# Patient Record
Sex: MALE | Race: WHITE | Employment: OTHER | URBAN - METROPOLITAN AREA
[De-identification: names, ages, dates, MRNs, and addresses within clinical notes are randomized per-mention and may not be internally consistent; named-entity substitution may affect disease eponyms.]

---

## 2017-02-08 RX ORDER — AMLODIPINE BESYLATE 2.5 MG/1
TABLET ORAL
Qty: 90 TAB | Refills: 1 | Status: SHIPPED | OUTPATIENT
Start: 2017-02-08 | End: 2017-08-07 | Stop reason: SDUPTHER

## 2017-02-15 ENCOUNTER — APPOINTMENT (OUTPATIENT)
Dept: INTERNAL MEDICINE CLINIC | Age: 62
End: 2017-02-15

## 2017-02-15 DIAGNOSIS — I12.9 BENIGN HYPERTENSION WITH CHRONIC KIDNEY DISEASE, STAGE II: ICD-10-CM

## 2017-02-15 DIAGNOSIS — N18.2 BENIGN HYPERTENSION WITH CHRONIC KIDNEY DISEASE, STAGE II: ICD-10-CM

## 2017-02-16 LAB
BUN SERPL-MCNC: 17 MG/DL (ref 8–27)
BUN/CREAT SERPL: 15 (ref 10–22)
CALCIUM SERPL-MCNC: 8.9 MG/DL (ref 8.6–10.2)
CHLORIDE SERPL-SCNC: 101 MMOL/L (ref 96–106)
CO2 SERPL-SCNC: 25 MMOL/L (ref 18–29)
CREAT SERPL-MCNC: 1.14 MG/DL (ref 0.76–1.27)
GLUCOSE SERPL-MCNC: 123 MG/DL (ref 65–99)
INTERPRETATION: NORMAL
POTASSIUM SERPL-SCNC: 4.4 MMOL/L (ref 3.5–5.2)
SODIUM SERPL-SCNC: 140 MMOL/L (ref 134–144)

## 2017-02-21 ENCOUNTER — OFFICE VISIT (OUTPATIENT)
Dept: INTERNAL MEDICINE CLINIC | Age: 62
End: 2017-02-21

## 2017-02-21 VITALS
HEART RATE: 61 BPM | HEIGHT: 73 IN | RESPIRATION RATE: 14 BRPM | TEMPERATURE: 96.9 F | OXYGEN SATURATION: 95 % | SYSTOLIC BLOOD PRESSURE: 129 MMHG | DIASTOLIC BLOOD PRESSURE: 78 MMHG | BODY MASS INDEX: 30.75 KG/M2 | WEIGHT: 232 LBS

## 2017-02-21 DIAGNOSIS — I25.10 CORONARY ARTERY DISEASE INVOLVING NATIVE CORONARY ARTERY OF NATIVE HEART WITHOUT ANGINA PECTORIS: ICD-10-CM

## 2017-02-21 DIAGNOSIS — G89.29 CHRONIC LEFT SHOULDER PAIN: ICD-10-CM

## 2017-02-21 DIAGNOSIS — M25.512 CHRONIC LEFT SHOULDER PAIN: ICD-10-CM

## 2017-02-21 DIAGNOSIS — I12.9 BENIGN HYPERTENSION WITH CHRONIC KIDNEY DISEASE, STAGE II: Primary | ICD-10-CM

## 2017-02-21 DIAGNOSIS — E78.00 HYPERCHOLESTEROLEMIA: ICD-10-CM

## 2017-02-21 DIAGNOSIS — Z23 ENCOUNTER FOR IMMUNIZATION: ICD-10-CM

## 2017-02-21 DIAGNOSIS — N18.2 BENIGN HYPERTENSION WITH CHRONIC KIDNEY DISEASE, STAGE II: Primary | ICD-10-CM

## 2017-02-21 NOTE — MR AVS SNAPSHOT
Visit Information Date & Time Provider Department Dept. Phone Encounter #  
 2/21/2017  1:30 PM Shaun Hendricks MD [facility] Martel [phone] [id number] Follow-up Instructions Return in about 6 months (around 8/21/2017) for HTN, ckd, CAD  Fasting lab one week prior . Upcoming Health Maintenance Date Due ZOSTER VACCINE AGE 60> 11/10/2015 INFLUENZA AGE 9 TO ADULT 8/1/2016 Pneumococcal 19-64 Highest Risk (1 of 3 - PCV13) 11/10/2020* COLONOSCOPY 12/19/2017 DTaP/Tdap/Td series (2 - Td) 5/7/2022 *Topic was postponed. The date shown is not the original due date. Allergies as of 2/21/2017  Review Complete On: 2/21/2017 By: Shaun Hendricks MD  
 No Known Allergies Current Immunizations  Reviewed on 2/21/2017 Name Date Influenza Vaccine (Quad) PF  Incomplete Influenza Vaccine Split 10/1/2011 Influenza Vaccine Whole 9/14/2010 TD Vaccine 3/31/2004 TDAP Vaccine 5/7/2012 Reviewed by Matthew James LPN on 2/60/9623 at  1:32 PM  
You Were Diagnosed With   
  
 Codes Comments Benign hypertension with chronic kidney disease, stage II    -  Primary ICD-10-CM: I12.9, N18.2 ICD-9-CM: 403.10, 585.2 Hypercholesterolemia     ICD-10-CM: E78.00 ICD-9-CM: 272.0 Coronary artery disease involving native coronary artery of native heart without angina pectoris     ICD-10-CM: I25.10 ICD-9-CM: 414.01 Chronic left shoulder pain     ICD-10-CM: M25.512, G89.29 ICD-9-CM: 719.41, 338.29 Encounter for immunization     ICD-10-CM: W21 ICD-9-CM: V03.89 Vitals BP Pulse Temp Resp Height(growth percentile) Weight(growth percentile) 129/78 (BP 1 Location: Left arm, BP Patient Position: Sitting) 61 96.9 °F (36.1 °C) (Oral) 14 6' 1\" (1.854 m) 232 lb (105.2 kg) SpO2 BMI Smoking Status 95% 30.61 kg/m2 Former Smoker BMI and BSA Data  Body Mass Index Body Surface Area  
 30.61 kg/m 2 2.33 m 2  
  
 Preferred Pharmacy Pharmacy Name [address], [facility] [phone] Your Updated Medication List  
  
   
This list is accurate as of: 2/21/17  2:06 PM.  Always use your most recent med list. amLODIPine 2.5 mg tablet Commonly known as:  Arlyss Badger TAKE 1 TABLET DAILY  
  
 aspirin delayed-release 81 mg tablet Take 1 Tab by mouth daily. atorvastatin 40 mg tablet Commonly known as:  LIPITOR  
TAKE 1 TABLET EVERY EVENING  
  
 metoprolol succinate 25 mg XL tablet Commonly known as:  TOPROL-XL  
TAKE 1 TABLET DAILY (NEED APPOINTMENT FOR REFILLS)  
  
 nitroglycerin 0.4 mg SL tablet Commonly known as:  NITROSTAT  
1 Tab by SubLINGual route every five (5) minutes as needed for Chest Pain (Max 3 doses daily). We Performed the Following INFLUENZA VIRUS VAC QUAD,SPLIT,PRESV FREE SYRINGE 3/> YRS IM [id number] CPT(R)] [place] IMMUNIZ ADMIN,1 SINGLE/COMB VAC/TOXOID [id number] CPT(R)] Follow-up Instructions Return in about 6 months (around 8/21/2017) for HTN, ckd, CAD  Fasting lab one week prior . To-Do List   
 02/21/2017 Imaging:  XR SHOULDER LT AP/LAT MIN 2 V   
  
 08/21/2017 Lab:  LIPID PANEL   
  
 08/21/2017 Lab:  METABOLIC PANEL, COMPREHENSIVE Patient Instructions Office visit in 6 months with fasting lab work a week before visit. See Dr Yomi Nguyen in June. Vaccine Information Statement Influenza (Flu) Vaccine (Inactivated or Recombinant): What you need to know Many Vaccine Information Statements are available in [place]ian and other languages. See www.immunize.org/vis Hojas de Información Sobre Vacunas están disponibles en Español y en muchos otros idiomas. Visite www.immunize.org/vis 1. Why get vaccinated? Influenza (flu) is a contagious disease that spreads around the [place] every year, usually between October and May. Flu is caused by influenza viruses, and is spread mainly by coughing, sneezing, and close contact. Anyone can get flu. Flu strikes suddenly and can last several days. Symptoms vary by age, but can include: 
 fever/chills  sore throat  muscle aches  fatigue  cough  headache  runny or stuffy nose Flu can also lead to pneumonia and blood infections, and cause diarrhea and seizures in children. If you have a medical condition, such as heart or lung disease, flu can make it worse. Flu is more dangerous for some people. Infants and young children, people 72years of age and older, pregnant women, and people with certain health conditions or a weakened immune system are at greatest risk. Each year thousands of people in the [facility] die from flu, and many more are hospitalized. Flu vaccine can: 
 keep you from getting flu, 
 make flu less severe if you do get it, and 
 keep you from spreading flu to your family and other people. 2. Inactivated and recombinant flu vaccines A dose of flu vaccine is recommended every flu season. Children 6 months through 6years of age may need two doses during the same flu season. Everyone else needs only one dose each flu season. Some inactivated flu vaccines contain a very small amount of a mercury-based preservative called thimerosal. Studies have not shown thimerosal in vaccines to be harmful, but flu vaccines that do not contain thimerosal are available. There is no live flu virus in flu shots. They cannot cause the flu. There are many flu viruses, and they are always changing. Each year a new flu vaccine is made to protect against three or four viruses that are likely to cause disease in the upcoming flu season. But even when the vaccine doesnt exactly match these viruses, it may still provide some protection Flu vaccine cannot prevent: 
 flu that is caused by a virus not covered by the vaccine, or 
  illnesses that look like flu but are not. It takes about 2 weeks for protection to develop after vaccination, and protection lasts through the flu season. 3. Some people should not get this vaccine Tell the person who is giving you the vaccine:  If you have any severe, life-threatening allergies. If you ever had a life-threatening allergic reaction after a dose of flu vaccine, or have a severe allergy to any part of this vaccine, you may be advised not to get vaccinated. Most, but not all, types of flu vaccine contain a small amount of egg protein.  If you ever had Guillain-Barré Syndrome (also called GBS). Some people with a history of GBS should not get this vaccine. This should be discussed with your doctor.  If you are not feeling well. It is usually okay to get flu vaccine when you have a mild illness, but you might be asked to come back when you feel better. 4. Risks of a vaccine reaction With any medicine, including vaccines, there is a chance of reactions. These are usually mild and go away on their own, but serious reactions are also possible. Most people who get a flu shot do not have any problems with it. Minor problems following a flu shot include:  
 soreness, redness, or swelling where the shot was given  hoarseness  sore, red or itchy eyes  cough  fever  aches  headache  itching  fatigue If these problems occur, they usually begin soon after the shot and last 1 or 2 days. More serious problems following a flu shot can include the following:  There may be a small increased risk of Guillain-Barré Syndrome (GBS) after inactivated flu vaccine. This risk has been estimated at 1 or 2 additional cases per million people vaccinated. This is much lower than the risk of severe complications from flu, which can be prevented by flu vaccine.    
 
 Young children who get the flu shot along with pneumococcal vaccine (PCV13) and/or DTaP vaccine at the same time might be slightly more likely to have a seizure caused by fever. Ask your doctor for more information. Tell your doctor if a child who is getting flu vaccine has ever had a seizure. Problems that could happen after any injected vaccine:  People sometimes faint after a medical procedure, including vaccination. Sitting or lying down for about 15 minutes can help prevent fainting, and injuries caused by a fall. Tell your doctor if you feel dizzy, or have vision changes or ringing in the ears.  Some people get severe pain in the shoulder and have difficulty moving the arm where a shot was given. This happens very rarely.  Any medication can cause a severe allergic reaction. Such reactions from a vaccine are very rare, estimated at about 1 in a million doses, and would happen within a few minutes to a few hours after the vaccination. As with any medicine, there is a very remote chance of a vaccine causing a serious injury or death. The safety of vaccines is always being monitored. For more information, visit: www.cdc.gov/vaccinesafety/ 
 
 
The [facility] Vaccine Injury Compensation Program (VICP) is a federal program that was created to compensate people who may have been injured by certain vaccines. Persons who believe they may have been injured by a vaccine can learn about the program and about filing a claim by calling 2-281.600.4629 or visiting the [employer] website at [email or web address]. There is a time limit to file a claim for compensation. 7. How can I learn more?  Ask your healthcare provider. He or she can give you the vaccine package insert or suggest other sources of information.  Call your local or state health department.  Contact the Centers for Disease Control and Prevention (CDC): 
- Call 4-381.869.9470 (2-800-FFX-INFO) or 
- Visit CDCs website at www.cdc.gov/flu Vaccine Information Statement Inactivated Influenza Vaccine 8/7/2015 
[id number] [facility] and [employer] Centers for Disease Control and Prevention Office Use Only Introducing [facility]! Maddie Florian introduces [employer] patient portal. Now you can access parts of your medical record, email your doctor's office, and request medication refills online. 1. In your internet browser, go to [email or web address] 2. Click on the First Time User? Click Here link in the Sign In box. You will see the New Member Sign Up page. 3. Enter your [employer] Access Code exactly as it appears below. You will not need to use this code after youve completed the sign-up process. If you do not sign up before the expiration date, you must request a new code. · [employer] Access Code: [email or web address] Expires: 5/22/2017  2:06 PM 
 
4. Enter the last four digits of your Social Security Number (xxxx) and Date of Birth (mm/dd/yyyy) as indicated and click Submit.  You will be taken to the next sign-up page. 5. Create a [employer] ID. This will be your [employer] login ID and cannot be changed, so think of one that is secure and easy to remember. 6. Create a [employer] password. You can change your password at any time. 7. Enter your Password Reset Question and Answer. This can be used at a later time if you forget your password. 8. Enter your e-mail address. You will receive e-mail notification when new information is available in [address]. 9. Click Sign Up. You can now view and download portions of your medical record. 10. Click the Download Summary menu link to download a portable copy of your medical information. If you have questions, please visit the Frequently Asked Questions section of the [employer] website. Remember, [employer] is NOT to be used for urgent needs. For medical emergencies, dial 911. Now available from your iPhone and Android! Please provide this summary of care documentation to your next provider. Your primary care clinician is listed as Samuel Zee. If you have any questions after today's visit, please call [phone].

## 2017-02-21 NOTE — PROGRESS NOTES
Reviewed record In preparation for visit and have obtained necessary documentation. Has info on advanced directive but has not filled them out. 1. Have you been to the ER, urgent care clinic or hospitalized since your last visit? UC 9/12/16    2. Have you seen or consulted any other health care providers outside of the [address] [place] [address] since your last visit? Include any pap smears or colon screening. MRI, DR Rosaline Montez [facility] reviewed: After verbal order read back of , patient received Flu Shot in left arm,  NDC [id number] Lot TR9AL Exp 6/30/17. Patient tolerated procedure without complaints and received VIS.

## 2017-02-21 NOTE — PROGRESS NOTES
HISTORY OF PRESENT ILLNESS  Portillo Conway is a 64 y.o. male. HPI  He presents for follow up of hypertension, hyperlipidemia, coronary artery disease, history of NSTEMI, and drug-eluting stent in right coronary in 2014, and chronic kidney disease. Diet and Lifestyle: generally follows a low fat low cholesterol diet, generally follows a low sodium diet, exercises regularly, nonsmoker   Medication compliance: compliant all of the time  Medication side effects: none  Home BP Monitoring: is well controlled at home, ranging 120's/70's. Cardiovascular ROS:  He complains of lower extremity edema. He denies palpitations, orthopnea, exertional chest pressure/discomfort, claudication, dyspnea on exertion, dizziness    He complains of left shoulder pain for months. He has some difficulty moving it particularly reaching behind to scratch his back. He denies any injury. He has no neck pain, numbness, tingling, or weakness in the left arm.       Patient Active Problem List   Diagnosis Code    History of colonoscopy Z98.890    Low back pain M54.5    Hypercholesterolemia E78.00    CAD (coronary artery disease), native coronary artery I25.10    Osteoarthritis of both knees M17.0    CKD (chronic kidney disease) stage 2, GFR 60-89 ml/min N18.2    Benign hypertension with chronic kidney disease, stage II I12.9, N18.2     Past Medical History   Diagnosis Date    CAD (coronary artery disease) April 2014     NSTEMI, PCI/NATE RCA    Hypercholesterolemia     Hypertension      Past Surgical History   Procedure Laterality Date    Hx orthopaedic       left shoulder, torn tendon    Hx orthopaedic       right knee X 4    Pr colonoscopy flx dx w/collj spec when pfrmd  12/19/2007     Dr Caballero Day 1 polyp repeat 12/2012    Pr cardiac surg procedure unlist       Stent RCA 4/2014     Social History     Social History    Marital status:      Spouse name: N/A    Number of children: N/A    Years of education: N/A     Social History Main Topics    Smoking status: Former Smoker     Packs/day: 0.25     Years: 35.00     Types: Cigarettes     Quit date: 11/12/2014    Smokeless tobacco: Never Used      Comment: Never smoked over a pack per day    Alcohol use No    Drug use: No    Sexual activity: Not Asked     Other Topics Concern    None     Social History Narrative     Family History   Problem Relation Age of Onset    Heart Disease Mother      CAD    Hypertension Mother     Elevated Lipids Mother     Cancer Father      lung    Mental Retardation Brother     Seizures Brother     Diabetes Brother     Hypertension Brother     Elevated Lipids Brother      No Known Allergies  Current Outpatient Prescriptions   Medication Sig Dispense Refill    amLODIPine (NORVASC) 2.5 mg tablet TAKE 1 TABLET DAILY 90 Tab 1    metoprolol succinate (TOPROL-XL) 25 mg XL tablet TAKE 1 TABLET DAILY (NEED APPOINTMENT FOR REFILLS) 90 Tab 1    atorvastatin (LIPITOR) 40 mg tablet TAKE 1 TABLET EVERY EVENING 90 Tab 2    nitroglycerin (NITROSTAT) 0.4 mg SL tablet 1 Tab by SubLINGual route every five (5) minutes as needed for Chest Pain (Max 3 doses daily). 1 Bottle 3    aspirin delayed-release 81 mg tablet Take 1 Tab by mouth daily. Review of Systems   Constitutional: Negative for malaise/fatigue and weight loss. Gastrointestinal: Negative for constipation, diarrhea and heartburn. Musculoskeletal: Positive for back pain (sometimes) and joint pain (knees, left shoulder (cannot reach behind to scratch back)). Neurological: Negative for dizziness, tingling and focal weakness. Visit Vitals    /78 (BP 1 Location: Left arm, BP Patient Position: Sitting)    Pulse 61    Temp 96.9 °F (36.1 °C) (Oral)    Resp 14    Ht 6' 1\" (1.854 m)    Wt 232 lb (105.2 kg)    SpO2 95%    BMI 30.61 kg/m2     Physical Exam   Constitutional: He is oriented to person, place, and time. He appears well-developed and well-nourished.    HENT: Head: Normocephalic and atraumatic. Eyes: Conjunctivae are normal. Pupils are equal, round, and reactive to light. Neck: Neck supple. Carotid bruit is not present. No thyromegaly present. Cardiovascular: Normal rate, regular rhythm and normal heart sounds. PMI is not displaced. Exam reveals no gallop. No murmur heard. Pulses:       Dorsalis pedis pulses are 2+ on the right side, and 2+ on the left side. Posterior tibial pulses are 2+ on the right side, and 2+ on the left side. Pulmonary/Chest: Effort normal. He has no wheezes. He has no rhonchi. He has no rales. Abdominal: Soft. Normal appearance. He exhibits no abdominal bruit and no mass. There is no hepatosplenomegaly. There is no tenderness. Musculoskeletal: He exhibits no edema. Right shoulder: He exhibits decreased range of motion (internal rotation). Lymphadenopathy:     He has no cervical adenopathy. Right: No supraclavicular adenopathy present. Left: No inguinal and no supraclavicular adenopathy present. Neurological: He is alert and oriented to person, place, and time. No sensory deficit. Skin: Skin is warm, dry and intact. No rash noted. Psychiatric: He has a normal mood and affect. His behavior is normal.   Nursing note and vitals reviewed.       Results for orders placed or performed in visit on 45/29/91   METABOLIC PANEL, BASIC   Result Value Ref Range    Glucose 123 (H) 65 - 99 mg/dL    BUN 17 8 - 27 mg/dL    Creatinine 1.14 0.76 - 1.27 mg/dL    GFR est non-AA 69 >59 mL/min/1.73    GFR est AA 80 >59 mL/min/1.73    BUN/Creatinine ratio 15 10 - 22    Sodium 140 134 - 144 mmol/L    Potassium 4.4 3.5 - 5.2 mmol/L    Chloride 101 96 - 106 mmol/L    CO2 25 18 - 29 mmol/L    Calcium 8.9 8.6 - 10.2 mg/dL   CKD REPORT   Result Value Ref Range    Interpretation Note      Lab Results   Component Value Date/Time    Cholesterol, total 90 08/08/2016 11:18 AM    HDL Cholesterol 34 08/08/2016 11:18 AM    LDL, calculated 44 08/08/2016 11:18 AM    VLDL, calculated 12 08/08/2016 11:18 AM    Triglyceride 59 08/08/2016 11:18 AM    CHOL/HDL Ratio 4.5 10/29/2010 08:57 AM     Results for orders placed or performed in visit on 02/21/17   XR SHOULDER LT AP/LAT MIN 2 V    Narrative    EXAM:  XR SHOULDER LT AP/LAT MIN 2 V    INDICATION:   pain, no injury. Chronic left shoulder pain, with no known  injury. COMPARISON: None. FINDINGS: 2 views of the left shoulder demonstrate no fracture, dislocation or  other acute abnormality. No significant degenerative changes are evident. Impression    IMPRESSION:  No acute abnormality. ASSESSMENT and PLAN    ICD-10-CM ICD-9-CM    1. Benign hypertension with chronic kidney disease, stage II I12.9 403.10     N18.2 585.2    2. Hypercholesterolemia M34.17 763.1 METABOLIC PANEL, COMPREHENSIVE      LIPID PANEL   3. Coronary artery disease involving native coronary artery of native heart without angina pectoris I25.10 414.01    4. Chronic left shoulder pain M25.512 719.41 XR SHOULDER LT AP/LAT MIN 2 V    G89.29 338.29    5. Encounter for immunization Z23 V03.89 INFLUENZA VIRUS VAC QUAD,SPLIT,PRESV FREE SYRINGE 3/> YRS IM      IN IMMUNIZ ADMIN,1 SINGLE/COMB VAC/TOXOID         Benign hypertension with chronic kidney disease, stage II  Hypertension is controlled    Hypercholesterolemia  Hyperlipidemia is controlled  -     METABOLIC PANEL, COMPREHENSIVE; Future  -     LIPID PANEL; Future    Coronary artery disease involving native coronary artery of native heart without angina pectoris  Stable. Chronic left shoulder pain  Can offer cortisone injection or referral to orthopedics. He will take under advisement.   -     XR SHOULDER LT AP/LAT MIN 2 V; Future    Encounter for immunization  -     Influenza virus vaccine (QUADRIVALENT PRES FREE SYRINGE) IM 3 years and older  -     IN IMMUNIZ ADMIN,1 SINGLE/COMB VAC/TOXOID      Follow-up Disposition:  Return in about 6 months (around 8/21/2017) for HTN, ckd, CAD  Fasting lab one week prior . lab results and schedule of future lab studies reviewed with patient  reviewed diet, exercise and weight control  cardiovascular risk and specific lipid/LDL goals reviewed  Discussed the patient's BMI with him. The BMI follow up plan is as follows: I have counseled this patient on diet and exercise regimens  I have discussed the diagnosis with the patient and the intended plan as seen in the above orders. Patient is in agreement. The patient has received an after-visit summary and questions were answered concerning future plans. I have discussed medication side effects and warnings with the patient as well.

## 2017-02-21 NOTE — PATIENT INSTRUCTIONS
Office visit in 6 months with fasting lab work a week before visit. See Dr Yomi Nguyen in June. Vaccine Information Statement    Influenza (Flu) Vaccine (Inactivated or Recombinant): What you need to know    Many Vaccine Information Statements are available in Setswana and other languages. See www.immunize.org/vis  Hojas de Información Sobre Vacunas están disponibles en Español y en muchos otros idiomas. Visite www.immunize.org/vis    1. Why get vaccinated? Influenza (flu) is a contagious disease that spreads around the [place] every year, usually between October and May. Flu is caused by influenza viruses, and is spread mainly by coughing, sneezing, and close contact. Anyone can get flu. Flu strikes suddenly and can last several days. Symptoms vary by age, but can include:   fever/chills   sore throat   muscle aches   fatigue   cough   headache    runny or stuffy nose    Flu can also lead to pneumonia and blood infections, and cause diarrhea and seizures in children. If you have a medical condition, such as heart or lung disease, flu can make it worse. Flu is more dangerous for some people. Infants and young children, people 72years of age and older, pregnant women, and people with certain health conditions or a weakened immune system are at greatest risk. Each year thousands of people in the [facility] die from flu, and many more are hospitalized. Flu vaccine can:   keep you from getting flu,   make flu less severe if you do get it, and   keep you from spreading flu to your family and other people. 2. Inactivated and recombinant flu vaccines    A dose of flu vaccine is recommended every flu season. Children 6 months through 6years of age may need two doses during the same flu season. Everyone else needs only one dose each flu season.        Some inactivated flu vaccines contain a very small amount of a mercury-based preservative called thimerosal. Studies have not shown thimerosal in vaccines to be harmful, but flu vaccines that do not contain thimerosal are available. There is no live flu virus in flu shots. They cannot cause the flu. There are many flu viruses, and they are always changing. Each year a new flu vaccine is made to protect against three or four viruses that are likely to cause disease in the upcoming flu season. But even when the vaccine doesnt exactly match these viruses, it may still provide some protection    Flu vaccine cannot prevent:   flu that is caused by a virus not covered by the vaccine, or   illnesses that look like flu but are not. It takes about 2 weeks for protection to develop after vaccination, and protection lasts through the flu season. 3. Some people should not get this vaccine    Tell the person who is giving you the vaccine:     If you have any severe, life-threatening allergies. If you ever had a life-threatening allergic reaction after a dose of flu vaccine, or have a severe allergy to any part of this vaccine, you may be advised not to get vaccinated. Most, but not all, types of flu vaccine contain a small amount of egg protein.  If you ever had Guillain-Barré Syndrome (also called GBS). Some people with a history of GBS should not get this vaccine. This should be discussed with your doctor.  If you are not feeling well. It is usually okay to get flu vaccine when you have a mild illness, but you might be asked to come back when you feel better. 4. Risks of a vaccine reaction    With any medicine, including vaccines, there is a chance of reactions. These are usually mild and go away on their own, but serious reactions are also possible. Most people who get a flu shot do not have any problems with it.      Minor problems following a flu shot include:    soreness, redness, or swelling where the shot was given     hoarseness   sore, red or itchy eyes   cough   fever   aches   headache   itching   fatigue  If these problems occur, they usually begin soon after the shot and last 1 or 2 days. More serious problems following a flu shot can include the following:     There may be a small increased risk of Guillain-Barré Syndrome (GBS) after inactivated flu vaccine. This risk has been estimated at 1 or 2 additional cases per million people vaccinated. This is much lower than the risk of severe complications from flu, which can be prevented by flu vaccine.  Young children who get the flu shot along with pneumococcal vaccine (PCV13) and/or DTaP vaccine at the same time might be slightly more likely to have a seizure caused by fever. Ask your doctor for more information. Tell your doctor if a child who is getting flu vaccine has ever had a seizure. Problems that could happen after any injected vaccine:      People sometimes faint after a medical procedure, including vaccination. Sitting or lying down for about 15 minutes can help prevent fainting, and injuries caused by a fall. Tell your doctor if you feel dizzy, or have vision changes or ringing in the ears.  Some people get severe pain in the shoulder and have difficulty moving the arm where a shot was given. This happens very rarely.  Any medication can cause a severe allergic reaction. Such reactions from a vaccine are very rare, estimated at about 1 in a million doses, and would happen within a few minutes to a few hours after the vaccination. As with any medicine, there is a very remote chance of a vaccine causing a serious injury or death. The safety of vaccines is always being monitored. For more information, visit: www.cdc.gov/vaccinesafety/    5. What if there is a serious reaction? What should I look for?  Look for anything that concerns you, such as signs of a severe allergic reaction, very high fever, or unusual behavior.     Signs of a severe allergic reaction can include hives, swelling of the face and throat, difficulty breathing, a fast heartbeat, dizziness, and weakness - usually within a few minutes to a few hours after the vaccination. What should I do?  If you think it is a severe allergic reaction or other emergency that cant wait, call 9-1-1 and get the person to the nearest hospital. Otherwise, call your doctor.  Reactions should be reported to the Vaccine Adverse Event Reporting System (VAERS). Your doctor should file this report, or you can do it yourself through  the VAERS web site at www.vaers. [facility].gov, or by calling 0-129.831.8754. VAERS does not give medical advice. 6. The National Vaccine Injury Compensation Program    The [facility] Vaccine Injury Compensation Program (VICP) is a federal program that was created to compensate people who may have been injured by certain vaccines. Persons who believe they may have been injured by a vaccine can learn about the program and about filing a claim by calling 7-734.239.6325 or visiting the [facility] website at www.[facility].gov/vaccinecompensation. There is a time limit to file a claim for compensation. 7. How can I learn more?  Ask your healthcare provider. He or she can give you the vaccine package insert or suggest other sources of information.  Call your local or state health department.  Contact the Centers for Disease Control and Prevention (CDC):  - Call 2-697.694.4321 (1-800-CDC-INFO) or  - Visit CDCs website at www.cdc.gov/flu    Vaccine Information Statement   Inactivated Influenza Vaccine   8/7/2015  42 HÉCTOR Christy [id number]    Department of Health and Human Services  Centers for Disease Control and Prevention    Office Use Only

## 2017-05-25 ENCOUNTER — OFFICE VISIT (OUTPATIENT)
Dept: CARDIOLOGY CLINIC | Age: 62
End: 2017-05-25

## 2017-05-25 VITALS
RESPIRATION RATE: 16 BRPM | OXYGEN SATURATION: 97 % | DIASTOLIC BLOOD PRESSURE: 80 MMHG | SYSTOLIC BLOOD PRESSURE: 120 MMHG | HEIGHT: 73 IN | BODY MASS INDEX: 30.88 KG/M2 | WEIGHT: 233 LBS | HEART RATE: 68 BPM

## 2017-05-25 DIAGNOSIS — I25.10 CORONARY ARTERY DISEASE INVOLVING NATIVE CORONARY ARTERY OF NATIVE HEART WITHOUT ANGINA PECTORIS: Primary | ICD-10-CM

## 2017-05-25 DIAGNOSIS — I12.9 BENIGN HYPERTENSION WITH CHRONIC KIDNEY DISEASE, STAGE II: ICD-10-CM

## 2017-05-25 DIAGNOSIS — E78.00 HYPERCHOLESTEROLEMIA: ICD-10-CM

## 2017-05-25 DIAGNOSIS — N18.2 BENIGN HYPERTENSION WITH CHRONIC KIDNEY DISEASE, STAGE II: ICD-10-CM

## 2017-05-25 NOTE — PROGRESS NOTES
5/25/2017 10:46 AM      Subjective:     Haroon Howell   denies chest pain, chest pressure/discomfort, dyspnea, palpitations, irregular heart beats, near-syncope, syncope. Visit Vitals    /80 (BP 1 Location: Right arm, BP Patient Position: Sitting)    Pulse 68    Resp 16    Ht 6' 1\" (1.854 m)    Wt 233 lb (105.7 kg)    SpO2 97%    BMI 30.74 kg/m2     Current Outpatient Prescriptions   Medication Sig    amLODIPine (NORVASC) 2.5 mg tablet TAKE 1 TABLET DAILY    metoprolol succinate (TOPROL-XL) 25 mg XL tablet TAKE 1 TABLET DAILY (NEED APPOINTMENT FOR REFILLS)    atorvastatin (LIPITOR) 40 mg tablet TAKE 1 TABLET EVERY EVENING    nitroglycerin (NITROSTAT) 0.4 mg SL tablet 1 Tab by SubLINGual route every five (5) minutes as needed for Chest Pain (Max 3 doses daily).  aspirin delayed-release 81 mg tablet Take 1 Tab by mouth daily. No current facility-administered medications for this visit. Objective:      Visit Vitals    /80 (BP 1 Location: Right arm, BP Patient Position: Sitting)    Pulse 68    Resp 16    Ht 6' 1\" (1.854 m)    Wt 233 lb (105.7 kg)    SpO2 97%    BMI 30.74 kg/m2       Data Review:     EKG: Normal sinus rhythm, inferior T wave inversion.      Reviewed and/or ordered active problem list, medication list tests    Past Medical History:   Diagnosis Date    CAD (coronary artery disease) April 2014    NSTEMI, PCI/NATE RCA    Hypercholesterolemia     Hypertension       Past Surgical History:   Procedure Laterality Date    CARDIAC SURG PROCEDURE UNLIST      Stent RCA 4/2014    HX ORTHOPAEDIC      left shoulder, torn tendon    HX ORTHOPAEDIC      right knee X 4    IN COLONOSCOPY FLX DX W/COLLJ SPEC WHEN PFRMD  12/19/2007    Dr Meliza Patel 1 polyp repeat 12/2012     No Known Allergies   Family History   Problem Relation Age of Onset    Heart Disease Mother      CAD    Hypertension Mother     Elevated Lipids Mother     Cancer Father lung    Mental Retardation Brother     Seizures Brother     Diabetes Brother     Hypertension Brother     Elevated Lipids Brother       Social History     Social History    Marital status:      Spouse name: N/A    Number of children: N/A    Years of education: N/A     Occupational History    Not on file. Social History Main Topics    Smoking status: Former Smoker     Packs/day: 0.25     Years: 35.00     Types: Cigarettes     Quit date: 11/12/2014    Smokeless tobacco: Never Used      Comment: Never smoked over a pack per day    Alcohol use No    Drug use: No    Sexual activity: Not on file     Other Topics Concern    Not on file     Social History Narrative         Review of Systems     General: Not Present- Anorexia, Chills, Dietary Changes, Fatigue, Fever, Medication Changes, Night Sweats, Weight Gain > 10lbs. and Weight Loss > 10lbs. .  Skin: Not Present- Bruising and Excessive Sweating. HEENT: Not Present- Headache, Visual Loss and Vertigo. Respiratory: Not Present- Cough, Decreased Exercise Tolerance, Difficulty Breathing, Snoring and Wheezing. Cardiovascular: Not Present- Abnormal Blood Pressure, Chest Pain, Claudications, Difficulty Breathing On Exertion, Edema, Fainting / Blacking Out, Irregular Heart Beat, Night Cramps, Orthopnea, Palpitations, Paroxysmal Nocturnal Dyspnea, Rapid Heart Rate, Shortness of Breath and Swelling of Extremities. Gastrointestinal: Not Present- Black, Tarry Stool, Bloody Stool, Diarrhea, Hematemesis, Rectal Bleeding and Vomiting. Musculoskeletal: Not Present- Muscle Pain and Muscle Weakness. Neurological: Not Present- Dizziness. Psychiatric: Not Present- Depression. Endocrine: Not Present- Cold Intolerance, Heat Intolerance and Thyroid Problems. Hematology: Not Present- Abnormal Bleeding, Anemia, Blood Clots and Easy Bruising.       Physical Exam   The physical exam findings are as follows:       General   Mental Status - Alert.  General Appearance - Not in acute distress. Chest and Lung Exam   Inspection: Accessory muscles - No use of accessory muscles in breathing. Auscultation:   Breath sounds: - Normal.      Cardiovascular   Inspection: Jugular vein - Bilateral - Inspection Normal.  Palpation/Percussion:   Apical Impulse: - Normal.  Auscultation: Rhythm - Regular. Heart Sounds - S1 WNL and S2 WNL. No S3 or S4. Murmurs & Other Heart Sounds: Auscultation of the heart reveals - No Murmurs. Carotid arteries - No Carotid bruit. Peripheral Vascular   Upper Extremity: Inspection - Bilateral - No Cyanotic nailbeds or Digital clubbing. Lower Extremity:   Palpation: Dorsalis pedis pulse - Bilateral - Normal. Posterior tibia pulse - Bilateral - Normal. Edema - Bilateral - No edema. Assessment:       ICD-10-CM ICD-9-CM    1. Coronary artery disease involving native coronary artery of native heart without angina pectoris I25.10 414.01 AMB POC EKG ROUTINE W/ 12 LEADS, INTER & REP   2. Hypercholesterolemia E78.00 272.0    3. Benign hypertension with chronic kidney disease, stage II I12.9 403.10     N18.2 585. 2        Plan:     1. CAD: stable. Continue current meds. 2. HTN: Controlled  3. High Cholesterol: Continue current statin, Last FLP noted. At target.

## 2017-05-25 NOTE — MR AVS SNAPSHOT
Visit Information Date & Time Provider Department Dept. Phone Encounter #  
 5/25/2017 10:30 AM Yolande Lange, [address] [facility] [phone] [id number] Follow-up Instructions Return in about 1 year (around 5/25/2018). Routing History Follow-up and Disposition History Your Appointments 8/22/2017  8:15 AM  
LAB with LAB [facility]) Appt Note: FASTING labs [address] [facility] [address] [phone]  
  
   
 [facility]  
  
    
 8/29/2017  8:45 AM  
ROUTINE CARE with Corbin Goldberg, MD  
[facility]) Appt Note: 6mth f/u; HTN, ckd, CAD Â Fasting lab one week prior [address] [facility] [address] [phone]  
  
   
 [address] Upcoming Health Maintenance Date Due ZOSTER VACCINE AGE 60> 11/10/2015 INFLUENZA AGE 9 TO ADULT 8/1/2017 COLONOSCOPY 12/19/2017 DTaP/Tdap/Td series (2 - Td) 5/7/2022 Allergies as of 5/25/2017  Review Complete On: 5/25/2017 By: Yolande Lange MD  
 No Known Allergies Current Immunizations  Reviewed on 2/21/2017 Name Date Influenza Vaccine (Quad) PF 2/21/2017 Influenza Vaccine Split 10/1/2011 Influenza Vaccine Whole 9/14/2010 TD Vaccine 3/31/2004 TDAP Vaccine 5/7/2012 Not reviewed this visit You Were Diagnosed With   
  
 Codes Comments Coronary artery disease involving native coronary artery of native heart without angina pectoris    -  Primary ICD-10-CM: I25.10 ICD-9-CM: 414.01 Hypercholesterolemia     ICD-10-CM: E78.00 ICD-9-CM: 272.0 Benign hypertension with chronic kidney disease, stage II     ICD-10-CM: I12.9, N18.2 ICD-9-CM: 403.10, 585.2 Vitals BP Pulse Resp Height(growth percentile) Weight(growth percentile) SpO2 120/80 (BP 1 Location: Right arm, BP Patient Position: Sitting) 68 16 6' 1\" (1.854 m) 233 lb (105.7 kg) 97% BMI Smoking Status 30.74 kg/m2 Former Smoker Vitals History BMI and BSA Data Body Mass Index Body Surface Area 30.74 kg/m 2 2.33 m 2 Preferred Pharmacy Pharmacy Name Phone [address] [phone] Your Updated Medication List  
  
   
This list is accurate as of: 5/25/17 10:48 AM.  Always use your most recent med list. amLODIPine 2.5 mg tablet Commonly known as:  [place] TAKE 1 TABLET DAILY  
  
 aspirin delayed-release 81 mg tablet Take 1 Tab by mouth daily. atorvastatin 40 mg tablet Commonly known as:  LIPITOR  
TAKE 1 TABLET EVERY EVENING  
  
 metoprolol succinate 25 mg XL tablet Commonly known as:  TOPROL-XL  
TAKE 1 TABLET DAILY (NEED APPOINTMENT FOR REFILLS)  
  
 nitroglycerin 0.4 mg SL tablet Commonly known as:  NITROSTAT  
1 Tab by SubLINGual route every five (5) minutes as needed for Chest Pain (Max 3 doses daily). We Performed the Following AMB POC EKG ROUTINE W/ 12 LEADS, INTER & REP [12113 CPT(R)] Follow-up Instructions Return in about 1 year (around 5/25/2018). Introducing [facility]! [employer] introduces [employer] patient portal. Now you can access parts of your medical record, email your doctor's office, and request medication refills online. 1. In your internet browser, go to [email or web address] [employer] 2. Click on the First Time User? Click Here link in the Sign In box. You will see the New Member Sign Up page. 3. Enter your [employer] Access Code exactly as it appears below. You will not need to use this code after youve completed the sign-up process. If you do not sign up before the expiration date, you must request a new code.  
 
· [employer] Access Code: [facility] 
 Expires: 8/23/2017 10:48 AM 
 
4. Enter the last four digits of your Social Security Number (xxxx) and Date of Birth (mm/dd/yyyy) as indicated and click Submit. You will be taken to the next sign-up page. 5. Create a OwnLocal ID. This will be your [employer] login ID and cannot be changed, so think of one that is secure and easy to remember. 6. Create a Own[employer] password. You can change your password at any time. 7. Enter your Password Reset Question and Answer. This can be used at a later time if you forget your password. 8. Enter your e-mail address. You will receive e-mail notification when new information is available in [address] 9. Click Sign Up. You can now view and download portions of your medical record. 10. Click the Download Summary menu link to download a portable copy of your medical information. If you have questions, please visit the Frequently Asked Questions section of the [employer] website. Remember, [employer] is NOT to be used for urgent needs. For medical emergencies, dial 911. Now available from your iPhone and Android! Please provide this summary of care documentation to your next provider. Your primary care clinician is listed as Jazmin Stover. If you have any questions after today's visit, please call [phone].

## 2017-06-09 RX ORDER — ATORVASTATIN CALCIUM 40 MG/1
TABLET, FILM COATED ORAL
Qty: 90 TAB | Refills: 1 | Status: SHIPPED | OUTPATIENT
Start: 2017-06-09 | End: 2017-11-30 | Stop reason: SDUPTHER

## 2017-06-28 RX ORDER — METOPROLOL SUCCINATE 25 MG/1
TABLET, EXTENDED RELEASE ORAL
Qty: 90 TAB | Refills: 0 | Status: SHIPPED | OUTPATIENT
Start: 2017-06-28 | End: 2017-09-26 | Stop reason: SDUPTHER

## 2017-08-07 RX ORDER — AMLODIPINE BESYLATE 2.5 MG/1
TABLET ORAL
Qty: 90 TAB | Refills: 0 | Status: SHIPPED | OUTPATIENT
Start: 2017-08-07 | End: 2017-11-05 | Stop reason: SDUPTHER

## 2017-08-22 ENCOUNTER — APPOINTMENT (OUTPATIENT)
Dept: INTERNAL MEDICINE CLINIC | Age: 62
End: 2017-08-22

## 2017-08-22 DIAGNOSIS — E78.00 HYPERCHOLESTEROLEMIA: ICD-10-CM

## 2017-08-23 ENCOUNTER — TELEPHONE (OUTPATIENT)
Dept: CARDIOLOGY CLINIC | Age: 62
End: 2017-08-23

## 2017-08-23 LAB
ALBUMIN SERPL-MCNC: 4.1 G/DL (ref 3.6–4.8)
ALBUMIN/GLOB SERPL: 1.6 {RATIO} (ref 1.2–2.2)
ALP SERPL-CCNC: 90 IU/L (ref 39–117)
ALT SERPL-CCNC: 22 IU/L (ref 0–44)
AST SERPL-CCNC: 21 IU/L (ref 0–40)
BILIRUB SERPL-MCNC: 0.4 MG/DL (ref 0–1.2)
BUN SERPL-MCNC: 18 MG/DL (ref 8–27)
BUN/CREAT SERPL: 13 (ref 10–24)
CALCIUM SERPL-MCNC: 9.2 MG/DL (ref 8.6–10.2)
CHLORIDE SERPL-SCNC: 101 MMOL/L (ref 96–106)
CHOLEST SERPL-MCNC: 103 MG/DL (ref 100–199)
CO2 SERPL-SCNC: 25 MMOL/L (ref 18–29)
CREAT SERPL-MCNC: 1.44 MG/DL (ref 0.76–1.27)
GLOBULIN SER CALC-MCNC: 2.5 G/DL (ref 1.5–4.5)
GLUCOSE SERPL-MCNC: 112 MG/DL (ref 65–99)
HDLC SERPL-MCNC: 32 MG/DL
INTERPRETATION, 910389: NORMAL
INTERPRETATION: NORMAL
LDLC SERPL CALC-MCNC: 50 MG/DL (ref 0–99)
PDF IMAGE, 910387: NORMAL
POTASSIUM SERPL-SCNC: 4.9 MMOL/L (ref 3.5–5.2)
PROT SERPL-MCNC: 6.6 G/DL (ref 6–8.5)
SODIUM SERPL-SCNC: 140 MMOL/L (ref 134–144)
TRIGL SERPL-MCNC: 104 MG/DL (ref 0–149)
VLDLC SERPL CALC-MCNC: 21 MG/DL (ref 5–40)

## 2017-08-23 NOTE — TELEPHONE ENCOUNTER
----- Message from Preet Babcock MD sent at 8/23/2017  8:08 AM EDT -----  Can you check with him who ordered labs for him       I looked in cc and it looks like his PCP-Lorin ordered for him. The results are in cc. Is that what you are looking for? Message  Received: Today       MD Sona Reed LPN       Caller: Unspecified (Today, 12:10 PM)                     He needs to f/u with them to discuss test results. Called pt,spoke to wife Contreras on hippa form, told her that pt needs to speak to his PCP for his lab results. She verified that pt has appt with his PCP next week and will get his results then. She verbalized that she understood everything.

## 2017-08-29 ENCOUNTER — OFFICE VISIT (OUTPATIENT)
Dept: INTERNAL MEDICINE CLINIC | Age: 62
End: 2017-08-29

## 2017-08-29 VITALS
DIASTOLIC BLOOD PRESSURE: 78 MMHG | HEART RATE: 64 BPM | BODY MASS INDEX: 30.42 KG/M2 | SYSTOLIC BLOOD PRESSURE: 128 MMHG | RESPIRATION RATE: 16 BRPM | WEIGHT: 229.5 LBS | OXYGEN SATURATION: 95 % | HEIGHT: 73 IN | TEMPERATURE: 97.3 F

## 2017-08-29 DIAGNOSIS — Z23 ENCOUNTER FOR IMMUNIZATION: ICD-10-CM

## 2017-08-29 DIAGNOSIS — I12.9 BENIGN HYPERTENSION WITH CHRONIC KIDNEY DISEASE, STAGE II: ICD-10-CM

## 2017-08-29 DIAGNOSIS — E11.9 CONTROLLED TYPE 2 DIABETES MELLITUS WITHOUT COMPLICATION, WITHOUT LONG-TERM CURRENT USE OF INSULIN (HCC): Primary | ICD-10-CM

## 2017-08-29 DIAGNOSIS — Z13.31 SCREENING FOR DEPRESSION: ICD-10-CM

## 2017-08-29 DIAGNOSIS — M72.2 PLANTAR FASCIITIS OF RIGHT FOOT: ICD-10-CM

## 2017-08-29 DIAGNOSIS — N18.2 BENIGN HYPERTENSION WITH CHRONIC KIDNEY DISEASE, STAGE II: ICD-10-CM

## 2017-08-29 DIAGNOSIS — E78.00 HYPERCHOLESTEROLEMIA: ICD-10-CM

## 2017-08-29 DIAGNOSIS — R73.9 HYPERGLYCEMIA: ICD-10-CM

## 2017-08-29 DIAGNOSIS — I25.10 CORONARY ARTERY DISEASE INVOLVING NATIVE CORONARY ARTERY OF NATIVE HEART WITHOUT ANGINA PECTORIS: ICD-10-CM

## 2017-08-29 LAB — HBA1C MFR BLD HPLC: 6.5 % (ref 4.8–5.6)

## 2017-08-29 RX ORDER — METFORMIN HYDROCHLORIDE 500 MG/1
500 TABLET, EXTENDED RELEASE ORAL
Qty: 90 TAB | Refills: 3 | Status: SHIPPED | OUTPATIENT
Start: 2017-08-29 | End: 2017-11-30 | Stop reason: SDUPTHER

## 2017-08-29 RX ORDER — ASPIRIN 81 MG/1
81 TABLET ORAL DAILY
Qty: 90 TAB | Refills: 3
Start: 2017-08-29

## 2017-08-29 NOTE — PROGRESS NOTES
Reviewed record In preparation for visit and have obtained necessary documentation. Has info on advanced directive but has not filled them out. 1. Have you been to the ER, urgent care clinic or hospitalized since your last visit? No     2. Have you seen or consulted any other health care providers outside of the [address] [place] [address] since your last visit? Include any pap smears or colon screening. Dr Alie Frey reviewed with provider. Health Maintenance reviewed: After verbal order read back of , patient received Flu Shot in left arm,  [employer] [address] 47 [id number] Lot [id number] Exp 4/30/18. Patient tolerated procedure without complaints and received VIS.

## 2017-08-29 NOTE — MR AVS SNAPSHOT
Visit Information Date & Time Provider Department Dept. Phone Encounter #  
 8/29/2017  8:45 AM Carlos Valderrama MD [facility] [phone] [id number] Follow-up Instructions Return in about 3 months (around 11/29/2017) for HTN, DM, ckd  PPSV 23  NON-fasting lab one week prior . Upcoming Health Maintenance Date Due ZOSTER VACCINE AGE 60> 9/10/2015 COLONOSCOPY 12/19/2017 DTaP/Tdap/Td series (2 - Td) 5/7/2022 Allergies as of 8/29/2017  Review Complete On: 8/29/2017 By: Carlos Valderrama MD  
 No Known Allergies Current Immunizations  Reviewed on 8/29/2017 Name Date Influenza Vaccine (Quad) PF 8/29/2017, 2/21/2017 Influenza Vaccine Split 10/1/2011 Influenza Vaccine Whole 9/14/2010 TD Vaccine 3/31/2004 TDAP Vaccine 5/7/2012 Reviewed by Jerome Rose LPN on 9/77/5189 at  8:47 AM  
You Were Diagnosed With   
  
 Codes Comments Controlled type 2 diabetes mellitus without complication, without long-term current use of insulin ([facility]    -  Primary ICD-10-CM: E11.9 ICD-9-CM: 250.00 Benign hypertension with chronic kidney disease, stage II     ICD-10-CM: I12.9, N18.2 ICD-9-CM: 403.10, 585.2 Hypercholesterolemia     ICD-10-CM: E78.00 ICD-9-CM: 272.0 Coronary artery disease involving native coronary artery of native heart without angina pectoris     ICD-10-CM: I25.10 ICD-9-CM: 414.01 Hyperglycemia     ICD-10-CM: R73.9 ICD-9-CM: 790.29 Plantar fasciitis of right foot     ICD-10-CM: M72.2 ICD-9-CM: 728.71 Encounter for immunization     ICD-10-CM: Z65 ICD-9-CM: V03.89 Vitals BP Pulse Temp Resp Height(growth percentile) Weight(growth percentile) 128/78 (BP 1 Location: Left arm, BP Patient Position: Sitting) 64 97.3 °F (36.3 °C) (Oral) 16 6' 1\" (1.854 m) 229 lb 8 oz (104.1 kg) SpO2 BMI Smoking Status 95% 30.28 kg/m2 Former Smoker BMI and BSA Data Body Mass Index Body Surface Area  
 30.28 kg/m 2 2.32 m 2 Preferred Pharmacy Pharmacy Name Phone [address] [facility] [phone] Your Updated Medication List  
  
   
This list is accurate as of: 8/29/17  9:59 AM.  Always use your most recent med list. amLODIPine 2.5 mg tablet Commonly known as:  [place] TAKE 1 TABLET DAILY  
  
 aspirin delayed-release 81 mg tablet Take 1 Tab by mouth daily. atorvastatin 40 mg tablet Commonly known as:  LIPITOR  
TAKE 1 TABLET EVERY EVENING  
  
 metFORMIN  mg tablet Commonly known as:  GLUCOPHAGE XR Take 1 Tab by mouth daily (with dinner). metoprolol succinate 25 mg XL tablet Commonly known as:  TOPROL-XL  
TAKE 1 TABLET DAILY (NEED APPOINTMENT FOR REFILLS)  
  
 nitroglycerin 0.4 mg SL tablet Commonly known as:  NITROSTAT  
1 Tab by SubLINGual route every five (5) minutes as needed for Chest Pain (Max 3 doses daily). Prescriptions Sent to Pharmacy Refills  
 metFORMIN ER (GLUCOPHAGE XR) 500 mg tablet 3 Sig: Take 1 Tab by mouth daily (with dinner). Class: Normal  
 Pharmacy: [address], [address] [place] [address] #: [phone] Route: Oral  
  
We Performed the Following AMB POC HEMOGLOBIN A1C [07009 CPT(R)] INFLUENZA VIRUS VAC QUAD,SPLIT,PRESV FREE SYRINGE 3/> YRS IM [id number] CPT(R)] IN IMMUNIZ ADMIN,1 SINGLE/COMB VAC/TOXOID [id number] CPT(R)] Follow-up Instructions Return in about 3 months (around 11/29/2017) for HTN, DM, ckd  PPSV 23  NON-fasting lab one week prior . To-Do List   
 11/29/2017 Lab:  HEMOGLOBIN A1C WITH EAG   
  
 11/29/2017 Lab:  METABOLIC PANEL, BASIC   
  
 11/29/2017 Lab:  MICROALBUMIN, UR, RAND W/ MICROALBUMIN/CREA RATIO Patient Instructions Advise you purchase Tuli's heel cups and use them in both shoes. Let me know if this, plus stretching and ice packs outlined below do not help after a month. Physical therapy, night splinting, referral to foot and ankle specialist would be the options at that point Start metformin  mg once daily after a meal.  
Make appointment with eye doctor for dilated retinal exam and have a copy of report sent to us Office visit in 3 months, non-fasting lab one week prior. Need urine sample Vaccine Information Statement Influenza (Flu) Vaccine (Inactivated or Recombinant): What you need to know Many Vaccine Information Statements are available in [place]ese and other languages. See www.immunize.org/vis Hojas de Información Sobre Vacunas están disponibles en Español y en muchos otros idiomas. Visite www.immunize.org/vis 1. Why get vaccinated? Influenza (flu) is a contagious disease that spreads around the [place] every year, usually between October and May. Flu is caused by influenza viruses, and is spread mainly by coughing, sneezing, and close contact. Anyone can get flu. Flu strikes suddenly and can last several days. Symptoms vary by age, but can include: 
 fever/chills  sore throat  muscle aches  fatigue  cough  headache  runny or stuffy nose Flu can also lead to pneumonia and blood infections, and cause diarrhea and seizures in children. If you have a medical condition, such as heart or lung disease, flu can make it worse. Flu is more dangerous for some people. Infants and young children, people 72years of age and older, pregnant women, and people with certain health conditions or a weakened immune system are at greatest risk. Each year thousands of people in the [facility] die from flu, and many more are hospitalized. Flu vaccine can: 
 keep you from getting flu, 
 make flu less severe if you do get it, and 
 keep you from spreading flu to your family and other people. 2. Inactivated and recombinant flu vaccines A dose of flu vaccine is recommended every flu season. Children 6 months through 6years of age may need two doses during the same flu season. Everyone else needs only one dose each flu season. Some inactivated flu vaccines contain a very small amount of a mercury-based preservative called thimerosal. Studies have not shown thimerosal in vaccines to be harmful, but flu vaccines that do not contain thimerosal are available. There is no live flu virus in flu shots. They cannot cause the flu. There are many flu viruses, and they are always changing. Each year a new flu vaccine is made to protect against three or four viruses that are likely to cause disease in the upcoming flu season. But even when the vaccine doesnt exactly match these viruses, it may still provide some protection Flu vaccine cannot prevent: 
 flu that is caused by a virus not covered by the vaccine, or 
 illnesses that look like flu but are not. It takes about 2 weeks for protection to develop after vaccination, and protection lasts through the flu season. 3. Some people should not get this vaccine Tell the person who is giving you the vaccine:  If you have any severe, life-threatening allergies. If you ever had a life-threatening allergic reaction after a dose of flu vaccine, or have a severe allergy to any part of this vaccine, you may be advised not to get vaccinated. Most, but not all, types of flu vaccine contain a small amount of egg protein.  If you ever had Guillain-Barré Syndrome (also called GBS). Some people with a history of GBS should not get this vaccine. This should be discussed with your doctor.  If you are not feeling well. It is usually okay to get flu vaccine when you have a mild illness, but you might be asked to come back when you feel better. 4. Risks of a vaccine reaction With any medicine, including vaccines, there is a chance of reactions. These are usually mild and go away on their own, but serious reactions are also possible. Most people who get a flu shot do not have any problems with it. Minor problems following a flu shot include:  
 soreness, redness, or swelling where the shot was given  hoarseness  sore, red or itchy eyes  cough  fever  aches  headache  itching  fatigue If these problems occur, they usually begin soon after the shot and last 1 or 2 days. More serious problems following a flu shot can include the following:  There may be a small increased risk of Guillain-Barré Syndrome (GBS) after inactivated flu vaccine. This risk has been estimated at 1 or 2 additional cases per million people vaccinated. This is much lower than the risk of severe complications from flu, which can be prevented by flu vaccine.  Young children who get the flu shot along with pneumococcal vaccine (PCV13) and/or DTaP vaccine at the same time might be slightly more likely to have a seizure caused by fever. Ask your doctor for more information. Tell your doctor if a child who is getting flu vaccine has ever had a seizure. Problems that could happen after any injected vaccine:  People sometimes faint after a medical procedure, including vaccination. Sitting or lying down for about 15 minutes can help prevent fainting, and injuries caused by a fall. Tell your doctor if you feel dizzy, or have vision changes or ringing in the ears.  Some people get severe pain in the shoulder and have difficulty moving the arm where a shot was given. This happens very rarely.  Any medication can cause a severe allergic reaction. Such reactions from a vaccine are very rare, estimated at about 1 in a million doses, and would happen within a few minutes to a few hours after the vaccination. As with any medicine, there is a very remote chance of a vaccine causing a serious injury or death. The safety of vaccines is always being monitored. For more information, visit: www.cdc.gov/vaccinesafety/ 
 
5. What if there is a serious reaction? What should I look for?  Look for anything that concerns you, such as signs of a severe allergic reaction, very high fever, or unusual behavior. Signs of a severe allergic reaction can include hives, swelling of the face and throat, difficulty breathing, a fast heartbeat, dizziness, and weakness  usually within a few minutes to a few hours after the vaccination. What should I do?  If you think it is a severe allergic reaction or other emergency that cant wait, call 9-1-1 and get the person to the nearest hospital. Otherwise, call your doctor.  Reactions should be reported to the Vaccine Adverse Event Reporting System (VAERS). Your doctor should file this report, or you can do it yourself through  the VAERS web site at www.vaers. [facility].gov, or by calling 5-431.913.6120. VAERS does not give medical advice. 6. The National Vaccine Injury Compensation Program 
 
The [facility] Vaccine Injury Compensation Program (VICP) is a federal program that was created to compensate people who may have been injured by certain vaccines. Persons who believe they may have been injured by a vaccine can learn about the program and about filing a claim by calling 7-941.398.5695 or visiting the 1900 "[employer] website at www.[facility].gov/vaccinecompensation. There is a time limit to file a claim for compensation. 7. How can I learn more?  Ask your healthcare provider. He or she can give you the vaccine package insert or suggest other sources of information.  Call your local or state health department.  Contact the Centers for Disease Control and Prevention (CDC): 
- Call 0-188.850.4361 (1-800-CDC-INFO) or 
- Visit CDCs website at www.cdc.gov/flu Vaccine Information Statement Inactivated Influenza Vaccine 8/7/2015 
42 HÉCTOR Fernandez 574ON-78 Department of Health and [facility] [employer] Centers for Disease Control and Prevention Office Use Only Plantar Fasciitis: Care Instructions Your Care Instructions Plantar fasciitis is pain and inflammation of the plantar fascia, the tissue at the bottom of your foot that connects the heel bone to the toes. The plantar fascia also supports the arch. If you strain the plantar fascia, it can develop small tears and cause heel pain when you stand or walk. Plantar fasciitis can be caused by running or other sports. It also may occur in people who are overweight or who have high arches or flat feet. You may get plantar fasciitis if you walk or stand for long periods, or have a tight Achilles tendon or calf muscles. You can improve your foot pain with rest and other care at home. It might take a few weeks to a few months for your foot to heal completely. Follow-up care is a key part of your treatment and safety. Be sure to make and go to all appointments, and call your doctor if you are having problems. It's also a good idea to know your test results and keep a list of the medicines you take. How can you care for yourself at home? · Rest your feet often. Reduce your activity to a level that lets you avoid pain. If possible, do not run or walk on hard surfaces. · Take pain medicines exactly as directed. ¨ If the doctor gave you a prescription medicine for pain, take it as prescribed. ¨ If you are not taking a prescription pain medicine, take an over-the-counter anti-inflammatory medicine for pain and swelling, such as ibuprofen (Advil, Motrin) or naproxen (Aleve). Read and follow all instructions on the label. · Use ice massage to help with pain and swelling. You can use an ice cube or an ice cup several times a day.  To make an ice cup, fill a paper cup with water and freeze it. Cut off the top of the cup until a half-inch of ice shows. Hold onto the remaining paper to use the cup. Rub the ice in small circles over the area for 5 to 7 minutes. · Contrast baths, which alternate hot and cold water, can also help reduce swelling. But because heat alone may make pain and swelling worse, end a contrast bath with a soak in cold water. · Wear a night splint if your doctor suggests it. A night splint holds your foot with the toes pointed up and the foot and ankle at a 90-degree angle. This position gives the bottom of your foot a constant, gentle stretch. · Do simple exercises such as calf stretches and towel stretches 2 to 3 times each day, especially when you first get up in the morning. These can help the plantar fascia become more flexible. They also make the muscles that support your arch stronger. Hold these stretches for 15 to 30 seconds per stretch. Repeat 2 to 4 times. ¨ Stand about 1 foot from a wall. Place the palms of both hands against the wall at chest level. Lean forward against the wall, keeping one leg with the knee straight and heel on the ground while bending the knee of the other leg. ¨ Sit down on the floor or a mat with your feet stretched in front of you. Roll up a towel lengthwise, and loop it over the ball of your foot. Holding the towel at both ends, gently pull the towel toward you to stretch your foot. · Wear shoes with good arch support. Athletic shoes or shoes with a well-cushioned sole are good choices. · Try heel cups or shoe inserts (orthotics) to help cushion your heel. You can buy these at many shoe stores. · Put on your shoes as soon as you get out of bed. Going barefoot or wearing slippers may make your pain worse. · Reach and stay at a good weight for your height. This puts less strain on your feet. When should you call for help? Call your doctor now or seek immediate medical care if: · You have heel pain with fever, redness, or warmth in your heel. · You cannot put weight on the sore foot. Watch closely for changes in your health, and be sure to contact your doctor if: 
· You have numbness or tingling in your heel. · Your heel pain lasts more than 2 weeks. Where can you learn more? Go to http://fela-franklin.info/. Rogena Olp in the search box to learn more about \"Plantar Fasciitis: Care Instructions. \" Current as of: March 21, 2017 Content Version: 11.3 © 3927-4410 [employer]. Care instructions adapted under license by [employer] (which disclaims liability or warranty for this information). If you have questions about a medical condition or this instruction, always ask your healthcare professional. [address] any warranty or liability for your use of this information. Plantar Fasciitis: Exercises Your Care Instructions Here are some examples of typical rehabilitation exercises for your condition. Start each exercise slowly. Ease off the exercise if you start to have pain. Your doctor or physical therapist will tell you when you can start these exercises and which ones will work best for you. How to do the exercises Note: Each exercise should create a pulling feeling but should not cause pain. Towel stretch 1. Sit with your legs extended and knees straight. 2. Place a towel around your foot just under the toes. 3. Hold each end of the towel in each hand, with your hands above your knees. 4. Pull back with the towel so that your foot stretches toward you. 5. Hold the position for at least 15 to 30 seconds. 6. Repeat 2 to 4 times a session, up to 5 sessions a day. Calf stretch Note: This exercise stretches the muscles at the back of the lower leg (the calf) and the Achilles tendon. Do this exercise 3 or 4 times a day, 5 days a week. 1. Stand facing a wall with your hands on the wall at about eye level. Put the leg you want to stretch about a step behind your other leg. 2. Keeping your back heel on the floor, bend your front knee until you feel a stretch in the back leg. 3. Hold the stretch for 15 to 30 seconds. Repeat 2 to 4 times. Plantar fascia and calf stretch Note: Stretching the plantar fascia and calf muscles can increase flexibility and decrease heel pain. You can do this exercise several times each day and before and after activity. 1. Stand on a step as shown above. Be sure to hold on to the banister. 2. Slowly let your heels down over the edge of the step as you relax your calf muscles. You should feel a gentle stretch across the bottom of your foot and up the back of your leg to your knee. 3. Hold the stretch about 15 to 30 seconds, and then tighten your calf muscle a little to bring your heel back up to the level of the step. Repeat 2 to 4 times. Towel curls 1. While sitting, place your foot on a towel on the floor and scrunch the towel toward you with your toes. 2. Then, also using your toes, push the towel away from you. Note: Make this exercise more challenging by placing a weighted object, such as a soup can, on the other end of the towel. [employer] pickups 1. Put marbles on the floor next to a cup. 
2. Using your toes, try to lift the marbles up from the floor and put them in the cup. Follow-up care is a key part of your treatment and safety. Be sure to make and go to all appointments, and call your doctor if you are having problems. It's also a good idea to know your test results and keep a list of the medicines you take. Where can you learn more? Go to http://fela-franklin.info/. Camilla Gordon in the search box to learn more about \"Plantar Fasciitis: Exercises. \" Current as of: March 21, 2017 Content Version: 11.3 © 0124-0569 [employer].  Care instructions adapted under license by [facility] [address] (which disclaims liability or warranty for this information). If you have questions about a medical condition or this instruction, always ask your healthcare professional. [address] any warranty or liability for your use of this information. Learning About Diabetes Food Guidelines Your Care Instructions Meal planning is important to manage diabetes. It helps keep your blood sugar at a target level (which you set with your doctor). You don't have to eat special foods. You can eat what your family eats, including sweets once in a while. But you do have to pay attention to how often you eat and how much you eat of certain foods. You may want to work with a dietitian or a certified diabetes educator (CDE) to help you plan meals and snacks. A dietitian or CDE can also help you lose weight if that is one of your goals. What should you know about eating carbs? Managing the amount of carbohydrate (carbs) you eat is an important part of healthy meals when you have diabetes. Carbohydrate is found in many foods. · Learn which foods have carbs. And learn the amounts of carbs in different foods. ¨ Bread, cereal, pasta, and rice have about 15 grams of carbs in a serving. A serving is 1 slice of bread (1 ounce), ½ cup of cooked cereal, or 1/3 cup of cooked pasta or rice. ¨ Fruits have 15 grams of carbs in a serving. A serving is 1 small fresh fruit, such as an apple or orange; ½ of a banana; ½ cup of cooked or canned fruit; ½ cup of fruit juice; 1 cup of melon or raspberries; or 2 tablespoons of dried fruit. ¨ Milk and no-sugar-added yogurt have 15 grams of carbs in a serving. A serving is 1 cup of milk or 2/3 cup of no-sugar-added yogurt. ¨ Starchy vegetables have 15 grams of carbs in a serving. A serving is ½ cup of mashed potatoes or sweet potato; 1 cup winter squash; ½ of a small baked potato; ½ cup of cooked beans; or ½ cup cooked corn or green peas. · Learn how much carbs to eat each day and at each meal. A dietitian or CDE can teach you how to keep track of the amount of carbs you eat. This is called carbohydrate counting. · If you are not sure how to count carbohydrate grams, use the Plate Method to plan meals. It is a good, quick way to make sure that you have a balanced meal. It also helps you spread carbs throughout the day. ¨ Divide your plate by types of foods. Put non-starchy vegetables on half the plate, meat or other protein food on one-quarter of the plate, and a grain or starchy vegetable in the final quarter of the plate. To this you can add a small piece of fruit and 1 cup of milk or yogurt, depending on how many carbs you are supposed to eat at a meal. 
· Try to eat about the same amount of carbs at each meal. Do not \"save up\" your daily allowance of carbs to eat at one meal. 
· Proteins have very little or no carbs per serving. Examples of proteins are beef, chicken, turkey, fish, eggs, tofu, cheese, cottage cheese, and peanut butter. A serving size of meat is 3 ounces, which is about the size of a deck of cards. Examples of meat substitute serving sizes (equal to 1 ounce of meat) are 1/4 cup of cottage cheese, 1 egg, 1 tablespoon of peanut butter, and ½ cup of tofu. How can you eat out and still eat healthy? · Learn to estimate the serving sizes of foods that have carbohydrate. If you measure food at home, it will be easier to estimate the amount in a serving of restaurant food. · If the meal you order has too much carbohydrate (such as potatoes, corn, or baked beans), ask to have a low-carbohydrate food instead. Ask for a salad or green vegetables. · If you use insulin, check your blood sugar before and after eating out to help you plan how much to eat in the future. · If you eat more carbohydrate at a meal than you had planned, take a walk or do other exercise. This will help lower your blood sugar. What else should you know? · Limit saturated fat, such as the fat from meat and dairy products. This is a healthy choice because people who have diabetes are at higher risk of heart disease. So choose lean cuts of meat and nonfat or low-fat dairy products. Use olive or canola oil instead of butter or shortening when cooking. · Don't skip meals. Your blood sugar may drop too low if you skip meals and take insulin or certain medicines for diabetes. · Check with your doctor before you drink alcohol. Alcohol can cause your blood sugar to drop too low. Alcohol can also cause a bad reaction if you take certain diabetes medicines. Follow-up care is a key part of your treatment and safety. Be sure to make and go to all appointments, and call your doctor if you are having problems. It's also a good idea to know your test results and keep a list of the medicines you take. Where can you learn more? Go to http://fela-franklin.info/. Enter D282 in the search box to learn more about \"Learning About Diabetes Food Guidelines. \" Current as of: March 13, 2017 Content Version: 11.3 © 1241-9712 [employer]. Care instructions adapted under license by [employer] (which disclaims liability or warranty for this information). If you have questions about a medical condition or this instruction, always ask your healthcare professional. [address] any warranty or liability for your use of this information. Learning About Tests When You Have Diabetes Why do you need regular diabetes tests? Diabetes can be hard on your body if it's not well controlled. But having tests on a regular schedule can help you and your doctor find problems early, when it's easier to start managing them. What tests do you need?  
The tests you may have, how often you should have them, and the goals of the tests are: 
A1c blood test. This test shows the average level of blood sugar over the past 2 to 3 months. It helps your doctor see whether blood sugar levels have been staying within your target range. · How often: Every 3 to 6 months · Goal: A blood sugar level in your target range Blood pressure test: This test measures the pressure of blood flow in the arteries. Controlling blood pressure can help prevent damage to nerves and blood vessels. · How often: Every 3 to 6 months · Goal: A blood pressure level in your target range Cholesterol test: This test measures the amount of a type of fat in the blood. It is common for people with diabetes to also have high cholesterol. Too much cholesterol in the blood can build up inside the blood vessels and raise the risk for heart attack and stroke. · How often: At the time of your diabetes diagnosis, and as often as your doctor recommends after that · Goal: A cholesterol level in your target range Albumin-creatinine ratio test: This test checks for kidney damage by looking for the protein albumin (say \"al-BYOO-deniz\") in the urine. Albumin is normally found in the blood. Kidney damage can let small amounts of it (microalbumin) leak into the urine. · How often: Once a year · Goal: No protein in the urine Blood creatinine test/estimated glomerular filtration (eGFR): The blood creatinine (say \"ohec-SI-kg-neen\") level shows how well your kidneys are working. Creatinine is a waste product that muscles release into the blood. Blood creatinine is used to estimate the glomerular filtration rate. A high level of creatinine and/or a low eGFR may mean your kidneys are not working as well as they should. · How often: Once a year · Goal: Normal level of creatinine in the blood. The eGFR goal is greater than 60 mL/min/1.73 m². Complete foot exam: The doctor checks for foot sores and whether any sensation has been lost. 
· How often: Once a year · Goal: Healthy feet with no foot ulcers or loss of feeling Dental exam and cleaning: The dentist checks for gum disease and tooth decay. People with high blood sugar are more likely to have these problems. · How often: Every 6 months · Goal: Healthy teeth and gums Complete eye exam: High blood sugar levels can damage the eyes. This exam is done by an ophthalmologist or optometrist. It includes a dilated eye exam. The exam shows whether there's damage to the back of the eye (diabetic retinopathy). · How often: Once a year. If you don't have any signs of diabetic retinopathy, your doctor may recommend an exam every 2 years. · Goal: No damage to the back of the eye Thyroid-stimulating hormone (TSH) blood test: This test checks for thyroid disease. Too little thyroid hormone can cause some medicines (like insulin) to stay in the body longer. This can cause low blood sugar. You may be tested if you have high cholesterol or are a woman over 48years old. · How often: As part of your diabetes diagnosis, and as often as your doctor recommends after that · Goal: Normal level of TSH in the blood Follow-up care is a key part of your treatment and safety. Be sure to make and go to all appointments, and call your doctor if you are having problems. It's also a good idea to know your test results and keep a list of the medicines you take. Where can you learn more? Go to http://fela-franklin.info/. Enter [id number] in the search box to learn more about \"Learning About Tests When You Have Diabetes. \" Current as of: March 13, 2017 Content Version: 11.3 © 2792-3494 [employer]. Care instructions adapted under license by [employer] (which disclaims liability or warranty for this information). If you have questions about a medical condition or this instruction, always ask your healthcare professional. [place] [address] any warranty or liability for your use of this information. Introducing [facility] SERVICES! [facility] introduces [employer] patient portal. Now you can access parts of your medical record, email your doctor's office, and request medication refills online. 1. In your internet browser, go to [email or web address] 2. Click on the First Time User? Click Here link in the Sign In box. You will see the New Member Sign Up page. 3. Enter your [employer] Access Code exactly as it appears below. You will not need to use this code after youve completed the sign-up process. If you do not sign up before the expiration date, you must request a new code. · [employer] Access Code: [email or web address] Expires: 11/27/2017  8:52 AM 
 
4. Enter the last four digits of your Social Security Number (xxxx) and Date of Birth (mm/dd/yyyy) as indicated and click Submit. You will be taken to the next sign-up page. 5. Create a [employer] ID. This will be your [employer] login ID and cannot be changed, so think of one that is secure and easy to remember. 6. Create a [employer] password. You can change your password at any time. 7. Enter your Password Reset Question and Answer. This can be used at a later time if you forget your password. 8. Enter your e-mail address. You will receive e-mail notification when new information is available in [address] 9. Click Sign Up. You can now view and download portions of your medical record. 10. Click the Download Summary menu link to download a portable copy of your medical information. If you have questions, please visit the Frequently Asked Questions section of the [employer] website. Remember, [employer] is NOT to be used for urgent needs. For medical emergencies, dial 911. Now available from your iPhone and Android! Please provide this summary of care documentation to your next provider. Your primary care clinician is listed as Carlos Valderrama. If you have any questions after today's visit, please call [phone].

## 2017-08-29 NOTE — PATIENT INSTRUCTIONS
Advise you purchase Tuli's heel cups and use them in both shoes. Let me know if this, plus stretching and ice packs outlined below do not help after a month. Physical therapy, night splinting, referral to foot and ankle specialist would be the options at that point  Start metformin  mg once daily after a meal.   Make appointment with eye doctor for dilated retinal exam and have a copy of report sent to us  Office visit in 3 months, non-fasting lab one week prior. Need urine sample          Vaccine Information Statement    Influenza (Flu) Vaccine (Inactivated or Recombinant): What you need to know    Many Vaccine Information Statements are available in Faroese and other languages. See www.immunize.org/vis  Hojas de Información Sobre Vacunas están disponibles en Español y en muchos otros idiomas. Visite www.immunize.org/vis    1. Why get vaccinated? Influenza (flu) is a contagious disease that spreads around the [place] every year, usually between October and May. Flu is caused by influenza viruses, and is spread mainly by coughing, sneezing, and close contact. Anyone can get flu. Flu strikes suddenly and can last several days. Symptoms vary by age, but can include:   fever/chills   sore throat   muscle aches   fatigue   cough   headache    runny or stuffy nose    Flu can also lead to pneumonia and blood infections, and cause diarrhea and seizures in children. If you have a medical condition, such as heart or lung disease, flu can make it worse. Flu is more dangerous for some people. Infants and young children, people 72years of age and older, pregnant women, and people with certain health conditions or a weakened immune system are at greatest risk. Each year thousands of people in the [facility] die from flu, and many more are hospitalized.      Flu vaccine can:   keep you from getting flu,   make flu less severe if you do get it, and   keep you from spreading flu to your family and other people. 2. Inactivated and recombinant flu vaccines    A dose of flu vaccine is recommended every flu season. Children 6 months through 6years of age may need two doses during the same flu season. Everyone else needs only one dose each flu season. Some inactivated flu vaccines contain a very small amount of a mercury-based preservative called thimerosal. Studies have not shown thimerosal in vaccines to be harmful, but flu vaccines that do not contain thimerosal are available. There is no live flu virus in flu shots. They cannot cause the flu. There are many flu viruses, and they are always changing. Each year a new flu vaccine is made to protect against three or four viruses that are likely to cause disease in the upcoming flu season. But even when the vaccine doesnt exactly match these viruses, it may still provide some protection    Flu vaccine cannot prevent:   flu that is caused by a virus not covered by the vaccine, or   illnesses that look like flu but are not. It takes about 2 weeks for protection to develop after vaccination, and protection lasts through the flu season. 3. Some people should not get this vaccine    Tell the person who is giving you the vaccine:     If you have any severe, life-threatening allergies. If you ever had a life-threatening allergic reaction after a dose of flu vaccine, or have a severe allergy to any part of this vaccine, you may be advised not to get vaccinated. Most, but not all, types of flu vaccine contain a small amount of egg protein.  If you ever had Guillain-Barré Syndrome (also called GBS). Some people with a history of GBS should not get this vaccine. This should be discussed with your doctor.  If you are not feeling well. It is usually okay to get flu vaccine when you have a mild illness, but you might be asked to come back when you feel better.       4. Risks of a vaccine reaction    With any medicine, including vaccines, there is a chance of reactions. These are usually mild and go away on their own, but serious reactions are also possible. Most people who get a flu shot do not have any problems with it. Minor problems following a flu shot include:    soreness, redness, or swelling where the shot was given     hoarseness   sore, red or itchy eyes   cough   fever   aches   headache   itching   fatigue  If these problems occur, they usually begin soon after the shot and last 1 or 2 days. More serious problems following a flu shot can include the following:     There may be a small increased risk of Guillain-Barré Syndrome (GBS) after inactivated flu vaccine. This risk has been estimated at 1 or 2 additional cases per million people vaccinated. This is much lower than the risk of severe complications from flu, which can be prevented by flu vaccine.  Young children who get the flu shot along with pneumococcal vaccine (PCV13) and/or DTaP vaccine at the same time might be slightly more likely to have a seizure caused by fever. Ask your doctor for more information. Tell your doctor if a child who is getting flu vaccine has ever had a seizure. Problems that could happen after any injected vaccine:      People sometimes faint after a medical procedure, including vaccination. Sitting or lying down for about 15 minutes can help prevent fainting, and injuries caused by a fall. Tell your doctor if you feel dizzy, or have vision changes or ringing in the ears.  Some people get severe pain in the shoulder and have difficulty moving the arm where a shot was given. This happens very rarely.  Any medication can cause a severe allergic reaction. Such reactions from a vaccine are very rare, estimated at about 1 in a million doses, and would happen within a few minutes to a few hours after the vaccination.     As with any medicine, there is a very remote chance of a vaccine causing a serious injury or death. The safety of vaccines is always being monitored. For more information, visit: www.cdc.gov/vaccinesafety/    5. What if there is a serious reaction? What should I look for?  Look for anything that concerns you, such as signs of a severe allergic reaction, very high fever, or unusual behavior. Signs of a severe allergic reaction can include hives, swelling of the face and throat, difficulty breathing, a fast heartbeat, dizziness, and weakness  usually within a few minutes to a few hours after the vaccination. What should I do?  If you think it is a severe allergic reaction or other emergency that cant wait, call 9-1-1 and get the person to the nearest hospital. Otherwise, call your doctor.  Reactions should be reported to the Vaccine Adverse Event Reporting System (VAERS). Your doctor should file this report, or you can do it yourself through  the VAERS web site at www.vaers. [facility].gov, or by calling 6-194.808.6798. VAERS does not give medical advice. 6. The National Vaccine Injury Compensation Program    The [facility] Vaccine Injury Compensation Program (VICP) is a federal program that was created to compensate people who may have been injured by certain vaccines. Persons who believe they may have been injured by a vaccine can learn about the program and about filing a claim by calling 1-836.751.7211 or visiting the [facility] website at www.[facility].gov/vaccinecompensation. There is a time limit to file a claim for compensation. 7. How can I learn more?  Ask your healthcare provider. He or she can give you the vaccine package insert or suggest other sources of information.  Call your local or state health department.  Contact the Centers for Disease Control and Prevention (CDC):  - Call 9-398.655.7883 (1-800-CDC-INFO) or  - Visit CDCs website at www.cdc.gov/flu    Vaccine Information Statement   Inactivated Influenza Vaccine   8/7/2015  42 HÉCTOR Osman [id number]    Department of Health and Human Services  Centers for Disease Control and Prevention    Office Use Only       Plantar Fasciitis: Care Instructions  Your Care Instructions    Plantar fasciitis is pain and inflammation of the plantar fascia, the tissue at the bottom of your foot that connects the heel bone to the toes. The plantar fascia also supports the arch. If you strain the plantar fascia, it can develop small tears and cause heel pain when you stand or walk. Plantar fasciitis can be caused by running or other sports. It also may occur in people who are overweight or who have high arches or flat feet. You may get plantar fasciitis if you walk or stand for long periods, or have a tight Achilles tendon or calf muscles. You can improve your foot pain with rest and other care at home. It might take a few weeks to a few months for your foot to heal completely. Follow-up care is a key part of your treatment and safety. Be sure to make and go to all appointments, and call your doctor if you are having problems. It's also a good idea to know your test results and keep a list of the medicines you take. How can you care for yourself at home? · Rest your feet often. Reduce your activity to a level that lets you avoid pain. If possible, do not run or walk on hard surfaces. · Take pain medicines exactly as directed. ¨ If the doctor gave you a prescription medicine for pain, take it as prescribed. ¨ If you are not taking a prescription pain medicine, take an over-the-counter anti-inflammatory medicine for pain and swelling, such as ibuprofen (Advil, Motrin) or naproxen (Aleve). Read and follow all instructions on the label. · Use ice massage to help with pain and swelling. You can use an ice cube or an ice cup several times a day. To make an ice cup, fill a paper cup with water and freeze it. Cut off the top of the cup until a half-inch of ice shows. Hold onto the remaining paper to use the cup.  Rub the ice in small circles over the area for 5 to 7 minutes. · Contrast baths, which alternate hot and cold water, can also help reduce swelling. But because heat alone may make pain and swelling worse, end a contrast bath with a soak in cold water. · Wear a night splint if your doctor suggests it. A night splint holds your foot with the toes pointed up and the foot and ankle at a 90-degree angle. This position gives the bottom of your foot a constant, gentle stretch. · Do simple exercises such as calf stretches and towel stretches 2 to 3 times each day, especially when you first get up in the morning. These can help the plantar fascia become more flexible. They also make the muscles that support your arch stronger. Hold these stretches for 15 to 30 seconds per stretch. Repeat 2 to 4 times. ¨ Stand about 1 foot from a wall. Place the palms of both hands against the wall at chest level. Lean forward against the wall, keeping one leg with the knee straight and heel on the ground while bending the knee of the other leg. ¨ Sit down on the floor or a mat with your feet stretched in front of you. Roll up a towel lengthwise, and loop it over the ball of your foot. Holding the towel at both ends, gently pull the towel toward you to stretch your foot. · Wear shoes with good arch support. Athletic shoes or shoes with a well-cushioned sole are good choices. · Try heel cups or shoe inserts (orthotics) to help cushion your heel. You can buy these at many shoe stores. · Put on your shoes as soon as you get out of bed. Going barefoot or wearing slippers may make your pain worse. · Reach and stay at a good weight for your height. This puts less strain on your feet. When should you call for help? Call your doctor now or seek immediate medical care if:  · You have heel pain with fever, redness, or warmth in your heel. · You cannot put weight on the sore foot.   Watch closely for changes in your health, and be sure to contact your doctor if:  · You have numbness or tingling in your heel. · Your heel pain lasts more than 2 weeks. Where can you learn more? Go to http://fela-franklin.info/. iLma Waterman in the search box to learn more about \"Plantar Fasciitis: Care Instructions. \"  Current as of: March 21, 2017  Content Version: 11.3  © 2430-3893 [employer]. Care instructions adapted under license by [employer] (which disclaims liability or warranty for this information). If you have questions about a medical condition or this instruction, always ask your healthcare professional. [place] [address] any warranty or liability for your use of this information. Plantar Fasciitis: Exercises  Your Care Instructions  Here are some examples of typical rehabilitation exercises for your condition. Start each exercise slowly. Ease off the exercise if you start to have pain. Your doctor or physical therapist will tell you when you can start these exercises and which ones will work best for you. How to do the exercises  Note: Each exercise should create a pulling feeling but should not cause pain. Towel stretch    1. Sit with your legs extended and knees straight. 2. Place a towel around your foot just under the toes. 3. Hold each end of the towel in each hand, with your hands above your knees. 4. Pull back with the towel so that your foot stretches toward you. 5. Hold the position for at least 15 to 30 seconds. 6. Repeat 2 to 4 times a session, up to 5 sessions a day. Calf stretch    Note: This exercise stretches the muscles at the back of the lower leg (the calf) and the Achilles tendon. Do this exercise 3 or 4 times a day, 5 days a week. 1. Stand facing a wall with your hands on the wall at about eye level. Put the leg you want to stretch about a step behind your other leg.   2. Keeping your back heel on the floor, bend your front knee until you feel a stretch in the back leg.  3. Hold the stretch for 15 to 30 seconds. Repeat 2 to 4 times. Plantar fascia and calf stretch    Note: Stretching the plantar fascia and calf muscles can increase flexibility and decrease heel pain. You can do this exercise several times each day and before and after activity. 1. Stand on a step as shown above. Be sure to hold on to the banister. 2. Slowly let your heels down over the edge of the step as you relax your calf muscles. You should feel a gentle stretch across the bottom of your foot and up the back of your leg to your knee. 3. Hold the stretch about 15 to 30 seconds, and then tighten your calf muscle a little to bring your heel back up to the level of the step. Repeat 2 to 4 times. Towel curls    1. While sitting, place your foot on a towel on the floor and scrunch the towel toward you with your toes. 2. Then, also using your toes, push the towel away from you. Note: Make this exercise more challenging by placing a weighted object, such as a soup can, on the other end of the towel. [place] pickups    1. Put marbles on the floor next to a cup.  2. Using your toes, try to lift the marbles up from the floor and put them in the cup. Follow-up care is a key part of your treatment and safety. Be sure to make and go to all appointments, and call your doctor if you are having problems. It's also a good idea to know your test results and keep a list of the medicines you take. Where can you learn more? Go to http://fela-franklin.info/. Telma Valadez in the search box to learn more about \"Plantar Fasciitis: Exercises. \"  Current as of: March 21, 2017  Content Version: 11.3  © 8752-0602 [employer]. Care instructions adapted under license by [employer] (which disclaims liability or warranty for this information).  If you have questions about a medical condition or this instruction, always ask your healthcare professional. Lilli Ahumada any warranty or liability for your use of this information. Learning About Diabetes Food Guidelines  Your Care Instructions  Meal planning is important to manage diabetes. It helps keep your blood sugar at a target level (which you set with your doctor). You don't have to eat special foods. You can eat what your family eats, including sweets once in a while. But you do have to pay attention to how often you eat and how much you eat of certain foods. You may want to work with a dietitian or a certified diabetes educator (CDE) to help you plan meals and snacks. A dietitian or CDE can also help you lose weight if that is one of your goals. What should you know about eating carbs? Managing the amount of carbohydrate (carbs) you eat is an important part of healthy meals when you have diabetes. Carbohydrate is found in many foods. · Learn which foods have carbs. And learn the amounts of carbs in different foods. ¨ Bread, cereal, pasta, and rice have about 15 grams of carbs in a serving. A serving is 1 slice of bread (1 ounce), ½ cup of cooked cereal, or 1/3 cup of cooked pasta or rice. ¨ Fruits have 15 grams of carbs in a serving. A serving is 1 small fresh fruit, such as an apple or orange; ½ of a banana; ½ cup of cooked or canned fruit; ½ cup of fruit juice; 1 cup of melon or raspberries; or 2 tablespoons of dried fruit. ¨ Milk and no-sugar-added yogurt have 15 grams of carbs in a serving. A serving is 1 cup of milk or 2/3 cup of no-sugar-added yogurt. ¨ Starchy vegetables have 15 grams of carbs in a serving. A serving is ½ cup of mashed potatoes or sweet potato; 1 cup winter squash; ½ of a small baked potato; ½ cup of cooked beans; or ½ cup cooked corn or green peas. · Learn how much carbs to eat each day and at each meal. A dietitian or CDE can teach you how to keep track of the amount of carbs you eat. This is called carbohydrate counting.   · If you are not sure how to count carbohydrate grams, use the Plate Method to plan meals. It is a good, quick way to make sure that you have a balanced meal. It also helps you spread carbs throughout the day. ¨ Divide your plate by types of foods. Put non-starchy vegetables on half the plate, meat or other protein food on one-quarter of the plate, and a grain or starchy vegetable in the final quarter of the plate. To this you can add a small piece of fruit and 1 cup of milk or yogurt, depending on how many carbs you are supposed to eat at a meal.  · Try to eat about the same amount of carbs at each meal. Do not \"save up\" your daily allowance of carbs to eat at one meal.  · Proteins have very little or no carbs per serving. Examples of proteins are beef, chicken, turkey, fish, eggs, tofu, cheese, cottage cheese, and peanut butter. A serving size of meat is 3 ounces, which is about the size of a deck of cards. Examples of meat substitute serving sizes (equal to 1 ounce of meat) are 1/4 cup of cottage cheese, 1 egg, 1 tablespoon of peanut butter, and ½ cup of tofu. How can you eat out and still eat healthy? · Learn to estimate the serving sizes of foods that have carbohydrate. If you measure food at home, it will be easier to estimate the amount in a serving of restaurant food. · If the meal you order has too much carbohydrate (such as potatoes, corn, or baked beans), ask to have a low-carbohydrate food instead. Ask for a salad or green vegetables. · If you use insulin, check your blood sugar before and after eating out to help you plan how much to eat in the future. · If you eat more carbohydrate at a meal than you had planned, take a walk or do other exercise. This will help lower your blood sugar. What else should you know? · Limit saturated fat, such as the fat from meat and dairy products. This is a healthy choice because people who have diabetes are at higher risk of heart disease. So choose lean cuts of meat and nonfat or low-fat dairy products.  Use olive or canola oil instead of butter or shortening when cooking. · Don't skip meals. Your blood sugar may drop too low if you skip meals and take insulin or certain medicines for diabetes. · Check with your doctor before you drink alcohol. Alcohol can cause your blood sugar to drop too low. Alcohol can also cause a bad reaction if you take certain diabetes medicines. Follow-up care is a key part of your treatment and safety. Be sure to make and go to all appointments, and call your doctor if you are having problems. It's also a good idea to know your test results and keep a list of the medicines you take. Where can you learn more? Go to http://fela-franklin.info/. Enter L581 in the search box to learn more about \"Learning About Diabetes Food Guidelines. \"  Current as of: March 13, 2017  Content Version: 11.3  © 5877-7944 [employer]. Care instructions adapted under license by [employer] (which disclaims liability or warranty for this information). If you have questions about a medical condition or this instruction, always ask your healthcare professional. [address] any warranty or liability for your use of this information. Learning About Tests When You Have Diabetes  Why do you need regular diabetes tests? Diabetes can be hard on your body if it's not well controlled. But having tests on a regular schedule can help you and your doctor find problems early, when it's easier to start managing them. What tests do you need? The tests you may have, how often you should have them, and the goals of the tests are:  A1c blood test. This test shows the average level of blood sugar over the past 2 to 3 months. It helps your doctor see whether blood sugar levels have been staying within your target range.   · How often: Every 3 to 6 months  · Goal: A blood sugar level in your target range  Blood pressure test: This test measures the pressure of blood flow in the arteries. Controlling blood pressure can help prevent damage to nerves and blood vessels. · How often: Every 3 to 6 months  · Goal: A blood pressure level in your target range  Cholesterol test: This test measures the amount of a type of fat in the blood. It is common for people with diabetes to also have high cholesterol. Too much cholesterol in the blood can build up inside the blood vessels and raise the risk for heart attack and stroke. · How often: At the time of your diabetes diagnosis, and as often as your doctor recommends after that  · Goal: A cholesterol level in your target range  Albumin-creatinine ratio test: This test checks for kidney damage by looking for the protein albumin (say \"al-BYOO-deniz\") in the urine. Albumin is normally found in the blood. Kidney damage can let small amounts of it (microalbumin) leak into the urine. · How often: Once a year  · Goal: No protein in the urine  Blood creatinine test/estimated glomerular filtration (eGFR): The blood creatinine (say \"ekdu-TG-ns-neen\") level shows how well your kidneys are working. Creatinine is a waste product that muscles release into the blood. Blood creatinine is used to estimate the glomerular filtration rate. A high level of creatinine and/or a low eGFR may mean your kidneys are not working as well as they should. · How often: Once a year  · Goal: Normal level of creatinine in the blood. The eGFR goal is greater than 60 mL/min/1.73 m². Complete foot exam: The doctor checks for foot sores and whether any sensation has been lost.  · How often: Once a year  · Goal: Healthy feet with no foot ulcers or loss of feeling  Dental exam and cleaning: The dentist checks for gum disease and tooth decay. People with high blood sugar are more likely to have these problems. · How often: Every 6 months  · Goal: Healthy teeth and gums  Complete eye exam: High blood sugar levels can damage the eyes.  This exam is done by an ophthalmologist or optometrist. It includes a dilated eye exam. The exam shows whether there's damage to the back of the eye (diabetic retinopathy). · How often: Once a year. If you don't have any signs of diabetic retinopathy, your doctor may recommend an exam every 2 years. · Goal: No damage to the back of the eye  Thyroid-stimulating hormone (TSH) blood test: This test checks for thyroid disease. Too little thyroid hormone can cause some medicines (like insulin) to stay in the body longer. This can cause low blood sugar. You may be tested if you have high cholesterol or are a woman over 48years old. · How often: As part of your diabetes diagnosis, and as often as your doctor recommends after that  · Goal: Normal level of TSH in the blood  Follow-up care is a key part of your treatment and safety. Be sure to make and go to all appointments, and call your doctor if you are having problems. It's also a good idea to know your test results and keep a list of the medicines you take. Where can you learn more? Go to http://fela-franklin.info/. Enter [id number] in the search box to learn more about \"Learning About Tests When You Have Diabetes. \"  Current as of: March 13, 2017  Content Version: 11.3  © 9852-4778 [employer]. Care instructions adapted under license by [employer] (which disclaims liability or warranty for this information). If you have questions about a medical condition or this instruction, always ask your healthcare professional. [place] [address] any warranty or liability for your use of this information.

## 2017-08-29 NOTE — PROGRESS NOTES
HISTORY OF PRESENT ILLNESS  Jazz López is a 64 y.o. male. HPI  He presents for follow up of hypertension with chronic kidney disease, hyperlipidemia and coronary artery disease with history of myocardial infarction and coronary stenting. Diet and Lifestyle: generally follows a low fat low cholesterol diet, generally follows a low sodium diet, exercises regularly, nonsmoker  Medication compliance: compliant all of the time  Medication side effects: none  Home BP Monitoring:  is well controlled at home, ranging 120's/70's  Cardiovascular ROS:  He complains of lower extremity edema, dizziness. (when rises quickly)    He denies palpitations, orthopnea, exertional chest pressure/discomfort, claudication, dyspnea on exertion     Right heel pain for weeks. Has a lot of sharp and aching pain when first stands on it in the morning and then gets worse again late in the day. It is worse when barefoot. Problem is getting worse since onset. He has not tried any specific treatment.      PHQ over the last two weeks 8/29/2017   Little interest or pleasure in doing things Not at all   Feeling down, depressed or hopeless Not at all   Total Score PHQ 2 0       Patient Active Problem List   Diagnosis Code    History of colonoscopy Z98.890    Low back pain M54.5    Hypercholesterolemia E78.00    CAD (coronary artery disease), native coronary artery I25.10    Osteoarthritis of both knees M17.0    CKD (chronic kidney disease) stage 2, GFR 60-89 ml/min N18.2    Benign hypertension with chronic kidney disease, stage II I12.9, N18.2     Past Medical History:   Diagnosis Date    CAD (coronary artery disease) April 2014    NSTEMI, PCI/NATE RCA    Hypercholesterolemia     Hypertension      Past Surgical History:   Procedure Laterality Date    CARDIAC SURG PROCEDURE UNLIST      Stent RCA 4/2014    HX ORTHOPAEDIC      left shoulder, torn tendon    HX ORTHOPAEDIC      right knee X 4    [place] COLONOSCOPY FLX DX W/COLLJ SPEC WHEN PFRMD  12/19/2007    Dr Eloina Mulligan 1 polyp repeat 12/2012     Social History     Social History    Marital status:      Spouse name: N/A    Number of children: N/A    Years of education: N/A     Social History Main Topics    Smoking status: Former Smoker     Packs/day: 0.25     Years: 35.00     Types: Cigarettes     Quit date: 11/12/2014    Smokeless tobacco: Never Used      Comment: Never smoked over a pack per day    Alcohol use No    Drug use: No    Sexual activity: Not Asked     Other Topics Concern    None     Social History Narrative     Family History   Problem Relation Age of Onset    Heart Disease Mother      CAD    Hypertension Mother     Elevated Lipids Mother     Cancer Father      lung    Mental Retardation Brother     Seizures Brother     Diabetes Brother     Hypertension Brother     Elevated Lipids Brother      No Known Allergies  Current Outpatient Prescriptions   Medication Sig Dispense Refill    amLODIPine (NORVASC) 2.5 mg tablet TAKE 1 TABLET DAILY 90 Tab 0    metoprolol succinate (TOPROL-XL) 25 mg XL tablet TAKE 1 TABLET DAILY (NEED APPOINTMENT FOR REFILLS) 90 Tab 0    atorvastatin (LIPITOR) 40 mg tablet TAKE 1 TABLET EVERY EVENING 90 Tab 1    nitroglycerin (NITROSTAT) 0.4 mg SL tablet 1 Tab by SubLINGual route every five (5) minutes as needed for Chest Pain (Max 3 doses daily). 1 Bottle 3    aspirin delayed-release 81 mg tablet Take 1 Tab by mouth daily. Review of Systems   Constitutional: Negative for malaise/fatigue and weight loss. Eyes: Negative for blurred vision and double vision. Gastrointestinal: Negative for constipation, diarrhea and heartburn. Musculoskeletal: Negative for back pain and joint pain. Neurological: Negative for dizziness, tingling and focal weakness.      Visit Vitals    /78 (BP 1 Location: Left arm, BP Patient Position: Sitting)    Pulse 64    Temp 97.3 °F (36.3 °C) (Oral)    Resp 16    Ht 6' 1\" (1.854 m)    Wt 229 lb 8 oz (104.1 kg)    SpO2 95%    BMI 30.28 kg/m2     Physical Exam   Constitutional: He is oriented to person, place, and time. He appears well-developed and well-nourished. HENT:   Head: Normocephalic and atraumatic. Eyes: Conjunctivae are normal. Pupils are equal, round, and reactive to light. Neck: Neck supple. Carotid bruit is not present. No thyromegaly present. Cardiovascular: Normal rate, regular rhythm and normal heart sounds. PMI is not displaced. Exam reveals no gallop. No murmur heard. Pulses:       Dorsalis pedis pulses are 2+ on the right side, and 2+ on the left side. Posterior tibial pulses are 2+ on the right side, and 2+ on the left side. Pulmonary/Chest: Effort normal. He has no wheezes. He has no rhonchi. He has no rales. Abdominal: Soft. Normal appearance. He exhibits no abdominal bruit and no mass. There is no hepatosplenomegaly. There is no tenderness. Musculoskeletal: He exhibits no edema. Right heel is tender. Lymphadenopathy:     He has no cervical adenopathy. Right: No supraclavicular adenopathy present. Left: No supraclavicular adenopathy present. Neurological: He is alert and oriented to person, place, and time. No sensory deficit. Skin: Skin is warm, dry and intact. No rash noted. Psychiatric: He has a normal mood and affect. His behavior is normal.   Nursing note and vitals reviewed.       Results for orders placed or performed in visit on 95/95/06   METABOLIC PANEL, COMPREHENSIVE   Result Value Ref Range    Glucose 112 (H) 65 - 99 mg/dL    BUN 18 8 - 27 mg/dL    Creatinine 1.44 (H) 0.76 - 1.27 mg/dL    GFR est non-AA 52 (L) >59 mL/min/1.73    GFR est AA 60 >59 mL/min/1.73    BUN/Creatinine ratio 13 10 - 24    Sodium 140 134 - 144 mmol/L    Potassium 4.9 3.5 - 5.2 mmol/L    Chloride 101 96 - 106 mmol/L    CO2 25 18 - 29 mmol/L    Calcium 9.2 8.6 - 10.2 mg/dL    Protein, total 6.6 6.0 - 8.5 g/dL    Albumin 4.1 3.6 - 4.8 g/dL    GLOBULIN, TOTAL 2.5 1.5 - 4.5 g/dL    A-G Ratio 1.6 1.2 - 2.2    Bilirubin, total 0.4 0.0 - 1.2 mg/dL    Alk. phosphatase 90 39 - 117 IU/L    AST (SGOT) 21 0 - 40 IU/L    ALT (SGPT) 22 0 - 44 IU/L   LIPID PANEL   Result Value Ref Range    Cholesterol, total 103 100 - 199 mg/dL    Triglyceride 104 0 - 149 mg/dL    HDL Cholesterol 32 (L) >39 mg/dL    VLDL, calculated 21 5 - 40 mg/dL    LDL, calculated 50 0 - 99 mg/dL   CVD REPORT   Result Value Ref Range    INTERPRETATION NTAP     PDF IMAGE Not applicable    CKD REPORT   Result Value Ref Range    Interpretation Note      Results for orders placed or performed in visit on 08/29/17   AMB POC HEMOGLOBIN A1C   Result Value Ref Range    Hemoglobin A1c (POC) 6.5 (A) 4.8 - 5.6 %       ASSESSMENT and PLAN    ICD-10-CM ICD-9-CM    1. Controlled type 2 diabetes mellitus without complication, without long-term current use of insulin ([facility]) E11.9 250.00 HEMOGLOBIN A1C WITH EAG      MICROALBUMIN, UR, RAND W/ MICROALBUMIN/CREA RATIO   2. Benign hypertension with chronic kidney disease, stage II H99.6 889.28 METABOLIC PANEL, BASIC    C40.7 585.2    3. Hypercholesterolemia E78.00 272.0    4. Coronary artery disease involving native coronary artery of native heart without angina pectoris I25.10 414.01    5. Hyperglycemia R73.9 790.29 AMB POC HEMOGLOBIN A1C   6. Plantar fasciitis of right foot M72.2 728.71    7. Encounter for immunization Z23 V03.89 INFLUENZA VIRUS VAC QUAD,SPLIT,PRESV FREE SYRINGE 3/> YRS IM      ME IMMUNIZ ADMIN,1 SINGLE/COMB VAC/TOXOID     Diagnoses and all orders for this visit:    1. Controlled type 2 diabetes mellitus without complication, without long-term current use of insulin ([facility] [address]  New diagnosis. -     HEMOGLOBIN A1C WITH EAG; Future  -     MICROALBUMIN, UR, RAND W/ MICROALBUMIN/CREA RATIO; Future        -     metFORMIN ER (GLUCOPHAGE XR) 500 mg tablet; Take 1 Tab by mouth daily (with dinner).     2. Benign hypertension with chronic kidney disease, stage II  Blood pressure is at goal and creatinine is stable. -     METABOLIC PANEL, BASIC; Future    3. Hypercholesterolemia  Hyperlipidemia is controlled    4. Coronary artery disease involving native coronary artery of native heart without angina pectoris      -     aspirin delayed-release 81 mg tablet; Take 1 Tab by mouth daily. Stable    5. Hyperglycemia  -     AMB POC HEMOGLOBIN A1C    6. Plantar fasciitis of right foot  Conservative treatment discussed. Information sheet given. 7. Encounter for immunization  -     Influenza virus vaccine (QUADRIVALENT PRES FREE SYRINGE) IM ([id number])  -     [place] IMMUNIZ ADMIN,1 SINGLE/COMB VAC/TOXOID        Follow-up Disposition:  Return in about 3 months (around 11/29/2017) for HTN, DM, ckd  PPSV 23  NON-fasting lab one week prior . lab results and schedule of future lab studies reviewed with patient  reviewed diet, exercise and weight control  cardiovascular risk and specific lipid/LDL goals reviewed  Discussed the patient's BMI with him. The BMI follow up plan is as follows: I have counseled this patient on diet and exercise regimens  I have discussed the diagnosis with the patient and the intended plan as seen in the above orders. Patient is in agreement. The patient has received an after-visit summary and questions were answered concerning future plans. I have discussed medication side effects and warnings with the patient as well.

## 2017-09-26 RX ORDER — METOPROLOL SUCCINATE 25 MG/1
TABLET, EXTENDED RELEASE ORAL
Qty: 90 TAB | Refills: 0 | Status: SHIPPED | OUTPATIENT
Start: 2017-09-26 | End: 2017-11-30 | Stop reason: SDUPTHER

## 2017-11-06 RX ORDER — AMLODIPINE BESYLATE 2.5 MG/1
TABLET ORAL
Qty: 90 TAB | Refills: 0 | Status: SHIPPED | OUTPATIENT
Start: 2017-11-06 | End: 2017-11-30 | Stop reason: SDUPTHER

## 2017-11-21 ENCOUNTER — APPOINTMENT (OUTPATIENT)
Dept: INTERNAL MEDICINE CLINIC | Age: 62
End: 2017-11-21

## 2017-11-21 DIAGNOSIS — I12.9 BENIGN HYPERTENSION WITH CHRONIC KIDNEY DISEASE, STAGE II: ICD-10-CM

## 2017-11-21 DIAGNOSIS — N18.2 BENIGN HYPERTENSION WITH CHRONIC KIDNEY DISEASE, STAGE II: ICD-10-CM

## 2017-11-21 DIAGNOSIS — E11.9 CONTROLLED TYPE 2 DIABETES MELLITUS WITHOUT COMPLICATION, WITHOUT LONG-TERM CURRENT USE OF INSULIN (HCC): ICD-10-CM

## 2017-11-22 LAB
ALBUMIN/CREAT UR: 7 MG/G CREAT (ref 0–30)
BUN SERPL-MCNC: 19 MG/DL (ref 8–27)
BUN/CREAT SERPL: 16 (ref 10–24)
CALCIUM SERPL-MCNC: 8.7 MG/DL (ref 8.6–10.2)
CHLORIDE SERPL-SCNC: 102 MMOL/L (ref 96–106)
CO2 SERPL-SCNC: 25 MMOL/L (ref 18–29)
CREAT SERPL-MCNC: 1.2 MG/DL (ref 0.76–1.27)
CREAT UR-MCNC: 235.6 MG/DL
EST. AVERAGE GLUCOSE BLD GHB EST-MCNC: 128 MG/DL
GFR SERPLBLD CREATININE-BSD FMLA CKD-EPI: 64 ML/MIN/1.73
GFR SERPLBLD CREATININE-BSD FMLA CKD-EPI: 74 ML/MIN/1.73
GLUCOSE SERPL-MCNC: 98 MG/DL (ref 65–99)
HBA1C MFR BLD: 6.1 % (ref 4.8–5.6)
INTERPRETATION: NORMAL
Lab: NORMAL
MICROALBUMIN UR-MCNC: 16.5 UG/ML
POTASSIUM SERPL-SCNC: 4.7 MMOL/L (ref 3.5–5.2)
SODIUM SERPL-SCNC: 141 MMOL/L (ref 134–144)

## 2017-11-30 ENCOUNTER — OFFICE VISIT (OUTPATIENT)
Dept: INTERNAL MEDICINE CLINIC | Age: 62
End: 2017-11-30

## 2017-11-30 VITALS
RESPIRATION RATE: 18 BRPM | DIASTOLIC BLOOD PRESSURE: 75 MMHG | WEIGHT: 222.2 LBS | OXYGEN SATURATION: 96 % | SYSTOLIC BLOOD PRESSURE: 126 MMHG | HEART RATE: 60 BPM | TEMPERATURE: 97.2 F | HEIGHT: 73 IN | BODY MASS INDEX: 29.45 KG/M2

## 2017-11-30 DIAGNOSIS — I25.10 CORONARY ARTERY DISEASE INVOLVING NATIVE CORONARY ARTERY OF NATIVE HEART WITHOUT ANGINA PECTORIS: ICD-10-CM

## 2017-11-30 DIAGNOSIS — E78.00 HYPERCHOLESTEROLEMIA: ICD-10-CM

## 2017-11-30 DIAGNOSIS — Z12.11 SCREEN FOR COLON CANCER: ICD-10-CM

## 2017-11-30 DIAGNOSIS — M72.2 PLANTAR FASCIITIS OF RIGHT FOOT: ICD-10-CM

## 2017-11-30 DIAGNOSIS — N18.2 BENIGN HYPERTENSION WITH CHRONIC KIDNEY DISEASE, STAGE II: ICD-10-CM

## 2017-11-30 DIAGNOSIS — I12.9 BENIGN HYPERTENSION WITH CHRONIC KIDNEY DISEASE, STAGE II: ICD-10-CM

## 2017-11-30 DIAGNOSIS — Z23 ENCOUNTER FOR IMMUNIZATION: ICD-10-CM

## 2017-11-30 DIAGNOSIS — E11.9 CONTROLLED TYPE 2 DIABETES MELLITUS WITHOUT COMPLICATION, WITHOUT LONG-TERM CURRENT USE OF INSULIN (HCC): Primary | ICD-10-CM

## 2017-11-30 RX ORDER — AMLODIPINE BESYLATE 2.5 MG/1
TABLET ORAL
Qty: 90 TAB | Refills: 3 | Status: SHIPPED | OUTPATIENT
Start: 2017-11-30 | End: 2018-02-03 | Stop reason: SDUPTHER

## 2017-11-30 RX ORDER — ATORVASTATIN CALCIUM 40 MG/1
TABLET, FILM COATED ORAL
Qty: 90 TAB | Refills: 3 | Status: SHIPPED | OUTPATIENT
Start: 2017-11-30 | End: 2018-09-21 | Stop reason: SDUPTHER

## 2017-11-30 RX ORDER — METFORMIN HYDROCHLORIDE 500 MG/1
500 TABLET, EXTENDED RELEASE ORAL
Qty: 90 TAB | Refills: 3 | Status: SHIPPED | OUTPATIENT
Start: 2017-11-30 | End: 2018-09-24 | Stop reason: SDUPTHER

## 2017-11-30 RX ORDER — METOPROLOL SUCCINATE 25 MG/1
TABLET, EXTENDED RELEASE ORAL
Qty: 90 TAB | Refills: 3 | Status: SHIPPED | OUTPATIENT
Start: 2017-11-30 | End: 2018-09-28 | Stop reason: SDUPTHER

## 2017-11-30 NOTE — PATIENT INSTRUCTIONS
Try Tuli's heel cups in both shoes  Do stretches as illustrated below  Office visit in 6 months with fasting lab work a week before visit. Vaccine Information Statement    Pneumococcal Polysaccharide Vaccine: What You Need to Know    Many Vaccine Information Statements are available in Serbian and other languages. See www.immunize.org/vis. Hojas de información Sobre Vacunas están disponibles en español y en muchos otros idiomas. Visite WorthScale.si. 1. Why get vaccinated? Vaccination can protect older adults (and some children and younger adults) from pneumococcal disease. Pneumococcal disease is caused by bacteria that can spread from person to person through close contact. It can cause ear infections, and it can also lead to more serious infections of the:   Lungs (pneumonia),   Blood (bacteremia), and   Covering of the brain and spinal cord (meningitis). Meningitis can cause deafness and brain damage, and it can be fatal.      Anyone can get pneumococcal disease, but children under 3years of age, people with certain medical conditions, adults over 72years of age, and cigarette smokers are at the highest risk. About 18,000 older adults die each year from pneumococcal disease in the [place]. Treatment of pneumococcal infections with penicillin and other drugs used to be more effective. But some strains of the disease have become resistant to these drugs. This makes prevention of the disease, through vaccination, even more important. 2. Pneumococcal polysaccharide vaccine (PPSV23)    Pneumococcal polysaccharide vaccine (PPSV23) protects against 23 types of pneumococcal bacteria. It will not prevent all pneumococcal disease.     PPSV23 is recommended for:   All adults 72years of age and older,   Anyone 2 through 59years of age with certain long-term health problems,   Anyone 2 through 59years of age with a weakened immune system,   Adults 23 through 59 years of age who smoke cigarettes or have asthma. Most people need only one dose of PPSV. A second dose is recommended for certain high-risk groups. People 72 and older should get a dose even if they have gotten one or more doses of the vaccine before they turned 65. Your healthcare provider can give you more information about these recommendations. Most healthy adults develop protection within 2 to 3 weeks of getting the shot. 3. Some people should not get this vaccine     Anyone who has had a life-threatening allergic reaction to PPSV should not get another dose.  Anyone who has a severe allergy to any component of PPSV should not receive it. Tell your provider if you have any severe allergies.  Anyone who is moderately or severely ill when the shot is scheduled may be asked to wait until they recover before getting the vaccine. Someone with a mild illness can usually be vaccinated.  Children less than 3years of age should not receive this vaccine.  There is no evidence that PPSV is harmful to either a pregnant woman or to her fetus. However, as a precaution, women who need the vaccine should be vaccinated before becoming pregnant, if possible. 4. Risks of a vaccine reaction    With any medicine, including vaccines, there is a chance of side effects. These are usually mild and go away on their own, but serious reactions are also possible. About half of people who get PPSV have mild side effects, such as redness or pain where the shot is given, which go away within about two days. Less than 1 out of 100 people develop a fever, muscle aches, or more severe local reactions. Problems that could happen after any vaccine:     People sometimes faint after a medical procedure, including vaccination. Sitting or lying down for about 15 minutes can help prevent fainting, and injuries caused by a fall.  Tell your doctor if you feel dizzy, or have vision changes or ringing in the ears.     Some people get severe pain in the shoulder and have difficulty moving the arm where a shot was given. This happens very rarely.  Any medication can cause a severe allergic reaction. Such reactions from a vaccine are very rare, estimated at about 1 in a million doses, and would happen within a few minutes to a few hours after the vaccination. As with any medicine, there is a very remote chance of a vaccine causing a serious injury or death. The safety of vaccines is always being monitored. For more information, visit: www.cdc.gov/vaccinesafety/     5. What if there is a serious reaction? What should I look for? Look for anything that concerns you, such as signs of a severe allergic reaction, very high fever, or unusual behavior. Signs of a severe allergic reaction can include hives, swelling of the face and throat, difficulty breathing, a fast heartbeat, dizziness, and weakness. These would usually start a few minutes to a few hours after the vaccination. What should I do? If you think it is a severe allergic reaction or other emergency that cant wait, call 9-1-1 or get to the nearest hospital. Otherwise, call your doctor. Afterward, the reaction should be reported to the Vaccine Adverse Event Reporting System (VAERS). Your doctor might file this report, or you can do it yourself through the VAERS web site at www.vaers. hhs.gov, or by calling 7-540.597.6192. VAERS does not give medical advice. 6. How can I learn more?  Ask your doctor. He or she can give you the vaccine package insert or suggest other sources of information.  Call your local or state health department.    Contact the Centers for Disease Control and Prevention (CDC):  - Call 9-307.479.9239 (1-800-CDC-INFO) or  - Visit CDCs website at [employer] 18 04/24/2015    [facility] Disease Control and Prevention    Office Use Only       Plantar Fasciitis: Care Instructions  Your Care Instructions    Plantar fasciitis is pain and inflammation of the plantar fascia, the tissue at the bottom of your foot that connects the heel bone to the toes. The plantar fascia also supports the arch. If you strain the plantar fascia, it can develop small tears and cause heel pain when you stand or walk. Plantar fasciitis can be caused by running or other sports. It also may occur in people who are overweight or who have high arches or flat feet. You may get plantar fasciitis if you walk or stand for long periods, or have a tight Achilles tendon or calf muscles. You can improve your foot pain with rest and other care at home. It might take a few weeks to a few months for your foot to heal completely. Follow-up care is a key part of your treatment and safety. Be sure to make and go to all appointments, and call your doctor if you are having problems. It's also a good idea to know your test results and keep a list of the medicines you take. How can you care for yourself at home? · Rest your feet often. Reduce your activity to a level that lets you avoid pain. If possible, do not run or walk on hard surfaces. · Take pain medicines exactly as directed. ¨ If the doctor gave you a prescription medicine for pain, take it as prescribed. ¨ If you are not taking a prescription pain medicine, take an over-the-counter anti-inflammatory medicine for pain and swelling, such as ibuprofen (Advil, Motrin) or naproxen (Aleve). Read and follow all instructions on the label. · Use ice massage to help with pain and swelling. You can use an ice cube or an ice cup several times a day. To make an ice cup, fill a paper cup with water and freeze it. Cut off the top of the cup until a half-inch of ice shows. Hold onto the remaining paper to use the cup. Rub the ice in small circles over the area for 5 to 7 minutes.   · Contrast baths, which alternate hot and cold water, can also help reduce swelling. But because heat alone may make pain and swelling worse, end a contrast bath with a soak in cold water. · Wear a night splint if your doctor suggests it. A night splint holds your foot with the toes pointed up and the foot and ankle at a 90-degree angle. This position gives the bottom of your foot a constant, gentle stretch. · Do simple exercises such as calf stretches and towel stretches 2 to 3 times each day, especially when you first get up in the morning. These can help the plantar fascia become more flexible. They also make the muscles that support your arch stronger. Hold these stretches for 15 to 30 seconds per stretch. Repeat 2 to 4 times. ¨ Stand about 1 foot from a wall. Place the palms of both hands against the wall at chest level. Lean forward against the wall, keeping one leg with the knee straight and heel on the ground while bending the knee of the other leg. ¨ Sit down on the floor or a mat with your feet stretched in front of you. Roll up a towel lengthwise, and loop it over the ball of your foot. Holding the towel at both ends, gently pull the towel toward you to stretch your foot. · Wear shoes with good arch support. Athletic shoes or shoes with a well-cushioned sole are good choices. · Try heel cups or shoe inserts (orthotics) to help cushion your heel. You can buy these at many shoe stores. · Put on your shoes as soon as you get out of bed. Going barefoot or wearing slippers may make your pain worse. · Reach and stay at a good weight for your height. This puts less strain on your feet. When should you call for help? Call your doctor now or seek immediate medical care if:  · You have heel pain with fever, redness, or warmth in your heel. · You cannot put weight on the sore foot. Watch closely for changes in your health, and be sure to contact your doctor if:  · You have numbness or tingling in your heel.   · Your heel pain lasts more than 2 weeks. Where can you learn more? Go to http://fela-franklin.info/. Anjali Numbers in the search box to learn more about \"Plantar Fasciitis: Care Instructions. \"  Current as of: March 21, 2017  Content Version: 11.4  © 1412-6295 [employer]. Care instructions adapted under license by [employer] (which disclaims liability or warranty for this information). If you have questions about a medical condition or this instruction, always ask your healthcare professional. [place] [address] any warranty or liability for your use of this information. Plantar Fasciitis: Exercises  Your Care Instructions  Here are some examples of typical rehabilitation exercises for your condition. Start each exercise slowly. Ease off the exercise if you start to have pain. Your doctor or physical therapist will tell you when you can start these exercises and which ones will work best for you. How to do the exercises  Towel stretch    1. Sit with your legs extended and knees straight. 2. Place a towel around your foot just under the toes. 3. Hold each end of the towel in each hand, with your hands above your knees. 4. Pull back with the towel so that your foot stretches toward you. 5. Hold the position for at least 15 to 30 seconds. 6. Repeat 2 to 4 times a session, up to 5 sessions a day. Calf stretch    This exercise stretches the muscles at the back of the lower leg (the calf) and the Achilles tendon. Do this exercise 3 or 4 times a day, 5 days a week. 1. Stand facing a wall with your hands on the wall at about eye level. Put the leg you want to stretch about a step behind your other leg. 2. Keeping your back heel on the floor, bend your front knee until you feel a stretch in the back leg. 3. Hold the stretch for 15 to 30 seconds. Repeat 2 to 4 times.   Plantar fascia and calf stretch    Stretching the plantar fascia and calf muscles can increase flexibility and decrease heel pain. You can do this exercise several times each day and before and after activity. 1. Stand on a step as shown above. Be sure to hold on to the banister. 2. Slowly let your heels down over the edge of the step as you relax your calf muscles. You should feel a gentle stretch across the bottom of your foot and up the back of your leg to your knee. 3. Hold the stretch about 15 to 30 seconds, and then tighten your calf muscle a little to bring your heel back up to the level of the step. Repeat 2 to 4 times. Towel curls    Make this exercise more challenging by placing a weighted object, such as a soup can, on the other end of the towel. 1. While sitting, place your foot on a towel on the floor and scrunch the towel toward you with your toes. 2. Then, also using your toes, push the towel away from you. Molt pickups    1. Put marbles on the floor next to a cup.  2. Using your toes, try to lift the marbles up from the floor and put them in the cup. Follow-up care is a key part of your treatment and safety. Be sure to make and go to all appointments, and call your doctor if you are having problems. It's also a good idea to know your test results and keep a list of the medicines you take. Where can you learn more? Go to http://fela-franklin.info/. Shakila Rodriguez in the search box to learn more about \"Plantar Fasciitis: Exercises. \"  Current as of: March 21, 2017  Content Version: 11.4  © 6469-0083 [employer]. Care instructions adapted under license by [email or web address] (which disclaims liability or warranty for this information). If you have questions about a medical condition or this instruction, always ask your healthcare professional. [address] any warranty or liability for your use of this information.            Body Mass Index: Care Instructions  Your Care Instructions    Body mass index (BMI) can help you see if your weight is raising your risk for health problems. It uses a formula to compare how much you weigh with how tall you are. · A BMI lower than 18.5 is considered underweight. · A BMI between 18.5 and 24.9 is considered healthy. · A BMI between 25 and 29.9 is considered overweight. A BMI of 30 or higher is considered obese. If your BMI is in the normal range, it means that you have a lower risk for weight-related health problems. If your BMI is in the overweight or obese range, you may be at increased risk for weight-related health problems, such as high blood pressure, heart disease, stroke, arthritis or joint pain, and diabetes. If your BMI is in the underweight range, you may be at increased risk for health problems such as fatigue, lower protection (immunity) against illness, muscle loss, bone loss, hair loss, and hormone problems. BMI is just one measure of your risk for weight-related health problems. You may be at higher risk for health problems if you are not active, you eat an unhealthy diet, or you drink too much alcohol or use tobacco products. Follow-up care is a key part of your treatment and safety. Be sure to make and go to all appointments, and call your doctor if you are having problems. It's also a good idea to know your test results and keep a list of the medicines you take. How can you care for yourself at home? · Practice healthy eating habits. This includes eating plenty of fruits, vegetables, whole grains, lean protein, and low-fat dairy. · If your doctor recommends it, get more exercise. Walking is a good choice. Bit by bit, increase the amount you walk every day. Try for at least 30 minutes on most days of the week. · Do not smoke. Smoking can increase your risk for health problems. If you need help quitting, talk to your doctor about stop-smoking programs and medicines. These can increase your chances of quitting for good.   · Limit alcohol to 2 drinks a day for men and 1 drink a day for women. Too much alcohol can cause health problems. If you have a BMI higher than 25  · Your doctor may do other tests to check your risk for weight-related health problems. This may include measuring the distance around your waist. A waist measurement of more than 40 inches in men or 35 inches in women can increase the risk of weight-related health problems. · Talk with your doctor about steps you can take to stay healthy or improve your health. You may need to make lifestyle changes to lose weight and stay healthy, such as changing your diet and getting regular exercise. If you have a BMI lower than 18.5  · Your doctor may do other tests to check your risk for health problems. · Talk with your doctor about steps you can take to stay healthy or improve your health. You may need to make lifestyle changes to gain or maintain weight and stay healthy, such as getting more healthy foods in your diet and doing exercises to build muscle. Where can you learn more? Go to http://fela-franklin.info/. Enter S176 in the search box to learn more about \"Body Mass Index: Care Instructions. \"  Current as of: October 13, 2016  Content Version: 11.4  © 6343-5101 [employer]. Care instructions adapted under license by [employer] (which disclaims liability or warranty for this information). If you have questions about a medical condition or this instruction, always ask your healthcare professional. [address] any warranty or liability for your use of this information.

## 2017-11-30 NOTE — PROGRESS NOTES
Chief Complaint   Patient presents with    Hypertension     3m f/u    Diabetes     3m f/u    Chronic Kidney Disease     3m f/u     1. Have you been to the ER, urgent care clinic since your last visit? Hospitalized since your last visit? No    2. Have you seen or consulted any other health care providers outside of the [facility] since your last visit? Include any pap smears or colon screening. No    Eye exam scheduled for 12/2017. Kasandra Villalta is a 58 y.o. male who presents for routine immunizations. He denies any symptoms , reactions or allergies that would exclude them from being immunized today. Risks and adverse reactions were discussed and the VIS was given to them. All questions were addressed. He was observed for 15 min post injection. There were no reactions observed.     Iglesia Leach LPN

## 2017-11-30 NOTE — MR AVS SNAPSHOT
Visit Information Date & Time Provider Department Dept. Phone Encounter #  
 11/30/2017  9:15 AM Rocio Walker, [address] [facility] [phone] [id number] Follow-up Instructions Return in about 6 months (around 5/30/2018) for DM, HTN, chol  Fasting lab one week prior  . Upcoming Health Maintenance Date Due  
 FOOT EXAM Q1 11/10/1965 EYE EXAM RETINAL OR DILATED Q1 11/10/1965 Pneumococcal 19-64 Medium Risk (1 of 1 - PPSV23) 11/10/1974 ZOSTER VACCINE AGE 60> 9/10/2015 COLONOSCOPY 12/19/2017 HEMOGLOBIN A1C Q6M 5/21/2018 LIPID PANEL Q1 8/22/2018 MICROALBUMIN Q1 11/21/2018 DTaP/Tdap/Td series (2 - Td) 5/7/2022 Allergies as of 11/30/2017  Review Complete On: 11/30/2017 By: Rocio Walker MD  
 No Known Allergies Current Immunizations  Reviewed on 8/29/2017 Name Date Influenza Vaccine (Quad) PF 8/29/2017, 2/21/2017 Influenza Vaccine Split 10/1/2011 Influenza Vaccine Whole 9/14/2010 Pneumococcal Polysaccharide (PPSV-23)  Incomplete TD Vaccine 3/31/2004 TDAP Vaccine 5/7/2012 Not reviewed this visit You Were Diagnosed With   
  
 Codes Comments Controlled type 2 diabetes mellitus without complication, without long-term current use of insulin ([facility] [address]    -  Primary ICD-10-CM: E11.9 ICD-9-CM: 250.00 Hypercholesterolemia     ICD-10-CM: E78.00 ICD-9-CM: 272.0 Benign hypertension with chronic kidney disease, stage II     ICD-10-CM: I12.9, N18.2 ICD-9-CM: 403.10, 585.2 Coronary artery disease involving native coronary artery of native heart without angina pectoris     ICD-10-CM: I25.10 ICD-9-CM: 414.01 Plantar fasciitis of right foot     ICD-10-CM: M72.2 ICD-9-CM: 728.71 Encounter for immunization     ICD-10-CM: T03 ICD-9-CM: V03.89 Screen for colon cancer     ICD-10-CM: Z12.11 ICD-9-CM: V76.51 Vitals BP Pulse Temp Resp Height(growth percentile) Weight(growth percentile) 126/75 (BP 1 Location: Left arm, BP Patient Position: Sitting) 60 97.2 °F (36.2 °C) (Oral) 18 6' 1\" (1.854 m) 222 lb 3.2 oz (100.8 kg) SpO2 BMI Smoking Status 96% 29.32 kg/m2 Former Smoker Vitals History BMI and BSA Data Body Mass Index Body Surface Area  
 29.32 kg/m 2 2.28 m 2 Preferred Pharmacy Pharmacy Name Phone [address] [phone] Your Updated Medication List  
  
   
This list is accurate as of: 11/30/17  9:49 AM.  Always use your most recent med list. amLODIPine 2.5 mg tablet Commonly known as:  Morrison Del TAKE 1 TABLET DAILY  
  
 aspirin delayed-release 81 mg tablet Take 1 Tab by mouth daily. atorvastatin 40 mg tablet Commonly known as:  LIPITOR  
TAKE 1 TABLET EVERY EVENING  
  
 metFORMIN  mg tablet Commonly known as:  GLUCOPHAGE XR Take 1 Tab by mouth daily (with dinner). metoprolol succinate 25 mg XL tablet Commonly known as:  TOPROL-XL  
TAKE 1 TABLET DAILY  
  
 nitroglycerin 0.4 mg SL tablet Commonly known as:  NITROSTAT  
1 Tab by SubLINGual route every five (5) minutes as needed for Chest Pain (Max 3 doses daily). Prescriptions Sent to Pharmacy Refills  
 metFORMIN ER (GLUCOPHAGE XR) 500 mg tablet 3 Sig: Take 1 Tab by mouth daily (with dinner). Class: Normal  
 Pharmacy: [address], [address] Ph #: [phone] Route: Oral  
 metoprolol succinate (TOPROL-XL) 25 mg XL tablet 3 Sig: TAKE 1 TABLET DAILY Class: Normal  
 Pharmacy: [address], [address] Ph #: [phone]  
 atorvastatin (LIPITOR) 40 mg tablet 3 Sig: TAKE 1 TABLET EVERY EVENING  Class: Normal  
 Pharmacy: [address], [address] Ph #: [phone]  
 amLODIPine (NORVASC) 2.5 mg tablet 3  
 Sig: TAKE 1 TABLET DAILY Class: Normal  
 Pharmacy: [address], [address] [facility] #: [phone] We Performed the Following  DIABETES FOOT EXAM [HM7 Custom] PNEUMOCOCCAL POLYSACCHARIDE VACCINE, 23-VALENT, ADULT OR IMMUNOSUPPRESSED PT DOSE, [59296 CPT(R)] SD IMMUNIZ,ADMIN,EACH ADDL [id number] CPT(R)] REFERRAL TO GASTROENTEROLOGY [FKI50 Custom] Follow-up Instructions Return in about 6 months (around 5/30/2018) for DM, HTN, chol  Fasting lab one week prior  . To-Do List   
 05/30/2018 Lab:  HEMOGLOBIN A1C WITH EAG   
  
 05/30/2018 Lab:  LIPID PANEL   
  
 05/30/2018 Lab:  METABOLIC PANEL, COMPREHENSIVE Referral Information Referral ID Referred By Referred To  
  
 [id number] BRITTANIE, [address], [place]   
   [address] [place] [address] Phone: [phone] Fax: [phone] Visits Status Start Date End Date 1 New Request 11/30/17 11/30/18 If your referral has a status of pending review or denied, additional information will be sent to support the outcome of this decision. Patient Instructions Try Tuli's heel cups in both shoes Do stretches as illustrated below Office visit in 6 months with fasting lab work a week before visit. Vaccine Information Statement Pneumococcal Polysaccharide Vaccine: What You Need to Know Many Vaccine Information Statements are available in Sudanese and other languages. See www.immunize.org/vis. Hojas de información Sobre Vacunas están disponibles en español y en muchos otros idiomas. Visite ScottScale.si. 1. Why get vaccinated? Vaccination can protect older adults (and some children and younger adults) from pneumococcal disease. Pneumococcal disease is caused by bacteria that can spread from person to person through close contact.   It can cause ear infections, and it can also lead to more serious infections of the: 
 Lungs (pneumonia),  Blood (bacteremia), and 
 Covering of the brain and spinal cord (meningitis). Meningitis can cause deafness and brain damage, and it can be fatal.   
 
Anyone can get pneumococcal disease, but children under 3years of age, people with certain medical conditions, adults over 72years of age, and cigarette smokers are at the highest risk. About 18,000 older adults die each year from pneumococcal disease in the [place]. Treatment of pneumococcal infections with penicillin and other drugs used to be more effective. But some strains of the disease have become resistant to these drugs. This makes prevention of the disease, through vaccination, even more important. 2. Pneumococcal polysaccharide vaccine (PPSV23) Pneumococcal polysaccharide vaccine (PPSV23) protects against 23 types of pneumococcal bacteria. It will not prevent all pneumococcal disease. PPSV23 is recommended for:  All adults 72years of age and older,  Anyone 2 through 59years of age with certain long-term health problems, 
 Anyone 2 through 59years of age with a weakened immune system, 
 Adults 23 through 59years of age who smoke cigarettes or have asthma. Most people need only one dose of PPSV. A second dose is recommended for certain high-risk groups. People 72 and older should get a dose even if they have gotten one or more doses of the vaccine before they turned 65. Your healthcare provider can give you more information about these recommendations. Most healthy adults develop protection within 2 to 3 weeks of getting the shot. 3. Some people should not get this vaccine  Anyone who has had a life-threatening allergic reaction to PPSV should not get another dose.  Anyone who has a severe allergy to any component of PPSV should not receive it. Tell your provider if you have any severe allergies.  Anyone who is moderately or severely ill when the shot is scheduled may be asked to wait until they recover before getting the vaccine. Someone with a mild illness can usually be vaccinated.  Children less than 3years of age should not receive this vaccine.  There is no evidence that PPSV is harmful to either a pregnant woman or to her fetus. However, as a precaution, women who need the vaccine should be vaccinated before becoming pregnant, if possible. 4. Risks of a vaccine reaction With any medicine, including vaccines, there is a chance of side effects. These are usually mild and go away on their own, but serious reactions are also possible. About half of people who get PPSV have mild side effects, such as redness or pain where the shot is given, which go away within about two days. Less than 1 out of 100 people develop a fever, muscle aches, or more severe local reactions. Problems that could happen after any vaccine:  People sometimes faint after a medical procedure, including vaccination. Sitting or lying down for about 15 minutes can help prevent fainting, and injuries caused by a fall. Tell your doctor if you feel dizzy, or have vision changes or ringing in the ears.  Some people get severe pain in the shoulder and have difficulty moving the arm where a shot was given. This happens very rarely.  Any medication can cause a severe allergic reaction. Such reactions from a vaccine are very rare, estimated at about 1 in a million doses, and would happen within a few minutes to a few hours after the vaccination. As with any medicine, there is a very remote chance of a vaccine causing a serious injury or death. The safety of vaccines is always being monitored. For more information, visit: www.cdc.gov/vaccinesafety/  
 
5. What if there is a serious reaction? What should I look for?  
 
Look for anything that concerns you, such as signs of a severe allergic reaction, very high fever, or unusual behavior. Signs of a severe allergic reaction can include hives, swelling of the face and throat, difficulty breathing, a fast heartbeat, dizziness, and weakness. These would usually start a few minutes to a few hours after the vaccination. What should I do? If you think it is a severe allergic reaction or other emergency that cant wait, call 9-1-1 or get to the nearest hospital. Otherwise, call your doctor. Afterward, the reaction should be reported to the Vaccine Adverse Event Reporting System (VAERS). Your doctor might file this report, or you can do it yourself through the VAERS web site at www.vaers. [facility].gov, or by calling 5-335.696.6566. [employer] does not give medical advice. 6. How can I learn more?  Ask your doctor. He or she can give you the vaccine package insert or suggest other sources of information.  Call your local or state health department.  Contact the Centers for Disease Control and Prevention (CDC): 
- Call 4-312.313.4737 (1-800-CDC-INFO) or 
- Visit CDCs website at www.cdc.gov/vaccines Vaccine Information Statement PPSV  
04/24/2015 Department of [facility] and [employer] Centers for Disease Control and Prevention Office Use Only Plantar Fasciitis: Care Instructions Your Care Instructions Plantar fasciitis is pain and inflammation of the plantar fascia, the tissue at the bottom of your foot that connects the heel bone to the toes. The plantar fascia also supports the arch. If you strain the plantar fascia, it can develop small tears and cause heel pain when you stand or walk. Plantar fasciitis can be caused by running or other sports. It also may occur in people who are overweight or who have high arches or flat feet. You may get plantar fasciitis if you walk or stand for long periods, or have a tight Achilles tendon or calf muscles. You can improve your foot pain with rest and other care at home.  It might take a few weeks to a few months for your foot to heal completely. Follow-up care is a key part of your treatment and safety. Be sure to make and go to all appointments, and call your doctor if you are having problems. It's also a good idea to know your test results and keep a list of the medicines you take. How can you care for yourself at home? · Rest your feet often. Reduce your activity to a level that lets you avoid pain. If possible, do not run or walk on hard surfaces. · Take pain medicines exactly as directed. ¨ If the doctor gave you a prescription medicine for pain, take it as prescribed. ¨ If you are not taking a prescription pain medicine, take an over-the-counter anti-inflammatory medicine for pain and swelling, such as ibuprofen (Advil, Motrin) or naproxen (Aleve). Read and follow all instructions on the label. · Use ice massage to help with pain and swelling. You can use an ice cube or an ice cup several times a day. To make an ice cup, fill a paper cup with water and freeze it. Cut off the top of the cup until a half-inch of ice shows. Hold onto the remaining paper to use the cup. Rub the ice in small circles over the area for 5 to 7 minutes. · Contrast baths, which alternate hot and cold water, can also help reduce swelling. But because heat alone may make pain and swelling worse, end a contrast bath with a soak in cold water. · Wear a night splint if your doctor suggests it. A night splint holds your foot with the toes pointed up and the foot and ankle at a 90-degree angle. This position gives the bottom of your foot a constant, gentle stretch. · Do simple exercises such as calf stretches and towel stretches 2 to 3 times each day, especially when you first get up in the morning. These can help the plantar fascia become more flexible. They also make the muscles that support your arch stronger. Hold these stretches for 15 to 30 seconds per stretch. Repeat 2 to 4 times. ¨ Stand about 1 foot from a wall. Place the palms of both hands against the wall at chest level. Lean forward against the wall, keeping one leg with the knee straight and heel on the ground while bending the knee of the other leg. ¨ Sit down on the floor or a mat with your feet stretched in front of you. Roll up a towel lengthwise, and loop it over the ball of your foot. Holding the towel at both ends, gently pull the towel toward you to stretch your foot. · Wear shoes with good arch support. Athletic shoes or shoes with a well-cushioned sole are good choices. · Try heel cups or shoe inserts (orthotics) to help cushion your heel. You can buy these at many shoe stores. · Put on your shoes as soon as you get out of bed. Going barefoot or wearing slippers may make your pain worse. · Reach and stay at a good weight for your height. This puts less strain on your feet. When should you call for help? Call your doctor now or seek immediate medical care if: 
· You have heel pain with fever, redness, or warmth in your heel. · You cannot put weight on the sore foot. Watch closely for changes in your health, and be sure to contact your doctor if: 
· You have numbness or tingling in your heel. · Your heel pain lasts more than 2 weeks. Where can you learn more? Go to http://fela-franklin.info/. Mera Pineda in the search box to learn more about \"Plantar Fasciitis: Care Instructions. \" Current as of: March 21, 2017 Content Version: 11.4 © 7961-1637 [employer]. Care instructions adapted under license by [employer] (which disclaims liability or warranty for this information). If you have questions about a medical condition or this instruction, always ask your healthcare professional. [address] any warranty or liability for your use of this information. Plantar Fasciitis: Exercises Your Care Instructions Here are some examples of typical rehabilitation exercises for your condition. Start each exercise slowly. Ease off the exercise if you start to have pain. Your doctor or physical therapist will tell you when you can start these exercises and which ones will work best for you. How to do the exercises Towel stretch 1. Sit with your legs extended and knees straight. 2. Place a towel around your foot just under the toes. 3. Hold each end of the towel in each hand, with your hands above your knees. 4. Pull back with the towel so that your foot stretches toward you. 5. Hold the position for at least 15 to 30 seconds. 6. Repeat 2 to 4 times a session, up to 5 sessions a day. Calf stretch This exercise stretches the muscles at the back of the lower leg (the calf) and the Achilles tendon. Do this exercise 3 or 4 times a day, 5 days a week. 1. Stand facing a wall with your hands on the wall at about eye level. Put the leg you want to stretch about a step behind your other leg. 2. Keeping your back heel on the floor, bend your front knee until you feel a stretch in the back leg. 3. Hold the stretch for 15 to 30 seconds. Repeat 2 to 4 times. Plantar fascia and calf stretch Stretching the plantar fascia and calf muscles can increase flexibility and decrease heel pain. You can do this exercise several times each day and before and after activity. 1. Stand on a step as shown above. Be sure to hold on to the banister. 2. Slowly let your heels down over the edge of the step as you relax your calf muscles. You should feel a gentle stretch across the bottom of your foot and up the back of your leg to your knee. 3. Hold the stretch about 15 to 30 seconds, and then tighten your calf muscle a little to bring your heel back up to the level of the step. Repeat 2 to 4 times. Towel curls Make this exercise more challenging by placing a weighted object, such as a soup can, on the other end of the towel. 1. While sitting, place your foot on a towel on the floor and scrunch the towel toward you with your toes. 2. Then, also using your toes, push the towel away from you. [place] pickups 1. Put marbles on the floor next to a cup. 
2. Using your toes, try to lift the marbles up from the floor and put them in the cup. Follow-up care is a key part of your treatment and safety. Be sure to make and go to all appointments, and call your doctor if you are having problems. It's also a good idea to know your test results and keep a list of the medicines you take. Where can you learn more? Go to http://fela-franklin.info/. Cresencio Mcknight in the search box to learn more about \"Plantar Fasciitis: Exercises. \" Current as of: March 21, 2017 Content Version: 11.4 © 1791-6859 Yg[employer]. Care instructions adapted under license by [employer] (which disclaims liability or warranty for this information). If you have questions about a medical condition or this instruction, always ask your healthcare professional. [place] [address] any warranty or liability for your use of this information. Body Mass Index: Care Instructions Your Care Instructions Body mass index (BMI) can help you see if your weight is raising your risk for health problems. It uses a formula to compare how much you weigh with how tall you are. · A BMI lower than 18.5 is considered underweight. · A BMI between 18.5 and 24.9 is considered healthy. · A BMI between 25 and 29.9 is considered overweight. A BMI of 30 or higher is considered obese. If your BMI is in the normal range, it means that you have a lower risk for weight-related health problems.  If your BMI is in the overweight or obese range, you may be at increased risk for weight-related health problems, such as high blood pressure, heart disease, stroke, arthritis or joint pain, and diabetes. If your BMI is in the underweight range, you may be at increased risk for health problems such as fatigue, lower protection (immunity) against illness, muscle loss, bone loss, hair loss, and hormone problems. BMI is just one measure of your risk for weight-related health problems. You may be at higher risk for health problems if you are not active, you eat an unhealthy diet, or you drink too much alcohol or use tobacco products. Follow-up care is a key part of your treatment and safety. Be sure to make and go to all appointments, and call your doctor if you are having problems. It's also a good idea to know your test results and keep a list of the medicines you take. How can you care for yourself at home? · Practice healthy eating habits. This includes eating plenty of fruits, vegetables, whole grains, lean protein, and low-fat dairy. · If your doctor recommends it, get more exercise. Walking is a good choice. Bit by bit, increase the amount you walk every day. Try for at least 30 minutes on most days of the week. · Do not smoke. Smoking can increase your risk for health problems. If you need help quitting, talk to your doctor about stop-smoking programs and medicines. These can increase your chances of quitting for good. · Limit alcohol to 2 drinks a day for men and 1 drink a day for women. Too much alcohol can cause health problems. If you have a BMI higher than 25 · Your doctor may do other tests to check your risk for weight-related health problems. This may include measuring the distance around your waist. A waist measurement of more than 40 inches in men or 35 inches in women can increase the risk of weight-related health problems. · Talk with your doctor about steps you can take to stay healthy or improve your health. You may need to make lifestyle changes to lose weight and stay healthy, such as changing your diet and getting regular exercise. If you have a BMI lower than 18.5 · Your doctor may do other tests to check your risk for health problems. · Talk with your doctor about steps you can take to stay healthy or improve your health. You may need to make lifestyle changes to gain or maintain weight and stay healthy, such as getting more healthy foods in your diet and doing exercises to build muscle. Where can you learn more? Go to http://fela-franklin.info/. Enter S176 in the search box to learn more about \"Body Mass Index: Care Instructions. \" Current as of: October 13, 2016 Content Version: 11.4 © 3397-1355 [employer]. Care instructions adapted under license by [employer] (which disclaims liability or warranty for this information). If you have questions about a medical condition or this instruction, always ask your healthcare professional. [address] any warranty or liability for your use of this information. Introducing [facility]! [employer] introduces [employer] patient portal. Now you can access parts of your medical record, email your doctor's office, and request medication refills online. 1. In your internet browser, go to [email or web address] 2. Click on the First Time User? Click Here link in the Sign In box. You will see the New Member Sign Up page. 3. Enter your [employer] Access Code exactly as it appears below. You will not need to use this code after youve completed the sign-up process. If you do not sign up before the expiration date, you must request a new code. · [employer] Access Code: [email or web address] Expires: 2/28/2018  9:49 AM 
 
4. Enter the last four digits of your Social Security Number (xxxx) and Date of Birth (mm/dd/yyyy) as indicated and click Submit. You will be taken to the next sign-up page. 5. Create a [employer] ID.  This will be your [employer] login ID and cannot be changed, so think of one that is secure and easy to remember. 6. Create a Sakti3 password. You can change your password at any time. 7. Enter your Password Reset Question and Answer. This can be used at a later time if you forget your password. 8. Enter your e-mail address. You will receive e-mail notification when new information is available in [address] 9. Click Sign Up. You can now view and download portions of your medical record. 10. Click the Download Summary menu link to download a portable copy of your medical information. If you have questions, please visit the Frequently Asked Questions section of the Sakti[employer] website. Remember, Sakti3 is NOT to be used for urgent needs. For medical emergencies, dial 911. Now available from your iPhone and Android! Please provide this summary of care documentation to your next provider. Your primary care clinician is listed as Airam Clemente. If you have any questions after today's visit, please call [phone].

## 2017-11-30 NOTE — PROGRESS NOTES
HISTORY OF PRESENT ILLNESS  Bindu Gambino is a 58 y.o. male. HPI  He presents for follow up of diabetes mellitus, hypertension with chronic kidney disease, hyperlipidemia, coronary artery disease, history of prior MI and status post coronary artery stenting. Diabetic ROS - medication compliance: compliant all of the time,      home glucose monitoring: is not performed,      home BP Monitoring: is well controlled at home, ranging 120's/70's.      further diabetic ROS: no polyuria or polydipsia, no numbness, tingling or pain in extremities, no unusual visual symptoms, no hypoglycemia, no medication side effects noted. Diet and Lifestyle: generally follows a low fat low cholesterol diet, generally follows a low sodium diet, follows a diabetic diet regularly, exercises regularly, nonsmoker  Cardiovascular ROS:  He denies palpitations, orthopnea, exertional chest pressure/discomfort, claudication, lower extremity edema, dyspnea on exertion, dizziness      Pain in right heel. Interferes with walking somewhat. Worst when first puts heel on the floor.        Patient Active Problem List   Diagnosis Code    History of colonoscopy Z98.890    Low back pain M54.5    Hypercholesterolemia E78.00    Osteoarthritis of both knees M17.0    CKD (chronic kidney disease) stage 2, GFR 60-89 ml/min N18.2    Benign hypertension with chronic kidney disease, stage II I12.9, N18.2    Coronary artery disease involving native coronary artery of native heart without angina pectoris I25.10    Controlled type 2 diabetes mellitus without complication, without long-term current use of insulin ([facility]) E11.9     Past Medical History:   Diagnosis Date    CAD (coronary artery disease) April 2014    NSTEMI, PCI/NATE RCA    Hypercholesterolemia     Hypertension      Past Surgical History:   Procedure Laterality Date    CARDIAC SURG PROCEDURE UNLIST      Stent RCA 4/2014    HX ORTHOPAEDIC      left shoulder, torn tendon    HX ORTHOPAEDIC right knee X 4    [place] COLONOSCOPY FLX DX W/COLLJ SPEC WHEN PFRMD  12/19/2007    Dr Velia Arzate 1 polyp repeat 12/2012     Social History     Social History    Marital status:      Spouse name: N/A    Number of children: N/A    Years of education: N/A     Social History Main Topics    Smoking status: Former Smoker     Packs/day: 0.25     Years: 35.00     Types: Cigarettes     Quit date: 11/12/2014    Smokeless tobacco: Never Used      Comment: Never smoked over a pack per day    Alcohol use No    Drug use: No    Sexual activity: Not Asked     Other Topics Concern    None     Social History Narrative     Family History   Problem Relation Age of Onset    Heart Disease Mother      CAD    Hypertension Mother     Elevated Lipids Mother     Cancer Father      lung    Mental Retardation Brother     Seizures Brother     Diabetes Brother     Hypertension Brother     Elevated Lipids Brother      No Known Allergies  Current Outpatient Prescriptions   Medication Sig Dispense Refill    amLODIPine (NORVASC) 2.5 mg tablet TAKE 1 TABLET DAILY 90 Tab 0    metoprolol succinate (TOPROL-XL) 25 mg XL tablet TAKE 1 TABLET DAILY (NEED APPOINTMENT FOR REFILLS) 90 Tab 0    aspirin delayed-release 81 mg tablet Take 1 Tab by mouth daily. 90 Tab 3    metFORMIN ER (GLUCOPHAGE XR) 500 mg tablet Take 1 Tab by mouth daily (with dinner). 90 Tab 3    atorvastatin (LIPITOR) 40 mg tablet TAKE 1 TABLET EVERY EVENING 90 Tab 1    nitroglycerin (NITROSTAT) 0.4 mg SL tablet 1 Tab by SubLINGual route every five (5) minutes as needed for Chest Pain (Max 3 doses daily). 1 Bottle 3       Review of Systems   Constitutional: Negative for malaise/fatigue and weight loss. Gastrointestinal: Negative for constipation, diarrhea and heartburn. Musculoskeletal: Negative for back pain and joint pain. Neurological: Negative for dizziness, tingling and focal weakness.      Visit Vitals    /75 (BP 1 Location: Left arm, BP Patient Position: Sitting)    Pulse 60    Temp 97.2 °F (36.2 °C) (Oral)    Resp 18    Ht 6' 1\" (1.854 m)    Wt 222 lb 3.2 oz (100.8 kg)    SpO2 96%    BMI 29.32 kg/m2     Physical Exam   Constitutional: He is oriented to person, place, and time. He appears well-developed and well-nourished. HENT:   Head: Normocephalic and atraumatic. Eyes: Conjunctivae are normal. Pupils are equal, round, and reactive to light. Neck: Neck supple. Carotid bruit is not present. No thyromegaly present. Cardiovascular: Normal rate, regular rhythm and normal heart sounds. PMI is not displaced. Exam reveals no gallop. No murmur heard. Pulses:       Dorsalis pedis pulses are 2+ on the right side, and 2+ on the left side. Posterior tibial pulses are 2+ on the right side, and 2+ on the left side. Pulmonary/Chest: Effort normal. He has no wheezes. He has no rhonchi. He has no rales. Abdominal: Soft. Normal appearance. He exhibits no abdominal bruit and no mass. There is no hepatosplenomegaly. There is no tenderness. Musculoskeletal: He exhibits no edema. Right foot: There is no tenderness and no swelling. Lymphadenopathy:     He has no cervical adenopathy. Right: No supraclavicular adenopathy present. Left: No supraclavicular adenopathy present. Neurological: He is alert and oriented to person, place, and time. No sensory deficit. Skin: Skin is warm, dry and intact. No rash noted. Psychiatric: He has a normal mood and affect. His behavior is normal.   Nursing note and vitals reviewed.   Diabetic foot exam:     Left: Reflexes 1+     Vibratory sensation diminished    Proprioception normal   Sharp/dull discrimination normal    Filament test 6/6   Pulse DP: 2+ (normal)   Pulse PT: 2+ (normal)   Deformities: None  Right: Reflexes 1+   Vibratory sensation diminished   Proprioception normal   Sharp/dull discrimination normal   Filament test 6/6   Pulse DP: 2+ (normal)   Pulse PT: 2+ (normal)   Deformities: None    Results for orders placed or performed in visit on 13/78/23   METABOLIC PANEL, BASIC   Result Value Ref Range    Glucose 98 65 - 99 mg/dL    BUN 19 8 - 27 mg/dL    Creatinine 1.20 0.76 - 1.27 mg/dL    GFR est non-AA 64 >59 mL/min/1.73    GFR est AA 74 >59 mL/min/1.73    BUN/Creatinine ratio 16 10 - 24    Sodium 141 134 - 144 mmol/L    Potassium 4.7 3.5 - 5.2 mmol/L    Chloride 102 96 - 106 mmol/L    CO2 25 18 - 29 mmol/L    Calcium 8.7 8.6 - 10.2 mg/dL   HEMOGLOBIN A1C WITH EAG   Result Value Ref Range    Hemoglobin A1c 6.1 (H) 4.8 - 5.6 %    Estimated average glucose 128 mg/dL   MICROALBUMIN, UR, RAND W/ MICROALBUMIN/CREA RATIO   Result Value Ref Range    Creatinine, urine 235.6 Not Estab. mg/dL    Microalbumin, urine 16.5 Not Estab. ug/mL    Microalb/Creat ratio (ug/mg creat.) 7.0 0.0 - 30.0 mg/g creat   CKD REPORT   Result Value Ref Range    Interpretation Note    DIABETES PATIENT EDUCATION   Result Value Ref Range    PDF Image Not applicable      Lab Results   Component Value Date/Time    Cholesterol, total 103 08/22/2017 08:18 AM    HDL Cholesterol 32 08/22/2017 08:18 AM    LDL, calculated 50 08/22/2017 08:18 AM    VLDL, calculated 21 08/22/2017 08:18 AM    Triglyceride 104 08/22/2017 08:18 AM    CHOL/HDL Ratio 4.5 10/29/2010 08:57 AM       ASSESSMENT and PLAN    ICD-10-CM ICD-9-CM    1. Controlled type 2 diabetes mellitus without complication, without long-term current use of insulin (HCC) E11.9 250.00 HM DIABETES FOOT EXAM      metFORMIN ER (GLUCOPHAGE XR) 500 mg tablet      HEMOGLOBIN A1C WITH EAG   2. Hypercholesterolemia E78.00 272.0 atorvastatin (LIPITOR) 40 mg tablet      LIPID PANEL      METABOLIC PANEL, COMPREHENSIVE   3. Benign hypertension with chronic kidney disease, stage II I12.9 403.10 metoprolol succinate (TOPROL-XL) 25 mg XL tablet    N18.2 585.2 amLODIPine (NORVASC) 2.5 mg tablet   4.  Coronary artery disease involving native coronary artery of native heart without angina pectoris I25.10 414.01    5. Plantar fasciitis of right foot M72.2 728.71    6. Encounter for immunization Z23 V03.89 PNEUMOCOCCAL POLYSACCHARIDE VACCINE, 23-VALENT, ADULT OR IMMUNOSUPPRESSED PT DOSE,      [place] IMMUNIZ,ADMIN,EACH ADDL   7. Screen for colon cancer Z12.11 V76.51 REFERRAL TO GASTROENTEROLOGY     Diagnoses and all orders for this visit:    1. Controlled type 2 diabetes mellitus without complication, without long-term current use of insulin ([facility])  -      DIABETES FOOT EXAM  -     metFORMIN ER (GLUCOPHAGE XR) 500 mg tablet; Take 1 Tab by mouth daily (with dinner). -     HEMOGLOBIN A1C WITH EAG; Future    2. Hypercholesterolemia  Hyperlipidemia is controlled  -     atorvastatin (LIPITOR) 40 mg tablet; TAKE 1 TABLET EVERY EVENING  -     LIPID PANEL; Future  -     METABOLIC PANEL, COMPREHENSIVE; Future    3. Benign hypertension with chronic kidney disease, stage II  Blood pressure is at goal and creatinine is stable. -     metoprolol succinate (TOPROL-XL) 25 mg XL tablet; TAKE 1 TABLET DAILY  -     amLODIPine (NORVASC) 2.5 mg tablet; TAKE 1 TABLET DAILY    4. Coronary artery disease involving native coronary artery of native heart without angina pectoris    5. Plantar fasciitis of right foot  Discussed home treatment and referral to PT or orthopedist if not improving. 6. Encounter for immunization  -     PNEUMOCOCCAL POLYSACCHARIDE VACCINE, 23-VALENT, ADULT OR IMMUNOSUPPRESSED PT DOSE,  -     [place] IMMUNIZ,ADMIN,EACH ADDL    7. Screen for colon cancer  -     REFERRAL TO GASTROENTEROLOGY      Follow-up Disposition:  Return in about 6 months (around 5/30/2018) for DM, HTN, chol  Fasting lab one week prior  . lab results and schedule of future lab studies reviewed with patient  reviewed diet, exercise and weight control  cardiovascular risk and specific lipid/LDL goals reviewed  Discussed the patient's BMI with him.   The BMI follow up plan is as follows:   dietary management education, guidance, and counseling  encourage exercise  monitor weight  I have discussed the diagnosis with the patient and the intended plan as seen in the above orders. Patient is in agreement. The patient has received an after-visit summary and questions were answered concerning future plans. I have discussed medication side effects and warnings with the patient as well.

## 2018-03-19 DIAGNOSIS — I12.9 BENIGN HYPERTENSION WITH CHRONIC KIDNEY DISEASE, STAGE II: ICD-10-CM

## 2018-03-19 DIAGNOSIS — N18.2 BENIGN HYPERTENSION WITH CHRONIC KIDNEY DISEASE, STAGE II: ICD-10-CM

## 2018-03-19 RX ORDER — AMLODIPINE BESYLATE 2.5 MG/1
TABLET ORAL
Qty: 14 TAB | Refills: 0 | Status: SHIPPED | OUTPATIENT
Start: 2018-03-19 | End: 2018-12-27 | Stop reason: SDUPTHER

## 2018-03-19 RX ORDER — AMLODIPINE BESYLATE 2.5 MG/1
TABLET ORAL
Qty: 90 TAB | Refills: 3 | Status: SHIPPED | OUTPATIENT
Start: 2018-03-19 | End: 2018-03-19 | Stop reason: SDUPTHER

## 2018-03-19 NOTE — TELEPHONE ENCOUNTER
PCP: Asha Alvarez MD     Last appt: 11/30/2017   Future Appointments  Date Time Provider Gosia Wagoneristi   5/29/2018 8:15 AM LAB [facility] SCHED   6/4/2018 8:45 AM Asha Alvarez MD [facility] [place] SCHED        Requested Prescriptions     Pending Prescriptions Disp Refills    amLODIPine (NORVASC) 2.5 mg tablet 90 Tab 3     Sig: TAKE 1 TABLET DAILY

## 2018-05-29 ENCOUNTER — APPOINTMENT (OUTPATIENT)
Dept: INTERNAL MEDICINE CLINIC | Facility: CLINIC | Age: 63
End: 2018-05-29

## 2018-05-29 DIAGNOSIS — E78.00 HYPERCHOLESTEROLEMIA: ICD-10-CM

## 2018-05-29 DIAGNOSIS — E11.9 CONTROLLED TYPE 2 DIABETES MELLITUS WITHOUT COMPLICATION, WITHOUT LONG-TERM CURRENT USE OF INSULIN (HCC): ICD-10-CM

## 2018-05-30 LAB
ALBUMIN SERPL-MCNC: 3.9 G/DL (ref 3.6–4.8)
ALBUMIN/GLOB SERPL: 1.6 {RATIO} (ref 1.2–2.2)
ALP SERPL-CCNC: 85 IU/L (ref 39–117)
ALT SERPL-CCNC: 24 IU/L (ref 0–44)
AST SERPL-CCNC: 23 IU/L (ref 0–40)
BILIRUB SERPL-MCNC: 0.3 MG/DL (ref 0–1.2)
BUN SERPL-MCNC: 11 MG/DL (ref 8–27)
BUN/CREAT SERPL: 9 (ref 10–24)
CALCIUM SERPL-MCNC: 8.7 MG/DL (ref 8.6–10.2)
CHLORIDE SERPL-SCNC: 102 MMOL/L (ref 96–106)
CHOLEST SERPL-MCNC: 96 MG/DL (ref 100–199)
CO2 SERPL-SCNC: 25 MMOL/L (ref 18–29)
CREAT SERPL-MCNC: 1.17 MG/DL (ref 0.76–1.27)
EST. AVERAGE GLUCOSE BLD GHB EST-MCNC: 123 MG/DL
GFR SERPLBLD CREATININE-BSD FMLA CKD-EPI: 66 ML/MIN/1.73
GFR SERPLBLD CREATININE-BSD FMLA CKD-EPI: 77 ML/MIN/1.73
GLOBULIN SER CALC-MCNC: 2.5 G/DL (ref 1.5–4.5)
GLUCOSE SERPL-MCNC: 120 MG/DL (ref 65–99)
HBA1C MFR BLD: 5.9 % (ref 4.8–5.6)
HDLC SERPL-MCNC: 30 MG/DL
LDLC SERPL CALC-MCNC: 29 MG/DL (ref 0–99)
POTASSIUM SERPL-SCNC: 4.3 MMOL/L (ref 3.5–5.2)
PROT SERPL-MCNC: 6.4 G/DL (ref 6–8.5)
SODIUM SERPL-SCNC: 141 MMOL/L (ref 134–144)
TRIGL SERPL-MCNC: 187 MG/DL (ref 0–149)
VLDLC SERPL CALC-MCNC: 37 MG/DL (ref 5–40)

## 2018-06-04 ENCOUNTER — OFFICE VISIT (OUTPATIENT)
Dept: INTERNAL MEDICINE CLINIC | Facility: CLINIC | Age: 63
End: 2018-06-04

## 2018-06-04 VITALS
OXYGEN SATURATION: 98 % | DIASTOLIC BLOOD PRESSURE: 78 MMHG | RESPIRATION RATE: 20 BRPM | TEMPERATURE: 98.3 F | BODY MASS INDEX: 28.83 KG/M2 | HEIGHT: 73 IN | SYSTOLIC BLOOD PRESSURE: 128 MMHG | WEIGHT: 217.5 LBS | HEART RATE: 67 BPM

## 2018-06-04 DIAGNOSIS — I12.9 BENIGN HYPERTENSION WITH CHRONIC KIDNEY DISEASE, STAGE II: ICD-10-CM

## 2018-06-04 DIAGNOSIS — E11.9 CONTROLLED TYPE 2 DIABETES MELLITUS WITHOUT COMPLICATION, WITHOUT LONG-TERM CURRENT USE OF INSULIN (HCC): Primary | ICD-10-CM

## 2018-06-04 DIAGNOSIS — E66.3 OVERWEIGHT (BMI 25.0-29.9): ICD-10-CM

## 2018-06-04 DIAGNOSIS — I25.10 CORONARY ARTERY DISEASE INVOLVING NATIVE CORONARY ARTERY OF NATIVE HEART WITHOUT ANGINA PECTORIS: ICD-10-CM

## 2018-06-04 DIAGNOSIS — N18.2 BENIGN HYPERTENSION WITH CHRONIC KIDNEY DISEASE, STAGE II: ICD-10-CM

## 2018-06-04 DIAGNOSIS — E78.00 HYPERCHOLESTEROLEMIA: ICD-10-CM

## 2018-06-04 NOTE — PROGRESS NOTES
HISTORY OF PRESENT ILLNESS  Haley Burt is a 58 y.o. male. HPI  He presents for follow up of diabetes mellitus, hypertension, chronic kidney disease, hyperlipidemia, coronary artery disease and status post coronary artery stenting. Diet and Lifestyle: generally follows a low fat low cholesterol diet, generally follows a low sodium diet, does not rigorously follow a diabetic diet, exercises sporadically, nonsmoker  Diabetic ROS - medication compliance: compliant all of the time,      home glucose monitoring: not done     home BP Monitorin'/80's.      further diabetic ROS: no polyuria or polydipsia, no numbness, tingling or pain in extremities, no unusual visual symptoms, no hypoglycemia, no medication side effects noted.    Cardiovascular ROS:  He complains of dizziness if stands up quickly  He denies palpitations, orthopnea, exertional chest pressure/discomfort, claudication, lower extremity edema, dyspnea on exertion      Patient Active Problem List   Diagnosis Code    History of colonoscopy Z98.890    Low back pain M54.5    Hypercholesterolemia E78.00    Osteoarthritis of both knees M17.0    CKD (chronic kidney disease) stage 2, GFR 60-89 ml/min N18.2    Benign hypertension with chronic kidney disease, stage II I12.9, N18.2    Coronary artery disease involving native coronary artery of native heart without angina pectoris I25.10    Controlled type 2 diabetes mellitus without complication, without long-term current use of insulin ([facility]) E11.9     Past Medical History:   Diagnosis Date    CAD (coronary artery disease) 2014    NSTEMI, PCI/NATE RCA    Hypercholesterolemia     Hypertension      Past Surgical History:   Procedure Laterality Date    CARDIAC SURG PROCEDURE UNLIST      Stent RCA 2014    HX ORTHOPAEDIC      left shoulder, torn tendon    HX ORTHOPAEDIC      right knee X 4    [place] COLONOSCOPY FLX DX W/COLLJ SPEC WHEN PFRMD  2007    Dr Rasheed Cline 1 polyp repeat 2012     Social History     Social History    Marital status:      Spouse name: N/A    Number of children: N/A    Years of education: N/A     Social History Main Topics    Smoking status: Former Smoker     Packs/day: 0.25     Years: 35.00     Types: Cigarettes     Quit date: 11/12/2014    Smokeless tobacco: Never Used      Comment: Never smoked over a pack per day    Alcohol use No    Drug use: No    Sexual activity: Not Asked     Other Topics Concern    None     Social History Narrative     Family History   Problem Relation Age of Onset    Heart Disease Mother      CAD    Hypertension Mother     Elevated Lipids Mother     Cancer Father      lung    Mental Retardation Brother     Seizures Brother     Diabetes Brother     Hypertension Brother     Elevated Lipids Brother      No Known Allergies  Current Outpatient Prescriptions   Medication Sig Dispense Refill    amLODIPine (NORVASC) 2.5 mg tablet TAKE 1 TABLET DAILY 14 Tab 0    metFORMIN ER (GLUCOPHAGE XR) 500 mg tablet Take 1 Tab by mouth daily (with dinner). 90 Tab 3    metoprolol succinate (TOPROL-XL) 25 mg XL tablet TAKE 1 TABLET DAILY 90 Tab 3    atorvastatin (LIPITOR) 40 mg tablet TAKE 1 TABLET EVERY EVENING 90 Tab 3    aspirin delayed-release 81 mg tablet Take 1 Tab by mouth daily. 90 Tab 3    nitroglycerin (NITROSTAT) 0.4 mg SL tablet 1 Tab by SubLINGual route every five (5) minutes as needed for Chest Pain (Max 3 doses daily). 1 Bottle 3       Review of Systems   Constitutional: Negative for malaise/fatigue and weight loss. Gastrointestinal: Negative for constipation, diarrhea and heartburn. Musculoskeletal: Positive for back pain (lower back). Negative for joint pain. Neurological: Negative for dizziness, tingling and focal weakness.      Visit Vitals    /78 (BP 1 Location: Left arm, BP Patient Position: Sitting)    Pulse 67    Temp 98.3 °F (36.8 °C) (Oral)    Resp 20    Ht 6' 1\" (1.854 m)    Wt 217 lb 8 oz (98.7 kg)    SpO2 98%    BMI 28.7 kg/m2     Physical Exam   Constitutional: He is oriented to person, place, and time. He appears well-developed and well-nourished. HENT:   Head: Normocephalic and atraumatic. Eyes: Conjunctivae are normal. Pupils are equal, round, and reactive to light. Neck: Neck supple. Carotid bruit is not present. No thyromegaly present. Cardiovascular: Normal rate, regular rhythm and normal heart sounds. PMI is not displaced. Exam reveals no gallop. No murmur heard. Pulses:       Dorsalis pedis pulses are 2+ on the right side, and 2+ on the left side. Posterior tibial pulses are 2+ on the right side, and 2+ on the left side. Pulmonary/Chest: Effort normal. He has no wheezes. He has no rhonchi. He has no rales. Abdominal: Soft. Normal appearance. He exhibits no abdominal bruit and no mass. There is no hepatosplenomegaly. There is no tenderness. Musculoskeletal: He exhibits no edema. Lymphadenopathy:     He has no cervical adenopathy. Right: No supraclavicular adenopathy present. Left: No supraclavicular adenopathy present. Neurological: He is alert and oriented to person, place, and time. No sensory deficit. Skin: Skin is warm, dry and intact. No rash noted. Psychiatric: He has a normal mood and affect. His behavior is normal.   Nursing note and vitals reviewed.     Results for orders placed or performed in visit on 05/29/18   LIPID PANEL   Result Value Ref Range    Cholesterol, total 96 (L) 100 - 199 mg/dL    Triglyceride 187 (H) 0 - 149 mg/dL    HDL Cholesterol 30 (L) >39 mg/dL    VLDL, calculated 37 5 - 40 mg/dL    LDL, calculated 29 0 - 99 mg/dL   METABOLIC PANEL, COMPREHENSIVE   Result Value Ref Range    Glucose 120 (H) 65 - 99 mg/dL    BUN 11 8 - 27 mg/dL    Creatinine 1.17 0.76 - 1.27 mg/dL    GFR est non-AA 66 >59 mL/min/1.73    GFR est AA 77 >59 mL/min/1.73    BUN/Creatinine ratio 9 (L) 10 - 24    Sodium 141 134 - 144 mmol/L    Potassium 4.3 3.5 - 5.2 mmol/L    Chloride 102 96 - 106 mmol/L    CO2 25 18 - 29 mmol/L    Calcium 8.7 8.6 - 10.2 mg/dL    Protein, total 6.4 6.0 - 8.5 g/dL    Albumin 3.9 3.6 - 4.8 g/dL    GLOBULIN, TOTAL 2.5 1.5 - 4.5 g/dL    A-G Ratio 1.6 1.2 - 2.2    Bilirubin, total 0.3 0.0 - 1.2 mg/dL    Alk. phosphatase 85 39 - 117 IU/L    AST (SGOT) 23 0 - 40 IU/L    ALT (SGPT) 24 0 - 44 IU/L   HEMOGLOBIN A1C WITH EAG   Result Value Ref Range    Hemoglobin A1c 5.9 (H) 4.8 - 5.6 %    Estimated average glucose 123 mg/dL       ASSESSMENT and PLAN    ICD-10-CM ICD-9-CM    1. Controlled type 2 diabetes mellitus without complication, without long-term current use of insulin (HCC) E11.9 250.00    2. Benign hypertension with chronic kidney disease, stage II I12.9 403.10     N18.2 585.2    3. Hypercholesterolemia E78.00 272.0    4. Coronary artery disease involving native coronary artery of native heart without angina pectoris I25.10 414.01    5. Overweight (BMI 25.0-29. 9) E66.3 278.02      Diagnoses and all orders for this visit:    1. Controlled type 2 diabetes mellitus without complication, without long-term current use of insulin ([facility] [address]  Diabetes Mellitus: well controlled. Is taking statin. Issues reviewed with him: low cholesterol diet, weight control and daily exercise discussed and glycohemoglobin and other lab monitoring discussed. Is not taking ACE/ARB; blood pressure controlled and microalbumin/creat is normal. .    2. Benign hypertension with chronic kidney disease, stage II  Blood pressure is at goal and creatinine is stable. 3. Hypercholesterolemia  Hyperlipidemia is controlled     4. Coronary artery disease involving native coronary artery of native heart without angina pectoris  Stable taking aspirin and statin. 5. Overweight (BMI 25.0-29. 9)  Discussed using smaller plate for portion control, keeping a food diary for awareness of food consumed, checking weight often, and increasing physical activity. Will re-evaluate next visit. Follow-up Disposition:  Return in about 6 months (around 12/4/2018) for DM, HTN, chol, POC A1c .  lab results and schedule of future lab studies reviewed with patient  reviewed diet, exercise and weight control  cardiovascular risk and specific lipid/LDL goals reviewed  I have discussed the diagnosis, evaluation and treatment options and the intended plan with the patient. Patient is in agreement. The patient has received an after-visit summary and questions were answered concerning future plans. I have discussed side effects and warnings of any new medications with the patient as well.

## 2018-06-04 NOTE — PROGRESS NOTES
Suha Morejon  Identified pt with two pt identifiers(name and ). Chief Complaint   Patient presents with    Diabetes    Blood Pressure Check    Cholesterol Problem       Reviewed record In preparation for visit and have obtained necessary documentation. Given info on advanced directives. 1. Have you been to the ER, urgent care clinic or hospitalized since your last visit? No     2. Have you seen or consulted any other health care providers outside of the [address] [place] [address] since your last visit? Include any pap smears or colon screening. Colonoscopy, Dr Merna Chu reviewed with provider.     Health Maintenance reviewed:     Health Maintenance Due   Topic    ZOSTER VACCINE AGE 60>           Wt Readings from Last 3 Encounters:   18 217 lb 8 oz (98.7 kg)   17 222 lb 3.2 oz (100.8 kg)   17 229 lb 8 oz (104.1 kg)        Temp Readings from Last 3 Encounters:   18 98.3 °F (36.8 °C) (Oral)   17 97.2 °F (36.2 °C) (Oral)   17 97.3 °F (36.3 °C) (Oral)        BP Readings from Last 3 Encounters:   18 128/78   17 126/75   17 128/78        Pulse Readings from Last 3 Encounters:   18 67   17 60   17 64        Vitals:    18 0854   BP: 128/78   Pulse: 67   Resp: 20   Temp: 98.3 °F (36.8 °C)   TempSrc: Oral   SpO2: 98%   Weight: 217 lb 8 oz (98.7 kg)   Height: 6' 1\" (1.854 m)   PainSc:   0 - No pain          Learning Assessment:   :       Learning Assessment 4/10/2014   PRIMARY LEARNER Patient   HIGHEST LEVEL OF EDUCATION - PRIMARY LEARNER  SOME COLLEGE   BARRIERS PRIMARY LEARNER NONE   CO-LEARNER CAREGIVER No   PRIMARY LANGUAGE ENGLISH    NEED No   LEARNER PREFERENCE PRIMARY VIDEOS   ANSWERED BY Patient   RELATIONSHIP SELF        Depression Screening:   :       PHQ over the last two weeks 2017   Little interest or pleasure in doing things Not at all   Feeling down, depressed or hopeless Not at all   Total Score PHQ 2 0        Fall Risk Assessment:   :     No flowsheet data found. Abuse Screening:   :       Abuse Screening Questionnaire 8/29/2017 8/15/2016   Do you ever feel afraid of your partner? N N   Are you in a relationship with someone who physically or mentally threatens you? N N   Is it safe for you to go home?  Y Y        ADL Screening:   :       ADL Assessment 11/3/2014   Feeding yourself No Help Needed   Getting from bed to chair No Help Needed   Getting dressed No Help Needed   Bathing or showering No Help Needed   Walk across the room (includes cane/walker) No Help Needed   Using the telphone No Help Needed   Taking your medications No Help Needed   Preparing meals No Help Needed   Managing money (expenses/bills) No Help Needed   Moderately strenuous housework (laundry) No Help Needed   Shopping for personal items (toiletries/medicines) No Help Needed   Shopping for groceries No Help Needed   Driving No Help Needed   Climbing a flight of stairs No Help Needed   Getting to places beyond walking distances No Help Needed

## 2018-06-04 NOTE — MR AVS SNAPSHOT
[address] [address] [phone] Patient: Charisse Lou MRN: [id number] OIT:[id number] Visit Information Date & Time Provider Department Dept. Phone Encounter #  
 6/4/2018  8:45 AM Dixon Chau MD [facility] Internal Medicine [place] [id number] Follow-up Instructions Return in about 6 months (around 12/4/2018) for DM, HTN, chol, POC A1c . Upcoming Health Maintenance Date Due ZOSTER VACCINE AGE 60> 9/10/2015 Influenza Age 5 to Adult 8/1/2018 MICROALBUMIN Q1 11/21/2018 HEMOGLOBIN A1C Q6M 11/29/2018 FOOT EXAM Q1 11/30/2018 EYE EXAM RETINAL OR DILATED Q1 12/4/2018 LIPID PANEL Q1 5/29/2019 DTaP/Tdap/Td series (2 - Td) 5/7/2022 COLONOSCOPY 1/9/2028 Allergies as of 6/4/2018  Review Complete On: 6/4/2018 By: Dixon Chau MD  
 No Known Allergies Current Immunizations  Reviewed on 6/4/2018 Name Date Influenza Vaccine (Quad) PF 8/29/2017, 2/21/2017 Influenza Vaccine Split 10/1/2011 Influenza Vaccine Whole 9/14/2010 Pneumococcal Polysaccharide (PPSV-23) 11/30/2017 TD Vaccine 3/31/2004 TDAP Vaccine 5/7/2012 Reviewed by Eriberto Martin LPN on 0/6/4916 at  2:25 AM  
You Were Diagnosed With   
  
 Codes Comments Controlled type 2 diabetes mellitus without complication, without long-term current use of insulin ([facility]    -  Primary ICD-10-CM: E11.9 ICD-9-CM: 250.00 Benign hypertension with chronic kidney disease, stage II     ICD-10-CM: I12.9, N18.2 ICD-9-CM: 403.10, 585.2 Hypercholesterolemia     ICD-10-CM: E78.00 ICD-9-CM: 272.0 Coronary artery disease involving native coronary artery of native heart without angina pectoris     ICD-10-CM: I25.10 ICD-9-CM: 414.01 Overweight (BMI 25.0-29. 9)     ICD-10-CM: C31.2 ICD-9-CM: 278.02 Vitals BP Pulse Temp Resp Height(growth percentile) Weight(growth percentile) 128/78 (BP 1 Location: Left arm, BP Patient Position: Sitting) 67 98.3 °F (36.8 °C) (Oral) 20 6' 1\" (1.854 m) 217 lb 8 oz (98.7 kg) SpO2 BMI Smoking Status 98% 28.7 kg/m2 Former Smoker BMI and BSA Data Body Mass Index Body Surface Area 28.7 kg/m 2 2.25 m 2 Preferred Pharmacy Pharmacy Name Phone [facility] [address] [facility], [place] [phone] Your Updated Medication List  
  
   
This list is accurate as of 6/4/18  9:35 AM.  Always use your most recent med list. amLODIPine 2.5 mg tablet Commonly known as:  Gabrielle Jose TAKE 1 TABLET DAILY  
  
 aspirin delayed-release 81 mg tablet Take 1 Tab by mouth daily. atorvastatin 40 mg tablet Commonly known as:  LIPITOR  
TAKE 1 TABLET EVERY EVENING  
  
 metFORMIN  mg tablet Commonly known as:  GLUCOPHAGE XR Take 1 Tab by mouth daily (with dinner). metoprolol succinate 25 mg XL tablet Commonly known as:  TOPROL-XL  
TAKE 1 TABLET DAILY  
  
 nitroglycerin 0.4 mg SL tablet Commonly known as:  NITROSTAT  
1 Tab by SubLINGual route every five (5) minutes as needed for Chest Pain (Max 3 doses daily). Follow-up Instructions Return in about 6 months (around 12/4/2018) for DM, HTN, chol, POC A1c . Patient Instructions Office visit in 6 months with hemoglobin A1c at that visit. We will also need a urine sample. Learning About Meal Planning for Diabetes Why plan your meals? Meal planning can be a key part of managing diabetes. Planning meals and snacks with the right balance of carbohydrate, protein, and fat can help you keep your blood sugar at the target level you set with your doctor. You don't have to eat special foods. You can eat what your family eats, including sweets once in a while.  But you do have to pay attention to how often you eat and how much you eat of certain foods. You may want to work with a dietitian or a certified diabetes educator. He or she can give you tips and meal ideas and can answer your questions about meal planning. This health professional can also help you reach a healthy weight if that is one of your goals. What plan is right for you? Your dietitian or diabetes educator may suggest that you start with the plate format or carbohydrate counting. The plate format The plate format is a simple way to help you manage how you eat. You plan meals by learning how much space each food should take on a plate. Using the plate format helps you spread carbohydrate throughout the day. It can make it easier to keep your blood sugar level within your target range. It also helps you see if you're eating healthy portion sizes. To use the plate format, you put non-starchy vegetables on half your plate. Add meat or meat substitutes on one-quarter of the plate. Put a grain or starchy vegetable (such as brown rice or a potato) on the final quarter of the plate. You can add a small piece of fruit and some low-fat or fat-free milk or yogurt, depending on your carbohydrate goal for each meal. 
Here are some tips for using the plate format: · Make sure that you are not using an oversized plate. A 9-inch plate is best. Many restaurants use larger plates. · Get used to using the plate format at home. Then you can use it when you eat out. · Write down your questions about using the plate format. Talk to your doctor, a dietitian, or a diabetes educator about your concerns. Carbohydrate counting With carbohydrate counting, you plan meals based on the amount of carbohydrate in each food. Carbohydrate raises blood sugar higher and more quickly than any other nutrient. It is found in desserts, breads and cereals, and fruit.  It's also found in starchy vegetables such as potatoes and corn, grains such as rice and pasta, and milk and yogurt. Spreading carbohydrate throughout the day helps keep your blood sugar levels within your target range. Your daily amount depends on several things, including your weight, how active you are, which diabetes medicines you take, and what your goals are for your blood sugar levels. A registered dietitian or diabetes educator can help you plan how much carbohydrate to include in each meal and snack. A guideline for your daily amount of carbohydrate is: · 45 to 60 grams at each meal. That's about the same as 3 to 4 carbohydrate servings. · 15 to 20 grams at each snack. That's about the same as 1 carbohydrate serving. The Nutrition Facts label on packaged foods tells you how much carbohydrate is in a serving of the food. First, look at the serving size on the food label. Is that the amount you eat in a serving? All of the nutrition information on a food label is based on that serving size. So if you eat more or less than that, you'll need to adjust the other numbers. Total carbohydrate is the next thing you need to look for on the label. If you count carbohydrate servings, one serving of carbohydrate is 15 grams. For foods that don't come with labels, such as fresh fruits and vegetables, you'll need a guide that lists carbohydrate in these foods. Ask your doctor, dietitian, or diabetes educator about books or other nutrition guides you can use. If you take insulin, you need to know how many grams of carbohydrate are in a meal. This lets you know how much rapid-acting insulin to take before you eat. If you use an insulin pump, you get a constant rate of insulin during the day. So the pump must be programmed at meals to give you extra insulin to cover the rise in blood sugar after meals. When you know how much carbohydrate you will eat, you can take the right amount of insulin.  Or, if you always use the same amount of insulin, you need to make sure that you eat the same amount of carbohydrate at meals. If you need more help to understand carbohydrate counting and food labels, ask your doctor, dietitian, or diabetes educator. How do you get started with meal planning? Here are some tips to get started: 
· Plan your meals a week at a time. Don't forget to include snacks too. · Use cookbooks or online recipes to plan several main meals. Plan some quick meals for busy nights. You also can double some recipes that freeze well. Then you can save half for other busy nights when you don't have time to cook. · Make sure you have the ingredients you need for your recipes. If you're running low on basic items, put these items on your shopping list too. · List foods that you use to make breakfasts, lunches, and snacks. List plenty of fruits and vegetables. · Post this list on the refrigerator. Add to it as you think of more things you need. · Take the list to the store to do your weekly shopping. Follow-up care is a key part of your treatment and safety. Be sure to make and go to all appointments, and call your doctor if you are having problems. It's also a good idea to know your test results and keep a list of the medicines you take. Where can you learn more? Go to http://fela-franklin.info/. Jordan Andre in the search box to learn more about \"Learning About Meal Planning for Diabetes. \" Current as of: March 13, 2017 Content Version: 11.4 © 1386-1236 Healthwise, Incorporated. Care instructions adapted under license by [employer] (which disclaims liability or warranty for this information). If you have questions about a medical condition or this instruction, always ask your healthcare professional. [place] [address] any warranty or liability for your use of this information. Introducing [facility]!    
 [employer] introduces Opeepl patient portal. Now you can access parts of your medical record, email your doctor's office, and request medication refills online. 1. In your internet browser, go to [email or web address] 2. Click on the First Time User? Click Here link in the Sign In box. You will see the New Member Sign Up page. 3. Enter your Mirror42 Access Code exactly as it appears below. You will not need to use this code after youve completed the sign-up process. If you do not sign up before the expiration date, you must request a new code. · Mirror42 Access Code: [email or web address] Expires: 9/2/2018  9:35 AM 
 
4. Enter the last four digits of your Social Security Number (xxxx) and Date of Birth (mm/dd/yyyy) as indicated and click Submit. You will be taken to the next sign-up page. 5. Create a Mirror42 ID. This will be your Mirror42 login ID and cannot be changed, so think of one that is secure and easy to remember. 6. Create a Mirror42 password. You can change your password at any time. 7. Enter your Password Reset Question and Answer. This can be used at a later time if you forget your password. 8. Enter your e-mail address. You will receive e-mail notification when new information is available in [address] 9. Click Sign Up. You can now view and download portions of your medical record. 10. Click the Download Summary menu link to download a portable copy of your medical information. If you have questions, please visit the Frequently Asked Questions section of the Mirror42 website. Remember, Mirror42 is NOT to be used for urgent needs. For medical emergencies, dial 911. Now available from your iPhone and Android! Please provide this summary of care documentation to your next provider. Your primary care clinician is listed as Joselito Saucedo. If you have any questions after today's visit, please call [phone].

## 2018-06-04 NOTE — PATIENT INSTRUCTIONS
Office visit in 6 months with hemoglobin A1c at that visit. We will also need a urine sample. Learning About Meal Planning for Diabetes  Why plan your meals? Meal planning can be a key part of managing diabetes. Planning meals and snacks with the right balance of carbohydrate, protein, and fat can help you keep your blood sugar at the target level you set with your doctor. You don't have to eat special foods. You can eat what your family eats, including sweets once in a while. But you do have to pay attention to how often you eat and how much you eat of certain foods. You may want to work with a dietitian or a certified diabetes educator. He or she can give you tips and meal ideas and can answer your questions about meal planning. This health professional can also help you reach a healthy weight if that is one of your goals. What plan is right for you? Your dietitian or diabetes educator may suggest that you start with the plate format or carbohydrate counting. The plate format  The plate format is a simple way to help you manage how you eat. You plan meals by learning how much space each food should take on a plate. Using the plate format helps you spread carbohydrate throughout the day. It can make it easier to keep your blood sugar level within your target range. It also helps you see if you're eating healthy portion sizes. To use the plate format, you put non-starchy vegetables on half your plate. Add meat or meat substitutes on one-quarter of the plate. Put a grain or starchy vegetable (such as brown rice or a potato) on the final quarter of the plate. You can add a small piece of fruit and some low-fat or fat-free milk or yogurt, depending on your carbohydrate goal for each meal.  Here are some tips for using the plate format:  · Make sure that you are not using an oversized plate. A 9-inch plate is best. Many restaurants use larger plates. · Get used to using the plate format at home.  Then you can use it when you eat out. · Write down your questions about using the plate format. Talk to your doctor, a dietitian, or a diabetes educator about your concerns. Carbohydrate counting  With carbohydrate counting, you plan meals based on the amount of carbohydrate in each food. Carbohydrate raises blood sugar higher and more quickly than any other nutrient. It is found in desserts, breads and cereals, and fruit. It's also found in starchy vegetables such as potatoes and corn, grains such as rice and pasta, and milk and yogurt. Spreading carbohydrate throughout the day helps keep your blood sugar levels within your target range. Your daily amount depends on several things, including your weight, how active you are, which diabetes medicines you take, and what your goals are for your blood sugar levels. A registered dietitian or diabetes educator can help you plan how much carbohydrate to include in each meal and snack. A guideline for your daily amount of carbohydrate is:  · 45 to 60 grams at each meal. That's about the same as 3 to 4 carbohydrate servings. · 15 to 20 grams at each snack. That's about the same as 1 carbohydrate serving. The Nutrition Facts label on packaged foods tells you how much carbohydrate is in a serving of the food. First, look at the serving size on the food label. Is that the amount you eat in a serving? All of the nutrition information on a food label is based on that serving size. So if you eat more or less than that, you'll need to adjust the other numbers. Total carbohydrate is the next thing you need to look for on the label. If you count carbohydrate servings, one serving of carbohydrate is 15 grams. For foods that don't come with labels, such as fresh fruits and vegetables, you'll need a guide that lists carbohydrate in these foods. Ask your doctor, dietitian, or diabetes educator about books or other nutrition guides you can use.   If you take insulin, you need to know how many grams of carbohydrate are in a meal. This lets you know how much rapid-acting insulin to take before you eat. If you use an insulin pump, you get a constant rate of insulin during the day. So the pump must be programmed at meals to give you extra insulin to cover the rise in blood sugar after meals. When you know how much carbohydrate you will eat, you can take the right amount of insulin. Or, if you always use the same amount of insulin, you need to make sure that you eat the same amount of carbohydrate at meals. If you need more help to understand carbohydrate counting and food labels, ask your doctor, dietitian, or diabetes educator. How do you get started with meal planning? Here are some tips to get started:  · Plan your meals a week at a time. Don't forget to include snacks too. · Use cookbooks or online recipes to plan several main meals. Plan some quick meals for busy nights. You also can double some recipes that freeze well. Then you can save half for other busy nights when you don't have time to cook. · Make sure you have the ingredients you need for your recipes. If you're running low on basic items, put these items on your shopping list too. · List foods that you use to make breakfasts, lunches, and snacks. List plenty of fruits and vegetables. · Post this list on the refrigerator. Add to it as you think of more things you need. · Take the list to the store to do your weekly shopping. Follow-up care is a key part of your treatment and safety. Be sure to make and go to all appointments, and call your doctor if you are having problems. It's also a good idea to know your test results and keep a list of the medicines you take. Where can you learn more? Go to http://fela-franklin.info/. Sindhu Beckham in the search box to learn more about \"Learning About Meal Planning for Diabetes. \"  Current as of: March 13, 2017  Content Version: 11.4  © 2825-4169 Healthwise, [employer]. Care instructions adapted under license by [employer] (which disclaims liability or warranty for this information). If you have questions about a medical condition or this instruction, always ask your healthcare professional. [address] any warranty or liability for your use of this information.

## 2018-06-19 ENCOUNTER — HOSPITAL ENCOUNTER (EMERGENCY)
Age: 63
Discharge: HOME | End: 2018-06-19
Attending: EMERGENCY MEDICINE | Admitting: EMERGENCY MEDICINE
Payer: COMMERCIAL

## 2018-06-19 ENCOUNTER — APPOINTMENT (OUTPATIENT)
Dept: CT IMAGING | Age: 63
End: 2018-06-19
Attending: EMERGENCY MEDICINE
Payer: COMMERCIAL

## 2018-06-19 ENCOUNTER — APPOINTMENT (OUTPATIENT)
Dept: GENERAL RADIOLOGY | Age: 63
End: 2018-06-19
Attending: EMERGENCY MEDICINE
Payer: COMMERCIAL

## 2018-06-19 VITALS
SYSTOLIC BLOOD PRESSURE: 131 MMHG | TEMPERATURE: 97.7 F | OXYGEN SATURATION: 94 % | WEIGHT: 218.7 LBS | DIASTOLIC BLOOD PRESSURE: 70 MMHG | RESPIRATION RATE: 18 BRPM | HEART RATE: 67 BPM | HEIGHT: 73 IN | BODY MASS INDEX: 28.98 KG/M2

## 2018-06-19 DIAGNOSIS — R10.13 ABDOMINAL PAIN, EPIGASTRIC: Primary | ICD-10-CM

## 2018-06-19 LAB
ALBUMIN SERPL-MCNC: 3.4 G/DL (ref 3.5–5)
ALBUMIN/GLOB SERPL: 0.9 {RATIO} (ref 1.1–2.2)
ALP SERPL-CCNC: 80 U/L (ref 45–117)
ALT SERPL-CCNC: 25 U/L (ref 12–78)
ANION GAP SERPL CALC-SCNC: 6 MMOL/L (ref 5–15)
APPEARANCE UR: CLEAR
AST SERPL-CCNC: 21 U/L (ref 15–37)
ATRIAL RATE: 67 BPM
BACTERIA URNS QL MICRO: NEGATIVE /HPF
BASOPHILS # BLD: 0 K/UL (ref 0–0.1)
BASOPHILS NFR BLD: 0 % (ref 0–1)
BILIRUB SERPL-MCNC: 0.4 MG/DL (ref 0.2–1)
BILIRUB UR QL: NEGATIVE
BUN SERPL-MCNC: 15 MG/DL (ref 6–20)
BUN/CREAT SERPL: 12 (ref 12–20)
CALCIUM SERPL-MCNC: 8.4 MG/DL (ref 8.5–10.1)
CALCULATED P AXIS, ECG09: 69 DEGREES
CALCULATED R AXIS, ECG10: -3 DEGREES
CALCULATED T AXIS, ECG11: 35 DEGREES
CHLORIDE SERPL-SCNC: 106 MMOL/L (ref 97–108)
CK MB CFR SERPL CALC: 1 % (ref 0–2.5)
CK MB SERPL-MCNC: 2.7 NG/ML (ref 5–25)
CK SERPL-CCNC: 263 U/L (ref 39–308)
CO2 SERPL-SCNC: 26 MMOL/L (ref 21–32)
COLOR UR: NORMAL
CREAT SERPL-MCNC: 1.27 MG/DL (ref 0.7–1.3)
DIAGNOSIS, 93000: NORMAL
DIFFERENTIAL METHOD BLD: ABNORMAL
EOSINOPHIL # BLD: 0.1 K/UL (ref 0–0.4)
EOSINOPHIL NFR BLD: 1 % (ref 0–7)
EPITH CASTS URNS QL MICRO: NORMAL /LPF
ERYTHROCYTE [DISTWIDTH] IN BLOOD BY AUTOMATED COUNT: 12.9 % (ref 11.5–14.5)
GLOBULIN SER CALC-MCNC: 3.6 G/DL (ref 2–4)
GLUCOSE SERPL-MCNC: 148 MG/DL (ref 65–100)
GLUCOSE UR STRIP.AUTO-MCNC: NEGATIVE MG/DL
HCT VFR BLD AUTO: 38.2 % (ref 36.6–50.3)
HGB BLD-MCNC: 13.3 G/DL (ref 12.1–17)
HGB UR QL STRIP: NEGATIVE
HYALINE CASTS URNS QL MICRO: NORMAL /LPF (ref 0–5)
IMM GRANULOCYTES # BLD: 0 K/UL (ref 0–0.04)
IMM GRANULOCYTES NFR BLD AUTO: 0 % (ref 0–0.5)
KETONES UR QL STRIP.AUTO: NEGATIVE MG/DL
LACTATE SERPL-SCNC: 1.2 MMOL/L (ref 0.4–2)
LEUKOCYTE ESTERASE UR QL STRIP.AUTO: NEGATIVE
LIPASE SERPL-CCNC: 147 U/L (ref 73–393)
LYMPHOCYTES # BLD: 1.2 K/UL (ref 0.8–3.5)
LYMPHOCYTES NFR BLD: 17 % (ref 12–49)
MCH RBC QN AUTO: 32.6 PG (ref 26–34)
MCHC RBC AUTO-ENTMCNC: 34.8 G/DL (ref 30–36.5)
MCV RBC AUTO: 93.6 FL (ref 80–99)
MONOCYTES # BLD: 0.3 K/UL (ref 0–1)
MONOCYTES NFR BLD: 4 % (ref 5–13)
NEUTS SEG # BLD: 5.5 K/UL (ref 1.8–8)
NEUTS SEG NFR BLD: 77 % (ref 32–75)
NITRITE UR QL STRIP.AUTO: NEGATIVE
NRBC # BLD: 0 K/UL (ref 0–0.01)
NRBC BLD-RTO: 0 PER 100 WBC
P-R INTERVAL, ECG05: 186 MS
PH UR STRIP: 7 [PH] (ref 5–8)
PLATELET # BLD AUTO: 159 K/UL (ref 150–400)
PMV BLD AUTO: 9.2 FL (ref 8.9–12.9)
POTASSIUM SERPL-SCNC: 3.9 MMOL/L (ref 3.5–5.1)
PROT SERPL-MCNC: 7 G/DL (ref 6.4–8.2)
PROT UR STRIP-MCNC: NEGATIVE MG/DL
Q-T INTERVAL, ECG07: 408 MS
QRS DURATION, ECG06: 102 MS
QTC CALCULATION (BEZET), ECG08: 431 MS
RBC # BLD AUTO: 4.08 M/UL (ref 4.1–5.7)
RBC #/AREA URNS HPF: NORMAL /HPF (ref 0–5)
SODIUM SERPL-SCNC: 138 MMOL/L (ref 136–145)
SP GR UR REFRACTOMETRY: 1.02 (ref 1–1.03)
TROPONIN I SERPL-MCNC: <0.05 NG/ML
UA: UC IF INDICATED,UAUC: NORMAL
UROBILINOGEN UR QL STRIP.AUTO: 0.2 EU/DL (ref 0.2–1)
VENTRICULAR RATE, ECG03: 67 BPM
WBC # BLD AUTO: 7.1 K/UL (ref 4.1–11.1)
WBC URNS QL MICRO: NORMAL /HPF (ref 0–4)

## 2018-06-19 PROCEDURE — 36415 COLL VENOUS BLD VENIPUNCTURE: CPT | Performed by: EMERGENCY MEDICINE

## 2018-06-19 PROCEDURE — 74177 CT ABD & PELVIS W/CONTRAST: CPT

## 2018-06-19 PROCEDURE — 96376 TX/PRO/DX INJ SAME DRUG ADON: CPT

## 2018-06-19 PROCEDURE — 99285 EMERGENCY DEPT VISIT HI MDM: CPT

## 2018-06-19 PROCEDURE — 83690 ASSAY OF LIPASE: CPT | Performed by: EMERGENCY MEDICINE

## 2018-06-19 PROCEDURE — 96375 TX/PRO/DX INJ NEW DRUG ADDON: CPT

## 2018-06-19 PROCEDURE — 80053 COMPREHEN METABOLIC PANEL: CPT | Performed by: EMERGENCY MEDICINE

## 2018-06-19 PROCEDURE — 93005 ELECTROCARDIOGRAM TRACING: CPT

## 2018-06-19 PROCEDURE — 96361 HYDRATE IV INFUSION ADD-ON: CPT

## 2018-06-19 PROCEDURE — 82553 CREATINE MB FRACTION: CPT | Performed by: EMERGENCY MEDICINE

## 2018-06-19 PROCEDURE — 84484 ASSAY OF TROPONIN QUANT: CPT | Performed by: EMERGENCY MEDICINE

## 2018-06-19 PROCEDURE — 74011000250 HC RX REV CODE- 250: Performed by: EMERGENCY MEDICINE

## 2018-06-19 PROCEDURE — 85025 COMPLETE CBC W/AUTO DIFF WBC: CPT | Performed by: EMERGENCY MEDICINE

## 2018-06-19 PROCEDURE — 74011636320 HC RX REV CODE- 636/320: Performed by: EMERGENCY MEDICINE

## 2018-06-19 PROCEDURE — 81001 URINALYSIS AUTO W/SCOPE: CPT | Performed by: EMERGENCY MEDICINE

## 2018-06-19 PROCEDURE — 96374 THER/PROPH/DIAG INJ IV PUSH: CPT

## 2018-06-19 PROCEDURE — 74011250636 HC RX REV CODE- 250/636: Performed by: EMERGENCY MEDICINE

## 2018-06-19 PROCEDURE — 83605 ASSAY OF LACTIC ACID: CPT | Performed by: EMERGENCY MEDICINE

## 2018-06-19 PROCEDURE — 74022 RADEX COMPL AQT ABD SERIES: CPT

## 2018-06-19 RX ORDER — HYDROGEN PEROXIDE 3 %
20 SOLUTION, NON-ORAL MISCELLANEOUS DAILY
Qty: 20 CAP | Refills: 0 | Status: SHIPPED | OUTPATIENT
Start: 2018-06-19 | End: 2018-07-09

## 2018-06-19 RX ORDER — FENTANYL CITRATE 50 UG/ML
50 INJECTION, SOLUTION INTRAMUSCULAR; INTRAVENOUS
Status: COMPLETED | OUTPATIENT
Start: 2018-06-19 | End: 2018-06-19

## 2018-06-19 RX ORDER — ONDANSETRON 2 MG/ML
4 INJECTION INTRAMUSCULAR; INTRAVENOUS
Status: COMPLETED | OUTPATIENT
Start: 2018-06-19 | End: 2018-06-19

## 2018-06-19 RX ORDER — FAMOTIDINE 10 MG/ML
20 INJECTION INTRAVENOUS
Status: COMPLETED | OUTPATIENT
Start: 2018-06-19 | End: 2018-06-19

## 2018-06-19 RX ORDER — TRAMADOL HYDROCHLORIDE 50 MG/1
50 TABLET ORAL
Qty: 10 TAB | Refills: 0 | Status: SHIPPED | OUTPATIENT
Start: 2018-06-19 | End: 2019-06-04

## 2018-06-19 RX ORDER — ONDANSETRON 4 MG/1
4 TABLET, ORALLY DISINTEGRATING ORAL
Qty: 10 TAB | Refills: 0 | Status: SHIPPED | OUTPATIENT
Start: 2018-06-19 | End: 2018-06-19

## 2018-06-19 RX ORDER — SODIUM CHLORIDE 9 MG/ML
50 INJECTION, SOLUTION INTRAVENOUS
Status: COMPLETED | OUTPATIENT
Start: 2018-06-19 | End: 2018-06-19

## 2018-06-19 RX ORDER — SODIUM CHLORIDE 0.9 % (FLUSH) 0.9 %
10 SYRINGE (ML) INJECTION
Status: COMPLETED | OUTPATIENT
Start: 2018-06-19 | End: 2018-06-19

## 2018-06-19 RX ORDER — SODIUM CHLORIDE 0.9 % (FLUSH) 0.9 %
5-10 SYRINGE (ML) INJECTION EVERY 8 HOURS
Status: DISCONTINUED | OUTPATIENT
Start: 2018-06-19 | End: 2018-06-19 | Stop reason: HOSPADM

## 2018-06-19 RX ORDER — HYDROGEN PEROXIDE 3 %
20 SOLUTION, NON-ORAL MISCELLANEOUS DAILY
Qty: 20 CAP | Refills: 0 | Status: SHIPPED | OUTPATIENT
Start: 2018-06-19 | End: 2018-06-19

## 2018-06-19 RX ORDER — SODIUM CHLORIDE 0.9 % (FLUSH) 0.9 %
5-10 SYRINGE (ML) INJECTION AS NEEDED
Status: DISCONTINUED | OUTPATIENT
Start: 2018-06-19 | End: 2018-06-19 | Stop reason: HOSPADM

## 2018-06-19 RX ORDER — ONDANSETRON 4 MG/1
4 TABLET, ORALLY DISINTEGRATING ORAL
Qty: 10 TAB | Refills: 0 | Status: SHIPPED | OUTPATIENT
Start: 2018-06-19 | End: 2019-07-09

## 2018-06-19 RX ORDER — POLYETHYLENE GLYCOL 3350 17 G/17G
17 POWDER, FOR SOLUTION ORAL DAILY
Qty: 289 G | Refills: 0 | Status: SHIPPED | OUTPATIENT
Start: 2018-06-19 | End: 2019-07-09

## 2018-06-19 RX ADMIN — SODIUM CHLORIDE 500 ML: 900 INJECTION, SOLUTION INTRAVENOUS at 03:54

## 2018-06-19 RX ADMIN — ONDANSETRON 4 MG: 2 INJECTION, SOLUTION INTRAMUSCULAR; INTRAVENOUS at 03:55

## 2018-06-19 RX ADMIN — IOPAMIDOL 100 ML: 755 INJECTION, SOLUTION INTRAVENOUS at 04:32

## 2018-06-19 RX ADMIN — FENTANYL CITRATE 50 MCG: 50 INJECTION, SOLUTION INTRAMUSCULAR; INTRAVENOUS at 05:55

## 2018-06-19 RX ADMIN — FENTANYL CITRATE 50 MCG: 50 INJECTION, SOLUTION INTRAMUSCULAR; INTRAVENOUS at 04:40

## 2018-06-19 RX ADMIN — FAMOTIDINE 20 MG: 10 INJECTION, SOLUTION INTRAVENOUS at 03:54

## 2018-06-19 RX ADMIN — Medication 10 ML: at 04:32

## 2018-06-19 RX ADMIN — SODIUM CHLORIDE 50 ML/HR: 900 INJECTION, SOLUTION INTRAVENOUS at 04:32

## 2018-06-19 NOTE — ED NOTES
All discharge paperwork reviewed with patient and MD and he denies any need for further explanation regarding these instructions.   Denies need for wheelchair and ambulates out of ED

## 2018-06-19 NOTE — ED PROVIDER NOTES
EMERGENCY DEPARTMENT HISTORY AND PHYSICAL EXAM          Date: 6/19/2018  Patient Name: Sha Branham    History of Presenting Illness     Chief Complaint   Patient presents with    Abdominal Pain     onset around midnight tonight, mid abdominal, no relief with tums, pepto bismul       History Provided By: Patient    HPI: Sha Branham is a 58 y.o. male, pmhx HTN / CAD / HLD, who presents ambulatory to the ED c/o gradually worsening sharp diffuse epigastric abd pain that began x 2230 this evening. Pt denies any associated sxs or modifying factors. Pt states his sxs began after eating cabbage and other vegetables for dinner. He notes taking Tums, Pepto Bismol, and drinking water with vinegar with no relief of sxs. Pt denies any hx of similar sxs. He further notes taking baby ASA daily. Pt denies any recent follow up with his PCP. Per chart review, pt's last 2D ECHO in April 2014 reports an EF of 55%. He otherwise specifically denies any recent fever, chills, nausea, vomiting, diarrhea, CP, SOB, lightheadedness, dizziness, numbness, weakness, tingling, BLE swelling, HA, heart palpitations, urinary sxs, changes in BM, changes in PO intake, melena, hematochezia, cough, or congestion. PCP: Farshad Rico MD   Cardiology: Vilma Morgan    Allergies: NKDA  PMHx: Significant for HTN, CAD, HLD  PSHx: Significant for orthopedic surgery, colonoscopy, cardiac cath / stent  Social Hx: -tobacco (former 3494), -EtOH, -Illicit Drugs    There are no other complaints, changes, or physical findings at this time.      Current Facility-Administered Medications   Medication Dose Route Frequency Provider Last Rate Last Dose    sodium chloride (NS) flush 5-10 mL  5-10 mL IntraVENous Q8H Trupti Mack MD        sodium chloride (NS) flush 5-10 mL  5-10 mL IntraVENous PRN Trupti Mack MD         Current Outpatient Prescriptions   Medication Sig Dispense Refill    ondansetron (ZOFRAN ODT) 4 mg disintegrating tablet Take 1 Tab by mouth every eight (8) hours as needed for Nausea. 10 Tab 0    esomeprazole (NEXIUM) 20 mg capsule Take 1 Cap by mouth daily for 20 days. 20 Cap 0    traMADol (ULTRAM) 50 mg tablet Take 1 Tab by mouth every six (6) hours as needed for Pain. Max Daily Amount: 200 mg. 10 Tab 0    polyethylene glycol (MIRALAX) 17 gram/dose powder Take 17 g by mouth daily. 289 g 0    amLODIPine (NORVASC) 2.5 mg tablet TAKE 1 TABLET DAILY 14 Tab 0    metFORMIN ER (GLUCOPHAGE XR) 500 mg tablet Take 1 Tab by mouth daily (with dinner). 90 Tab 3    metoprolol succinate (TOPROL-XL) 25 mg XL tablet TAKE 1 TABLET DAILY 90 Tab 3    atorvastatin (LIPITOR) 40 mg tablet TAKE 1 TABLET EVERY EVENING 90 Tab 3    aspirin delayed-release 81 mg tablet Take 1 Tab by mouth daily. 90 Tab 3    nitroglycerin (NITROSTAT) 0.4 mg SL tablet 1 Tab by SubLINGual route every five (5) minutes as needed for Chest Pain (Max 3 doses daily).  1 Bottle 3       Past History     Past Medical History:  Past Medical History:   Diagnosis Date    CAD (coronary artery disease) April 2014    NSTEMI, PCI/NATE RCA    Hypercholesterolemia     Hypertension        Past Surgical History:  Past Surgical History:   Procedure Laterality Date    CARDIAC SURG PROCEDURE UNLIST      Stent RCA 4/2014    HX ORTHOPAEDIC      left shoulder, torn tendon    HX ORTHOPAEDIC      right knee X 4    MT COLONOSCOPY FLX DX W/COLLJ SPEC WHEN PFRMD  12/19/2007    Dr Palmira Delgadillo 1 polyp repeat 12/2012       Family History:  Family History   Problem Relation Age of Onset    Heart Disease Mother      CAD    Hypertension Mother     Elevated Lipids Mother     Cancer Father      lung    Mental Retardation Brother     Seizures Brother     Diabetes Brother     Hypertension Brother     Elevated Lipids Brother        Social History:  Social History   Substance Use Topics    Smoking status: Former Smoker     Packs/day: 0.25     Years: 35.00     Types: Cigarettes     Quit date: 11/12/2014   [address] [place] [address] Smokeless tobacco: Never Used      Comment: Never smoked over a pack per day    Alcohol use No       Allergies:  No Known Allergies      Review of Systems   Review of Systems   Constitutional: Negative for chills and fever. HENT: Negative for congestion, ear pain, rhinorrhea and sore throat. Respiratory: Negative for cough and shortness of breath. Cardiovascular: Negative for chest pain, palpitations and leg swelling. Gastrointestinal: Positive for abdominal pain. Negative for constipation, diarrhea, nausea and vomiting. No melena  No hematochezia   Endocrine: Negative for polyuria. Genitourinary: Negative for dysuria, frequency and hematuria. Neurological: Negative for dizziness, weakness, light-headedness, numbness and headaches. No tingling   All other systems reviewed and are negative. Physical Exam   Physical Exam   Nursing note and vitals reviewed.     General appearance - overweight, well appearing, and in no distress  Eyes - pupils equal and reactive, extraocular eye movements intact  ENT - mucous membranes moist, pharynx normal without lesions  Neck - supple, no significant adenopathy; non-tender to palpation  Chest - clear to auscultation, no wheezes, rales or rhonchi; non-tender to palpation  Heart - normal rate and regular rhythm, S1 and S2 normal, no murmurs noted  Abdomen - soft, tenderness to palpation epigastric and LUQ abd, nondistended, no masses or organomegaly  Musculoskeletal - no joint tenderness, deformity or swelling; normal ROM  Extremities - peripheral pulses normal, no pedal edema  Skin - normal coloration and turgor, no rashes  Neurological - alert, oriented x3, normal speech, no focal findings or movement disorder noted  Written by Thomas Funes ED Scribadrian, as dictated by Katrina Abrams MD      Diagnostic Study Results     Labs -     Recent Results (from the past 12 hour(s))   EKG, 12 LEAD, INITIAL    Collection Time: 06/19/18  2:46 AM   Result Value Ref Range    Ventricular Rate 67 BPM    Atrial Rate 67 BPM    P-R Interval 186 ms    QRS Duration 102 ms    Q-T Interval 408 ms    QTC Calculation (Bezet) 431 ms    Calculated P Axis 69 degrees    Calculated R Axis -3 degrees    Calculated T Axis 35 degrees    Diagnosis       Normal sinus rhythm  Incomplete right bundle branch block  When compared with ECG of 05-APR-2014 04:57,  No significant change was found     CBC WITH AUTOMATED DIFF    Collection Time: 06/19/18  3:03 AM   Result Value Ref Range    WBC 7.1 4.1 - 11.1 K/uL    RBC 4.08 (L) 4.10 - 5.70 M/uL    HGB 13.3 12.1 - 17.0 g/dL    HCT 38.2 36.6 - 50.3 %    MCV 93.6 80.0 - 99.0 FL    MCH 32.6 26.0 - 34.0 PG    MCHC 34.8 30.0 - 36.5 g/dL    RDW 12.9 11.5 - 14.5 %    PLATELET 733 031 - 217 K/uL    MPV 9.2 8.9 - 12.9 FL    NRBC 0.0 0  WBC    ABSOLUTE NRBC 0.00 0.00 - 0.01 K/uL    NEUTROPHILS 77 (H) 32 - 75 %    LYMPHOCYTES 17 12 - 49 %    MONOCYTES 4 (L) 5 - 13 %    EOSINOPHILS 1 0 - 7 %    BASOPHILS 0 0 - 1 %    IMMATURE GRANULOCYTES 0 0.0 - 0.5 %    ABS. NEUTROPHILS 5.5 1.8 - 8.0 K/UL    ABS. LYMPHOCYTES 1.2 0.8 - 3.5 K/UL    ABS. MONOCYTES 0.3 0.0 - 1.0 K/UL    ABS. EOSINOPHILS 0.1 0.0 - 0.4 K/UL    ABS. BASOPHILS 0.0 0.0 - 0.1 K/UL    ABS. IMM. GRANS. 0.0 0.00 - 0.04 K/UL    DF AUTOMATED     METABOLIC PANEL, COMPREHENSIVE    Collection Time: 06/19/18  3:03 AM   Result Value Ref Range    Sodium 138 136 - 145 mmol/L    Potassium 3.9 3.5 - 5.1 mmol/L    Chloride 106 97 - 108 mmol/L    CO2 26 21 - 32 mmol/L    Anion gap 6 5 - 15 mmol/L    Glucose 148 (H) 65 - 100 mg/dL    BUN 15 6 - 20 MG/DL    Creatinine 1.27 0.70 - 1.30 MG/DL    BUN/Creatinine ratio 12 12 - 20      GFR est AA >60 >60 ml/min/1.73m2    GFR est non-AA 57 (L) >60 ml/min/1.73m2    Calcium 8.4 (L) 8.5 - 10.1 MG/DL    Bilirubin, total 0.4 0.2 - 1.0 MG/DL    ALT (SGPT) 25 12 - 78 U/L    AST (SGOT) 21 15 - 37 U/L    Alk.  phosphatase 80 45 - 117 U/L    Protein, total 7.0 6.4 - 8.2 g/dL Albumin 3.4 (L) 3.5 - 5.0 g/dL    Globulin 3.6 2.0 - 4.0 g/dL    A-G Ratio 0.9 (L) 1.1 - 2.2     CK W/ CKMB & INDEX    Collection Time: 06/19/18  3:03 AM   Result Value Ref Range     39 - 308 U/L    CK - MB 2.7 <3.6 NG/ML    CK-MB Index 1.0 0 - 2.5     LIPASE    Collection Time: 06/19/18  3:03 AM   Result Value Ref Range    Lipase 147 73 - 393 U/L   TROPONIN I    Collection Time: 06/19/18  3:03 AM   Result Value Ref Range    Troponin-I, Qt. <0.05 <0.05 ng/mL   LACTIC ACID    Collection Time: 06/19/18  3:53 AM   Result Value Ref Range    Lactic acid 1.2 0.4 - 2.0 MMOL/L   URINALYSIS W/ REFLEX CULTURE    Collection Time: 06/19/18  4:12 AM   Result Value Ref Range    Color YELLOW/STRAW      Appearance CLEAR CLEAR      Specific gravity 1.017 1.003 - 1.030      pH (UA) 7.0 5.0 - 8.0      Protein NEGATIVE  NEG mg/dL    Glucose NEGATIVE  NEG mg/dL    Ketone NEGATIVE  NEG mg/dL    Bilirubin NEGATIVE  NEG      Blood NEGATIVE  NEG      Urobilinogen 0.2 0.2 - 1.0 EU/dL    Nitrites NEGATIVE  NEG      Leukocyte Esterase NEGATIVE  NEG      WBC 0-4 0 - 4 /hpf    RBC 0-5 0 - 5 /hpf    Epithelial cells FEW FEW /lpf    Bacteria NEGATIVE  NEG /hpf    UA:UC IF INDICATED CULTURE NOT INDICATED BY UA RESULT CNI      Hyaline cast 0-2 0 - 5 /lpf       Radiologic Studies -     CT Results  (Last 48 hours)               06/19/18 0431  CT ABD PELV W CONT Final result    Impression:   impression: No acute findings. Narrative:  EXAM:  CT ABD PELV W CONT       INDICATION: Abdominal pain       COMPARISON: None       CONTRAST:  100 mL of Isovue-370. TECHNIQUE:    Following the uneventful intravenous administration of contrast, thin axial   images were obtained through the abdomen and pelvis. Coronal and sagittal   reconstructions were generated. Oral contrast was not administered.  CT dose   reduction was achieved through use of a standardized protocol tailored for this   examination and automatic exposure control for dose modulation. FINDINGS:    Lung bases show no significant findings. Mild hepatic steatosis. Liver and   spleen are normal in size. Pancreas and adrenals appear unremarkable. The   gallbladder is not distended. Kidneys appeared unobstructed. There is no bowel   wall thickening or obstruction. The appendix appears normal. There is mild   sigmoid diverticulosis. The bladder is midline. Prostate is prominent. There is   no free air or free fluid. CXR Results  (Last 48 hours)               06/19/18 0353  XR ABD ACUTE W 1 V CHEST Final result    Impression:   impression: Mild fecal stasis. Narrative:  Clinical indication: Abdominal pain. Frontal view of the chest, supine and upright views of the abdomen obtained. Mild fecal stasis. No free air. Nonspecific otherwise. Medical Decision Making   I am the first provider for this patient. I reviewed the vital signs, available nursing notes, past medical history, past surgical history, family history and social history. Vital Signs-Reviewed the patient's vital signs. Patient Vitals for the past 12 hrs:   Temp Pulse Resp BP SpO2   06/19/18 0600 - 67 - 131/70 94 %   06/19/18 0530 - 72 - 140/72 96 %   06/19/18 0500 - 72 - 140/71 97 %   06/19/18 0330 - 70 - 119/48 96 %   06/19/18 0300 - 66 - 138/83 100 %   06/19/18 0235 97.7 °F (36.5 °C) 61 18 157/79 100 %       Records Reviewed: Nursing Notes and Old Medical Records    Provider Notes (Medical Decision Making):     DDx: gastritis, pancreatitis, cholecystitis, UTI, bowel obstruction    ED Course:   Initial assessment performed. The patients presenting problems have been discussed, and they are in agreement with the care plan formulated and outlined with them. I have encouraged them to ask questions as they arise throughout their visit. EKG interpretation: (Preliminary) 0600  Rhythm: normal sinus rhythm.  Rate (approx.): 67bpm; Axis: normal; Normal WA, QRS, QTc intervals; ST/T wave: non-specific changes; Other findings: non-ischemic. Written by Katrina Echevarria, ED Scribe, as dictated by Trupti Mack MD    Progress note:  6:09 AM  Pt noted to be feeling better, sxs improved throughout ED stay, ready for discharge. Updated pt and/or family on all final lab and imaging findings. Will follow up as instructed. All questions have been answered, pt voiced understanding and agreement with plan. Specific return precautions provided as well as instructions to return to the ED should sx worsen at any time. Vital signs stable for discharge. Written by Katrina Echevarria, ED Scribe, as dictated by Trupti Mack MD    Diagnosis     Clinical Impression:   1. Abdominal pain, epigastric        PLAN:  1. Discharge Medication List as of 6/19/2018  6:13 AM      START taking these medications    Details   ondansetron (ZOFRAN ODT) 4 mg disintegrating tablet Take 1 Tab by mouth every eight (8) hours as needed for Nausea. , Print, Disp-10 Tab, R-0      esomeprazole (NEXIUM) 20 mg capsule Take 1 Cap by mouth daily for 20 days. , Print, Disp-20 Cap, R-0      traMADol (ULTRAM) 50 mg tablet Take 1 Tab by mouth every six (6) hours as needed for Pain. Max Daily Amount: 200 mg., Print, Disp-10 Tab, R-0      polyethylene glycol (MIRALAX) 17 gram/dose powder Take 17 g by mouth daily. , Print, Disp-289 g, R-0         CONTINUE these medications which have NOT CHANGED    Details   amLODIPine (NORVASC) 2.5 mg tablet TAKE 1 TABLET DAILY, Normal, Disp-14 Tab, R-0      metFORMIN ER (GLUCOPHAGE XR) 500 mg tablet Take 1 Tab by mouth daily (with dinner). , Normal, Disp-90 Tab, R-3      metoprolol succinate (TOPROL-XL) 25 mg XL tablet TAKE 1 TABLET DAILY, Normal, Disp-90 Tab, R-3      atorvastatin (LIPITOR) 40 mg tablet TAKE 1 TABLET EVERY EVENING, Normal, Disp-90 Tab, R-3      aspirin delayed-release 81 mg tablet Take 1 Tab by mouth daily. , No Print, Disp-90 Tab, R-3      nitroglycerin (NITROSTAT) 0.4 mg SL tablet 1 Tab by SubLINGual route every five (5) minutes as needed for Chest Pain (Max 3 doses daily). , Normal, Disp-1 Bottle, R-3           2. Follow-up Information     Follow up With Details Comments Contact Info    Stacy Rausch MD Call today  [address]  [phone]      Samantha Nash MD Schedule an appointment as soon as possible for a visit  [address] [facility] [address] [place] [address] Drive  [phone]      [facility] EMERGENCY DEPT  If symptoms worsen [facility]  [address] [address] [facility]  [phone]        Return to ED if worse     Disposition:    DISCHARGE NOTE:  6:10 AM  The patient's results have been reviewed with family and/or caregiver. They verbally convey their understanding and agreement of the patient's signs, symptoms, diagnosis, treatment, and prognosis. They additionally agree to follow up as recommended in the discharge instructions or to return to the Emergency Room should the patient's condition change prior to their follow-up appointment. The family and/or caregiver verbally agrees with the care-plan and all of their questions have been answered. The discharge instructions have also been provided to the them along with educational information regarding the patient's diagnosis and a list of reasons why the patient would want to return to the ER prior to their follow-up appointment should their condition change. Written by Thomas Funes, ED Scribe, as dictated by Katrina Abrams MD.             Attestations: This note is prepared by Thomas Funes, acting as Scribe for MD Trupti Hinkle MD : The scribe's documentation has been prepared under my direction and personally reviewed by me in its entirety. I confirm that the note above accurately reflects all work, treatment, procedures, and medical decision making performed by me. This note will not be viewable in [address]

## 2018-06-19 NOTE — DISCHARGE INSTRUCTIONS
Abdominal Pain: Care Instructions  Your Care Instructions    Abdominal pain has many possible causes. Some aren't serious and get better on their own in a few days. Others need more testing and treatment. If your pain continues or gets worse, you need to be rechecked and may need more tests to find out what is wrong. You may need surgery to correct the problem. Don't ignore new symptoms, such as fever, nausea and vomiting, urination problems, pain that gets worse, and dizziness. These may be signs of a more serious problem. Your doctor may have recommended a follow-up visit in the next 8 to 12 hours. If you are not getting better, you may need more tests or treatment. The doctor has checked you carefully, but problems can develop later. If you notice any problems or new symptoms, get medical treatment right away. Follow-up care is a key part of your treatment and safety. Be sure to make and go to all appointments, and call your doctor if you are having problems. It's also a good idea to know your test results and keep a list of the medicines you take. How can you care for yourself at home? · Rest until you feel better. · To prevent dehydration, drink plenty of fluids, enough so that your urine is light yellow or clear like water. Choose water and other caffeine-free clear liquids until you feel better. If you have kidney, heart, or liver disease and have to limit fluids, talk with your doctor before you increase the amount of fluids you drink. · If your stomach is upset, eat mild foods, such as rice, dry toast or crackers, bananas, and applesauce. Try eating several small meals instead of two or three large ones. · Wait until 48 hours after all symptoms have gone away before you have spicy foods, alcohol, and drinks that contain caffeine. · Do not eat foods that are high in fat. · Avoid anti-inflammatory medicines such as aspirin, ibuprofen (Advil, Motrin), and naproxen (Aleve).  These can cause stomach upset. Talk to your doctor if you take daily aspirin for another health problem. When should you call for help? Call 911 anytime you think you may need emergency care. For example, call if:  ? · You passed out (lost consciousness). ? · You pass maroon or very bloody stools. ? · You vomit blood or what looks like coffee grounds. ? · You have new, severe belly pain. ?Call your doctor now or seek immediate medical care if:  ? · Your pain gets worse, especially if it becomes focused in one area of your belly. ? · You have a new or higher fever. ? · Your stools are black and look like tar, or they have streaks of blood. ? · You have unexpected vaginal bleeding. ? · You have symptoms of a urinary tract infection. These may include:  ¨ Pain when you urinate. ¨ Urinating more often than usual.  ¨ Blood in your urine. ? · You are dizzy or lightheaded, or you feel like you may faint. ? Watch closely for changes in your health, and be sure to contact your doctor if:  ? · You are not getting better after 1 day (24 hours). Where can you learn more? Go to http://fela-franklin.info/. Enter I653 in the search box to learn more about \"Abdominal Pain: Care Instructions. \"  Current as of: March 20, 2017  Content Version: 11.4  © 3895-4723 [employer]. Care instructions adapted under license by [employer] (which disclaims liability or warranty for this information). If you have questions about a medical condition or this instruction, always ask your healthcare professional. [place] [address] any warranty or liability for your use of this information.

## 2018-06-19 NOTE — ED NOTES
Patient presents to ED driven by his spouse with c/o sudden onset of mid epigastric pain that started between 200 and 2300 this evening. States that he was watching tv when the attack came on. Pain is 10/10, constant and sharp. Denies alcohol or spicy foods tonight. Denies hx of indigestion or heartburn although he did take pepto bismol, tums and apple cider vinegar without relief. The pain does not radiate and he is not nauseous.   Hx of MI with stent placement, states that the pain is not similar to that event

## 2018-06-22 ENCOUNTER — TELEPHONE (OUTPATIENT)
Dept: INTERNAL MEDICINE CLINIC | Facility: CLINIC | Age: 63
End: 2018-06-22

## 2018-06-22 ENCOUNTER — TELEPHONE (OUTPATIENT)
Dept: CARDIOLOGY CLINIC | Age: 63
End: 2018-06-22

## 2018-06-22 NOTE — TELEPHONE ENCOUNTER
Spoke with wife she called number on hospital discharge paperwork and they assisted her with more GI doctors numbers. Wife was able to find GI MD to see him July 2.

## 2018-06-22 NOTE — TELEPHONE ENCOUNTER
Wife states pt went to ER and they think he has an ulcer, was referred to someone but they aren't able to see him until Aug. 8th  Wants call back from nurse today to know who else pt may be able to see, states 8/8/18 is too long to wait for an appointment when pt is in pain

## 2018-06-22 NOTE — TELEPHONE ENCOUNTER
Please call patient's wife regarding . He was at the ER 6/19. Normal EKG but having stomach issues. Her  is concerned it could be his heart. I have s/d him an annual appt w/Dr. OROZCO but she wants advice. Please call.  Thanks

## 2018-06-22 NOTE — TELEPHONE ENCOUNTER
Spoke with patient's wife, Katina Saucedo, on HPI. Verified patient with two patient identifiers. Advised to keep appt 7-5-18. States ER told him his gastric pain was from his \"stomach, and possible ulcer\". Pt scared it could be his heart even though it was not the same as his \"CP\" previously. Advised ER notes did not suggest it was his heart but he should keep his appt with Dr. Debbie Donis on 7-5-18. She verbalized understanding. Conner Ozuna

## 2018-07-05 ENCOUNTER — OFFICE VISIT (OUTPATIENT)
Dept: CARDIOLOGY CLINIC | Age: 63
End: 2018-07-05

## 2018-07-05 VITALS
BODY MASS INDEX: 28.65 KG/M2 | SYSTOLIC BLOOD PRESSURE: 120 MMHG | HEIGHT: 73 IN | RESPIRATION RATE: 16 BRPM | DIASTOLIC BLOOD PRESSURE: 80 MMHG | OXYGEN SATURATION: 97 % | HEART RATE: 64 BPM | WEIGHT: 216.2 LBS

## 2018-07-05 DIAGNOSIS — E78.00 HYPERCHOLESTEROLEMIA: ICD-10-CM

## 2018-07-05 DIAGNOSIS — I25.10 CORONARY ARTERY DISEASE INVOLVING NATIVE CORONARY ARTERY OF NATIVE HEART WITHOUT ANGINA PECTORIS: Primary | ICD-10-CM

## 2018-07-05 DIAGNOSIS — N18.2 CKD (CHRONIC KIDNEY DISEASE) STAGE 2, GFR 60-89 ML/MIN: ICD-10-CM

## 2018-07-05 NOTE — PROGRESS NOTES
1. Have you been to the ER, urgent care clinic since your last visit? Hospitalized since your last visit? Yes When: 6/2018 [facility]     2. Have you seen or consulted any other health care providers outside of the [address] [place] [address] since your last visit? Include any pap smears or colon screening.  Yes GI     Patient C/O [place] abdomen discomfort is scheduled for endoscopy  6/17/18 with GI.

## 2018-07-05 NOTE — PROGRESS NOTES
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 Doing very well from CV standpoint. He denies chest pain, chest pressure/discomfort, dyspnea, palpitations, irregular heart beats, near-syncope, syncope, fatigue, orthopnea, paroxysmal nocturnal dyspnea, exertional chest pressure/discomfort, claudication, lower extremity edema, tachypnea. Visit Vitals    /80 (BP 1 Location: Right arm, BP Patient Position: Sitting)    Pulse 64    Resp 16    Ht 6' 1\" (1.854 m)    Wt 216 lb 3.2 oz (98.1 kg)    SpO2 97%    BMI 28.52 kg/m2     Current Outpatient Prescriptions   Medication Sig    ondansetron (ZOFRAN ODT) 4 mg disintegrating tablet Take 1 Tab by mouth every eight (8) hours as needed for Nausea.  esomeprazole (NEXIUM) 20 mg capsule Take 1 Cap by mouth daily for 20 days. (Patient taking differently: Take 20 mg by mouth as needed.)    traMADol (ULTRAM) 50 mg tablet Take 1 Tab by mouth every six (6) hours as needed for Pain. Max Daily Amount: 200 mg.  polyethylene glycol (MIRALAX) 17 gram/dose powder Take 17 g by mouth daily. (Patient taking differently: Take 17 g by mouth as needed.)    amLODIPine (NORVASC) 2.5 mg tablet TAKE 1 TABLET DAILY    metFORMIN ER (GLUCOPHAGE XR) 500 mg tablet Take 1 Tab by mouth daily (with dinner).  metoprolol succinate (TOPROL-XL) 25 mg XL tablet TAKE 1 TABLET DAILY    atorvastatin (LIPITOR) 40 mg tablet TAKE 1 TABLET EVERY EVENING    aspirin delayed-release 81 mg tablet Take 1 Tab by mouth daily.  nitroglycerin (NITROSTAT) 0.4 mg SL tablet 1 Tab by SubLINGual route every five (5) minutes as needed for Chest Pain (Max 3 doses daily). No current facility-administered medications for this visit.           Objective:      Visit Vitals    /80 (BP 1 Location: Right arm, BP Patient Position: Sitting)    Pulse 64    Resp 16    Ht 6' 1\" (1.854 m)    Wt 216 lb 3.2 oz (98.1 kg)    SpO2 97%    BMI 28.52 kg/m2       Data Review:     LEKG: Normal sinus rhythm, no acute st/t changes    Reviewed and/or ordered active problem list, medication list tests    Past Medical History:   Diagnosis Date    CAD (coronary artery disease) April 2014    NSTEMI, PCI/NATE RCA    Hypercholesterolemia     Hypertension       Past Surgical History:   Procedure Laterality Date    CARDIAC SURG PROCEDURE UNLIST      Stent RCA 4/2014    HX ORTHOPAEDIC      left shoulder, torn tendon    HX ORTHOPAEDIC      right knee X 4    LA COLONOSCOPY FLX DX W/COLLJ SPEC WHEN PFRMD  12/19/2007    Dr Anmol Hernandez 1 polyp repeat 12/2012     No Known Allergies   Family History   Problem Relation Age of Onset    Heart Disease Mother      CAD    Hypertension Mother     Elevated Lipids Mother     Cancer Father      lung    Mental Retardation Brother     Seizures Brother     Diabetes Brother     Hypertension Brother     Elevated Lipids Brother       Social History     Social History    Marital status:      Spouse name: N/A    Number of children: N/A    Years of education: N/A     Occupational History    Not on file. Social History Main Topics    Smoking status: Former Smoker     Packs/day: 0.25     Years: 35.00     Types: Cigarettes     Quit date: 11/12/2014    Smokeless tobacco: Never Used      Comment: Never smoked over a pack per day    Alcohol use No    Drug use: No    Sexual activity: Not on file     Other Topics Concern    Not on file     Social History Narrative         Review of Systems     General: Not Present- Anorexia, Chills, Dietary Changes, Fatigue, Fever, Medication Changes, Night Sweats, Weight Gain > 10lbs. and Weight Loss > 10lbs. .  Skin: Not Present- Bruising and Excessive Sweating. HEENT: Not Present- Headache, Visual Loss and Vertigo. Respiratory: Not Present- Cough, Decreased Exercise Tolerance, Difficulty Breathing, Snoring and Wheezing.   Cardiovascular: Not Present- Abnormal Blood Pressure, Chest Pain, Claudications, Difficulty Breathing On Exertion, Edema, Fainting / Blacking Out, Irregular Heart Beat, Night Cramps, Orthopnea, Palpitations, Paroxysmal Nocturnal Dyspnea, Rapid Heart Rate, Shortness of Breath and Swelling of Extremities. Gastrointestinal: Not Present- Black, Tarry Stool, Bloody Stool, Diarrhea, Hematemesis, Rectal Bleeding and Vomiting. Musculoskeletal: Not Present- Muscle Pain and Muscle Weakness. Neurological: Not Present- Dizziness. Psychiatric: Not Present- Depression. Endocrine: Not Present- Cold Intolerance, Heat Intolerance and Thyroid Problems. Hematology: Not Present- Abnormal Bleeding, Anemia, Blood Clots and Easy Bruising.       Physical Exam   The physical exam findings are as follows:       General   Mental Status - Alert. General Appearance - Not in acute distress. Chest and Lung Exam   Inspection: Accessory muscles - No use of accessory muscles in breathing. Auscultation:   Breath sounds: - Normal.      Cardiovascular   Inspection: Jugular vein - Bilateral - Inspection Normal.  Palpation/Percussion:   Apical Impulse: - Normal.  Auscultation: Rhythm - Regular. Heart Sounds - S1 WNL and S2 WNL. No S3 or S4. Murmurs & Other Heart Sounds: Auscultation of the heart reveals - No Murmurs. Carotid arteries - No Carotid bruit. Peripheral Vascular   Upper Extremity: Inspection - Bilateral - No Cyanotic nailbeds or Digital clubbing. Lower Extremity:   Palpation: Dorsalis pedis pulse - Bilateral - Normal. Posterior tibia pulse - Bilateral - Normal. Edema - Bilateral - No edema. Assessment:       ICD-10-CM ICD-9-CM    1. Coronary artery disease involving native coronary artery of native heart without angina pectoris I25.10 414.01 AMB POC EKG ROUTINE W/ 12 LEADS, INTER & REP   2. Hypercholesterolemia E78.00 272.0    3. CKD (chronic kidney disease) stage 2, GFR 60-89 ml/min N18.2 585. 2        Plan:     1. CAD: stable. Continue current meds. 2. HTN: Controlled.  Continue current meds. 3. High Cholesterol: Continue current statin, Last FLP noted. At target.

## 2018-07-05 NOTE — MR AVS SNAPSHOT
[address] [facility] 
[phone] Patient: Landon Marino MRN: [id number] SDD:[id number] Visit Information Date & Time Provider Department Dept. Phone Encounter #  
 7/5/2018  1:15 PM Raymundo Gaines, [address] [facility] [phone] Follow-up Instructions Return in about 1 year (around 7/5/2019). Your Appointments 12/4/2018  8:45 AM  
ROUTINE CARE with Tiffanie Alfred MD  
[facility]) Appt Note: 6mth f/u; DM, HTN, chol, POC A1c  
 [address] [address] [place] [address] [facility]  
  
    
 7/9/2019 10:15 AM  
ESTABLISHED PATIENT with Raymundo Gaines MD  
[facility] [address] [facility]) Appt Note: Dr OROZCO 1 year 9[address]  
[phone] [address] Upcoming Health Maintenance Date Due ZOSTER VACCINE AGE 60> 9/10/2015 Influenza Age 5 to Adult 8/1/2018 MICROALBUMIN Q1 11/21/2018 HEMOGLOBIN A1C Q6M 11/29/2018 FOOT EXAM Q1 11/30/2018 EYE EXAM RETINAL OR DILATED Q1 12/4/2018 LIPID PANEL Q1 5/29/2019 DTaP/Tdap/Td series (2 - Td) 5/7/2022 COLONOSCOPY 1/9/2028 Allergies as of 7/5/2018  Review Complete On: 7/5/2018 By: Raymundo Gaines MD  
 No Known Allergies Current Immunizations  Reviewed on 6/4/2018 Name Date Influenza Vaccine (Quad) PF 8/29/2017, 2/21/2017 Influenza Vaccine Split 10/1/2011 Influenza Vaccine Whole 9/14/2010 Pneumococcal Polysaccharide (PPSV-23) 11/30/2017 TD Vaccine 3/31/2004 TDAP Vaccine 5/7/2012 Not reviewed this visit You Were Diagnosed With   
  
 Codes Comments  Coronary artery disease involving native coronary artery of native heart without angina pectoris    -  Primary ICD-10-CM: I25.10 ICD-9-CM: 414.01 Hypercholesterolemia     ICD-10-CM: E78.00 ICD-9-CM: 272.0 CKD (chronic kidney disease) stage 2, GFR 60-89 ml/min     ICD-10-CM: N18.2 ICD-9-CM: 729. 2 Vitals BP Pulse Resp Height(growth percentile) Weight(growth percentile) SpO2  
 120/80 (BP 1 Location: Right arm, BP Patient Position: Sitting) 64 16 6' 1\" (1.854 m) 216 lb 3.2 oz (98.1 kg) 97% BMI Smoking Status 28.52 kg/m2 Former Smoker Vitals History BMI and BSA Data Body Mass Index Body Surface Area 28.52 kg/m 2 2.25 m 2 Preferred Pharmacy Pharmacy Name Phone [facility] [address] [address] Jules August [phone] Your Updated Medication List  
  
   
This list is accurate as of 7/5/18  2:05 PM.  Always use your most recent med list. amLODIPine 2.5 mg tablet Commonly known as:  Elspeth Bakes TAKE 1 TABLET DAILY  
  
 aspirin delayed-release 81 mg tablet Take 1 Tab by mouth daily. atorvastatin 40 mg tablet Commonly known as:  LIPITOR  
TAKE 1 TABLET EVERY EVENING  
  
 esomeprazole 20 mg capsule Commonly known as:  NexIUM Take 1 Cap by mouth daily for 20 days. metFORMIN  mg tablet Commonly known as:  GLUCOPHAGE XR Take 1 Tab by mouth daily (with dinner). metoprolol succinate 25 mg XL tablet Commonly known as:  TOPROL-XL  
TAKE 1 TABLET DAILY  
  
 nitroglycerin 0.4 mg SL tablet Commonly known as:  NITROSTAT  
1 Tab by SubLINGual route every five (5) minutes as needed for Chest Pain (Max 3 doses daily). ondansetron 4 mg disintegrating tablet Commonly known as:  ZOFRAN ODT Take 1 Tab by mouth every eight (8) hours as needed for Nausea. polyethylene glycol 17 gram/dose powder Commonly known as:  Ludivina Fail Take 17 g by mouth daily. traMADol 50 mg tablet Commonly known as:  Benson Robledo  
 Take 1 Tab by mouth every six (6) hours as needed for Pain. Max Daily Amount: 200 mg. We Performed the Following AMB POC EKG ROUTINE W/ 12 LEADS, INTER & REP [28262 CPT(R)] Follow-up Instructions Return in about 1 year (around 7/5/2019). Introducing [facility]! Austin Roche introduces [employer] patient portal. Now you can access parts of your medical record, email your doctor's office, and request medication refills online. 1. In your internet browser, go to [email or web address] 2. Click on the First Time User? Click Here link in the Sign In box. You will see the New Member Sign Up page. 3. Enter your [employer] Access Code exactly as it appears below. You will not need to use this code after youve completed the sign-up process. If you do not sign up before the expiration date, you must request a new code. · [employer] Access Code: [email or web address] Expires: 9/2/2018  9:35 AM 
 
4. Enter the last four digits of your Social Security Number (xxxx) and Date of Birth (mm/dd/yyyy) as indicated and click Submit. You will be taken to the next sign-up page. 5. Create a [employer] ID. This will be your [employer] login ID and cannot be changed, so think of one that is secure and easy to remember. 6. Create a [employer] password. You can change your password at any time. 7. Enter your Password Reset Question and Answer. This can be used at a later time if you forget your password. 8. Enter your e-mail address. You will receive e-mail notification when new information is available in [address] 9. Click Sign Up. You can now view and download portions of your medical record. 10. Click the Download Summary menu link to download a portable copy of your medical information. If you have questions, please visit the Frequently Asked Questions section of the [employer] website. Remember, [employer] is NOT to be used for urgent needs. For medical emergencies, dial 911. Now available from your iPhone and Android! Please provide this summary of care documentation to your next provider. Your primary care clinician is listed as Caro Triplett. If you have any questions after today's visit, please call [phone].

## 2018-08-15 ENCOUNTER — HOSPITAL ENCOUNTER (OUTPATIENT)
Dept: NUCLEAR MEDICINE | Age: 63
Discharge: HOME | End: 2018-08-15
Attending: INTERNAL MEDICINE
Payer: COMMERCIAL

## 2018-08-15 DIAGNOSIS — R10.13 EPIGASTRIC ABDOMINAL PAIN: ICD-10-CM

## 2018-08-15 PROCEDURE — A9541 TC99M SULFUR COLLOID: HCPCS

## 2018-09-21 DIAGNOSIS — E78.00 HYPERCHOLESTEROLEMIA: ICD-10-CM

## 2018-09-21 RX ORDER — ATORVASTATIN CALCIUM 40 MG/1
TABLET, FILM COATED ORAL
Qty: 90 TAB | Refills: 3 | Status: SHIPPED | OUTPATIENT
Start: 2018-09-21 | End: 2019-07-25 | Stop reason: SDUPTHER

## 2018-09-21 NOTE — TELEPHONE ENCOUNTER
PCP: Ambreen Pacheco MD     Last appt: 6/4/2018   Future Appointments  Date Time Provider Gosia Rogers   12/4/2018 8:45 AM Ambreen Pacheco  [facility]   7/9/2019 10:15 AM Maria Elena Jaeger MD [address] [facility]        Requested Prescriptions     Pending Prescriptions Disp Refills    atorvastatin (LIPITOR) 40 mg tablet 90 Tab 3     Sig: TAKE 1 TABLET EVERY EVENING

## 2018-09-24 DIAGNOSIS — E11.9 CONTROLLED TYPE 2 DIABETES MELLITUS WITHOUT COMPLICATION, WITHOUT LONG-TERM CURRENT USE OF INSULIN (HCC): ICD-10-CM

## 2018-09-24 RX ORDER — METFORMIN HYDROCHLORIDE 500 MG/1
500 TABLET, EXTENDED RELEASE ORAL
Qty: 90 TAB | Refills: 3 | Status: SHIPPED | OUTPATIENT
Start: 2018-09-24 | End: 2019-06-04

## 2018-09-24 NOTE — TELEPHONE ENCOUNTER
PCP: Shruti Vinson MD     Last appt: 6/4/2018   Future Appointments  Date Time Provider Gosia Rogers   12/4/2018 8:45 AM Shruti Vinson  [facility]   7/9/2019 10:15 AM Nandini Aguilera MD [address] [facility]        Requested Prescriptions     Pending Prescriptions Disp Refills    metFORMIN ER (GLUCOPHAGE XR) 500 mg tablet 90 Tab 3     Sig: Take 1 Tab by mouth daily (with dinner).

## 2018-09-28 DIAGNOSIS — I12.9 BENIGN HYPERTENSION WITH CHRONIC KIDNEY DISEASE, STAGE II: ICD-10-CM

## 2018-09-28 DIAGNOSIS — N18.2 BENIGN HYPERTENSION WITH CHRONIC KIDNEY DISEASE, STAGE II: ICD-10-CM

## 2018-09-28 RX ORDER — METOPROLOL SUCCINATE 25 MG/1
TABLET, EXTENDED RELEASE ORAL
Qty: 90 TAB | Refills: 3 | Status: SHIPPED | OUTPATIENT
Start: 2018-09-28 | End: 2019-08-02 | Stop reason: SDUPTHER

## 2018-12-04 ENCOUNTER — OFFICE VISIT (OUTPATIENT)
Dept: INTERNAL MEDICINE CLINIC | Facility: CLINIC | Age: 63
End: 2018-12-04

## 2018-12-04 VITALS
SYSTOLIC BLOOD PRESSURE: 134 MMHG | HEIGHT: 73 IN | WEIGHT: 219 LBS | HEART RATE: 64 BPM | RESPIRATION RATE: 16 BRPM | TEMPERATURE: 97.8 F | BODY MASS INDEX: 29.03 KG/M2 | OXYGEN SATURATION: 93 % | DIASTOLIC BLOOD PRESSURE: 79 MMHG

## 2018-12-04 DIAGNOSIS — N18.2 BENIGN HYPERTENSION WITH CHRONIC KIDNEY DISEASE, STAGE II: ICD-10-CM

## 2018-12-04 DIAGNOSIS — E11.9 CONTROLLED TYPE 2 DIABETES MELLITUS WITHOUT COMPLICATION, WITHOUT LONG-TERM CURRENT USE OF INSULIN (HCC): Primary | ICD-10-CM

## 2018-12-04 DIAGNOSIS — I25.10 CORONARY ARTERY DISEASE INVOLVING NATIVE CORONARY ARTERY OF NATIVE HEART WITHOUT ANGINA PECTORIS: ICD-10-CM

## 2018-12-04 DIAGNOSIS — I12.9 BENIGN HYPERTENSION WITH CHRONIC KIDNEY DISEASE, STAGE II: ICD-10-CM

## 2018-12-04 DIAGNOSIS — E78.00 HYPERCHOLESTEROLEMIA: ICD-10-CM

## 2018-12-04 DIAGNOSIS — Z13.31 SCREENING FOR DEPRESSION: ICD-10-CM

## 2018-12-04 PROBLEM — K29.30 CHRONIC SUPERFICIAL GASTRITIS WITHOUT BLEEDING: Status: ACTIVE | Noted: 2018-12-04

## 2018-12-04 LAB — HBA1C MFR BLD HPLC: 6.3 %

## 2018-12-04 RX ORDER — PANTOPRAZOLE SODIUM 40 MG/1
TABLET, DELAYED RELEASE ORAL
COMMUNITY
Start: 2018-10-14 | End: 2019-07-09

## 2018-12-04 NOTE — PROGRESS NOTES
Hilda Rivera  Identified pt with two pt identifiers(name and ). Chief Complaint Patient presents with  Diabetes  Hypertension  Cholesterol Problem Reviewed record In preparation for visit and have obtained necessary documentation. Has info on advanced directive but has not filled them out. 1. Have you been to the ER, urgent care clinic or hospitalized since your last visit? No  
 
2. Have you seen or consulted any other health care providers outside of the [address] [place] [address] since your last visit? Include any pap smears or colon screening. No 
 
Vitals reviewed with provider. Health Maintenance reviewed: On waiting list for Shingrix Has Apt for Eye dr soon Health Maintenance Due Topic  Shingrix Vaccine Age 50> (1 of 2)  Influenza Age 5 to Adult  MICROALBUMIN Q1   
 HEMOGLOBIN A1C Q6M   
 FOOT EXAM Q1   
 EYE EXAM RETINAL OR DILATED Q1 Wt Readings from Last 3 Encounters:  
18 219 lb (99.3 kg) 18 216 lb 3.2 oz (98.1 kg) 18 218 lb 11.1 oz (99.2 kg) Temp Readings from Last 3 Encounters:  
18 97.7 °F (36.5 °C)  
18 98.3 °F (36.8 °C) (Oral)  
17 97.2 °F (36.2 °C) (Oral) BP Readings from Last 3 Encounters:  
18 120/80  
18 131/70  
18 128/78 Pulse Readings from Last 3 Encounters:  
18 64  
18 67  
18 67 Vitals:  
 18 1246 Weight: 219 lb (99.3 kg) Height: 6' 1\" (1.854 m) PainSc:   0 - No pain Learning Assessment:  
:  
 
 
Learning Assessment 2018 4/10/2014 PRIMARY LEARNER Patient Patient HIGHEST LEVEL OF EDUCATION - PRIMARY LEARNER  - SOME COLLEGE  
BARRIERS PRIMARY LEARNER - NONE  
CO-LEARNER CAREGIVER - No  
PRIMARY LANGUAGE ENGLISH ENGLISH  NEED - No  
LEARNER PREFERENCE PRIMARY VIDEOS VIDEOS  
ANSWERED BY patient Patient RELATIONSHIP SELF SELF Depression Screening:  
:  
 
 
 PHQ over the last two weeks 12/4/2018 Little interest or pleasure in doing things Not at all Feeling down, depressed, irritable, or hopeless Not at all Total Score PHQ 2 0 Fall Risk Assessment:  
:  
 
No flowsheet data found. Abuse Screening:  
:  
 
 
Abuse Screening Questionnaire 8/29/2017 8/15/2016 Do you ever feel afraid of your partner? Brittani Cowbubba Are you in a relationship with someone who physically or mentally threatens you? Brittani Harvey Is it safe for you to go home? Y Y  
  
 
ADL Screening:  
:  
 
 

## 2018-12-04 NOTE — PROGRESS NOTES
HISTORY OF PRESENT ILLNESS 
Kathryn Galeana is a 61 y.o. male. HPI  He presents for follow up of diabetes mellitus, hypertension, CKD, hyperlipidemia, coronary artery disease and status post coronary artery stenting. Diet and Lifestyle: generally follows a low fat low cholesterol diet, generally follows a low sodium diet, follows a diabetic diet regularly, exercises sporadically, nonsmoker Diabetic ROS - medication compliance: compliant all of the time,  
   home glucose monitoring:not done, 
   home BP Monitorin-130's/70-75. further diabetic ROS: no polyuria or polydipsia, no numbness, tingling or pain in extremities, no unusual visual symptoms, no hypoglycemia, no medication side effects noted. Cardiovascular ROS: 
He denies palpitations, orthopnea, exertional chest pressure/discomfort, claudication, lower extremity edema, dyspnea on exertion, dizziness Patient Active Problem List  
Diagnosis Code  History of colonoscopy Z98.890  Low back pain M54.5  Hypercholesterolemia E78.00  
 Osteoarthritis of both knees M17.0  CKD (chronic kidney disease) stage 2, GFR 60-89 ml/min N18.2  Benign hypertension with chronic kidney disease, stage II I12.9, N18.2  Coronary artery disease involving native coronary artery of native heart without angina pectoris I25.10  Controlled type 2 diabetes mellitus without complication, without long-term current use of insulin (HCC) E11.9  Chronic superficial gastritis without bleeding K29.30 Past Medical History:  
Diagnosis Date  CAD (coronary artery disease) 2014 NSTEMI, PCI/NATE RCA  Hypercholesterolemia  Hypertension Past Surgical History:  
Procedure Laterality Date  CARDIAC SURG PROCEDURE UNLIST Stent RCA 2014  HX ORTHOPAEDIC    
 left shoulder, torn tendon  HX ORTHOPAEDIC    
 right knee X 4  
 [place] COLONOSCOPY FLX DX W/COLLJ SPEC WHEN PFRMD  2007 Dr Saqib Reddy 1 polyp repeat 2012 Social History Socioeconomic History  Marital status:  Spouse name: Not on file  Number of children: Not on file  Years of education: Not on file  Highest education level: Not on file Tobacco Use  Smoking status: Former Smoker Packs/day: 0.25 Years: 35.00 Pack years: 8.75 Types: Cigarettes Last attempt to quit: 2014 Years since quittin.0  Smokeless tobacco: Never Used  Tobacco comment: Never smoked over a pack per day Substance and Sexual Activity  Alcohol use: No  
  Alcohol/week: 0.0 oz  Drug use: No  
 
Family History Problem Relation Age of Onset  Heart Disease Mother CAD  Hypertension Mother  Elevated Lipids Mother  Cancer Father   
     lung  Mental Retardation Brother  Seizures Brother  Diabetes Brother  Hypertension Brother  Elevated Lipids Brother No Known Allergies Current Outpatient Medications Medication Sig Dispense Refill  metoprolol succinate (TOPROL-XL) 25 mg XL tablet TAKE 1 TABLET DAILY 90 Tab 3  
 metFORMIN ER (GLUCOPHAGE XR) 500 mg tablet Take 1 Tab by mouth daily (with dinner). 90 Tab 3  
 atorvastatin (LIPITOR) 40 mg tablet TAKE 1 TABLET EVERY EVENING 90 Tab 3  
 ondansetron (ZOFRAN ODT) 4 mg disintegrating tablet Take 1 Tab by mouth every eight (8) hours as needed for Nausea. 10 Tab 0  
 traMADol (ULTRAM) 50 mg tablet Take 1 Tab by mouth every six (6) hours as needed for Pain. Max Daily Amount: 200 mg. 10 Tab 0  
 polyethylene glycol (MIRALAX) 17 gram/dose powder Take 17 g by mouth daily. (Patient taking differently: Take 17 g by mouth as needed.) 289 g 0  
 amLODIPine (NORVASC) 2.5 mg tablet TAKE 1 TABLET DAILY 14 Tab 0  
 aspirin delayed-release 81 mg tablet Take 1 Tab by mouth daily. 90 Tab 3  
 nitroglycerin (NITROSTAT) 0.4 mg SL tablet 1 Tab by SubLINGual route every five (5) minutes as needed for Chest Pain (Max 3 doses daily).  1 Bottle 3  
  pantoprazole (PROTONIX) 40 mg tablet Review of Systems Constitutional: Negative for malaise/fatigue and weight loss. Gastrointestinal: Negative for constipation and diarrhea. Musculoskeletal: Positive for back pain and joint pain (knees). Neurological: Negative for tingling and focal weakness. Visit Vitals /79 (BP 1 Location: Left arm, BP Patient Position: Sitting) Pulse 64 Temp 97.8 °F (36.6 °C) (Oral) Resp 16 Ht 6' 1\" (1.854 m) Wt 219 lb (99.3 kg) SpO2 93% BMI 28.89 kg/m² Physical Exam  
Constitutional: He is oriented to person, place, and time. He appears well-developed and well-nourished. HENT:  
Head: Normocephalic and atraumatic. Eyes: Conjunctivae are normal. Pupils are equal, round, and reactive to light. Neck: Neck supple. Carotid bruit is not present. No thyromegaly present. Cardiovascular: Normal rate, regular rhythm and normal heart sounds. PMI is not displaced. Exam reveals no gallop. No murmur heard. Pulses: 
     Dorsalis pedis pulses are 2+ on the right side, and 2+ on the left side. Posterior tibial pulses are 2+ on the right side, and 2+ on the left side. Pulmonary/Chest: Effort normal. He has no wheezes. He has no rhonchi. He has no rales. Abdominal: Soft. Normal appearance. He exhibits no abdominal bruit and no mass. There is no hepatosplenomegaly. There is no tenderness. Musculoskeletal: He exhibits no edema. Lymphadenopathy:  
  He has no cervical adenopathy. Right: No supraclavicular adenopathy present. Left: No supraclavicular adenopathy present. Neurological: He is alert and oriented to person, place, and time. No sensory deficit. Skin: Skin is warm, dry and intact. No rash noted. Psychiatric: He has a normal mood and affect. His behavior is normal.  
Nursing note and vitals reviewed. Diabetic foot exam:  
 
Left Foot: 
 Visual Exam: normal  
 Pulse DP: 2+ (normal)  Filament test: 6/6 
 Vibratory sensation: diminished Right Foot: 
 Visual Exam: normal  
 Pulse DP: 2+ (normal) Filament test: 6/6 Vibratory sensation: diminished Results for orders placed or performed in visit on 12/04/18 AMB POC HEMOGLOBIN A1C Result Value Ref Range Hemoglobin A1c (POC) 6.3 % Lab Results Component Value Date/Time Hemoglobin A1c 5.9 (H) 05/29/2018 12:19 PM  
 Hemoglobin A1c (POC) 6.5 (A) 08/29/2017 09:30 AM  
 
Lab Results Component Value Date/Time Cholesterol, total 96 (L) 05/29/2018 12:19 PM  
 HDL Cholesterol 30 (L) 05/29/2018 12:19 PM  
 LDL, calculated 29 05/29/2018 12:19 PM  
 VLDL, calculated 37 05/29/2018 12:19 PM  
 Triglyceride 187 (H) 05/29/2018 12:19 PM  
 CHOL/HDL Ratio 4.5 10/29/2010 08:57 AM  
 
Lab Results Component Value Date/Time Sodium 138 06/19/2018 03:03 AM  
 Potassium 3.9 06/19/2018 03:03 AM  
 Chloride 106 06/19/2018 03:03 AM  
 CO2 26 06/19/2018 03:03 AM  
 Anion gap 6 06/19/2018 03:03 AM  
 Glucose 148 (H) 06/19/2018 03:03 AM  
 BUN 15 06/19/2018 03:03 AM  
 Creatinine 1.27 06/19/2018 03:03 AM  
 BUN/Creatinine ratio 12 06/19/2018 03:03 AM  
 GFR est AA >60 06/19/2018 03:03 AM  
 GFR est non-AA 57 (L) 06/19/2018 03:03 AM  
 Calcium 8.4 (L) 06/19/2018 03:03 AM  
 Bilirubin, total 0.4 06/19/2018 03:03 AM  
 AST (SGOT) 21 06/19/2018 03:03 AM  
 Alk. phosphatase 80 06/19/2018 03:03 AM  
 Protein, total 7.0 06/19/2018 03:03 AM  
 Albumin 3.4 (L) 06/19/2018 03:03 AM  
 Globulin 3.6 06/19/2018 03:03 AM  
 A-G Ratio 0.9 (L) 06/19/2018 03:03 AM  
 ALT (SGPT) 25 06/19/2018 03:03 AM  
 
Lab Results Component Value Date/Time Microalb/Creat ratio (ug/mg creat.) 7.0 11/21/2017 12:12 PM  
 
 
ASSESSMENT and PLAN 
  ICD-10-CM ICD-9-CM 1. Controlled type 2 diabetes mellitus without complication, without long-term current use of insulin ([facility]) E11.9 250.00 MICROALBUMIN, UR, RAND W/ MICROALB/CREAT RATIO  
   AMB POC HEMOGLOBIN A1C    HEMOGLOBIN A1C WITH EAG  
  DIABETES FOOT EXAM  
2. Benign hypertension with chronic kidney disease, stage II I12.9 403.10 N18.2 585.2 3. Hypercholesterolemia E78.00 272.0 LIPID PANEL  
   METABOLIC PANEL, COMPREHENSIVE 4. Coronary artery disease involving native coronary artery of native heart without angina pectoris I25.10 414.01   
5. Screening for depression Z13.31 V79.0 BEHAV ASSMT W/SCORE & DOCD/STAND INSTRUMENT Diagnoses and all orders for this visit: 1. Controlled type 2 diabetes mellitus without complication, without long-term current use of insulin ([facility] [address] Diabetes Mellitus: well controlled. Is taking statin Issues reviewed with him: low cholesterol diet, weight control and daily exercise discussed, glycohemoglobin and other lab monitoring discussed, long term diabetic complications discussed and Foot care discussed. Is not taking ACE/ARB due to CKD. -     MICROALBUMIN, UR, RAND W/ MICROALB/CREAT RATIO 
-     AMB POC HEMOGLOBIN A1C 
-     HEMOGLOBIN A1C WITH EAG; Future 
-      DIABETES FOOT EXAM 
 
2. Benign hypertension with chronic kidney disease, stage II Blood pressure is at goal and creatinine is stable. 3. Hypercholesterolemia Hyperlipidemia is controlled -     LIPID PANEL; Future -     METABOLIC PANEL, COMPREHENSIVE; Future 4. Coronary artery disease involving native coronary artery of native heart without angina pectoris Stable taking aspirin and statin. 5. Screening for depression--Negative  
-     BEHAV ASSMT W/SCORE & DOCD/STAND INSTRUMENT Follow-up Disposition: 
Return in about 6 months (around 6/4/2019) for DM, HTN, Chol   fasting lab one week prior . lab results and schedule of future lab studies reviewed with patient 
reviewed diet, exercise and weight control 
cardiovascular risk and specific lipid/LDL goals reviewed I have discussed the diagnosis, evaluation and treatment options and the intended plan with the patient. Patient understands and is in agreement. The patient has received an after-visit summary and questions were answered concerning future plans. I have discussed side effects and warnings of any new medications with the patient as well.

## 2018-12-04 NOTE — PATIENT INSTRUCTIONS
Office visit in 6 months with fasting lab work a week before visit. Learning About Diabetes Food Guidelines Your Care Instructions Meal planning is important to manage diabetes. It helps keep your blood sugar at a target level (which you set with your doctor). You don't have to eat special foods. You can eat what your family eats, including sweets once in a while. But you do have to pay attention to how often you eat and how much you eat of certain foods. You may want to work with a dietitian or a certified diabetes educator (CDE) to help you plan meals and snacks. A dietitian or CDE can also help you lose weight if that is one of your goals. What should you know about eating carbs? Managing the amount of carbohydrate (carbs) you eat is an important part of healthy meals when you have diabetes. Carbohydrate is found in many foods. · Learn which foods have carbs. And learn the amounts of carbs in different foods. ? Bread, cereal, pasta, and rice have about 15 grams of carbs in a serving. A serving is 1 slice of bread (1 ounce), ½ cup of cooked cereal, or 1/3 cup of cooked pasta or rice. ? Fruits have 15 grams of carbs in a serving. A serving is 1 small fresh fruit, such as an apple or orange; ½ of a banana; ½ cup of cooked or canned fruit; ½ cup of fruit juice; 1 cup of melon or raspberries; or 2 tablespoons of dried fruit. ? Milk and no-sugar-added yogurt have 15 grams of carbs in a serving. A serving is 1 cup of milk or 2/3 cup of no-sugar-added yogurt. ? Starchy vegetables have 15 grams of carbs in a serving. A serving is ½ cup of mashed potatoes or sweet potato; 1 cup winter squash; ½ of a small baked potato; ½ cup of cooked beans; or ½ cup cooked corn or green peas. · Learn how much carbs to eat each day and at each meal. A dietitian or CDE can teach you how to keep track of the amount of carbs you eat. This is called carbohydrate counting. · If you are not sure how to count carbohydrate grams, use the Plate Method to plan meals. It is a good, quick way to make sure that you have a balanced meal. It also helps you spread carbs throughout the day. ? Divide your plate by types of foods. Put non-starchy vegetables on half the plate, meat or other protein food on one-quarter of the plate, and a grain or starchy vegetable in the final quarter of the plate. To this you can add a small piece of fruit and 1 cup of milk or yogurt, depending on how many carbs you are supposed to eat at a meal. 
· Try to eat about the same amount of carbs at each meal. Do not \"save up\" your daily allowance of carbs to eat at one meal. 
· Proteins have very little or no carbs per serving. Examples of proteins are beef, chicken, turkey, fish, eggs, tofu, cheese, cottage cheese, and peanut butter. A serving size of meat is 3 ounces, which is about the size of a deck of cards. Examples of meat substitute serving sizes (equal to 1 ounce of meat) are 1/4 cup of cottage cheese, 1 egg, 1 tablespoon of peanut butter, and ½ cup of tofu. How can you eat out and still eat healthy? · Learn to estimate the serving sizes of foods that have carbohydrate. If you measure food at home, it will be easier to estimate the amount in a serving of restaurant food. · If the meal you order has too much carbohydrate (such as potatoes, corn, or baked beans), ask to have a low-carbohydrate food instead. Ask for a salad or green vegetables. · If you use insulin, check your blood sugar before and after eating out to help you plan how much to eat in the future. · If you eat more carbohydrate at a meal than you had planned, take a walk or do other exercise. This will help lower your blood sugar. What else should you know? · Limit saturated fat, such as the fat from meat and dairy products.  This is a healthy choice because people who have diabetes are at higher risk of heart disease. So choose lean cuts of meat and nonfat or low-fat dairy products. Use olive or canola oil instead of butter or shortening when cooking. · Don't skip meals. Your blood sugar may drop too low if you skip meals and take insulin or certain medicines for diabetes. · Check with your doctor before you drink alcohol. Alcohol can cause your blood sugar to drop too low. Alcohol can also cause a bad reaction if you take certain diabetes medicines. Follow-up care is a key part of your treatment and safety. Be sure to make and go to all appointments, and call your doctor if you are having problems. It's also a good idea to know your test results and keep a list of the medicines you take. Where can you learn more? Go to http://fela-franklin.info/. Enter T457 in the search box to learn more about \"Learning About Diabetes Food Guidelines. \" Current as of: December 7, 2017 Content Version: 11.8 © 8333-3738 [employer]. Care instructions adapted under license by [employer] (which disclaims liability or warranty for this information). If you have questions about a medical condition or this instruction, always ask your healthcare professional. [address] any warranty or liability for your use of this information.

## 2018-12-27 DIAGNOSIS — N18.2 BENIGN HYPERTENSION WITH CHRONIC KIDNEY DISEASE, STAGE II: ICD-10-CM

## 2018-12-27 DIAGNOSIS — I12.9 BENIGN HYPERTENSION WITH CHRONIC KIDNEY DISEASE, STAGE II: ICD-10-CM

## 2018-12-27 NOTE — TELEPHONE ENCOUNTER
PCP: Bhargav George MD     Last appt: 12/4/2018     Future Appointments   Date Time Provider Gosia Rogers   [facility]2019  [facility]   6/4/2019 10:30 AM Bhargav George MD [facility]   7/9/2019 10:15 AM Jared Monahan MD [address] [facility]          Requested Prescriptions     Pending Prescriptions Disp Refills    amLODIPine (NORVASC) 2.5 mg tablet 14 Tab 0     Sig: TAKE 1 TABLET DAILY

## 2018-12-27 NOTE — TELEPHONE ENCOUNTER
He had a prescription for #14 in May, but indicated he was taking daily at [address] earlier this month. Other maintenance prescriptions went to home delivery. Does he need small local amount or 90 day supply or both?

## 2019-01-02 RX ORDER — AMLODIPINE BESYLATE 2.5 MG/1
TABLET ORAL
Qty: 90 TAB | Refills: 3 | Status: SHIPPED | OUTPATIENT
Start: 2019-01-02 | End: 2019-11-08 | Stop reason: SDUPTHER

## 2019-01-02 NOTE — TELEPHONE ENCOUNTER
PCP: Yayo Villeda MD     Last appt: 12/4/2018   Future Appointments   Date Time Provider Gosia Rogers   5/28/2019  8:30 AM [facility]   6/4/2019 10:30 AM Yayo Villeda MD [facility]   7/9/2019 10:15 AM Yoanna Tavares MD [address] [facility]        Requested Prescriptions     Pending Prescriptions Disp Refills    amLODIPine (NORVASC) 2.5 mg tablet 90 Tab 3     Sig: TAKE 1 TABLET DAILY      90 days to mail order.

## 2019-05-28 DIAGNOSIS — E78.00 HYPERCHOLESTEROLEMIA: ICD-10-CM

## 2019-05-28 DIAGNOSIS — E11.9 CONTROLLED TYPE 2 DIABETES MELLITUS WITHOUT COMPLICATION, WITHOUT LONG-TERM CURRENT USE OF INSULIN (HCC): ICD-10-CM

## 2019-05-29 LAB
ALBUMIN SERPL-MCNC: 3.8 G/DL (ref 3.6–4.8)
ALBUMIN/GLOB SERPL: 1.6 {RATIO} (ref 1.2–2.2)
ALP SERPL-CCNC: 84 IU/L (ref 39–117)
ALT SERPL-CCNC: 20 IU/L (ref 0–44)
AST SERPL-CCNC: 21 IU/L (ref 0–40)
BILIRUB SERPL-MCNC: 0.3 MG/DL (ref 0–1.2)
BUN SERPL-MCNC: 13 MG/DL (ref 8–27)
BUN/CREAT SERPL: 9 (ref 10–24)
CALCIUM SERPL-MCNC: 8.6 MG/DL (ref 8.6–10.2)
CHLORIDE SERPL-SCNC: 104 MMOL/L (ref 96–106)
CHOLEST SERPL-MCNC: 101 MG/DL (ref 100–199)
CO2 SERPL-SCNC: 23 MMOL/L (ref 20–29)
CREAT SERPL-MCNC: 1.39 MG/DL (ref 0.76–1.27)
EST. AVERAGE GLUCOSE BLD GHB EST-MCNC: 160 MG/DL
GLOBULIN SER CALC-MCNC: 2.4 G/DL (ref 1.5–4.5)
GLUCOSE SERPL-MCNC: 117 MG/DL (ref 65–99)
HBA1C MFR BLD: 7.2 % (ref 4.8–5.6)
HDLC SERPL-MCNC: 28 MG/DL
LDLC SERPL CALC-MCNC: 38 MG/DL (ref 0–99)
POTASSIUM SERPL-SCNC: 4.7 MMOL/L (ref 3.5–5.2)
PROT SERPL-MCNC: 6.2 G/DL (ref 6–8.5)
SODIUM SERPL-SCNC: 138 MMOL/L (ref 134–144)
TRIGL SERPL-MCNC: 174 MG/DL (ref 0–149)
VLDLC SERPL CALC-MCNC: 35 MG/DL (ref 5–40)

## 2019-06-03 NOTE — PROGRESS NOTES
HISTORY OF PRESENT ILLNESS  Estelita Sawyer is a 61 y.o. male. HPI  He presents for follow up of diabetes mellitus, hypertension with CKD, hyperlipidemia, coronary artery disease, history of prior MI, status post coronary artery stenting and overweight. .  Diet and Lifestyle: generally follows a low fat low cholesterol diet, generally follows a low sodium diet, follows a diabetic diet regularly except has one soda daily, exercises sporadically, nonsmoker  Diabetic ROS - medication compliance: compliant all of the time,      home glucose monitoring: not done     home BP Monitorin's/70's.      further diabetic ROS: no polyuria or polydipsia, no numbness, tingling or pain in extremities, no unusual visual symptoms, no hypoglycemia, no medication side effects noted.    Cardiovascular ROS:  He denies palpitations, orthopnea, exertional chest pressure/discomfort, claudication, lower extremity edema, dyspnea on exertion, dizziness      3 most recent PHQ Screens 2019   Little interest or pleasure in doing things Not at all   Feeling down, depressed, irritable, or hopeless Not at all   Total Score PHQ 2 0       NEEDS microalbumin  Patient Active Problem List   Diagnosis Code    History of colonoscopy Z98.890    Low back pain M54.5    Hypercholesterolemia E78.00    Osteoarthritis of both knees M17.0    CKD (chronic kidney disease) stage 2, GFR 60-89 ml/min N18.2    Benign hypertension with chronic kidney disease, stage II I12.9, N18.2    Coronary artery disease involving native coronary artery of native heart without angina pectoris I25.10    Controlled type 2 diabetes mellitus without complication, without long-term current use of insulin ([facility]) E11.9    Chronic superficial gastritis without bleeding K29.30    Type 2 diabetes with nephropathy ([facility] [address] E11.21     Past Medical History:   Diagnosis Date    CAD (coronary artery disease) 2014    NSTEMI, PCI/NATE RCA    Hypercholesterolemia     Hypertension      Past Surgical History:   Procedure Laterality Date    CARDIAC SURG PROCEDURE UNLIST      Stent RCA 2014    HX ORTHOPAEDIC      left shoulder, torn tendon    HX ORTHOPAEDIC      right knee X 4    AR COLONOSCOPY FLX DX W/COLLJ SPEC WHEN PFRMD  2007    Dr Etta Valdez 1 polyp repeat 2012     Social History     Socioeconomic History    Marital status:      Spouse name: Not on file    Number of children: Not on file    Years of education: Not on file    Highest education level: Not on file   Tobacco Use    Smoking status: Former Smoker     Packs/day: 0.25     Years: 35.00     Pack years: 8.75     Types: Cigarettes     Last attempt to quit: 2014     Years since quittin.5    Smokeless tobacco: Never Used    Tobacco comment: Never smoked over a pack per day   Substance and Sexual Activity    Alcohol use: No     Alcohol/week: 0.0 oz    Drug use: No     Family History   Problem Relation Age of Onset    Heart Disease Mother         CAD    Hypertension Mother     Elevated Lipids Mother     Cancer Father         lung    Mental Retardation Brother     Seizures Brother     Diabetes Brother     Hypertension Brother     Elevated Lipids Brother      No Known Allergies  Current Outpatient Medications   Medication Sig Dispense Refill    amLODIPine (NORVASC) 2.5 mg tablet TAKE 1 TABLET DAILY 90 Tab 3    pantoprazole (PROTONIX) 40 mg tablet       metoprolol succinate (TOPROL-XL) 25 mg XL tablet TAKE 1 TABLET DAILY 90 Tab 3    metFORMIN ER (GLUCOPHAGE XR) 500 mg tablet Take 1 Tab by mouth daily (with dinner). 90 Tab 3    atorvastatin (LIPITOR) 40 mg tablet TAKE 1 TABLET EVERY EVENING 90 Tab 3    ondansetron (ZOFRAN ODT) 4 mg disintegrating tablet Take 1 Tab by mouth every eight (8) hours as needed for Nausea. 10 Tab 0    polyethylene glycol (MIRALAX) 17 gram/dose powder Take 17 g by mouth daily.  (Patient taking differently: Take 17 g by mouth as needed.) 289 g 0    aspirin delayed-release 81 mg tablet Take 1 Tab by mouth daily. 90 Tab 3    nitroglycerin (NITROSTAT) 0.4 mg SL tablet 1 Tab by SubLINGual route every five (5) minutes as needed for Chest Pain (Max 3 doses daily). 1 Bottle 3       Review of Systems   Constitutional: Negative for malaise/fatigue and weight loss. Gastrointestinal: Negative for constipation and diarrhea. Musculoskeletal: Negative for back pain and joint pain. Neurological: Negative for tingling and focal weakness. Visit Vitals  /76 (BP 1 Location: Left arm, BP Patient Position: Sitting)   Pulse (!) 59   Temp 98 °F (36.7 °C) (Oral)   Resp 12   Ht 6' 1\" (1.854 m)   Wt 222 lb 8 oz (100.9 kg)   SpO2 96%   BMI 29.36 kg/m²     Physical Exam   Constitutional: He is oriented to person, place, and time. He appears well-developed and well-nourished. HENT:   Head: Normocephalic and atraumatic. Eyes: Pupils are equal, round, and reactive to light. Conjunctivae are normal.   Neck: Neck supple. Carotid bruit is not present. No thyromegaly present. Cardiovascular: Normal rate, regular rhythm and normal heart sounds. PMI is not displaced. Exam reveals no gallop. No murmur heard. Pulses:       Dorsalis pedis pulses are 2+ on the right side, and 2+ on the left side. Posterior tibial pulses are 2+ on the right side, and 2+ on the left side. Pulmonary/Chest: Effort normal. He has no wheezes. He has no rhonchi. He has no rales. Abdominal: Soft. Normal appearance. He exhibits no abdominal bruit and no mass. There is no hepatosplenomegaly. There is no tenderness. Musculoskeletal: He exhibits no edema. Lymphadenopathy:     He has no cervical adenopathy. Right: No supraclavicular adenopathy present. Left: No supraclavicular adenopathy present. Neurological: He is alert and oriented to person, place, and time. No sensory deficit. Skin: Skin is warm, dry and intact. No rash noted.    Psychiatric: He has a normal mood and affect. His behavior is normal.   Nursing note and vitals reviewed. Results for orders placed or performed in visit on 05/28/19   HEMOGLOBIN A1C WITH EAG   Result Value Ref Range    Hemoglobin A1c 7.2 (H) 4.8 - 5.6 %    Estimated average glucose 673 mg/dL   METABOLIC PANEL, COMPREHENSIVE   Result Value Ref Range    Glucose 117 (H) 65 - 99 mg/dL    BUN 13 8 - 27 mg/dL    Creatinine 1.39 (H) 0.76 - 1.27 mg/dL    GFR est non-AA 54 (L) >59 mL/min/1.73    GFR est AA 62 >59 mL/min/1.73    BUN/Creatinine ratio 9 (L) 10 - 24    Sodium 138 134 - 144 mmol/L    Potassium 4.7 3.5 - 5.2 mmol/L    Chloride 104 96 - 106 mmol/L    CO2 23 20 - 29 mmol/L    Calcium 8.6 8.6 - 10.2 mg/dL    Protein, total 6.2 6.0 - 8.5 g/dL    Albumin 3.8 3.6 - 4.8 g/dL    GLOBULIN, TOTAL 2.4 1.5 - 4.5 g/dL    A-G Ratio 1.6 1.2 - 2.2    Bilirubin, total 0.3 0.0 - 1.2 mg/dL    Alk. phosphatase 84 39 - 117 IU/L    AST (SGOT) 21 0 - 40 IU/L    ALT (SGPT) 20 0 - 44 IU/L   LIPID PANEL   Result Value Ref Range    Cholesterol, total 101 100 - 199 mg/dL    Triglyceride 174 (H) 0 - 149 mg/dL    HDL Cholesterol 28 (L) >39 mg/dL    VLDL, calculated 35 5 - 40 mg/dL    LDL, calculated 38 0 - 99 mg/dL     Lab Results   Component Value Date/Time    Microalb/Creat ratio (ug/mg creat.) 7.0 11/21/2017 12:12 PM         ASSESSMENT and PLAN    ICD-10-CM ICD-9-CM    1. Controlled type 2 diabetes mellitus without complication, without long-term current use of insulin (HCC) E11.9 250.00 metFORMIN ER (GLUCOPHAGE XR) 500 mg tablet      HEMOGLOBIN A1C WITH EAG   2. Benign hypertension with chronic kidney disease, stage II A89.3 587.94 METABOLIC PANEL, BASIC    S06.4 585.2    3. Hypercholesterolemia E78.00 272.0    4. Coronary artery disease involving native coronary artery of native heart without angina pectoris I25.10 414.01    5. Screening for depression Z13.31 V79.0 BEHAV ASSMT W/SCORE & DOCD/STAND INSTRUMENT   6. Overweight (BMI 25.0-29. 9) E66.3 278.02      Diagnoses and all orders for this visit:    1. Controlled type 2 diabetes mellitus without complication, without long-term current use of insulin (HCC)  Diabetes Mellitus: borderline controlled, A1c has yosvany gradually riing in past year. . Is taking statin. Issues reviewed with him: low cholesterol diet, weight control and daily exercise discussed and glycohemoglobin and other lab monitoring discussed. Is no taking ACE/ARB due to CKD. -     Increase metFORMIN ER (GLUCOPHAGE XR) 500 mg tablet; Take 3 Tabs by mouth daily (with dinner). -     HEMOGLOBIN A1C WITH EAG; Future    2. Benign hypertension with chronic kidney disease, stage II  Blood pressure is at goal and creatinine is stable. -     METABOLIC PANEL, BASIC; Future    3. Hypercholesterolemia  Hyperlipidemia is controlled    4. Coronary artery disease involving native coronary artery of native heart without angina pectoris  Stable taking antiplatelet agent and statin. 5. Screening for depression--negative  -     BEHAV ASSMT W/SCORE & DOCD/STAND INSTRUMENT    6. Overweight (BMI 25.0-29. 9)  Discussed using smaller plate for portion control, keeping a food diary for awareness of food consumed, checking weight often, and increasing physical activity. Will re-evaluate next visit. Return in 6 months for DM, HTN, chol non-fasting lab one week prior   lab results and schedule of future lab studies reviewed with patient  reviewed diet, exercise and weight control  I have discussed the diagnosis, evaluation and treatment options and the intended plan with the patient. Patient understands and is in agreement. The patient has received an after-visit summary and questions were answered concerning future plans. I have discussed side effects and warnings of any new medications with the patient as well.

## 2019-06-04 ENCOUNTER — OFFICE VISIT (OUTPATIENT)
Dept: INTERNAL MEDICINE CLINIC | Facility: CLINIC | Age: 64
End: 2019-06-04

## 2019-06-04 VITALS
OXYGEN SATURATION: 96 % | RESPIRATION RATE: 12 BRPM | HEART RATE: 59 BPM | DIASTOLIC BLOOD PRESSURE: 76 MMHG | BODY MASS INDEX: 29.49 KG/M2 | TEMPERATURE: 98 F | HEIGHT: 73 IN | SYSTOLIC BLOOD PRESSURE: 130 MMHG | WEIGHT: 222.5 LBS

## 2019-06-04 DIAGNOSIS — E66.3 OVERWEIGHT (BMI 25.0-29.9): ICD-10-CM

## 2019-06-04 DIAGNOSIS — E78.00 HYPERCHOLESTEROLEMIA: ICD-10-CM

## 2019-06-04 DIAGNOSIS — Z13.31 SCREENING FOR DEPRESSION: ICD-10-CM

## 2019-06-04 DIAGNOSIS — E11.9 CONTROLLED TYPE 2 DIABETES MELLITUS WITHOUT COMPLICATION, WITHOUT LONG-TERM CURRENT USE OF INSULIN (HCC): Primary | ICD-10-CM

## 2019-06-04 DIAGNOSIS — I12.9 BENIGN HYPERTENSION WITH CHRONIC KIDNEY DISEASE, STAGE II: ICD-10-CM

## 2019-06-04 DIAGNOSIS — I25.10 CORONARY ARTERY DISEASE INVOLVING NATIVE CORONARY ARTERY OF NATIVE HEART WITHOUT ANGINA PECTORIS: ICD-10-CM

## 2019-06-04 DIAGNOSIS — N18.2 BENIGN HYPERTENSION WITH CHRONIC KIDNEY DISEASE, STAGE II: ICD-10-CM

## 2019-06-04 PROBLEM — E11.21 TYPE 2 DIABETES WITH NEPHROPATHY (HCC): Status: ACTIVE | Noted: 2019-06-04

## 2019-06-04 RX ORDER — METFORMIN HYDROCHLORIDE 500 MG/1
1500 TABLET, EXTENDED RELEASE ORAL
Qty: 270 TAB | Refills: 3 | Status: SHIPPED | OUTPATIENT
Start: 2019-06-04 | End: 2019-08-26 | Stop reason: SDUPTHER

## 2019-06-04 NOTE — PATIENT INSTRUCTIONS
Increase metformin to 2 tablets daily for a week, then 3 tablets daily after that. Office visit in 6 months with non-fasting lab work a week before visit. Learning About Meal Planning for Diabetes  Why plan your meals? Meal planning can be a key part of managing diabetes. Planning meals and snacks with the right balance of carbohydrate, protein, and fat can help you keep your blood sugar at the target level you set with your doctor. You don't have to eat special foods. You can eat what your family eats, including sweets once in a while. But you do have to pay attention to how often you eat and how much you eat of certain foods. You may want to work with a dietitian or a certified diabetes educator. He or she can give you tips and meal ideas and can answer your questions about meal planning. This health professional can also help you reach a healthy weight if that is one of your goals. What plan is right for you? Your dietitian or diabetes educator may suggest that you start with the plate format or carbohydrate counting. The plate format  The plate format is a simple way to help you manage how you eat. You plan meals by learning how much space each food should take on a plate. Using the plate format helps you spread carbohydrate throughout the day. It can make it easier to keep your blood sugar level within your target range. It also helps you see if you're eating healthy portion sizes. To use the plate format, you put non-starchy vegetables on half your plate. Add meat or meat substitutes on one-quarter of the plate. Put a grain or starchy vegetable (such as brown rice or a potato) on the final quarter of the plate. You can add a small piece of fruit and some low-fat or fat-free milk or yogurt, depending on your carbohydrate goal for each meal.  Here are some tips for using the plate format:  · Make sure that you are not using an oversized plate.  A 9-inch plate is best. Many restaurants use larger plates. · Get used to using the plate format at home. Then you can use it when you eat out. · Write down your questions about using the plate format. Talk to your doctor, a dietitian, or a diabetes educator about your concerns. Carbohydrate counting  With carbohydrate counting, you plan meals based on the amount of carbohydrate in each food. Carbohydrate raises blood sugar higher and more quickly than any other nutrient. It is found in desserts, breads and cereals, and fruit. It's also found in starchy vegetables such as potatoes and corn, grains such as rice and pasta, and milk and yogurt. Spreading carbohydrate throughout the day helps keep your blood sugar levels within your target range. Your daily amount depends on several things, including your weight, how active you are, which diabetes medicines you take, and what your goals are for your blood sugar levels. A registered dietitian or diabetes educator can help you plan how much carbohydrate to include in each meal and snack. A guideline for your daily amount of carbohydrate is:  · 45 to 60 grams at each meal. That's about the same as 3 to 4 carbohydrate servings. · 15 to 20 grams at each snack. That's about the same as 1 carbohydrate serving. The Nutrition Facts label on packaged foods tells you how much carbohydrate is in a serving of the food. First, look at the serving size on the food label. Is that the amount you eat in a serving? All of the nutrition information on a food label is based on that serving size. So if you eat more or less than that, you'll need to adjust the other numbers. Total carbohydrate is the next thing you need to look for on the label. If you count carbohydrate servings, one serving of carbohydrate is 15 grams. For foods that don't come with labels, such as fresh fruits and vegetables, you'll need a guide that lists carbohydrate in these foods.  Ask your doctor, dietitian, or diabetes educator about books or other nutrition guides you can use. If you take insulin, you need to know how many grams of carbohydrate are in a meal. This lets you know how much rapid-acting insulin to take before you eat. If you use an insulin pump, you get a constant rate of insulin during the day. So the pump must be programmed at meals to give you extra insulin to cover the rise in blood sugar after meals. When you know how much carbohydrate you will eat, you can take the right amount of insulin. Or, if you always use the same amount of insulin, you need to make sure that you eat the same amount of carbohydrate at meals. If you need more help to understand carbohydrate counting and food labels, ask your doctor, dietitian, or diabetes educator. How do you get started with meal planning? Here are some tips to get started:  · Plan your meals a week at a time. Don't forget to include snacks too. · Use cookbooks or online recipes to plan several main meals. Plan some quick meals for busy nights. You also can double some recipes that freeze well. Then you can save half for other busy nights when you don't have time to cook. · Make sure you have the ingredients you need for your recipes. If you're running low on basic items, put these items on your shopping list too. · List foods that you use to make breakfasts, lunches, and snacks. List plenty of fruits and vegetables. · Post this list on the refrigerator. Add to it as you think of more things you need. · Take the list to the store to do your weekly shopping. Follow-up care is a key part of your treatment and safety. Be sure to make and go to all appointments, and call your doctor if you are having problems. It's also a good idea to know your test results and keep a list of the medicines you take. Where can you learn more? Go to http://fela-franklin.info/. Kassy Shearer in the search box to learn more about \"Learning About Meal Planning for Diabetes. \"  Current as of: July 25, 2018  Content Version: 11.9  © 7748-6269 [employer]. Care instructions adapted under license by [employer] (which disclaims liability or warranty for this information). If you have questions about a medical condition or this instruction, always ask your healthcare professional. [address] any warranty or liability for your use of this information.

## 2019-06-04 NOTE — PROGRESS NOTES
Dereje Miller  Identified pt with two pt identifiers(name and ). Chief Complaint   Patient presents with    Diabetes    Hypertension    Cholesterol Problem       Reviewed record In preparation for visit and have obtained necessary documentation. Has info on advanced directive but has not filled them out. 1. Have you been to the ER, urgent care clinic or hospitalized since your last visit? No     2. Have you seen or consulted any other health care providers outside of the [address] [place] [address] since your last visit? Include any pap smears or colon screening. No    Vitals reviewed with provider.     Health Maintenance reviewed:     Health Maintenance Due   Topic    MICROALBUMIN Q1     Shingrix Vaccine Age 50> (2 of 2)          Wt Readings from Last 3 Encounters:   19 222 lb 8 oz (100.9 kg)   18 219 lb (99.3 kg)   18 216 lb 3.2 oz (98.1 kg)        Temp Readings from Last 3 Encounters:   19 98 °F (36.7 °C) (Oral)   18 97.8 °F (36.6 °C) (Oral)   18 97.7 °F (36.5 °C)        BP Readings from Last 3 Encounters:   19 130/76   18 134/79   18 120/80        Pulse Readings from Last 3 Encounters:   19 (!) 59   18 64   18 64        Vitals:    19 1038   BP: 130/76   Pulse: (!) 59   Resp: 12   Temp: 98 °F (36.7 °C)   TempSrc: Oral   SpO2: 96%   Weight: 222 lb 8 oz (100.9 kg)   Height: 6' 1\" (1.854 m)   PainSc:   0 - No pain          Learning Assessment:   :       Learning Assessment 2018 4/10/2014   PRIMARY LEARNER Patient Patient   HIGHEST LEVEL OF EDUCATION - PRIMARY LEARNER  - SOME COLLEGE   BARRIERS PRIMARY LEARNER - NONE   CO-LEARNER CAREGIVER - No   PRIMARY LANGUAGE ENGLISH ENGLISH    NEED - No   LEARNER PREFERENCE PRIMARY VIDEOS VIDEOS   ANSWERED BY patient Patient   RELATIONSHIP SELF SELF        Depression Screening:   :       3 most recent PHQ Screens 2019   Little interest or pleasure in doing things Not at all   Feeling down, depressed, irritable, or hopeless Not at all   Total Score PHQ 2 0        Fall Risk Assessment:   :     No flowsheet data found. Abuse Screening:   :       Abuse Screening Questionnaire 6/4/2019 8/29/2017 8/15/2016   Do you ever feel afraid of your partner? N N N   Are you in a relationship with someone who physically or mentally threatens you? N N N   Is it safe for you to go home?  Y Y Y        ADL Screening:   :       ADL Assessment 11/3/2014   Feeding yourself No Help Needed   Getting from bed to chair No Help Needed   Getting dressed No Help Needed   Bathing or showering No Help Needed   Walk across the room (includes cane/walker) No Help Needed   Using the telphone No Help Needed   Taking your medications No Help Needed   Preparing meals No Help Needed   Managing money (expenses/bills) No Help Needed   Moderately strenuous housework (laundry) No Help Needed   Shopping for personal items (toiletries/medicines) No Help Needed   Shopping for groceries No Help Needed   Driving No Help Needed   Climbing a flight of stairs No Help Needed   Getting to places beyond walking distances No Help Needed

## 2019-06-21 ENCOUNTER — OFFICE VISIT (OUTPATIENT)
Dept: URGENT CARE | Age: 64
End: 2019-06-21

## 2019-06-21 VITALS
DIASTOLIC BLOOD PRESSURE: 61 MMHG | SYSTOLIC BLOOD PRESSURE: 119 MMHG | HEIGHT: 73 IN | RESPIRATION RATE: 16 BRPM | WEIGHT: 218 LBS | HEART RATE: 68 BPM | BODY MASS INDEX: 28.89 KG/M2 | TEMPERATURE: 97.8 F | OXYGEN SATURATION: 96 %

## 2019-06-21 DIAGNOSIS — H92.02 LEFT EAR PAIN: Primary | ICD-10-CM

## 2019-06-21 DIAGNOSIS — H93.12 TINNITUS OF LEFT EAR: ICD-10-CM

## 2019-06-21 RX ORDER — OXYMETAZOLINE HCL 0.05 %
2 SPRAY, NON-AEROSOL (ML) NASAL 2 TIMES DAILY
Qty: 1 EACH | Refills: 0 | Status: SHIPPED | OUTPATIENT
Start: 2019-06-21 | End: 2019-06-24

## 2019-06-21 RX ORDER — GUAIFENESIN, PSEUDOEPHEDRINE HYDROCHLORIDE 600; 60 MG/1; MG/1
1 TABLET, EXTENDED RELEASE ORAL EVERY 12 HOURS
Qty: 20 TAB | Refills: 0 | Status: SHIPPED | OUTPATIENT
Start: 2019-06-21 | End: 2019-07-09

## 2019-06-21 NOTE — PROGRESS NOTES
The history is provided by the patient. Ear Pain   This is a new problem. The current episode started more than 2 days ago. The problem occurs constantly. The problem has been gradually worsening. Pertinent negatives include no chest pain. Nothing aggravates the symptoms. Nothing relieves the symptoms. The treatment provided no relief.      5 days of L ear fullness, decreased hearing, and tinnitus, that has become painful in the past couple of days. Pt notes a few days prior to sxs he was driving to [place] and his ears weren't 'popping' like they usually do with changes in elevation. Was seen in UC 4 days ago and started on augmentin, which has not been helping. No ear drainage, f/c. Minor assoc nasal congestion/sinus pressure.      Past Medical History:   Diagnosis Date    CAD (coronary artery disease) April 2014    NSTEMI, PCI/NATE RCA    Hypercholesterolemia     Hypertension         Past Surgical History:   Procedure Laterality Date    CARDIAC SURG PROCEDURE UNLIST      Stent RCA 4/2014    HX ORTHOPAEDIC      left shoulder, torn tendon    HX ORTHOPAEDIC      right knee X 4    SD COLONOSCOPY FLX DX W/COLLJ SPEC WHEN PFRMD  12/19/2007    Dr Madrigal Masters 1 polyp repeat 12/2012         Family History   Problem Relation Age of Onset    Heart Disease Mother         CAD    Hypertension Mother     Elevated Lipids Mother     Cancer Father         lung    Mental Retardation Brother     Seizures Brother     Diabetes Brother     Hypertension Brother     Elevated Lipids Brother         Social History     Socioeconomic History    Marital status:      Spouse name: Not on file    Number of children: Not on file    Years of education: Not on file    Highest education level: Not on file   Occupational History    Not on file   Social Needs    Financial resource strain: Not on file    Food insecurity:     Worry: Not on file     Inability: Not on file    Transportation needs:     Medical: Not on file Non-medical: Not on file   Tobacco Use    Smoking status: Former Smoker     Packs/day: 0.25     Years: 35.00     Pack years: 8.75     Types: Cigarettes     Last attempt to quit: 2014     Years since quittin.6    Smokeless tobacco: Never Used    Tobacco comment: Never smoked over a pack per day   Substance and Sexual Activity    Alcohol use: No     Alcohol/week: 0.0 oz    Drug use: No    Sexual activity: Not on file   Lifestyle    Physical activity:     Days per week: Not on file     Minutes per session: Not on file    Stress: Not on file   Relationships    Social connections:     Talks on phone: Not on file     Gets together: Not on file     Attends [employer] service: Not on file     Active member of club or organization: Not on file     Attends meetings of clubs or organizations: Not on file     Relationship status: Not on file    Intimate partner violence:     Fear of current or ex partner: Not on file     Emotionally abused: Not on file     Physically abused: Not on file     Forced sexual activity: Not on file   Other Topics Concern    Not on file   Social History Narrative    Not on file                ALLERGIES: Patient has no known allergies. Review of Systems   Constitutional: Negative for chills and fever. HENT: Positive for congestion, ear pain, hearing loss and sinus pressure. Negative for ear discharge, sore throat, trouble swallowing and voice change. Respiratory: Negative for cough. Cardiovascular: Negative for chest pain. Vitals:    19 1737   BP: 119/61   Pulse: 68   Resp: 16   Temp: 97.8 °F (36.6 °C)   SpO2: 96%   Weight: 218 lb (98.9 kg)   Height: 6' 1\" (1.854 m)       Physical Exam   Constitutional: He appears well-developed and well-nourished. No distress. HENT:   Head: Normocephalic and atraumatic. Right Ear: Tympanic membrane, external ear and ear canal normal. No drainage or swelling. No mastoid tenderness.  Tympanic membrane is not retracted and not bulging. Left Ear: Tympanic membrane, external ear and ear canal normal. No drainage or swelling. No mastoid tenderness. Tympanic membrane is not retracted and not bulging. Mouth/Throat: No oropharyngeal exudate. Eyes: Conjunctivae are normal.   Neck: Normal range of motion. Neck supple. No thyromegaly present. Lymphadenopathy:     He has no cervical adenopathy. Skin: He is not diaphoretic. MDM     Differential Diagnosis; Clinical Impression; Plan:     Otalgia with tinnitus and hearing loss of left ear. Exam appears normal. Already on abx. Will add decongestants and have him f/u with ENT.       Procedures

## 2019-06-21 NOTE — PATIENT INSTRUCTIONS
Tinnitus: Care Instructions  Your Care Instructions    Many people have some ringing sounds in their ears once in a while. You may hear a roar, a hiss, a tinkle, or a buzz. The sound usually lasts only a few minutes. If it goes on all the time, you may have tinnitus. Tinnitus is usually caused by long-term exposure to loud noise. This damages the nerves in the inner ear. It can occur with all types of hearing loss. It may be a symptom of almost any ear problem. Tinnitus may be caused by a buildup of earwax. Or it may be caused by ear infections or certain medicines (especially antibiotics or large amounts of aspirin). You can also hear noises in your ears because of an injury to the ears, drinking too much alcohol or caffeine, or a medical condition. You may need tests to evaluate your hearing and to find causes of long-lasting tinnitus. Your doctor may suggest one or more treatments to help you cope with it. You can also do things at home to help reduce symptoms. Follow-up care is a key part of your treatment and safety. Be sure to make and go to all appointments, and call your doctor if you are having problems. It's also a good idea to know your test results and keep a list of the medicines you take. How can you care for yourself at home? · Limit or cut out alcohol and caffeine. They can make your symptoms worse. · Do not smoke or use other tobacco products. Nicotine reduces blood flow to the ear and makes tinnitus worse. If you need help quitting, talk to your doctor about stop-smoking programs and medicines. These can increase your chances of quitting for good. · Talk to your doctor about whether to stop taking aspirin and similar products such as ibuprofen or naproxen. · Get exercise often. It can improve blood flow to the ear. Ways to cope with noise  Some tinnitus may last a long time. To cope with noise, try to:  · Avoid noises that you think caused your tinnitus.  If you can't avoid loud noises, wear earplugs or earmuffs. · Ignore the sound by paying attention to other things. · Relax using biofeedback, meditation, or yoga. Feeling stressed and being tired can make tinnitus worse. · Play music or white noise to help you sleep. Background noise may cover up the noise that you hear in your ears. You can buy a machine that makes soothing sounds, such as ocean waves. When should you call for help? Call 911 anytime you think you may need emergency care. For example, call if:    · You have symptoms of a stroke. These may include:  ? Sudden numbness, tingling, weakness, or loss of movement in your face, arm, or leg, especially on only one side of your body. ? Sudden vision changes. ? Sudden trouble speaking. ? Sudden confusion or trouble understanding simple statements. ? Sudden problems with walking or balance. ? A sudden, severe headache that is different from past headaches.    Call your doctor now or seek immediate medical care if:    · You develop other symptoms. These may include hearing loss (or worse hearing loss), balance problems, dizziness, nausea, or vomiting.    Watch closely for changes in your health, and be sure to contact your doctor if:    · Your tinnitus moves from both ears to one ear.     · Your hearing loss gets worse within 1 day after an ear injury.     · Your tinnitus or hearing loss does not get better within 1 week after an ear injury.     · Your tinnitus bothers you enough that you want to take medicines to help you cope with it. Where can you learn more? Go to http://fela-franklin.info/. Enter S165 in the search box to learn more about \"Tinnitus: Care Instructions. \"  Current as of: March 27, 2018  Content Version: 11.9  © 2263-1725 [employer]. Care instructions adapted under license by [employer] (which disclaims liability or warranty for this information).  If you have questions about a medical condition or this instruction, always ask your healthcare professional. [place] [address] any warranty or liability for your use of this information. Earache: Care Instructions  Your Care Instructions    Even though infection is a common cause of ear pain, not all ear pain means an infection. If you have ear pain and don't have an infection, it could be because of a jaw problem, such as temporomandibular joint (TMJ) pain. Or it could be because of a neck problem. When ear discomfort or pain is mild or comes and goes without other symptoms, home treatment may be all you need. Follow-up care is a key part of your treatment and safety. Be sure to make and go to all appointments, and call your doctor if you are having problems. It's also a good idea to know your test results and keep a list of the medicines you take. How can you care for yourself at home? · Apply heat on the ear to ease pain. To apply heat, put a warm water bottle, a heating pad set on low, or a warm cloth on your ear. Do not go to sleep with a heating pad on your skin. · Take an over-the-counter pain medicine, such as acetaminophen (Tylenol), ibuprofen (Advil, Motrin), or naproxen (Aleve). Be safe with medicines. Read and follow all instructions on the label. · Do not take two or more pain medicines at the same time unless the doctor told you to. Many pain medicines have acetaminophen, which is Tylenol. Too much acetaminophen (Tylenol) can be harmful. · Never insert anything, such as a cotton swab or a angel pin, into the ear. When should you call for help? Call your doctor now or seek immediate medical care if:    · You have new or worse symptoms of infection, such as:  ? Increased pain, swelling, warmth, or redness. ? Red streaks leading from the area. ? Pus draining from the area.   ? A fever.    Watch closely for changes in your health, and be sure to contact your doctor if:    · You have new or worse discharge coming from the ear.     · You do not get better as expected. Where can you learn more? Go to http://fela-franklin.info/. Enter D209 in the search box to learn more about \"Earache: Care Instructions. \"  Current as of: March 27, 2018  Content Version: 11.9  © 1212-6816 [employer]. Care instructions adapted under license by [employer] (which disclaims liability or warranty for this information). If you have questions about a medical condition or this instruction, always ask your healthcare professional. [address] any warranty or liability for your use of this information.

## 2019-07-09 ENCOUNTER — OFFICE VISIT (OUTPATIENT)
Dept: CARDIOLOGY CLINIC | Age: 64
End: 2019-07-09

## 2019-07-09 VITALS
DIASTOLIC BLOOD PRESSURE: 88 MMHG | RESPIRATION RATE: 16 BRPM | SYSTOLIC BLOOD PRESSURE: 128 MMHG | OXYGEN SATURATION: 97 % | WEIGHT: 209.9 LBS | HEART RATE: 69 BPM | HEIGHT: 73 IN | BODY MASS INDEX: 27.82 KG/M2

## 2019-07-09 DIAGNOSIS — I12.9 BENIGN HYPERTENSION WITH CHRONIC KIDNEY DISEASE, STAGE II: ICD-10-CM

## 2019-07-09 DIAGNOSIS — I25.10 CORONARY ARTERY DISEASE INVOLVING NATIVE CORONARY ARTERY OF NATIVE HEART WITHOUT ANGINA PECTORIS: Primary | ICD-10-CM

## 2019-07-09 DIAGNOSIS — E78.00 HYPERCHOLESTEROLEMIA: ICD-10-CM

## 2019-07-09 DIAGNOSIS — N18.2 BENIGN HYPERTENSION WITH CHRONIC KIDNEY DISEASE, STAGE II: ICD-10-CM

## 2019-07-09 NOTE — PROGRESS NOTES
Chief Complaint   Patient presents with    Annual Exam         1. Have you been to the ER, urgent care clinic since your last visit? Hospitalized since your last visit? no    2. Have you seen or consulted any other health care providers outside of the [address] [place] [address] since your last visit? Include any pap smears or colon screening.   no

## 2019-07-10 ENCOUNTER — HOSPITAL ENCOUNTER (OUTPATIENT)
Dept: MRI IMAGING | Age: 64
Discharge: HOME | End: 2019-07-10
Attending: OTOLARYNGOLOGY
Payer: COMMERCIAL

## 2019-07-10 DIAGNOSIS — H91.20 HEARING LOSS, SUDDEN: ICD-10-CM

## 2019-07-10 PROCEDURE — 70553 MRI BRAIN STEM W/O & W/DYE: CPT

## 2019-07-10 PROCEDURE — A9575 INJ GADOTERATE MEGLUMI 0.1ML: HCPCS | Performed by: OTOLARYNGOLOGY

## 2019-07-10 PROCEDURE — 74011250636 HC RX REV CODE- 250/636: Performed by: OTOLARYNGOLOGY

## 2019-07-10 RX ORDER — GADOTERATE MEGLUMINE 376.9 MG/ML
20 INJECTION INTRAVENOUS
Status: COMPLETED | OUTPATIENT
Start: 2019-07-10 | End: 2019-07-10

## 2019-07-10 RX ADMIN — GADOTERATE MEGLUMINE 20 ML: 376.9 INJECTION INTRAVENOUS at 17:26

## 2019-07-13 ENCOUNTER — APPOINTMENT (OUTPATIENT)
Dept: GENERAL RADIOLOGY | Age: 64
DRG: 418 | End: 2019-07-13
Attending: FAMILY MEDICINE
Payer: COMMERCIAL

## 2019-07-13 ENCOUNTER — APPOINTMENT (OUTPATIENT)
Dept: ULTRASOUND IMAGING | Age: 64
DRG: 418 | End: 2019-07-13
Attending: EMERGENCY MEDICINE
Payer: COMMERCIAL

## 2019-07-13 ENCOUNTER — APPOINTMENT (OUTPATIENT)
Dept: CT IMAGING | Age: 64
DRG: 418 | End: 2019-07-13
Attending: EMERGENCY MEDICINE
Payer: COMMERCIAL

## 2019-07-13 ENCOUNTER — HOSPITAL ENCOUNTER (INPATIENT)
Age: 64
LOS: 9 days | Discharge: HOME | DRG: 418 | End: 2019-07-22
Attending: EMERGENCY MEDICINE | Admitting: FAMILY MEDICINE
Payer: COMMERCIAL

## 2019-07-13 DIAGNOSIS — R10.9 ACUTE ABDOMINAL PAIN: ICD-10-CM

## 2019-07-13 DIAGNOSIS — R74.01 TRANSAMINITIS: ICD-10-CM

## 2019-07-13 DIAGNOSIS — E11.65 UNCONTROLLED TYPE 2 DIABETES MELLITUS WITH HYPERGLYCEMIA (HCC): ICD-10-CM

## 2019-07-13 DIAGNOSIS — K85.90 ACUTE PANCREATITIS, UNSPECIFIED COMPLICATION STATUS, UNSPECIFIED PANCREATITIS TYPE: Primary | ICD-10-CM

## 2019-07-13 DIAGNOSIS — K85.10 ACUTE BILIARY PANCREATITIS WITHOUT INFECTION OR NECROSIS: ICD-10-CM

## 2019-07-13 DIAGNOSIS — R11.10 ACUTE VOMITING: ICD-10-CM

## 2019-07-13 DIAGNOSIS — R79.89 ELEVATED LACTIC ACID LEVEL: ICD-10-CM

## 2019-07-13 DIAGNOSIS — N17.9 ACUTE RENAL FAILURE, UNSPECIFIED ACUTE RENAL FAILURE TYPE (HCC): ICD-10-CM

## 2019-07-13 LAB
ADMINISTERED INITIALS, ADMINIT: NORMAL
ALBUMIN SERPL-MCNC: 3.8 G/DL (ref 3.5–5)
ALBUMIN/GLOB SERPL: 1 {RATIO} (ref 1.1–2.2)
ALP SERPL-CCNC: 177 U/L (ref 45–117)
ALT SERPL-CCNC: 661 U/L (ref 12–78)
ANION GAP SERPL CALC-SCNC: 10 MMOL/L (ref 5–15)
ANION GAP SERPL CALC-SCNC: 10 MMOL/L (ref 5–15)
APPEARANCE UR: CLEAR
ARTERIAL PATENCY WRIST A: ABNORMAL
AST SERPL-CCNC: 504 U/L (ref 15–37)
BACTERIA URNS QL MICRO: NEGATIVE /HPF
BASE DEFICIT BLDV-SCNC: 2 MMOL/L
BASOPHILS # BLD: 0 K/UL (ref 0–0.1)
BASOPHILS NFR BLD: 0 % (ref 0–1)
BDY SITE: ABNORMAL
BILIRUB SERPL-MCNC: 1.6 MG/DL (ref 0.2–1)
BILIRUB UR QL: NEGATIVE
BUN SERPL-MCNC: 20 MG/DL (ref 6–20)
BUN SERPL-MCNC: 20 MG/DL (ref 6–20)
BUN/CREAT SERPL: 12 (ref 12–20)
BUN/CREAT SERPL: 13 (ref 12–20)
CALCIUM SERPL-MCNC: 8.3 MG/DL (ref 8.5–10.1)
CALCIUM SERPL-MCNC: 9.4 MG/DL (ref 8.5–10.1)
CHLORIDE SERPL-SCNC: 105 MMOL/L (ref 97–108)
CHLORIDE SERPL-SCNC: 99 MMOL/L (ref 97–108)
CK MB CFR SERPL CALC: 1.9 % (ref 0–2.5)
CK MB SERPL-MCNC: 1.7 NG/ML (ref 5–25)
CK SERPL-CCNC: 88 U/L (ref 39–308)
CO2 SERPL-SCNC: 23 MMOL/L (ref 21–32)
CO2 SERPL-SCNC: 24 MMOL/L (ref 21–32)
COLOR UR: ABNORMAL
CREAT SERPL-MCNC: 1.52 MG/DL (ref 0.7–1.3)
CREAT SERPL-MCNC: 1.64 MG/DL (ref 0.7–1.3)
D50 ADMINISTERED, D50ADM: 0 ML
D50 ORDER, D50ORD: 0 ML
DIFFERENTIAL METHOD BLD: ABNORMAL
EOSINOPHIL # BLD: 0 K/UL (ref 0–0.4)
EOSINOPHIL NFR BLD: 0 % (ref 0–7)
EPITH CASTS URNS QL MICRO: ABNORMAL /LPF
ERYTHROCYTE [DISTWIDTH] IN BLOOD BY AUTOMATED COUNT: 12.1 % (ref 11.5–14.5)
EST. AVERAGE GLUCOSE BLD GHB EST-MCNC: 217 MG/DL
GAS FLOW.O2 O2 DELIVERY SYS: ABNORMAL L/MIN
GLOBULIN SER CALC-MCNC: 3.8 G/DL (ref 2–4)
GLSCOM COMMENTS: NORMAL
GLUCOSE BLD STRIP.AUTO-MCNC: 227 MG/DL (ref 65–100)
GLUCOSE BLD STRIP.AUTO-MCNC: 286 MG/DL (ref 65–100)
GLUCOSE BLD STRIP.AUTO-MCNC: 301 MG/DL (ref 65–100)
GLUCOSE BLD STRIP.AUTO-MCNC: 332 MG/DL (ref 65–100)
GLUCOSE BLD STRIP.AUTO-MCNC: 389 MG/DL (ref 65–100)
GLUCOSE BLD STRIP.AUTO-MCNC: 422 MG/DL (ref 65–100)
GLUCOSE SERPL-MCNC: 401 MG/DL (ref 65–100)
GLUCOSE SERPL-MCNC: 462 MG/DL (ref 65–100)
GLUCOSE UR STRIP.AUTO-MCNC: >1000 MG/DL
GLUCOSE, GLC: 227 MG/DL
GLUCOSE, GLC: 286 MG/DL
GLUCOSE, GLC: 301 MG/DL
GLUCOSE, GLC: 332 MG/DL
GLUCOSE, GLC: 389 MG/DL
HBA1C MFR BLD: 9.2 % (ref 4.2–6.3)
HCO3 BLDV-SCNC: 23.9 MMOL/L (ref 23–28)
HCT VFR BLD AUTO: 46.5 % (ref 36.6–50.3)
HGB BLD-MCNC: 15.7 G/DL (ref 12.1–17)
HGB UR QL STRIP: NEGATIVE
HIGH TARGET, HITG: 250 MG/DL
HYALINE CASTS URNS QL MICRO: ABNORMAL /LPF (ref 0–5)
IMM GRANULOCYTES # BLD AUTO: 0.1 K/UL (ref 0–0.04)
IMM GRANULOCYTES NFR BLD AUTO: 1 % (ref 0–0.5)
INSULIN ADMINSTERED, INSADM: 11.3 UNITS/HOUR
INSULIN ADMINSTERED, INSADM: 6.6 UNITS/HOUR
INSULIN ADMINSTERED, INSADM: 8.2 UNITS/HOUR
INSULIN ADMINSTERED, INSADM: 8.4 UNITS/HOUR
INSULIN ADMINSTERED, INSADM: 9.6 UNITS/HOUR
INSULIN ORDER, INSORD: 11.3 UNITS/HOUR
INSULIN ORDER, INSORD: 6.6 UNITS/HOUR
INSULIN ORDER, INSORD: 8.2 UNITS/HOUR
INSULIN ORDER, INSORD: 8.4 UNITS/HOUR
INSULIN ORDER, INSORD: 9.6 UNITS/HOUR
KETONES UR QL STRIP.AUTO: ABNORMAL MG/DL
LACTATE SERPL-SCNC: 2.6 MMOL/L (ref 0.4–2)
LACTATE SERPL-SCNC: 4.2 MMOL/L (ref 0.4–2)
LEUKOCYTE ESTERASE UR QL STRIP.AUTO: NEGATIVE
LIPASE SERPL-CCNC: >3000 U/L (ref 73–393)
LOW TARGET, LOT: 150 MG/DL
LYMPHOCYTES # BLD: 1 K/UL (ref 0.8–3.5)
LYMPHOCYTES NFR BLD: 9 % (ref 12–49)
MAGNESIUM SERPL-MCNC: 1.7 MG/DL (ref 1.6–2.4)
MCH RBC QN AUTO: 31.5 PG (ref 26–34)
MCHC RBC AUTO-ENTMCNC: 33.8 G/DL (ref 30–36.5)
MCV RBC AUTO: 93.4 FL (ref 80–99)
MINUTES UNTIL NEXT BG, NBG: 60 MIN
MONOCYTES # BLD: 0.6 K/UL (ref 0–1)
MONOCYTES NFR BLD: 5 % (ref 5–13)
MULTIPLIER, MUL: 0.02
MULTIPLIER, MUL: 0.03
MULTIPLIER, MUL: 0.04
MULTIPLIER, MUL: 0.05
MULTIPLIER, MUL: 0.05
NEUTS SEG # BLD: 9.8 K/UL (ref 1.8–8)
NEUTS SEG NFR BLD: 85 % (ref 32–75)
NITRITE UR QL STRIP.AUTO: NEGATIVE
NRBC # BLD: 0 K/UL (ref 0–0.01)
NRBC BLD-RTO: 0 PER 100 WBC
ORDER INITIALS, ORDINIT: NORMAL
PCO2 BLDV: 42.6 MMHG (ref 41–51)
PH BLDV: 7.36 [PH] (ref 7.32–7.42)
PH UR STRIP: 6 [PH] (ref 5–8)
PHOSPHATE SERPL-MCNC: 5 MG/DL (ref 2.6–4.7)
PLATELET # BLD AUTO: 166 K/UL (ref 150–400)
PMV BLD AUTO: 10.1 FL (ref 8.9–12.9)
PO2 BLDV: 33 MMHG (ref 25–40)
POTASSIUM SERPL-SCNC: 4.5 MMOL/L (ref 3.5–5.1)
POTASSIUM SERPL-SCNC: 4.6 MMOL/L (ref 3.5–5.1)
PROT SERPL-MCNC: 7.6 G/DL (ref 6.4–8.2)
PROT UR STRIP-MCNC: 30 MG/DL
RBC # BLD AUTO: 4.98 M/UL (ref 4.1–5.7)
RBC #/AREA URNS HPF: ABNORMAL /HPF (ref 0–5)
SAO2 % BLDV: 60 % (ref 65–88)
SERVICE CMNT-IMP: ABNORMAL
SODIUM SERPL-SCNC: 133 MMOL/L (ref 136–145)
SODIUM SERPL-SCNC: 138 MMOL/L (ref 136–145)
SP GR UR REFRACTOMETRY: 1.03 (ref 1–1.03)
SPECIMEN TYPE: ABNORMAL
TRIGL SERPL-MCNC: 98 MG/DL (ref ?–150)
TROPONIN I SERPL-MCNC: <0.05 NG/ML
UA: UC IF INDICATED,UAUC: ABNORMAL
UROBILINOGEN UR QL STRIP.AUTO: 1 EU/DL (ref 0.2–1)
WBC # BLD AUTO: 11.5 K/UL (ref 4.1–11.1)
WBC URNS QL MICRO: ABNORMAL /HPF (ref 0–4)

## 2019-07-13 PROCEDURE — 99285 EMERGENCY DEPT VISIT HI MDM: CPT

## 2019-07-13 PROCEDURE — 84100 ASSAY OF PHOSPHORUS: CPT

## 2019-07-13 PROCEDURE — 85025 COMPLETE CBC W/AUTO DIFF WBC: CPT

## 2019-07-13 PROCEDURE — 96374 THER/PROPH/DIAG INJ IV PUSH: CPT

## 2019-07-13 PROCEDURE — 83036 HEMOGLOBIN GLYCOSYLATED A1C: CPT

## 2019-07-13 PROCEDURE — 74011000258 HC RX REV CODE- 258: Performed by: FAMILY MEDICINE

## 2019-07-13 PROCEDURE — 83735 ASSAY OF MAGNESIUM: CPT

## 2019-07-13 PROCEDURE — 65660000000 HC RM CCU STEPDOWN

## 2019-07-13 PROCEDURE — 83605 ASSAY OF LACTIC ACID: CPT

## 2019-07-13 PROCEDURE — 74011636637 HC RX REV CODE- 636/637: Performed by: HOSPITALIST

## 2019-07-13 PROCEDURE — 84484 ASSAY OF TROPONIN QUANT: CPT

## 2019-07-13 PROCEDURE — 96376 TX/PRO/DX INJ SAME DRUG ADON: CPT

## 2019-07-13 PROCEDURE — 96361 HYDRATE IV INFUSION ADD-ON: CPT

## 2019-07-13 PROCEDURE — 71045 X-RAY EXAM CHEST 1 VIEW: CPT

## 2019-07-13 PROCEDURE — 74011636320 HC RX REV CODE- 636/320: Performed by: EMERGENCY MEDICINE

## 2019-07-13 PROCEDURE — 74011000250 HC RX REV CODE- 250: Performed by: EMERGENCY MEDICINE

## 2019-07-13 PROCEDURE — 36415 COLL VENOUS BLD VENIPUNCTURE: CPT

## 2019-07-13 PROCEDURE — 74011636637 HC RX REV CODE- 636/637: Performed by: FAMILY MEDICINE

## 2019-07-13 PROCEDURE — 80048 BASIC METABOLIC PNL TOTAL CA: CPT

## 2019-07-13 PROCEDURE — 82962 GLUCOSE BLOOD TEST: CPT

## 2019-07-13 PROCEDURE — 74011250636 HC RX REV CODE- 250/636: Performed by: FAMILY MEDICINE

## 2019-07-13 PROCEDURE — 76705 ECHO EXAM OF ABDOMEN: CPT

## 2019-07-13 PROCEDURE — 82550 ASSAY OF CK (CPK): CPT

## 2019-07-13 PROCEDURE — 81001 URINALYSIS AUTO W/SCOPE: CPT

## 2019-07-13 PROCEDURE — 83690 ASSAY OF LIPASE: CPT

## 2019-07-13 PROCEDURE — 74011250636 HC RX REV CODE- 250/636: Performed by: EMERGENCY MEDICINE

## 2019-07-13 PROCEDURE — 80053 COMPREHEN METABOLIC PANEL: CPT

## 2019-07-13 PROCEDURE — 96375 TX/PRO/DX INJ NEW DRUG ADDON: CPT

## 2019-07-13 PROCEDURE — 93005 ELECTROCARDIOGRAM TRACING: CPT

## 2019-07-13 PROCEDURE — 74174 CTA ABD&PLVS W/CONTRAST: CPT

## 2019-07-13 PROCEDURE — 84478 ASSAY OF TRIGLYCERIDES: CPT

## 2019-07-13 PROCEDURE — 71275 CT ANGIOGRAPHY CHEST: CPT

## 2019-07-13 PROCEDURE — 82803 BLOOD GASES ANY COMBINATION: CPT

## 2019-07-13 RX ORDER — MORPHINE SULFATE 2 MG/ML
2 INJECTION, SOLUTION INTRAMUSCULAR; INTRAVENOUS
Status: DISCONTINUED | OUTPATIENT
Start: 2019-07-13 | End: 2019-07-19

## 2019-07-13 RX ORDER — SODIUM CHLORIDE 0.9 % (FLUSH) 0.9 %
10 SYRINGE (ML) INJECTION
Status: COMPLETED | OUTPATIENT
Start: 2019-07-13 | End: 2019-07-13

## 2019-07-13 RX ORDER — INSULIN LISPRO 100 [IU]/ML
INJECTION, SOLUTION INTRAVENOUS; SUBCUTANEOUS
Status: DISCONTINUED | OUTPATIENT
Start: 2019-07-14 | End: 2019-07-14 | Stop reason: SDUPTHER

## 2019-07-13 RX ORDER — ONDANSETRON 2 MG/ML
4 INJECTION INTRAMUSCULAR; INTRAVENOUS
Status: COMPLETED | OUTPATIENT
Start: 2019-07-13 | End: 2019-07-13

## 2019-07-13 RX ORDER — SODIUM CHLORIDE 0.9 % (FLUSH) 0.9 %
5-40 SYRINGE (ML) INJECTION EVERY 8 HOURS
Status: DISCONTINUED | OUTPATIENT
Start: 2019-07-13 | End: 2019-07-22 | Stop reason: HOSPADM

## 2019-07-13 RX ORDER — SODIUM CHLORIDE 9 MG/ML
1000 INJECTION, SOLUTION INTRAVENOUS CONTINUOUS
Status: DISCONTINUED | OUTPATIENT
Start: 2019-07-13 | End: 2019-07-14 | Stop reason: SDUPTHER

## 2019-07-13 RX ORDER — ASPIRIN 81 MG/1
81 TABLET ORAL DAILY
Status: DISCONTINUED | OUTPATIENT
Start: 2019-07-14 | End: 2019-07-15

## 2019-07-13 RX ORDER — MORPHINE SULFATE 2 MG/ML
4 INJECTION, SOLUTION INTRAMUSCULAR; INTRAVENOUS
Status: COMPLETED | OUTPATIENT
Start: 2019-07-13 | End: 2019-07-13

## 2019-07-13 RX ORDER — INSULIN GLARGINE 100 [IU]/ML
15 INJECTION, SOLUTION SUBCUTANEOUS
Status: DISCONTINUED | OUTPATIENT
Start: 2019-07-13 | End: 2019-07-14

## 2019-07-13 RX ORDER — AMLODIPINE BESYLATE 2.5 MG/1
2.5 TABLET ORAL DAILY
Status: DISCONTINUED | OUTPATIENT
Start: 2019-07-14 | End: 2019-07-22 | Stop reason: HOSPADM

## 2019-07-13 RX ORDER — NITROGLYCERIN 0.4 MG/1
0.4 TABLET SUBLINGUAL
Status: DISCONTINUED | OUTPATIENT
Start: 2019-07-13 | End: 2019-07-22 | Stop reason: HOSPADM

## 2019-07-13 RX ORDER — SODIUM CHLORIDE AND POTASSIUM CHLORIDE .9; .15 G/100ML; G/100ML
SOLUTION INTRAVENOUS CONTINUOUS
Status: DISCONTINUED | OUTPATIENT
Start: 2019-07-13 | End: 2019-07-14

## 2019-07-13 RX ORDER — NALOXONE HYDROCHLORIDE 0.4 MG/ML
0.4 INJECTION, SOLUTION INTRAMUSCULAR; INTRAVENOUS; SUBCUTANEOUS AS NEEDED
Status: DISCONTINUED | OUTPATIENT
Start: 2019-07-13 | End: 2019-07-22 | Stop reason: HOSPADM

## 2019-07-13 RX ORDER — HEPARIN SODIUM 5000 [USP'U]/ML
5000 INJECTION, SOLUTION INTRAVENOUS; SUBCUTANEOUS EVERY 8 HOURS
Status: DISCONTINUED | OUTPATIENT
Start: 2019-07-13 | End: 2019-07-17

## 2019-07-13 RX ORDER — SODIUM CHLORIDE 0.9 % (FLUSH) 0.9 %
5-40 SYRINGE (ML) INJECTION AS NEEDED
Status: DISCONTINUED | OUTPATIENT
Start: 2019-07-13 | End: 2019-07-22 | Stop reason: HOSPADM

## 2019-07-13 RX ORDER — INSULIN LISPRO 100 [IU]/ML
INJECTION, SOLUTION INTRAVENOUS; SUBCUTANEOUS
Status: DISCONTINUED | OUTPATIENT
Start: 2019-07-14 | End: 2019-07-14

## 2019-07-13 RX ORDER — MAGNESIUM SULFATE HEPTAHYDRATE 40 MG/ML
2 INJECTION, SOLUTION INTRAVENOUS ONCE
Status: COMPLETED | OUTPATIENT
Start: 2019-07-13 | End: 2019-07-14

## 2019-07-13 RX ORDER — METOCLOPRAMIDE HYDROCHLORIDE 5 MG/ML
10 INJECTION INTRAMUSCULAR; INTRAVENOUS
Status: COMPLETED | OUTPATIENT
Start: 2019-07-13 | End: 2019-07-13

## 2019-07-13 RX ORDER — MAGNESIUM SULFATE 100 %
4 CRYSTALS MISCELLANEOUS AS NEEDED
Status: DISCONTINUED | OUTPATIENT
Start: 2019-07-13 | End: 2019-07-13 | Stop reason: SDUPTHER

## 2019-07-13 RX ORDER — DEXTROSE MONOHYDRATE 25 G/50ML
25-50 INJECTION, SOLUTION INTRAVENOUS AS NEEDED
Status: DISCONTINUED | OUTPATIENT
Start: 2019-07-13 | End: 2019-07-17

## 2019-07-13 RX ORDER — ONDANSETRON 2 MG/ML
4 INJECTION INTRAMUSCULAR; INTRAVENOUS
Status: DISCONTINUED | OUTPATIENT
Start: 2019-07-13 | End: 2019-07-22 | Stop reason: HOSPADM

## 2019-07-13 RX ORDER — METOPROLOL SUCCINATE 25 MG/1
25 TABLET, EXTENDED RELEASE ORAL DAILY
Status: DISCONTINUED | OUTPATIENT
Start: 2019-07-14 | End: 2019-07-16

## 2019-07-13 RX ORDER — MAGNESIUM SULFATE 100 %
4 CRYSTALS MISCELLANEOUS AS NEEDED
Status: DISCONTINUED | OUTPATIENT
Start: 2019-07-13 | End: 2019-07-22 | Stop reason: HOSPADM

## 2019-07-13 RX ORDER — FENTANYL CITRATE 50 UG/ML
100 INJECTION, SOLUTION INTRAMUSCULAR; INTRAVENOUS
Status: COMPLETED | OUTPATIENT
Start: 2019-07-13 | End: 2019-07-13

## 2019-07-13 RX ADMIN — Medication 10 ML: at 21:25

## 2019-07-13 RX ADMIN — ONDANSETRON 4 MG: 2 INJECTION INTRAMUSCULAR; INTRAVENOUS at 20:05

## 2019-07-13 RX ADMIN — MORPHINE SULFATE 4 MG: 2 INJECTION, SOLUTION INTRAMUSCULAR; INTRAVENOUS at 17:53

## 2019-07-13 RX ADMIN — SODIUM CHLORIDE AND POTASSIUM CHLORIDE: 9; 1.49 INJECTION, SOLUTION INTRAVENOUS at 19:35

## 2019-07-13 RX ADMIN — SODIUM CHLORIDE 500 ML: 900 INJECTION, SOLUTION INTRAVENOUS at 15:20

## 2019-07-13 RX ADMIN — IOPAMIDOL 100 ML: 755 INJECTION, SOLUTION INTRAVENOUS at 15:21

## 2019-07-13 RX ADMIN — SODIUM CHLORIDE 250 ML/HR: 900 INJECTION, SOLUTION INTRAVENOUS at 17:52

## 2019-07-13 RX ADMIN — FAMOTIDINE 20 MG: 10 INJECTION, SOLUTION INTRAVENOUS at 18:20

## 2019-07-13 RX ADMIN — HEPARIN SODIUM 5000 UNITS: 5000 INJECTION INTRAVENOUS; SUBCUTANEOUS at 21:25

## 2019-07-13 RX ADMIN — ONDANSETRON 4 MG: 2 INJECTION INTRAMUSCULAR; INTRAVENOUS at 17:50

## 2019-07-13 RX ADMIN — Medication 10 ML: at 15:21

## 2019-07-13 RX ADMIN — FENTANYL CITRATE 100 MCG: 50 INJECTION, SOLUTION INTRAMUSCULAR; INTRAVENOUS at 16:15

## 2019-07-13 RX ADMIN — MORPHINE SULFATE 4 MG: 2 INJECTION, SOLUTION INTRAMUSCULAR; INTRAVENOUS at 15:20

## 2019-07-13 RX ADMIN — SODIUM CHLORIDE 6.6 UNITS/HR: 900 INJECTION, SOLUTION INTRAVENOUS at 19:36

## 2019-07-13 RX ADMIN — INSULIN GLARGINE 15 UNITS: 100 INJECTION, SOLUTION SUBCUTANEOUS at 23:18

## 2019-07-13 RX ADMIN — MAGNESIUM SULFATE HEPTAHYDRATE 2 G: 40 INJECTION, SOLUTION INTRAVENOUS at 23:19

## 2019-07-13 RX ADMIN — MORPHINE SULFATE 2 MG: 2 INJECTION, SOLUTION INTRAMUSCULAR; INTRAVENOUS at 21:25

## 2019-07-13 RX ADMIN — ONDANSETRON 4 MG: 2 INJECTION INTRAMUSCULAR; INTRAVENOUS at 15:20

## 2019-07-13 RX ADMIN — METOCLOPRAMIDE 10 MG: 5 INJECTION, SOLUTION INTRAMUSCULAR; INTRAVENOUS at 18:17

## 2019-07-13 NOTE — H&P
Medicine History and Physical    Patient: Maggie Licea   Age:  61 y.o. Chief Complaint:   Chief Complaint   Patient presents with    Abdominal Pain     per EMS, pt c/o severe abd pain, nausea, and vomiting starting last night. pt sts unable to have BM for the past 5-6 days. hx MI, denies CP/SOB.  for EMS - pt sts thinking he vomited his meds last night         HPI:   61 y.o m with PMH of DM, HTN, CAD (stent to RCA in 2014) presents with c/o abdominal pain, nausea and vomiting started last night. Patient states he had increased thirst and urination in the past week. Initially described CP per ED physician but denies CP/SOB or palpitations when seen by me. Patient found to have elevated BG, elevated Lipase and LFT's. Per wife, he was recently sick and prescribed Prednisone for 10 days with tapering dose that he completed. CT abdomen showed Acute pancreatitis. Ordered ABG's in the ED - pH 7.35.  CE not sent - will send now    Start patient on insulin drip after initial bolus.        Past Medical History:  Past Medical History:   Diagnosis Date    CAD (coronary artery disease) April 2014    NSTEMI, PCI/NATE RCA    Hypercholesterolemia     Hypertension        Past Surgical History:  Past Surgical History:   Procedure Laterality Date    CARDIAC SURG PROCEDURE UNLIST      Stent RCA 4/2014    HX ORTHOPAEDIC      left shoulder, torn tendon    HX ORTHOPAEDIC      right knee X 4    MA COLONOSCOPY FLX DX W/COLLJ SPEC WHEN PFRMD  12/19/2007    Dr Kike Lay 1 polyp repeat 12/2012       Family History:  Family History   Problem Relation Age of Onset    Heart Disease Mother         CAD    Hypertension Mother     Elevated Lipids Mother     Cancer Father         lung    Mental Retardation Brother     Seizures Brother     Diabetes Brother     Hypertension Brother     Elevated Lipids Brother        Social History:  Social History     Socioeconomic History    Marital status:      Spouse name: Not on file    Number of children: Not on file    Years of education: Not on file    Highest education level: Not on file   Tobacco Use    Smoking status: Former Smoker     Packs/day: 0.25     Years: 35.00     Pack years: 8.75     Types: Cigarettes     Last attempt to quit: 2014     Years since quittin.6    Smokeless tobacco: Never Used    Tobacco comment: Never smoked over a pack per day   Substance and Sexual Activity    Alcohol use: No     Alcohol/week: 0.0 oz    Drug use: No       Home Medications:  Prior to Admission medications    Medication Sig Start Date End Date Taking? Authorizing Provider   metFORMIN ER (GLUCOPHAGE XR) 500 mg tablet Take 3 Tabs by mouth daily (with dinner). 19   Marnie Nguyen MD   amLODIPine (NORVASC) 2.5 mg tablet TAKE 1 TABLET DAILY 19   Marnie Nguyen MD   metoprolol succinate (TOPROL-XL) 25 mg XL tablet TAKE 1 TABLET DAILY 18   Marnie Nguyen MD   atorvastatin (LIPITOR) 40 mg tablet TAKE 1 TABLET EVERY EVENING 18   Marnie Nguyen MD   aspirin delayed-release 81 mg tablet Take 1 Tab by mouth daily. 17   Marnie Nguyen MD   nitroglycerin (NITROSTAT) 0.4 mg SL tablet 1 Tab by SubLINGual route every five (5) minutes as needed for Chest Pain (Max 3 doses daily). 5/11/15   Carli Morrison NP       Allergies:  No Known Allergies    Review of Systems:   Constitutional: Negative for fever, chills   + thirst  + urination  Respiratory: negative for shortness of breath, cough, choking, chest tightness, wheezing or stridor. Cardiovascular: Negative for chest pain, palpitations and leg swelling.    Gastrointestinal: + nausea, + vomiting, +abdominal pain    As above otherwise 11 point review of systems negative including constitutional, skin, HENT, eyes, respiratory, cardiovascular, gastrointestinal, genitourinary, musculoskeletal, endo/heme/aller, neurological.      Physical Exam:     Visit Vitals  /78   Pulse 73   Resp 10   SpO2 97%       Physical Exam:  General appearance: alert, cooperative, no distress, appears stated age  Head: Normocephalic  Neck: supple, trachea midline  Lungs: clear to auscultation bilaterally  Heart: regular rate and rhythm, S1, S2 normal, no murmur, click, rub or gallop  Abdomen: distended, soft, diffuse tenderness, mostly epigastric   Extremities: extremities normal, atraumatic, no cyanosis or edema  Skin: mildly icteric   Neurologic: Grossly normal  Lymph: no palpable LAD in cervical or axillary region  PSY: appropriately behaved     Intake and Output:  Current Shift:  No intake/output data recorded. Last three shifts:  No intake/output data recorded.     Lab/Data Reviewed:  BMP:   Lab Results   Component Value Date/Time     (L) 07/13/2019 02:46 PM    K 4.5 07/13/2019 02:46 PM    CL 99 07/13/2019 02:46 PM    CO2 24 07/13/2019 02:46 PM    AGAP 10 07/13/2019 02:46 PM     (H) 07/13/2019 02:46 PM    BUN 20 07/13/2019 02:46 PM    CREA 1.64 (H) 07/13/2019 02:46 PM    GFRAA 52 (L) 07/13/2019 02:46 PM    GFRNA 43 (L) 07/13/2019 02:46 PM     CMP:   Lab Results   Component Value Date/Time     (L) 07/13/2019 02:46 PM    K 4.5 07/13/2019 02:46 PM    CL 99 07/13/2019 02:46 PM    CO2 24 07/13/2019 02:46 PM    AGAP 10 07/13/2019 02:46 PM     (H) 07/13/2019 02:46 PM    BUN 20 07/13/2019 02:46 PM    CREA 1.64 (H) 07/13/2019 02:46 PM    GFRAA 52 (L) 07/13/2019 02:46 PM    GFRNA 43 (L) 07/13/2019 02:46 PM    CA 9.4 07/13/2019 02:46 PM    ALB 3.8 07/13/2019 02:46 PM    TP 7.6 07/13/2019 02:46 PM    GLOB 3.8 07/13/2019 02:46 PM    AGRAT 1.0 (L) 07/13/2019 02:46 PM    SGOT 504 (H) 07/13/2019 02:46 PM     (H) 07/13/2019 02:46 PM     CBC:   Lab Results   Component Value Date/Time    WBC 11.5 (H) 07/13/2019 02:46 PM    HGB 15.7 07/13/2019 02:46 PM    HCT 46.5 07/13/2019 02:46 PM     07/13/2019 02:46 PM     All Cardiac Markers in the last 24 hours: No results found for: CPK, CK, CKMMB, CKMB, RCK3, CKMBT, CKNDX, CKND1, GABO, TROPT, TROIQ, LAURA, TROPT, TNIPOC, BNP, BNPP    Chest X-Ray is obtained; CXR reviewed independently - pending      EKG: tracing reviewed  independently -     Assessment   Active Problems:    Pancreatitis, acute (7/13/2019)      Uncontrolled type II diabetes mellitus ([facility] [address] (7/13/2019)      [facility] (7/13/2019)      Acute pancreatitis (7/13/2019)      Uncontrolled diabetes mellitus ([facility] (7/13/2019)          Plan   Acute Pancreatitis, Uncontrolled DM:  DKA ruled out  Admit to step down with telemetry   S/P IV fluids bolus  Continue NS with KCL @ 250 cc/hr  Insulin drip  Hold all PO meds  NPO  - wet swab ok for symptomatic relief   Send Lipid panel, TG  GI consulted     Lactic acid is trending down    HTN, CAD:  Continue Aspirin and Metoprolol  Hold Lipitor with elevated LFT's  No current CP but ?  Earlier in presentation - send serial CE  CXR now  Monitor    Prophylaxis:  Pepcid BID  Heparin SC         DISPO  -Pt to be admitted  at this time for reasons addressed above, continued hospitalization for ongoing assessment and treatment indicated     ACP discussed - patient is FULL CODE    Anticipated Date of Discharge: 3-4 days  Anticipated Disposition (home, SNF) : Marina Oden MD    July 13, 2019

## 2019-07-13 NOTE — ED PROVIDER NOTES
EMERGENCY DEPARTMENT HISTORY AND PHYSICAL EXAM      Date: 7/13/2019  Patient Name: Nohemy Tovar  Patient Age and Sex: 61 y.o. male    History of Presenting Illness     Chief Complaint   Patient presents with    Abdominal Pain     per EMS, pt c/o severe abd pain, nausea, and vomiting starting last night. pt sts unable to have BM for the past 5-6 days. hx MI, denies CP/SOB.  for EMS - pt sts thinking he vomited his meds last night       History Provided By: Patient and EMS    HPI: Nohemy Tovar, 61 y.o. male with past medical history as documented below presents to the ED with c/o of acute 10/10 abdominal pain with associated nausea and multiple bouts of emesis since last night. Per EMS, patient also reports having not had a bowel movement 5 to 6 days. Patient has history of MI 5 years ago. Vital signs otherwise normal except for blood sugar of 391. Patient reports 10 out of 10 diffuse abdominal pain with associated vomiting. Patient did vomit twice prior to arrival.  Patient denies any chest pain, pleuritic chest pain or shortness of breath. He reports his last bowel movement was 5 days ago and denies any flatus. He denies any history of chronic abdominal syndromes or surgeries. According to medication list, patient does take amlodipine, aspirin, statin, metformin, metoprolol, nitroglycerin, Zofran, pantoprazole and prednisone. Pt denies any other alleviating or exacerbating factors. Additionally, pt specifically denies any recent fever, chills, headache, CP, SOB, lightheadedness, dizziness, numbness, weakness, BLE swelling, heart palpitations, urinary sxs, diarrhea, melena, hematochezia, cough, or congestion. PCP: Glory Freedman MD    There are no other complaints, changes or physical findings at this time.      Past History   Past Medical History:  Past Medical History:   Diagnosis Date    CAD (coronary artery disease) April 2014    NSTEMI, PCI/NATE RCA    Hypercholesterolemia     Hypertension        Past Surgical History:  Past Surgical History:   Procedure Laterality Date    CARDIAC SURG PROCEDURE UNLIST      Stent RCA 2014    HX ORTHOPAEDIC      left shoulder, torn tendon    HX ORTHOPAEDIC      right knee X 4    AL COLONOSCOPY FLX DX W/COLLJ SPEC WHEN PFRMD  2007    Dr Genaro Mendoza 1 polyp repeat 2012       Family History:  Family History   Problem Relation Age of Onset    Heart Disease Mother         CAD    Hypertension Mother     Elevated Lipids Mother     Cancer Father         lung    Mental Retardation Brother     Seizures Brother     Diabetes Brother     Hypertension Brother     Elevated Lipids Brother        Social History:  Social History     Tobacco Use    Smoking status: Former Smoker     Packs/day: 0.25     Years: 35.00     Pack years: 8.75     Types: Cigarettes     Last attempt to quit: 2014     Years since quittin.6    Smokeless tobacco: Never Used    Tobacco comment: Never smoked over a pack per day   Substance Use Topics    Alcohol use: No     Alcohol/week: 0.0 oz    Drug use: No       Allergies:  No Known Allergies    Current Medications:  No current facility-administered medications on file prior to encounter. Current Outpatient Medications on File Prior to Encounter   Medication Sig Dispense Refill    metFORMIN ER (GLUCOPHAGE XR) 500 mg tablet Take 3 Tabs by mouth daily (with dinner). 270 Tab 3    amLODIPine (NORVASC) 2.5 mg tablet TAKE 1 TABLET DAILY 90 Tab 3    metoprolol succinate (TOPROL-XL) 25 mg XL tablet TAKE 1 TABLET DAILY 90 Tab 3    atorvastatin (LIPITOR) 40 mg tablet TAKE 1 TABLET EVERY EVENING 90 Tab 3    aspirin delayed-release 81 mg tablet Take 1 Tab by mouth daily. 90 Tab 3    nitroglycerin (NITROSTAT) 0.4 mg SL tablet 1 Tab by SubLINGual route every five (5) minutes as needed for Chest Pain (Max 3 doses daily). 1 Bottle 3       Review of Systems   Review of Systems   Constitutional: Positive for fatigue.  Negative for chills and fever. HENT: Negative. Negative for congestion, facial swelling, rhinorrhea, sore throat, trouble swallowing and voice change. Eyes: Negative. Respiratory: Negative. Negative for apnea, cough, chest tightness, shortness of breath and wheezing. Cardiovascular: Negative. Negative for chest pain, palpitations and leg swelling. Gastrointestinal: Positive for abdominal pain, constipation, nausea and vomiting. Negative for abdominal distention, blood in stool and diarrhea. Endocrine: Negative. Negative for cold intolerance, heat intolerance and polyuria. Genitourinary: Negative. Negative for difficulty urinating, dysuria, flank pain, frequency, hematuria and urgency. Musculoskeletal: Negative. Negative for arthralgias, back pain, myalgias, neck pain and neck stiffness. Skin: Negative. Negative for color change and rash. Neurological: Negative. Negative for dizziness, syncope, facial asymmetry, speech difficulty, weakness, light-headedness, numbness and headaches. Hematological: Negative. Does not bruise/bleed easily. Psychiatric/Behavioral: Negative. Negative for confusion and self-injury. The patient is not nervous/anxious. Physical Exam   Physical Exam   Constitutional: He is oriented to person, place, and time. He appears well-developed and well-nourished. He is cooperative. He appears toxic. He has a sickly appearance. He appears ill. He appears distressed. HENT:   Head: Normocephalic and atraumatic. Mouth/Throat: Mucous membranes are normal. No posterior oropharyngeal erythema. Eyes: Pupils are equal, round, and reactive to light. Conjunctivae and EOM are normal.   Neck: Normal range of motion. Cardiovascular: Regular rhythm, normal heart sounds and intact distal pulses. Tachycardia present. Exam reveals no gallop and no friction rub. No murmur heard. Pulmonary/Chest: Effort normal and breath sounds normal. No respiratory distress.  He has no wheezes. He has no rales. He exhibits no tenderness. Abdominal: Soft. Bowel sounds are normal. He exhibits no distension and no mass. There is tenderness (diffuse, no rebound or guarding). There is no rebound and no guarding. Musculoskeletal: Normal range of motion. He exhibits no edema, tenderness or deformity. Neurological: He is alert and oriented to person, place, and time. He displays normal reflexes. No cranial nerve deficit. He exhibits normal muscle tone. Coordination normal.   Skin: Skin is warm. No rash noted. Psychiatric: He has a normal mood and affect. Nursing note and vitals reviewed. Diagnostic Study Results     Labs -  Recent Results (from the past 24 hour(s))   LIPASE    Collection Time: 07/13/19  2:46 PM   Result Value Ref Range    Lipase >3,000 (H) 73 - 393 U/L   LACTIC ACID    Collection Time: 07/13/19  2:46 PM   Result Value Ref Range    Lactic acid 4.2 (HH) 0.4 - 2.0 MMOL/L   CBC WITH AUTOMATED DIFF    Collection Time: 07/13/19  2:46 PM   Result Value Ref Range    WBC 11.5 (H) 4.1 - 11.1 K/uL    RBC 4.98 4.10 - 5.70 M/uL    HGB 15.7 12.1 - 17.0 g/dL    HCT 46.5 36.6 - 50.3 %    MCV 93.4 80.0 - 99.0 FL    MCH 31.5 26.0 - 34.0 PG    MCHC 33.8 30.0 - 36.5 g/dL    RDW 12.1 11.5 - 14.5 %    PLATELET 224 084 - 813 K/uL    MPV 10.1 8.9 - 12.9 FL    NRBC 0.0 0  WBC    ABSOLUTE NRBC 0.00 0.00 - 0.01 K/uL    NEUTROPHILS 85 (H) 32 - 75 %    LYMPHOCYTES 9 (L) 12 - 49 %    MONOCYTES 5 5 - 13 %    EOSINOPHILS 0 0 - 7 %    BASOPHILS 0 0 - 1 %    IMMATURE GRANULOCYTES 1 (H) 0.0 - 0.5 %    ABS. NEUTROPHILS 9.8 (H) 1.8 - 8.0 K/UL    ABS. LYMPHOCYTES 1.0 0.8 - 3.5 K/UL    ABS. MONOCYTES 0.6 0.0 - 1.0 K/UL    ABS. EOSINOPHILS 0.0 0.0 - 0.4 K/UL    ABS. BASOPHILS 0.0 0.0 - 0.1 K/UL    ABS. IMM.  GRANS. 0.1 (H) 0.00 - 0.04 K/UL    DF AUTOMATED     METABOLIC PANEL, COMPREHENSIVE    Collection Time: 07/13/19  2:46 PM   Result Value Ref Range    Sodium 133 (L) 136 - 145 mmol/L    Potassium 4.5 3.5 - 5.1 mmol/L    Chloride 99 97 - 108 mmol/L    CO2 24 21 - 32 mmol/L    Anion gap 10 5 - 15 mmol/L    Glucose 462 (H) 65 - 100 mg/dL    BUN 20 6 - 20 MG/DL    Creatinine 1.64 (H) 0.70 - 1.30 MG/DL    BUN/Creatinine ratio 12 12 - 20      GFR est AA 52 (L) >60 ml/min/1.73m2    GFR est non-AA 43 (L) >60 ml/min/1.73m2    Calcium 9.4 8.5 - 10.1 MG/DL    Bilirubin, total 1.6 (H) 0.2 - 1.0 MG/DL    ALT (SGPT) 661 (H) 12 - 78 U/L    AST (SGOT) 504 (H) 15 - 37 U/L    Alk.  phosphatase 177 (H) 45 - 117 U/L    Protein, total 7.6 6.4 - 8.2 g/dL    Albumin 3.8 3.5 - 5.0 g/dL    Globulin 3.8 2.0 - 4.0 g/dL    A-G Ratio 1.0 (L) 1.1 - 2.2     EKG, 12 LEAD, INITIAL    Collection Time: 07/13/19  3:29 PM   Result Value Ref Range    Ventricular Rate 67 BPM    Atrial Rate 67 BPM    P-R Interval 154 ms    QRS Duration 98 ms    Q-T Interval 418 ms    QTC Calculation (Bezet) 441 ms    Calculated P Axis 42 degrees    Calculated R Axis -11 degrees    Calculated T Axis 0 degrees    Diagnosis       Normal sinus rhythm  Normal ECG  When compared with ECG of 19-JUN-2018 02:46,  Inverted T waves have replaced nonspecific T wave abnormality in Inferior   leads     URINALYSIS W/ REFLEX CULTURE    Collection Time: 07/13/19  3:56 PM   Result Value Ref Range    Color YELLOW/STRAW      Appearance CLEAR CLEAR      Specific gravity 1.027 1.003 - 1.030      pH (UA) 6.0 5.0 - 8.0      Protein 30 (A) NEG mg/dL    Glucose >1,000 (A) NEG mg/dL    Ketone TRACE (A) NEG mg/dL    Bilirubin NEGATIVE  NEG      Blood NEGATIVE  NEG      Urobilinogen 1.0 0.2 - 1.0 EU/dL    Nitrites NEGATIVE  NEG      Leukocyte Esterase NEGATIVE  NEG      WBC 0-4 0 - 4 /hpf    RBC 0-5 0 - 5 /hpf    Epithelial cells FEW FEW /lpf    Bacteria NEGATIVE  NEG /hpf    UA:UC IF INDICATED CULTURE NOT INDICATED BY UA RESULT CNI      Hyaline cast 0-2 0 - 5 /lpf   GLUCOSE, POC    Collection Time: 07/13/19  6:01 PM   Result Value Ref Range    Glucose (POC) 422 (H) 65 - 100 mg/dL    Performed by Aide Duffy RN    POC VENOUS BLOOD GAS    Collection Time: 07/13/19  6:19 PM   Result Value Ref Range    Device: OTHER      pH, venous (POC) 7.357 7.32 - 7.42      pCO2, venous (POC) 42.6 41 - 51 MMHG    pO2, venous (POC) 33 25 - 40 mmHg    HCO3, venous (POC) 23.9 23.0 - 28.0 MMOL/L    sO2, venous (POC) 60 (L) 65 - 88 %    Base deficit, venous (POC) 2 mmol/L    Allens test (POC) N/A      Site OTHER      Specimen type (POC) VENOUS BLOOD     LACTIC ACID    Collection Time: 07/13/19  6:26 PM   Result Value Ref Range    Lactic acid 2.6 (HH) 0.4 - 2.0 MMOL/L   GLUCOSE, POC    Collection Time: 07/13/19  7:21 PM   Result Value Ref Range    Glucose (POC) 389 (H) 65 - 100 mg/dL    Performed by Aide Duffy RN    METABOLIC PANEL, BASIC    Collection Time: 07/13/19  7:23 PM   Result Value Ref Range    Sodium 138 136 - 145 mmol/L    Potassium 4.6 3.5 - 5.1 mmol/L    Chloride 105 97 - 108 mmol/L    CO2 23 21 - 32 mmol/L    Anion gap 10 5 - 15 mmol/L    Glucose 401 (H) 65 - 100 mg/dL    BUN 20 6 - 20 MG/DL    Creatinine 1.52 (H) 0.70 - 1.30 MG/DL    BUN/Creatinine ratio 13 12 - 20      GFR est AA 56 (L) >60 ml/min/1.73m2    GFR est non-AA 47 (L) >60 ml/min/1.73m2    Calcium 8.3 (L) 8.5 - 10.1 MG/DL   MAGNESIUM    Collection Time: 07/13/19  7:23 PM   Result Value Ref Range    Magnesium 1.7 1.6 - 2.4 mg/dL   PHOSPHORUS    Collection Time: 07/13/19  7:23 PM   Result Value Ref Range    Phosphorus 5.0 (H) 2.6 - 4.7 MG/DL   CK W/ CKMB & INDEX    Collection Time: 07/13/19  7:23 PM   Result Value Ref Range    CK 88 39 - 308 U/L    CK - MB 1.7 <3.6 NG/ML    CK-MB Index 1.9 0.0 - 2.5     TROPONIN I    Collection Time: 07/13/19  7:23 PM   Result Value Ref Range    Troponin-I, Qt. <0.05 <0.05 ng/mL   HEMOGLOBIN A1C WITH EAG    Collection Time: 07/13/19  7:24 PM   Result Value Ref Range    Hemoglobin A1c 9.2 (H) 4.2 - 6.3 %    Est. average glucose 217 mg/dL   TRIGLYCERIDE    Collection Time: 07/13/19  7:24 PM   Result Value Ref Range Triglyceride 98 <150 MG/DL   GLUCOSTABILIZER    Collection Time: 07/13/19  7:34 PM   Result Value Ref Range    Glucose 389 mg/dL    Insulin order 6.6 units/hour    Insulin adminstered 6.6 units/hour    Multiplier 0.020     Low target 150 mg/dL    High target 250 mg/dL    D50 order 0.0 ml    D50 administered 0.00 ml    Minutes until next BG 60 min    Order initials MBW     Administered initials MBW     GLSCOM Comments     GLUCOSE, POC    Collection Time: 07/13/19  8:35 PM   Result Value Ref Range    Glucose (POC) 332 (H) 65 - 100 mg/dL    Performed by Jose Miguel Shea (PCT)    GLUCOSTABILIZER    Collection Time: 07/13/19  8:37 PM   Result Value Ref Range    Glucose 332 mg/dL    Insulin order 8.2 units/hour    Insulin adminstered 8.2 units/hour    Multiplier 0.030     Low target 150 mg/dL    High target 250 mg/dL    D50 order 0.0 ml    D50 administered 0.00 ml    Minutes until next BG 60 min    Order initials      Administered initials      GLSCOM Comments     GLUCOSE, POC    Collection Time: 07/13/19  9:28 PM   Result Value Ref Range    Glucose (POC) 301 (H) 65 - 100 mg/dL    Performed by Jose Enrique Ny    Collection Time: 07/13/19  9:31 PM   Result Value Ref Range    Glucose 301 mg/dL    Insulin order 9.6 units/hour    Insulin adminstered 9.6 units/hour    Multiplier 0.040     Low target 150 mg/dL    High target 250 mg/dL    D50 order 0.0 ml    D50 administered 0.00 ml    Minutes until next BG 60 min    Order initials      Administered initials      GLSCOM Comments     GLUCOSE, POC    Collection Time: 07/13/19 10:26 PM   Result Value Ref Range    Glucose (POC) 286 (H) 65 - 100 mg/dL    Performed by Jose Miguel Shea (PCT)    GLUCOSTABILIZER    Collection Time: 07/13/19 10:27 PM   Result Value Ref Range    Glucose 286 mg/dL    Insulin order 11.3 units/hour    Insulin adminstered 11.3 units/hour    Multiplier 0.050     Low target 150 mg/dL    High target 250 mg/dL    D50 order 0.0 ml    D50 administered 0.00 ml    Minutes until next BG 60 min    Order initials      Administered initials      GLSCOM Comments     METABOLIC PANEL, BASIC    Collection Time: 07/13/19 11:35 PM   Result Value Ref Range    Sodium 142 136 - 145 mmol/L    Potassium 4.4 3.5 - 5.1 mmol/L    Chloride 110 (H) 97 - 108 mmol/L    CO2 26 21 - 32 mmol/L    Anion gap 6 5 - 15 mmol/L    Glucose 245 (H) 65 - 100 mg/dL    BUN 20 6 - 20 MG/DL    Creatinine 1.55 (H) 0.70 - 1.30 MG/DL    BUN/Creatinine ratio 13 12 - 20      GFR est AA 55 (L) >60 ml/min/1.73m2    GFR est non-AA 46 (L) >60 ml/min/1.73m2    Calcium 8.3 (L) 8.5 - 10.1 MG/DL   MAGNESIUM    Collection Time: 07/13/19 11:35 PM   Result Value Ref Range    Magnesium 2.0 1.6 - 2.4 mg/dL   LACTIC ACID    Collection Time: 07/13/19 11:35 PM   Result Value Ref Range    Lactic acid 3.8 (HH) 0.4 - 2.0 MMOL/L   CK W/ CKMB & INDEX    Collection Time: 07/13/19 11:35 PM   Result Value Ref Range    CK 74 39 - 308 U/L    CK - MB 1.5 <3.6 NG/ML    CK-MB Index 2.0 0.0 - 2.5     TROPONIN I    Collection Time: 07/13/19 11:35 PM   Result Value Ref Range    Troponin-I, Qt. <0.05 <0.05 ng/mL   GLUCOSE, POC    Collection Time: 07/13/19 11:35 PM   Result Value Ref Range    Glucose (POC) 227 (H) 65 - 100 mg/dL    Performed by Ward Bundy    Collection Time: 07/13/19 11:36 PM   Result Value Ref Range    Glucose 227 mg/dL    Insulin order 8.4 units/hour    Insulin adminstered 8.4 units/hour    Multiplier 0.050     Low target 150 mg/dL    High target 250 mg/dL    D50 order 0.0 ml    D50 administered 0.00 ml    Minutes until next BG 60 min    Order initials      Administered initials      GLSCOM Comments         Radiologic Studies -   XR CHEST PORT   Final Result   IMPRESSION: Normal chest.      US ABD LTD   Final Result   IMPRESSION: Normal right upper quadrant ultrasound. CTA CHEST W OR W WO CONT   Final Result   IMPRESSION: Acute pancreatitis.          CTA ABD PELV W WO CONT Final Result   IMPRESSION: Acute pancreatitis. CT Results  (Last 48 hours)               07/13/19 1542  CTA CHEST W OR W WO CONT Final result    Impression:  IMPRESSION: Acute pancreatitis. Narrative:  EXAM: CTA CHEST W OR W WO CONT, CTA ABD PELV W WO CONT dated 7/13/2019 3:42 PM       INDICATION:  eval for dissection, chest pain, back pain       COMPARISON: None. CONTRAST:  100 mL of  Isovue 370. TECHNIQUE:    Spiral CT through the thorax, abdomen and pelvis with IV contrast material was   performed. 2.5 mm axial images with sagittal and coronal reconstructions   produced. Delayed views obtained through the abdomen. Oral contrast was not   administered. Multiplanar 2D reformations generated in all regions from helical   data. MIP images were obtained. One or more of the following dose reduction   techniques were used: automated exposure control, adjustment of the mA and/or kV   according to patient size, use of iterative reconstruction technique. ?           FINDINGS:         Thorax: The heart, pericardium, and great vessels appear unremarkable. There is   no aneurysm or dissection. The chest wall and axilla appear unremarkable. Abdomen: The aorta and vessels are normal in course and caliber and no   dissection or thrombosis is identified. There is no aneurysm. There is   inflammation adjacent to the pancreatic head extending anterior to Gerota's   fascia. Pelvis: Normal-appearing appendix. Mild diverticulosis. Mildly enlarged   prostate. Spondylolisthesis L5.           07/13/19 1542  CTA ABD PELV W WO CONT Final result    Impression:  IMPRESSION: Acute pancreatitis. Narrative:  EXAM: CTA CHEST W OR W WO CONT, CTA ABD PELV W WO CONT dated 7/13/2019 3:42 PM       INDICATION:  eval for dissection, chest pain, back pain       COMPARISON: None. CONTRAST:  100 mL of  Isovue 370.        TECHNIQUE:    Spiral CT through the thorax, abdomen and pelvis with IV contrast material was   performed. 2.5 mm axial images with sagittal and coronal reconstructions   produced. Delayed views obtained through the abdomen. Oral contrast was not   administered. Multiplanar 2D reformations generated in all regions from helical   data. MIP images were obtained. One or more of the following dose reduction   techniques were used: automated exposure control, adjustment of the mA and/or kV   according to patient size, use of iterative reconstruction technique. ?           FINDINGS:         Thorax: The heart, pericardium, and great vessels appear unremarkable. There is   no aneurysm or dissection. The chest wall and axilla appear unremarkable. Abdomen: The aorta and vessels are normal in course and caliber and no   dissection or thrombosis is identified. There is no aneurysm. There is   inflammation adjacent to the pancreatic head extending anterior to Gerota's   fascia. Pelvis: Normal-appearing appendix. Mild diverticulosis. Mildly enlarged   prostate. Spondylolisthesis L5. CXR Results  (Last 48 hours)               07/13/19 1930  XR CHEST PORT Final result    Impression:  IMPRESSION: Normal chest.       Narrative:  EXAM: XR CHEST PORT       INDICATION: admission       COMPARISON: June 19, 2018       FINDINGS: A portable AP radiograph of the chest was obtained at 1915 hours. The   patient is on a cardiac monitor. The lungs are clear. The cardiac and   mediastinal contours and pulmonary vascularity are normal.  The bones and soft   tissues are grossly within normal limits. Medical Decision Making   I am the first provider for this patient. I reviewed the vital signs, available nursing notes, past medical history, past surgical history, family history and social history. Vital Signs-Reviewed the patient's vital signs.   Patient Vitals for the past 24 hrs:   Temp Pulse Resp BP SpO2   07/13/19 2230 98.4 °F (36.9 °C) 96 16 149/74 97 %   07/13/19 2000 97.8 °F (36.6 °C) 76 16 141/74 97 %   07/13/19 1930 98.2 °F (36.8 °C) 76 14 148/65 98 %   07/13/19 1915  78 11 144/73 100 %   07/13/19 1900  77 12 136/65 98 %   07/13/19 1845  77 13 138/73 99 %   07/13/19 1830  79 17 124/64 97 %   07/13/19 1800  83 17 137/78 96 %   07/13/19 1745  76 16 121/69 99 %   07/13/19 1730  72 13 113/69 99 %   07/13/19 1715  70 18 138/75 99 %   07/13/19 1700  69 (!) 7 136/77 99 %   07/13/19 1645  73 10 142/78 97 %   07/13/19 1630  87 17 (!) 140/94 99 %   07/13/19 1615  73 14 145/85 95 %   07/13/19 1615    145/85    07/13/19 1600  85 22 159/81 100 %   07/13/19 1555  88 18 151/89 100 %   07/13/19 1515  69 17 127/80 100 %   07/13/19 1500  65 18 128/75 100 %   07/13/19 1442     97 %   07/13/19 1437  62 12 130/76 96 %       Pulse Oximetry Analysis - 96% on RA    Cardiac Monitor:   Rate: 62 bpm  Rhythm: Normal Sinus Rhythm      ED EKG interpretation:  Rhythm: normal sinus rhythm; and regular . Rate (approx.): 67; Axis: normal; P wave: normal; QRS interval: normal ; ST/T wave: normal; Other findings: normal. This EKG was interpreted by Anitha Villafana M.D. Records Reviewed: Nursing Notes, Old Medical Records, Previous electrocardiograms, Previous Radiology Studies and Previous Laboratory Studies    Provider Notes (Medical Decision Making):   Pt presents with acute abdominal pain; vital signs stable with currently a non-peritoneal exam; DDx includes: Gastroenteritis, hepatitis, pancreatitis, obstruction, appendicitis, viral illness, IBD, diverticulitis, mesenteric ischemia, AAA or descending dissection, ACS, kidney stone. Will get labs, treat symptomatically and obtain serial abdominal exams to determine if additional imaging is indicated. Will reassess and monitor closely. ED Course:   Initial assessment performed. The patients presenting problems have been discussed, and they are in agreement with the care plan formulated and outlined with them.   I have encouraged them to ask questions as they arise throughout their visit. TOBACCO COUNSELING:   Upon evaluation, pt expressed that they are a current tobacco user. For approximately 10 minutes, pt has been counseled on the dangers of smoking and was encouraged to quit as soon as possible in order to decrease further risks to their health. Pt has conveyed their understanding of the risks involved should they continue to use tobacco products. ALCOHOL/SUBSTANCE ABUSE COUNSELING:  Upon evaluation, pt endorsed recent alcohol/illicit drug use. For approximately 15 minutes, pt has been counseled on the dangers of alcohol and illicit drug use on their health, and they were encouraged to quit as soon as possible in order to decrease further risks to their health. Pt has conveyed their understanding of the risks involved should they continue to use these products. HYPERTENSION COUNSELING   Education was provided to the patient today regarding their hypertension. Patient is made aware of their elevated blood pressure and is instructed to follow up this week with their Primary Care for a recheck. Patient is counseled regarding consequences of chronic, uncontrolled hypertension including kidney disease, heart disease, stroke or even death. Patient states their understanding and agrees to follow up this week. Additionally, during their visit, I discussed sodium restriction, maintaining ideal body weight and regular exercise program as physiologic means to achieve blood pressure control. The patient will strive towards this. I reviewed our electronic medical record system for any past medical records that were available that may contribute to the patient's current condition, the nursing notes and vital signs from today's visit.   Logan James MD    Medications Administered During ED Course:  Medications   nitroglycerin (NITROSTAT) tablet 0.4 mg (has no administration in time range)   amLODIPine (NORVASC) tablet 2.5 mg (2.5 mg Oral Refused 7/16/19 0924)   sodium chloride (NS) flush 5-40 mL (10 mL IntraVENous Given 7/16/19 1300)   sodium chloride (NS) flush 5-40 mL (has no administration in time range)   morphine injection 2 mg (2 mg IntraVENous Given 7/14/19 1411)   naloxone (NARCAN) injection 0.4 mg (has no administration in time range)   ondansetron (ZOFRAN) injection 4 mg (4 mg IntraVENous Given 7/16/19 1119)   heparin (porcine) injection 5,000 Units (5,000 Units SubCUTAneous Given 7/16/19 1333)   dextrose (D50) infusion 12.5-25 g (has no administration in time range)   glucose chewable tablet 16 g (has no administration in time range)   glucagon (GLUCAGEN) injection 1 mg (has no administration in time range)   lactated Ringers infusion (200 mL/hr IntraVENous New Bag 7/16/19 1732)   insulin lispro (HUMALOG) injection (0 Units SubCUTAneous Held 7/16/19 1800)   HYDROmorphone (PF) (DILAUDID) injection 2 mg (2 mg IntraVENous Given 7/15/19 2249)   insulin glargine (LANTUS) injection 20 Units (20 Units SubCUTAneous Given 7/15/19 2131)   HYDROmorphone (PF) 25 mg/50 mL (DILAUDID) PCA ( IntraVENous Rate Verify 7/16/19 1927)   piperacillin-tazobactam (ZOSYN) 3.375 g in 0.9% sodium chloride (MBP/ADV) 100 mL (3.375 g IntraVENous New Bag 7/16/19 1252)   famotidine (PF) (PEPCID) 20 mg in sodium chloride 0.9% 10 mL injection (20 mg IntraVENous Given 7/16/19 0925)   metoprolol (LOPRESSOR) injection 2.5 mg (2.5 mg IntraVENous Given 7/16/19 1622)   phenazopyridine (PYRIDIUM) tablet 100 mg (has no administration in time range)   ondansetron (ZOFRAN) injection 4 mg (4 mg IntraVENous Given 7/13/19 1520)   morphine injection 4 mg (4 mg IntraVENous Given 7/13/19 1520)   sodium chloride 0.9 % bolus infusion 500 mL (0 mL IntraVENous IV Completed 7/13/19 1555)   sodium chloride (NS) flush 10 mL (10 mL IntraVENous Given 7/13/19 1521)   iopamidol (ISOVUE-370) 76 % injection 100 mL (100 mL IntraVENous Given 7/13/19 1521)   fentaNYL citrate (PF) injection 100 mcg (100 mcg IntraVENous Given 7/13/19 1615)   morphine injection 4 mg (4 mg IntraVENous Given 7/13/19 1753)   ondansetron (ZOFRAN) injection 4 mg (4 mg IntraVENous Given 7/13/19 1750)   metoclopramide HCl (REGLAN) injection 10 mg (10 mg IntraVENous Given 7/13/19 1817)   famotidine (PF) (PEPCID) 20 mg in sodium chloride 0.9% 10 mL injection (20 mg IntraVENous Given 7/13/19 1820)   magnesium sulfate 2 g/50 ml IVPB (premix or compounded) (0 g IntraVENous Stopped 7/14/19 0049)   HYDROmorphone (PF) (DILAUDID) injection 2 mg (2 mg IntraVENous Given 7/14/19 1055)     ED Course as of Jul 14 0228   Sat Jul 13, 2019   1556 Notified by nursing staff of elevated lactate. Will continue to trend, another fluid bolus given, pt ill-appearing and diaphoretic, pt going to CT now. Wife at bedside updated on plan of care. Pt did complete a course of prednisone recently for presumed URI, suspect could be flaring up acute pancreatitis. Thus far, pt has received IV morphine, IV zofran, IV pepcid, IV reglan. ED Course User Index  [HW] Cici Bean MD     Progress Note:  Pt given 2nd liter of fluids, IV zosyn ordered empirically, pt's pain still 9/10, will order another dose of IV pain med    Progress Note:  Lactic acid 2.6, will trend, blood cultures sent, will order another dose of IV zofran for nausea/emesis. Progress Note:  LFT's elevated, BS elevated, T bili elevated, lipase > 3000, check RUQ US    Progress Note:  CT scan of the abdomen and pelvis demonstrated inflammation adjacent to the pancreatic head extending anterior to Gerota's fascia. No other significant fluid collections. Because of the elevated liver enzymes, he also had abdominal ultrasound, which showed the gallbladder to be fluid filled, but there was no cholelithiasis or pericholecystic fluid, common bile duct measured 3.9 mm. Will need GI and General Surgery input.     Consult Note:  Taryn Hernandes MD spoke with Dr. Nico Moran,   Specialty: Hospitalist  Discussed pt's hx, disposition, and available diagnostic and imaging results. Reviewed care plans. Agree with management and plan thus far. Consultant will evaluate pt for admission. Consult Note:  Moisés Dent MD spoke with Dr. Alma Cabral  Specialty: GI  Discussed pt's hx, disposition, and available diagnostic and imaging results. Reviewed care plans. Agree with management and plan thus far. Consultant will evaluate pt; keep NPO, may need MRCP. CRITICAL CARE NOTE :    IMPENDING DETERIORATION - Cardiovascular, CNS, Metabolic and Renal, Hepatic  ASSOCIATED RISK FACTORS - Hypotension, Shock, Hypoxia, Dysrhythmia, Metabolic changes, Dehydration and Vascular Compromise  MANAGEMENT- Bedside Assessment and Supervision of Care  INTERPRETATION -  Xrays, CT Scan, ECG, Blood Pressure and Cardiac Output Measures   INTERVENTIONS - hemodynamic mngmt and Metobolic interventions  CASE REVIEW - Hospitalist, Nursing and Family  TREATMENT RESPONSE -Improved  PERFORMED BY - Self    NOTES   :    I have spent 70 minutes of critical care time involved in lab review, consultations with specialist, family decision- making, bedside attention and documentation. During this entire length of time I was immediately available to the patient . Critical Care: The reason for providing this level of medical care for this critically ill patient was due to a critical illness that impaired one or more vital organ systems, such that there was a high probability of imminent or life threatening deterioration in the patient's condition. This care involved high complexity decision making to assess, manipulate, and support vital system functions, to treat this degree of vital organ system failure, and to prevent further life threatening deterioration of the patients condition.       Moisés Dent MD    Disposition:  ADMIT  Patient is being admitted to the hospital.  The results of their tests and reasons for their admission have been discussed with them and/or available family. They convey agreement and understanding for the need to be admitted and for their admission diagnosis. Consultation has been made with the inpatient physician specialist for hospitalization. Diagnosis     Clinical Impression:   1. Acute pancreatitis, unspecified complication status, unspecified pancreatitis type    2. Uncontrolled type 2 diabetes mellitus with hyperglycemia ([address]    3. Acute renal failure, unspecified acute renal failure type ([address]    4. Acute abdominal pain    5. Elevated lactic acid level    6. Transaminitis    7. Acute vomiting        Attestation:  I personally performed the services described in this documentation on this date 7/13/2019 for patient, Nroa Carballo. Bharat Rowley MD    Please note that this dictation was completed with [employer], the computer voice recognition software. Quite often unanticipated grammatical, syntax, homophones, and other interpretive errors are inadvertently transcribed by the computer software. Please disregard these errors. Please excuse any errors that have escaped final proofreading. This note will not be viewable in [address]

## 2019-07-13 NOTE — ED NOTES
Admitting at bedside. Requesting VBG and increase in fluids rate to be run over 1hr. Respiratory contacted.

## 2019-07-13 NOTE — ED NOTES
Admitting physician notified of  and improved lactic. Admitting physician verified that insulin drip is still appropriate to be started by RN.

## 2019-07-13 NOTE — ROUTINE PROCESS
TRANSFER - OUT REPORT: 
 
Verbal report given to Kristi Johnson (name) on Lazaro Staton  being transferred to PCU (unit) for routine progression of care Report consisted of patients Situation, Background, Assessment and  
Recommendations(SBAR). Information from the following report(s) SBAR, ED Summary and MAR was reviewed with the receiving nurse. Lines:  
Peripheral IV 07/13/19 Left Antecubital (Active) Site Assessment Clean, dry, & intact 7/13/2019  2:39 PM  
Phlebitis Assessment 0 7/13/2019  2:39 PM  
Infiltration Assessment 0 7/13/2019  2:39 PM  
Dressing Status Clean, dry, & intact 7/13/2019  2:39 PM  
Hub Color/Line Status Green 7/13/2019  2:39 PM  
   
Peripheral IV 07/13/19 Right Antecubital (Active) Site Assessment Clean, dry, & intact 7/13/2019  7:39 PM  
Phlebitis Assessment 0 7/13/2019  7:39 PM  
Infiltration Assessment 0 7/13/2019  7:39 PM  
Dressing Status Clean, dry, & intact 7/13/2019  7:39 PM  
Dressing Type Transparent 7/13/2019  7:39 PM  
Hub Color/Line Status Pink 7/13/2019  7:39 PM  
  
 
Opportunity for questions and clarification was provided. Patient transported with: 
 Monitor Registered Nurse

## 2019-07-13 NOTE — ED NOTES
This RN verified with pharmacy that ordered NS with 20mEq potassium Y-site compatible with ordered insulin drip.

## 2019-07-13 NOTE — ED NOTES
per EMS, pt c/o severe abd pain, nausea, and vomiting starting last night. pt sts unable to have BM for the past 5-6 days. hx MI, denies CP/SOB.  for EMS - pt sts thinking he vomited his meds last night. Pt rating pain 10/10. EMS gave 4mg zofran PO en route.

## 2019-07-14 ENCOUNTER — APPOINTMENT (OUTPATIENT)
Dept: MRI IMAGING | Age: 64
DRG: 418 | End: 2019-07-14
Attending: INTERNAL MEDICINE
Payer: COMMERCIAL

## 2019-07-14 LAB
ALBUMIN SERPL-MCNC: 3 G/DL (ref 3.5–5)
ALBUMIN SERPL-MCNC: 3.2 G/DL (ref 3.5–5)
ALBUMIN/GLOB SERPL: 0.8 {RATIO} (ref 1.1–2.2)
ALBUMIN/GLOB SERPL: 0.9 {RATIO} (ref 1.1–2.2)
ALP SERPL-CCNC: 134 U/L (ref 45–117)
ALP SERPL-CCNC: 143 U/L (ref 45–117)
ALT SERPL-CCNC: 364 U/L (ref 12–78)
ALT SERPL-CCNC: 488 U/L (ref 12–78)
ANION GAP SERPL CALC-SCNC: 10 MMOL/L (ref 5–15)
ANION GAP SERPL CALC-SCNC: 6 MMOL/L (ref 5–15)
ANION GAP SERPL CALC-SCNC: 6 MMOL/L (ref 5–15)
AST SERPL-CCNC: 113 U/L (ref 15–37)
AST SERPL-CCNC: 205 U/L (ref 15–37)
ATRIAL RATE: 67 BPM
BASOPHILS # BLD: 0 K/UL (ref 0–0.1)
BASOPHILS NFR BLD: 0 % (ref 0–1)
BILIRUB DIRECT SERPL-MCNC: 0.2 MG/DL (ref 0–0.2)
BILIRUB DIRECT SERPL-MCNC: 0.2 MG/DL (ref 0–0.2)
BILIRUB SERPL-MCNC: 0.7 MG/DL (ref 0.2–1)
BILIRUB SERPL-MCNC: 0.7 MG/DL (ref 0.2–1)
BUN SERPL-MCNC: 17 MG/DL (ref 6–20)
BUN SERPL-MCNC: 19 MG/DL (ref 6–20)
BUN SERPL-MCNC: 20 MG/DL (ref 6–20)
BUN/CREAT SERPL: 11 (ref 12–20)
BUN/CREAT SERPL: 13 (ref 12–20)
BUN/CREAT SERPL: 14 (ref 12–20)
CALCIUM SERPL-MCNC: 8 MG/DL (ref 8.5–10.1)
CALCIUM SERPL-MCNC: 8.2 MG/DL (ref 8.5–10.1)
CALCIUM SERPL-MCNC: 8.3 MG/DL (ref 8.5–10.1)
CALCULATED P AXIS, ECG09: 42 DEGREES
CALCULATED R AXIS, ECG10: -11 DEGREES
CALCULATED T AXIS, ECG11: 0 DEGREES
CHLORIDE SERPL-SCNC: 109 MMOL/L (ref 97–108)
CHLORIDE SERPL-SCNC: 109 MMOL/L (ref 97–108)
CHLORIDE SERPL-SCNC: 110 MMOL/L (ref 97–108)
CHOLEST SERPL-MCNC: 109 MG/DL
CK MB CFR SERPL CALC: 2 % (ref 0–2.5)
CK MB SERPL-MCNC: 1.5 NG/ML (ref 5–25)
CK SERPL-CCNC: 74 U/L (ref 39–308)
CO2 SERPL-SCNC: 23 MMOL/L (ref 21–32)
CO2 SERPL-SCNC: 24 MMOL/L (ref 21–32)
CO2 SERPL-SCNC: 26 MMOL/L (ref 21–32)
CREAT SERPL-MCNC: 1.32 MG/DL (ref 0.7–1.3)
CREAT SERPL-MCNC: 1.48 MG/DL (ref 0.7–1.3)
CREAT SERPL-MCNC: 1.55 MG/DL (ref 0.7–1.3)
DIAGNOSIS, 93000: NORMAL
DIFFERENTIAL METHOD BLD: ABNORMAL
EOSINOPHIL # BLD: 0 K/UL (ref 0–0.4)
EOSINOPHIL NFR BLD: 0 % (ref 0–7)
ERYTHROCYTE [DISTWIDTH] IN BLOOD BY AUTOMATED COUNT: 12.4 % (ref 11.5–14.5)
GLOBULIN SER CALC-MCNC: 3.5 G/DL (ref 2–4)
GLOBULIN SER CALC-MCNC: 3.6 G/DL (ref 2–4)
GLUCOSE BLD STRIP.AUTO-MCNC: 228 MG/DL (ref 65–100)
GLUCOSE BLD STRIP.AUTO-MCNC: 241 MG/DL (ref 65–100)
GLUCOSE BLD STRIP.AUTO-MCNC: 277 MG/DL (ref 65–100)
GLUCOSE BLD STRIP.AUTO-MCNC: 284 MG/DL (ref 65–100)
GLUCOSE BLD STRIP.AUTO-MCNC: 339 MG/DL (ref 65–100)
GLUCOSE SERPL-MCNC: 232 MG/DL (ref 65–100)
GLUCOSE SERPL-MCNC: 245 MG/DL (ref 65–100)
GLUCOSE SERPL-MCNC: 282 MG/DL (ref 65–100)
HCT VFR BLD AUTO: 40.7 % (ref 36.6–50.3)
HDLC SERPL-MCNC: 44 MG/DL
HDLC SERPL: 2.5 {RATIO} (ref 0–5)
HGB BLD-MCNC: 14 G/DL (ref 12.1–17)
IMM GRANULOCYTES # BLD AUTO: 0.1 K/UL (ref 0–0.04)
IMM GRANULOCYTES NFR BLD AUTO: 1 % (ref 0–0.5)
LACTATE SERPL-SCNC: 1.4 MMOL/L (ref 0.4–2)
LACTATE SERPL-SCNC: 3.8 MMOL/L (ref 0.4–2)
LDLC SERPL CALC-MCNC: 48.2 MG/DL (ref 0–100)
LIPASE SERPL-CCNC: 2493 U/L (ref 73–393)
LIPASE SERPL-CCNC: >3000 U/L (ref 73–393)
LIPID PROFILE,FLP: NORMAL
LYMPHOCYTES # BLD: 0.8 K/UL (ref 0.8–3.5)
LYMPHOCYTES NFR BLD: 7 % (ref 12–49)
MAGNESIUM SERPL-MCNC: 1.8 MG/DL (ref 1.6–2.4)
MAGNESIUM SERPL-MCNC: 2 MG/DL (ref 1.6–2.4)
MAGNESIUM SERPL-MCNC: 2 MG/DL (ref 1.6–2.4)
MCH RBC QN AUTO: 31.9 PG (ref 26–34)
MCHC RBC AUTO-ENTMCNC: 34.4 G/DL (ref 30–36.5)
MCV RBC AUTO: 92.7 FL (ref 80–99)
MONOCYTES # BLD: 0.5 K/UL (ref 0–1)
MONOCYTES NFR BLD: 4 % (ref 5–13)
NEUTS SEG # BLD: 10.8 K/UL (ref 1.8–8)
NEUTS SEG NFR BLD: 88 % (ref 32–75)
NRBC # BLD: 0 K/UL (ref 0–0.01)
NRBC BLD-RTO: 0 PER 100 WBC
P-R INTERVAL, ECG05: 154 MS
PLATELET # BLD AUTO: 156 K/UL (ref 150–400)
PMV BLD AUTO: 9.9 FL (ref 8.9–12.9)
POTASSIUM SERPL-SCNC: 4 MMOL/L (ref 3.5–5.1)
POTASSIUM SERPL-SCNC: 4.4 MMOL/L (ref 3.5–5.1)
POTASSIUM SERPL-SCNC: 4.5 MMOL/L (ref 3.5–5.1)
PROT SERPL-MCNC: 6.6 G/DL (ref 6.4–8.2)
PROT SERPL-MCNC: 6.7 G/DL (ref 6.4–8.2)
Q-T INTERVAL, ECG07: 418 MS
QRS DURATION, ECG06: 98 MS
QTC CALCULATION (BEZET), ECG08: 441 MS
RBC # BLD AUTO: 4.39 M/UL (ref 4.1–5.7)
SERVICE CMNT-IMP: ABNORMAL
SODIUM SERPL-SCNC: 139 MMOL/L (ref 136–145)
SODIUM SERPL-SCNC: 142 MMOL/L (ref 136–145)
SODIUM SERPL-SCNC: 142 MMOL/L (ref 136–145)
TRIGL SERPL-MCNC: 84 MG/DL (ref ?–150)
TROPONIN I SERPL-MCNC: <0.05 NG/ML
VENTRICULAR RATE, ECG03: 67 BPM
VLDLC SERPL CALC-MCNC: 16.8 MG/DL
WBC # BLD AUTO: 12.1 K/UL (ref 4.1–11.1)

## 2019-07-14 PROCEDURE — 74011250637 HC RX REV CODE- 250/637: Performed by: FAMILY MEDICINE

## 2019-07-14 PROCEDURE — 83690 ASSAY OF LIPASE: CPT

## 2019-07-14 PROCEDURE — 80048 BASIC METABOLIC PNL TOTAL CA: CPT

## 2019-07-14 PROCEDURE — 74011250636 HC RX REV CODE- 250/636: Performed by: FAMILY MEDICINE

## 2019-07-14 PROCEDURE — 85025 COMPLETE CBC W/AUTO DIFF WBC: CPT

## 2019-07-14 PROCEDURE — 80061 LIPID PANEL: CPT

## 2019-07-14 PROCEDURE — 74011250636 HC RX REV CODE- 250/636: Performed by: HOSPITALIST

## 2019-07-14 PROCEDURE — 82962 GLUCOSE BLOOD TEST: CPT

## 2019-07-14 PROCEDURE — 74183 MRI ABD W/O CNTR FLWD CNTR: CPT

## 2019-07-14 PROCEDURE — 36415 COLL VENOUS BLD VENIPUNCTURE: CPT

## 2019-07-14 PROCEDURE — 74011636637 HC RX REV CODE- 636/637: Performed by: INTERNAL MEDICINE

## 2019-07-14 PROCEDURE — 65660000000 HC RM CCU STEPDOWN

## 2019-07-14 PROCEDURE — 74011636637 HC RX REV CODE- 636/637: Performed by: HOSPITALIST

## 2019-07-14 PROCEDURE — 80076 HEPATIC FUNCTION PANEL: CPT

## 2019-07-14 PROCEDURE — 87040 BLOOD CULTURE FOR BACTERIA: CPT

## 2019-07-14 PROCEDURE — 74011250636 HC RX REV CODE- 250/636: Performed by: INTERNAL MEDICINE

## 2019-07-14 PROCEDURE — 83735 ASSAY OF MAGNESIUM: CPT

## 2019-07-14 PROCEDURE — 83605 ASSAY OF LACTIC ACID: CPT

## 2019-07-14 RX ORDER — OXYMETAZOLINE HCL 0.05 %
2 SPRAY, NON-AEROSOL (ML) NASAL 2 TIMES DAILY
COMMUNITY
End: 2019-09-11

## 2019-07-14 RX ORDER — HYDROMORPHONE HYDROCHLORIDE 1 MG/ML
2 INJECTION, SOLUTION INTRAMUSCULAR; INTRAVENOUS; SUBCUTANEOUS ONCE
Status: COMPLETED | OUTPATIENT
Start: 2019-07-14 | End: 2019-07-14

## 2019-07-14 RX ORDER — SODIUM CHLORIDE, SODIUM LACTATE, POTASSIUM CHLORIDE, CALCIUM CHLORIDE 600; 310; 30; 20 MG/100ML; MG/100ML; MG/100ML; MG/100ML
75 INJECTION, SOLUTION INTRAVENOUS CONTINUOUS
Status: DISCONTINUED | OUTPATIENT
Start: 2019-07-14 | End: 2019-07-18

## 2019-07-14 RX ORDER — HYDROMORPHONE HYDROCHLORIDE 2 MG/ML
2 INJECTION, SOLUTION INTRAMUSCULAR; INTRAVENOUS; SUBCUTANEOUS
Status: DISCONTINUED | OUTPATIENT
Start: 2019-07-14 | End: 2019-07-17

## 2019-07-14 RX ORDER — INSULIN LISPRO 100 [IU]/ML
INJECTION, SOLUTION INTRAVENOUS; SUBCUTANEOUS EVERY 6 HOURS
Status: DISCONTINUED | OUTPATIENT
Start: 2019-07-14 | End: 2019-07-17

## 2019-07-14 RX ORDER — INSULIN GLARGINE 100 [IU]/ML
20 INJECTION, SOLUTION SUBCUTANEOUS
Status: DISCONTINUED | OUTPATIENT
Start: 2019-07-14 | End: 2019-07-19

## 2019-07-14 RX ADMIN — INSULIN LISPRO 3 UNITS: 100 INJECTION, SOLUTION INTRAVENOUS; SUBCUTANEOUS at 18:24

## 2019-07-14 RX ADMIN — HEPARIN SODIUM 5000 UNITS: 5000 INJECTION INTRAVENOUS; SUBCUTANEOUS at 14:03

## 2019-07-14 RX ADMIN — SODIUM CHLORIDE, SODIUM LACTATE, POTASSIUM CHLORIDE, AND CALCIUM CHLORIDE 200 ML/HR: 600; 310; 30; 20 INJECTION, SOLUTION INTRAVENOUS at 00:49

## 2019-07-14 RX ADMIN — AMLODIPINE BESYLATE 2.5 MG: 2.5 TABLET ORAL at 10:15

## 2019-07-14 RX ADMIN — MORPHINE SULFATE 2 MG: 2 INJECTION, SOLUTION INTRAMUSCULAR; INTRAVENOUS at 06:44

## 2019-07-14 RX ADMIN — HEPARIN SODIUM 5000 UNITS: 5000 INJECTION INTRAVENOUS; SUBCUTANEOUS at 05:08

## 2019-07-14 RX ADMIN — ONDANSETRON 4 MG: 2 INJECTION INTRAMUSCULAR; INTRAVENOUS at 14:21

## 2019-07-14 RX ADMIN — INSULIN GLARGINE 20 UNITS: 100 INJECTION, SOLUTION SUBCUTANEOUS at 22:05

## 2019-07-14 RX ADMIN — HYDROMORPHONE HYDROCHLORIDE 2 MG: 1 INJECTION, SOLUTION INTRAMUSCULAR; INTRAVENOUS; SUBCUTANEOUS at 10:55

## 2019-07-14 RX ADMIN — Medication 10 ML: at 22:05

## 2019-07-14 RX ADMIN — INSULIN LISPRO 5 UNITS: 100 INJECTION, SOLUTION INTRAVENOUS; SUBCUTANEOUS at 14:03

## 2019-07-14 RX ADMIN — HYDROMORPHONE HYDROCHLORIDE 2 MG: 2 INJECTION, SOLUTION INTRAMUSCULAR; INTRAVENOUS; SUBCUTANEOUS at 16:08

## 2019-07-14 RX ADMIN — ONDANSETRON 4 MG: 2 INJECTION INTRAMUSCULAR; INTRAVENOUS at 03:02

## 2019-07-14 RX ADMIN — INSULIN LISPRO 5 UNITS: 100 INJECTION, SOLUTION INTRAVENOUS; SUBCUTANEOUS at 06:47

## 2019-07-14 RX ADMIN — Medication 10 ML: at 05:09

## 2019-07-14 RX ADMIN — HEPARIN SODIUM 5000 UNITS: 5000 INJECTION INTRAVENOUS; SUBCUTANEOUS at 22:05

## 2019-07-14 RX ADMIN — Medication 10 ML: at 16:09

## 2019-07-14 RX ADMIN — SODIUM CHLORIDE, SODIUM LACTATE, POTASSIUM CHLORIDE, AND CALCIUM CHLORIDE 200 ML/HR: 600; 310; 30; 20 INJECTION, SOLUTION INTRAVENOUS at 05:08

## 2019-07-14 RX ADMIN — MORPHINE SULFATE 2 MG: 2 INJECTION, SOLUTION INTRAMUSCULAR; INTRAVENOUS at 03:02

## 2019-07-14 RX ADMIN — HYDROMORPHONE HYDROCHLORIDE 2 MG: 2 INJECTION, SOLUTION INTRAMUSCULAR; INTRAVENOUS; SUBCUTANEOUS at 19:29

## 2019-07-14 RX ADMIN — SODIUM CHLORIDE, SODIUM LACTATE, POTASSIUM CHLORIDE, AND CALCIUM CHLORIDE 200 ML/HR: 600; 310; 30; 20 INJECTION, SOLUTION INTRAVENOUS at 10:58

## 2019-07-14 RX ADMIN — Medication 10 ML: at 14:04

## 2019-07-14 RX ADMIN — SODIUM CHLORIDE, SODIUM LACTATE, POTASSIUM CHLORIDE, AND CALCIUM CHLORIDE 200 ML/HR: 600; 310; 30; 20 INJECTION, SOLUTION INTRAVENOUS at 18:11

## 2019-07-14 RX ADMIN — MORPHINE SULFATE 2 MG: 2 INJECTION, SOLUTION INTRAMUSCULAR; INTRAVENOUS at 14:11

## 2019-07-14 NOTE — PROGRESS NOTES
Problem: Falls - Risk of  Goal: *Absence of Falls  Description  Document Nanda Payment Fall Risk and appropriate interventions in the flowsheet.   Outcome: Progressing Towards Goal     Problem: Patient Education: Go to Patient Education Activity  Goal: Patient/Family Education  Outcome: Progressing Towards Goal     Problem: DKA: Day 1  Goal: Diagnostic Tests/Procedures, if Ordered  Outcome: Progressing Towards Goal  Goal: Medications  Outcome: Progressing Towards Goal  Goal: Treatments/Interventions/Procedures  Outcome: Progressing Towards Goal

## 2019-07-14 NOTE — PROGRESS NOTES

## 2019-07-14 NOTE — PROGRESS NOTES
Hospitalist Progress Note    NAME: Marty Kyle   :  1955   MRN:  [id number]       Assessment / Plan:  Acute Pancreatitis, Uncontrolled DM:  -previously fairly well-controlled with recent steroid exposure  -cont IVF  -add dilaudid for pain control  -NPO  - wet swab ok for symptomatic relief   -elevated LFTs, no stones on imaging  -GI consult appreicated  -check MRCP  -patient recently on steroids   -CT AP : There isinflammation adjacent to the pancreatic head extending anterior to Gerota's fascia.  -cont SSI   -A1C > 9 but recenlty on steroids, previously around 7.0  -repeat     HTN, CAD:  -Continue Aspirin and Metoprolol  -Hold Lipitor with elevated LFT's  -denies CP    CKD 3  -Cr at baseline, 1.3  -renally dosing of meds  -monitor bmp   -avoid nephrotoxins     Prophylaxis:  Pepcid BID  Heparin SC     Subjective:     Chief Complaint / Reason for Physician Visit  Still having breakthrough abd pain despite morphine. No fevers/chills. +nausea    Discussed with RN events overnight. Review of Systems:  Symptom Y/N Comments  Symptom Y/N Comments   Fever/Chills n   Chest Pain n    Poor Appetite y   Edema n    Cough    Abdominal Pain y    Sputum    Joint Pain     SOB/HAMILTON    Pruritis/Rash     Nausea/vomit y nauesa  Tolerating PT/OT     Diarrhea n   Tolerating Diet npo    Constipation n   Other       Could NOT obtain due to:      Objective:     VITALS:   Last 24hrs VS reviewed since prior progress note.  Most recent are:  Patient Vitals for the past 24 hrs:   Temp Pulse Resp BP SpO2   19 1128 98.9 °F (37.2 °C) (!) 103 18 143/80 92 %   19 0726  87   94 %   19 0725 98.5 °F (36.9 °C) 87 18 123/70 94 %   19 0310 98.5 °F (36.9 °C) 89 16 144/85 98 %   19 2230 98.4 °F (36.9 °C) 96 16 149/74 97 %   19 97.8 °F (36.6 °C) 76 16 141/74 97 %   19 1930 98.2 °F (36.8 °C) 76 14 148/65 98 %   19 1915  78 11 144/73 100 %   19  77 12 136/65 98 % 07/13/19 1845  77 13 138/73 99 %   07/13/19 1830  79 17 124/64 97 %   07/13/19 1800  83 17 137/78 96 %   07/13/19 1745  76 16 121/69 99 %   07/13/19 1730  72 13 113/69 99 %   07/13/19 1715  70 18 138/75 99 %   07/13/19 1700  69 (!) 7 136/77 99 %   07/13/19 1645  73 10 142/78 97 %   07/13/19 1630  87 17 (!) 140/94 99 %   07/13/19 1615  73 14 145/85 95 %   07/13/19 1615    145/85    07/13/19 1600  85 22 159/81 100 %   07/13/19 1555  88 18 151/89 100 %   07/13/19 1515  69 17 127/80 100 %   07/13/19 1500  65 18 128/75 100 %   07/13/19 1442     97 %   07/13/19 1437  62 12 130/76 96 %       Intake/Output Summary (Last 24 hours) at 7/14/2019 1201  Last data filed at 7/14/2019 0644  Gross per 24 hour   Intake 1817.01 ml   Output 1350 ml   Net 467.01 ml        PHYSICAL EXAM:  General: WD, WN. Alert, cooperative, no acute distress    EENT:  EOMI. Anicteric sclerae. MMM  Resp:  CTA bilaterally, no wheezing or rales. No accessory muscle use  CV:  Regular  rhythm,  No edema  GI:  Soft, Non distended,+tenderness.  +Bowel sounds  Neurologic:  Alert and oriented X 3, normal speech,   Psych:   Good insight. Not anxious nor agitated  Skin:  No rashes. No jaundice    Reviewed most current lab test results and cultures  YES  Reviewed most current radiology test results   YES  Review and summation of old records today    NO  Reviewed patient's current orders and MAR    YES  PMH/SH reviewed - no change compared to H&P  ________________________________________________________________________  Care Plan discussed with:    Comments   Patient     Family      RN     Care Manager     Consultant                        Multidiciplinary team rounds were held today with , nursing, pharmacist and clinical coordinator. Patient's plan of care was discussed; medications were reviewed and discharge planning was addressed.      ________________________________________________________________________  Total NON critical care TIME:  30   Minutes    Total CRITICAL CARE TIME Spent:   Minutes non procedure based      Comments   >50% of visit spent in counseling and coordination of care x    ________________________________________________________________________  Amanda Rajput DO     Procedures: see electronic medical records for all procedures/Xrays and details which were not copied into this note but were reviewed prior to creation of Plan. LABS:  I reviewed today's most current labs and imaging studies.   Pertinent labs include:  Recent Labs     07/14/19 0449 07/13/19  1446   WBC 12.1* 11.5*   HGB 14.0 15.7   HCT 40.7 46.5    166     Recent Labs     07/14/19 0449 07/13/19  2335 07/13/19  1923 07/13/19  1446    142 138 133*   K 4.5 4.4 4.6 4.5   * 110* 105 99   CO2 23 26 23 24   * 245* 401* 462*   BUN 17 20 20 20   CREA 1.48* 1.55* 1.52* 1.64*   CA 8.2* 8.3* 8.3* 9.4   MG 1.8 2.0 1.7  --    PHOS  --   --  5.0*  --    ALB 3.0*  --   --  3.8   TBILI 0.7  --   --  1.6*   SGOT 205*  --   --  504*   *  --   --  661*       Signed: Terrell Lopez, DO

## 2019-07-14 NOTE — PROGRESS NOTES
1945: TRANSFER - IN REPORT:    Verbal report received from Rylee on Starlet Marrow  being received from ED for routine progression of care      Report consisted of patients Situation, Background, Assessment and   Recommendations(SBAR). Information from the following report(s) SBAR, Kardex, [facility] and Recent Results was reviewed with the receiving nurse. Opportunity for questions and clarification was provided. Assessment completed upon patients arrival to unit and care assumed. 2000: Pt arrived to unit via stretcher. Pt stood and used the urinal then walked to the bed with SBA. Skin check done with [facility]. Skin intact. 2237: Paged tele hospitalist due to glucose stabalizer requesting insulin drip to be run at a greater rate than order. Spoke with Dr. Parvin Jordan, new orders received please see eMAR. 2345: Paged tele hospitalist to get clarification on orders and to inform of pt . Spoke with Dr Parvin Jordan, new order to stop insulin drip at this time. Recheck sugar in 3 hours and no need for any D5 at this time. Also Q4 BMP and Mag drawn at this time as well as a repeat lactic.    0027: Paged Tele hospitalist due to Lactic of 3.8. New orders received from Dr Parvin Jordan, please see eMAR. Will continue to monitor pt.    0300: Repeat POC glucose checked. 0600: AM labs drawn.     0715: Report given to [facility]

## 2019-07-14 NOTE — PROGRESS NOTES
**Consult Information**  Member Facility: [facility] Deanne Valadez Ruperto MRN: [id number]  Consult ID: [id number]  Facility Time Zone: ET  Date and Time of Consult: 07/14/2019 12:29:18 AM  Requesting Clinician: Jennifer Suarez  Patient Name: Gail Banegas  Date of Birth: [id number]  Gender: Male    **Clinical Note**  Clinical Note: Lactic acid 3.8 which is up from previous lactic acid 2.6; patient is admitted with pacreatitis. Vitals are stable. No new orders. Change IV NS with KCL to LR as K is 4.4 and sodium is also 142. **Attestation**  Interaction Mode: Phone Only  Phone Duration (mins): 1  Time of Call : 07/14/2019 12:33 AM  Interaction Attestation: Interprofessional internet consultation was delivered through telephonic and/or electronic communication upon the request of the patients treating physician, while the requesting and the rendering provider were not in the same physical location. Written report was provided to the requesting provider.   Evaluation Duration (mins): 5

## 2019-07-14 NOTE — CONSULTS
[address] [facility]    Name:  Lars Patel  MR#:  [id number]  :  1955  ACCOUNT #:  [de-identified]  DATE OF SERVICE:  2019      ATTENDING PHYSICIAN:  Chad Lopez DO.    REASON FOR CONSULTATION:  Pancreatitis. HISTORY OF PRESENT ILLNESS:  The patient is a 41-year-old gentleman with the past medical history of diabetes, hypertension, coronary artery disease, who present to [facility] Emergency Department yesterday complaining of acute onset of abdominal pain, nausea, and vomiting. He says he really has not felt well for about the last 10 days with inner ear issue. He has seen ENT. He has apparently had an MRI of this. He was prescribed prednisone for the last 10 days on a tapering dose, which he had completed. He had not been on any other new medications. He continues to have significant abdominal pain. He has not had any more vomiting. He felt somewhat constipated over the last 10 days. He has not had any blood in the stool. He has no prior history of pancreatitis. He has had colonoscopy by Dr. Terri Rowley apparently in , and thinks he has had a repeat since then because the records are not currently available to me. Upon evaluation at [facility], he was found to have an elevated lipase to greater than 3000. Liver enzymes were abnormal with ALT of 488, , alkaline phosphatase 143, the normal bilirubin of 0.7. CT scan of the abdomen and pelvis demonstrated inflammation adjacent to the pancreatic head extending anterior to Gerota's fascia. No other significant fluid collections. Because of the elevated liver enzymes, he also had abdominal ultrasound, which showed the gallbladder to be fluid filled, but there was no cholelithiasis or pericholecystic fluid, common bile duct measured 3.9 mm. PAST MEDICAL HISTORY:  Includes, coronary artery disease, status post PTC and stenting of the right coronary artery 2014.   Hypercholesterolemia, hypertension, diabetes. PAST SURGERIES:  Include, left shoulder surgery, right knee surgery x4, colonoscopic polypectomy in 2007 by Dr. Terri Rowley with probable repeat since then, records not available. MEDICATIONS PRIOR TO ADMISSION:  He is on prednisone taper. In addition to that metformin  mg three tablets with dinner. Amlodipine 2.5 mg daily. Metoprolol 25 mg XL tablet one daily. Atorvastatin 40 mg daily, aspirin 81 mg daily. Nitroglycerin p.r.n. ALLERGIES:  NONE KNOWN. SOCIAL HISTORY:  The patient is , former smoker. Does not drink any alcohol. FAMILY HISTORY:  Noncontributory. REVIEW OF SYSTEMS:  Otherwise negative. PHYSICAL EXAMINATION:  GENERAL:  Shows him to be well-developed, well-nourished gentleman, appearing uncomfortable, complaining with abdominal pain. VITAL SIGNS:  Blood pressure 143/80, pulse 103, respirations 18. He is afebrile. HEENT:  Sclerae anicteric. Oropharynx was clear. NECK:  Supple without JVD. LUNGS:  Clear to auscultation. Charity Hoyles CARDIAC EXAM:  Regular rate and rhythm without murmur, gallop, or rub. ABDOMEN:  Firm, diffusely tender, but no rebound or guarding. Bowel sounds are diminished. SKIN:  No rash or jaundice. LABORATORY DATA:  WBC 12.1, hemoglobin 14.0, hematocrit 40.7, platelet count 354,341. Sodium 142, potassium 4.5, chloride 109, BUN 17, creatinine 1.48. Bilirubin 0.7, , , alkaline phosphatase 143, lipase greater than 3000. IMPRESSION:  1. Acute pancreatitis. Etiology is not totally clear; however, with elevated liver enzymes, one has to consider either biliary etiology despite the lack of definite gallstones on imaging or medication reaction. Prednisone is his only new medication, but certainly it is possible this could have caused his pancreatitis. 2.  Diabetes mellitus. 3.  History of colonoscopic .polypectomy, need further records. PLAN:  1. Serial liver enzymes. 2.  Serial lipase. 3.  MRCP.   4.  Review Dr. Nishant Baer prior records when available tomorrow.       Yolande Carr MD      [id number]  D:  07/14/2019 13:28  T:  07/14/2019 14:19  JOB #:  [id number]  CC:  Justice Paredes MD

## 2019-07-14 NOTE — PROGRESS NOTES
26 - Paged Dr. Lulú Antunez to report increase pain 10/10 not controlled by IV morphine. MD ordered one time dose of dilaudid 2mg IV now. 1411 - Pt states pain came back after the Dialudid wore off. Currently 9/10 to abdomen. Medicated with Morphine 2mg IV.     1600 - Dr. Merna Valdez at bedside assessing pt. Transport waiting to take pt to MRI. MD ordered Dilaudid 2mg IV Q3 hours PRN for pain. Give alyssa dose now before MRI testing. 1607 - Dilaudid 2mg IV given prior to leaving unit for MRI.      1732 - Pt back to room from MRI

## 2019-07-14 NOTE — PROGRESS NOTES
**Consult Information**  Member Facility: [facility] MRN: [id number]  Consult ID: [id number]  Facility Time Zone: ET  Date and Time of Consult: 07/13/2019 10:38:33 PM  Requesting Clinician: Margie Hall  Patient Name: Kisha Whitaker  Date of Birth: [birth year]  Gender: Male    **Clinical Note**  Clinical Note: Pt on insulin drip for DKA and order is only to titrate to 10 units but glucose stabilizer states need to run at 11.3 units right now. Patient does not have any signs of DKA; he has pancreatitis; will d/c insulin infusion as soon as BS less than 200 and will start him on Lantus and lispro sliding scale combination. **Attestation**  Interaction Mode: Phone Only  Phone Duration (mins): 4  Time of Call : 07/13/2019 10:51 PM  Interaction Attestation: Interprofessional internet consultation was delivered through telephonic and/or electronic communication upon the request of the patients treating physician, while the requesting and the rendering provider were not in the same physical location. Written report was provided to the requesting provider.   Evaluation Duration (mins): 9

## 2019-07-14 NOTE — CONSULTS
GI NOTE    Full consult dictated    Acute panceratitis---?  Etiology biliary vs med---recent prednisone    Will order MRCP  IVF  Pain meds  NPO

## 2019-07-15 LAB
ALBUMIN SERPL-MCNC: 2.9 G/DL (ref 3.5–5)
ALBUMIN/GLOB SERPL: 0.8 {RATIO} (ref 1.1–2.2)
ALP SERPL-CCNC: 119 U/L (ref 45–117)
ALT SERPL-CCNC: 293 U/L (ref 12–78)
ANION GAP SERPL CALC-SCNC: 6 MMOL/L (ref 5–15)
AST SERPL-CCNC: 81 U/L (ref 15–37)
BASOPHILS # BLD: 0 K/UL (ref 0–0.1)
BASOPHILS NFR BLD: 0 % (ref 0–1)
BILIRUB SERPL-MCNC: 0.7 MG/DL (ref 0.2–1)
BUN SERPL-MCNC: 20 MG/DL (ref 6–20)
BUN/CREAT SERPL: 16 (ref 12–20)
CALCIUM SERPL-MCNC: 7.8 MG/DL (ref 8.5–10.1)
CHLORIDE SERPL-SCNC: 109 MMOL/L (ref 97–108)
CO2 SERPL-SCNC: 24 MMOL/L (ref 21–32)
CREAT SERPL-MCNC: 1.28 MG/DL (ref 0.7–1.3)
DIFFERENTIAL METHOD BLD: ABNORMAL
EOSINOPHIL # BLD: 0 K/UL (ref 0–0.4)
EOSINOPHIL NFR BLD: 0 % (ref 0–7)
ERYTHROCYTE [DISTWIDTH] IN BLOOD BY AUTOMATED COUNT: 12.9 % (ref 11.5–14.5)
GLOBULIN SER CALC-MCNC: 3.6 G/DL (ref 2–4)
GLUCOSE BLD STRIP.AUTO-MCNC: 171 MG/DL (ref 65–100)
GLUCOSE BLD STRIP.AUTO-MCNC: 191 MG/DL (ref 65–100)
GLUCOSE BLD STRIP.AUTO-MCNC: 193 MG/DL (ref 65–100)
GLUCOSE BLD STRIP.AUTO-MCNC: 194 MG/DL (ref 65–100)
GLUCOSE SERPL-MCNC: 203 MG/DL (ref 65–100)
HCT VFR BLD AUTO: 41.5 % (ref 36.6–50.3)
HGB BLD-MCNC: 14.3 G/DL (ref 12.1–17)
IMM GRANULOCYTES # BLD AUTO: 0.1 K/UL (ref 0–0.04)
IMM GRANULOCYTES NFR BLD AUTO: 1 % (ref 0–0.5)
LIPASE SERPL-CCNC: 1299 U/L (ref 73–393)
LYMPHOCYTES # BLD: 0.7 K/UL (ref 0.8–3.5)
LYMPHOCYTES NFR BLD: 6 % (ref 12–49)
MCH RBC QN AUTO: 32.4 PG (ref 26–34)
MCHC RBC AUTO-ENTMCNC: 34.5 G/DL (ref 30–36.5)
MCV RBC AUTO: 93.9 FL (ref 80–99)
MONOCYTES # BLD: 0.3 K/UL (ref 0–1)
MONOCYTES NFR BLD: 3 % (ref 5–13)
NEUTS SEG # BLD: 10.3 K/UL (ref 1.8–8)
NEUTS SEG NFR BLD: 90 % (ref 32–75)
NRBC # BLD: 0 K/UL (ref 0–0.01)
NRBC BLD-RTO: 0 PER 100 WBC
PLATELET # BLD AUTO: 139 K/UL (ref 150–400)
PMV BLD AUTO: 9.6 FL (ref 8.9–12.9)
POTASSIUM SERPL-SCNC: 3.9 MMOL/L (ref 3.5–5.1)
PROT SERPL-MCNC: 6.5 G/DL (ref 6.4–8.2)
RBC # BLD AUTO: 4.42 M/UL (ref 4.1–5.7)
RBC MORPH BLD: ABNORMAL
SERVICE CMNT-IMP: ABNORMAL
SODIUM SERPL-SCNC: 139 MMOL/L (ref 136–145)
WBC # BLD AUTO: 11.4 K/UL (ref 4.1–11.1)

## 2019-07-15 PROCEDURE — 74011250636 HC RX REV CODE- 250/636: Performed by: FAMILY MEDICINE

## 2019-07-15 PROCEDURE — 85025 COMPLETE CBC W/AUTO DIFF WBC: CPT

## 2019-07-15 PROCEDURE — 80053 COMPREHEN METABOLIC PANEL: CPT

## 2019-07-15 PROCEDURE — 74011000258 HC RX REV CODE- 258: Performed by: SURGERY

## 2019-07-15 PROCEDURE — 74011250637 HC RX REV CODE- 250/637: Performed by: FAMILY MEDICINE

## 2019-07-15 PROCEDURE — 74011636637 HC RX REV CODE- 636/637: Performed by: HOSPITALIST

## 2019-07-15 PROCEDURE — 74011000250 HC RX REV CODE- 250: Performed by: SURGERY

## 2019-07-15 PROCEDURE — 74011250636 HC RX REV CODE- 250/636: Performed by: NURSE PRACTITIONER

## 2019-07-15 PROCEDURE — 74011636637 HC RX REV CODE- 636/637: Performed by: INTERNAL MEDICINE

## 2019-07-15 PROCEDURE — 36415 COLL VENOUS BLD VENIPUNCTURE: CPT

## 2019-07-15 PROCEDURE — 74011250636 HC RX REV CODE- 250/636: Performed by: INTERNAL MEDICINE

## 2019-07-15 PROCEDURE — 65660000000 HC RM CCU STEPDOWN

## 2019-07-15 PROCEDURE — 83690 ASSAY OF LIPASE: CPT

## 2019-07-15 PROCEDURE — 82962 GLUCOSE BLOOD TEST: CPT

## 2019-07-15 PROCEDURE — 81001 URINALYSIS AUTO W/SCOPE: CPT

## 2019-07-15 PROCEDURE — 74011250636 HC RX REV CODE- 250/636: Performed by: SURGERY

## 2019-07-15 RX ORDER — HYDROMORPHONE HCL/0.9% NACL/PF 0.5 MG/ML
PLASTIC BAG, INJECTION (ML) INTRAVENOUS CONTINUOUS
Status: DISCONTINUED | OUTPATIENT
Start: 2019-07-15 | End: 2019-07-17

## 2019-07-15 RX ADMIN — Medication 10 ML: at 05:45

## 2019-07-15 RX ADMIN — Medication 10 ML: at 21:32

## 2019-07-15 RX ADMIN — HEPARIN SODIUM 5000 UNITS: 5000 INJECTION INTRAVENOUS; SUBCUTANEOUS at 21:26

## 2019-07-15 RX ADMIN — HYDROMORPHONE HYDROCHLORIDE 2 MG: 2 INJECTION, SOLUTION INTRAMUSCULAR; INTRAVENOUS; SUBCUTANEOUS at 03:11

## 2019-07-15 RX ADMIN — INSULIN LISPRO 2 UNITS: 100 INJECTION, SOLUTION INTRAVENOUS; SUBCUTANEOUS at 05:45

## 2019-07-15 RX ADMIN — INSULIN LISPRO 2 UNITS: 100 INJECTION, SOLUTION INTRAVENOUS; SUBCUTANEOUS at 12:32

## 2019-07-15 RX ADMIN — INSULIN LISPRO 2 UNITS: 100 INJECTION, SOLUTION INTRAVENOUS; SUBCUTANEOUS at 00:10

## 2019-07-15 RX ADMIN — PIPERACILLIN SODIUM,TAZOBACTAM SODIUM 3.38 G: 3; .375 INJECTION, POWDER, FOR SOLUTION INTRAVENOUS at 17:00

## 2019-07-15 RX ADMIN — INSULIN LISPRO 2 UNITS: 100 INJECTION, SOLUTION INTRAVENOUS; SUBCUTANEOUS at 18:36

## 2019-07-15 RX ADMIN — HYDROMORPHONE HYDROCHLORIDE 2 MG: 2 INJECTION, SOLUTION INTRAMUSCULAR; INTRAVENOUS; SUBCUTANEOUS at 22:49

## 2019-07-15 RX ADMIN — HYDROMORPHONE HYDROCHLORIDE 2 MG: 2 INJECTION, SOLUTION INTRAMUSCULAR; INTRAVENOUS; SUBCUTANEOUS at 07:35

## 2019-07-15 RX ADMIN — HYDROMORPHONE HYDROCHLORIDE 2 MG: 2 INJECTION, SOLUTION INTRAMUSCULAR; INTRAVENOUS; SUBCUTANEOUS at 10:38

## 2019-07-15 RX ADMIN — Medication: at 16:53

## 2019-07-15 RX ADMIN — FAMOTIDINE 20 MG: 10 INJECTION, SOLUTION INTRAVENOUS at 21:26

## 2019-07-15 RX ADMIN — HYDROMORPHONE HYDROCHLORIDE 2 MG: 2 INJECTION, SOLUTION INTRAMUSCULAR; INTRAVENOUS; SUBCUTANEOUS at 13:42

## 2019-07-15 RX ADMIN — SODIUM CHLORIDE, SODIUM LACTATE, POTASSIUM CHLORIDE, AND CALCIUM CHLORIDE 125 ML/HR: 600; 310; 30; 20 INJECTION, SOLUTION INTRAVENOUS at 00:05

## 2019-07-15 RX ADMIN — HEPARIN SODIUM 5000 UNITS: 5000 INJECTION INTRAVENOUS; SUBCUTANEOUS at 12:32

## 2019-07-15 RX ADMIN — HEPARIN SODIUM 5000 UNITS: 5000 INJECTION INTRAVENOUS; SUBCUTANEOUS at 05:45

## 2019-07-15 RX ADMIN — INSULIN GLARGINE 20 UNITS: 100 INJECTION, SOLUTION SUBCUTANEOUS at 21:31

## 2019-07-15 RX ADMIN — Medication 10 ML: at 16:22

## 2019-07-15 RX ADMIN — SODIUM CHLORIDE, SODIUM LACTATE, POTASSIUM CHLORIDE, AND CALCIUM CHLORIDE 125 ML/HR: 600; 310; 30; 20 INJECTION, SOLUTION INTRAVENOUS at 08:44

## 2019-07-15 RX ADMIN — SODIUM CHLORIDE, SODIUM LACTATE, POTASSIUM CHLORIDE, AND CALCIUM CHLORIDE 200 ML/HR: 600; 310; 30; 20 INJECTION, SOLUTION INTRAVENOUS at 21:31

## 2019-07-15 RX ADMIN — ONDANSETRON 4 MG: 2 INJECTION INTRAMUSCULAR; INTRAVENOUS at 18:44

## 2019-07-15 NOTE — PROGRESS NOTES
GI PROGRESS NOTE   Nirav Marrero, DMITRI  [phone] NP in-hospital cell phone M-F until 4:30  After 5pm or on weekends, please call  for physician on call    NAME:Arnold Russell :1955 HBE:[id number]   ATTG: Dr. Perez Oar  PCP: Sharifa Marshall MD  Date/Time:  7/15/2019 10:28 AM     Primary GI: Dr. Paloma Barkley    Reason for following: Acute Pancreatitis    Assessment:   -Abnormal CT abd/pelvis,   · Inflammation adjacent to the pancreatic head extending anterior to Gerota's fascia. No other significant fluid collections. · Abdominal ultrasound, gallbladder fluid filled, no cholelithiasis or pericholecystic fluid, CBD measuring 3.9mm  -Acute Pancreatitis, thought to be r/t gallstone that has passed  · Lipase on admission >3,000. Down trending consistently  · MRI/MRCP showing pancreatic enlargement consistent with acute pancreatitis, no evidence of necrosis or pseudocyst. No evidence of gallstones, biliary dilatation, or filling defects in the CBD. -Abdominal pain  -Transaminitis, stable/improving  -DM, 190-230  · Recently on prednisone, which could have increased levels     Plan:   -MRI/MRCP results are suggestive of gallstone induced pancreatitis, likely gallstone has passed  -Consult placed to general surgery for further eval. Would appreciate their recommendations.  -Will need aggressive hydration maintenance, continue LR , rate increased to 200ml/hr since patient NPO with significant pain.  -Serial LFTs for trending  -Supportive measures as clinically indicated, including aggressive pain and nausea management.   -Will continue to follow   *Plan reviewed with Dr. Paloma Barkley  Subjective:   Patient in bed during visit. Very uncomfortable. Reports has severe BLQ pain, receiving morphine with little relief. Reports has constant nausea, no vomiting. Is very agitated. Discussed with RN events overnight.  She reports patient has been primarily complaining of epigastric and LUQ pain for her.    Complaint Y/N Description   Abdominal Pain Y BLQ   Hematemesis     Hematochezia     Melena     Constipation     Diarrhea     Dyspepsia     Dysphagia     Jaundiced     Nausea/vomiting Y      Review of Systems:  Symptom Y/N Comments  Symptom Y/N Comments   Fever/Chills    Chest Pain     Cough    Headaches     Sputum    Joint Pain     SOB/HAMILTON    Pruritis/Rash     Tolerating Diet N NPO  Other       Could NOT obtain due to:      Objective:   VITALS:   Last 24hrs VS reviewed since prior progress note. Most recent are:  Visit Vitals  BP (!) 143/94 (BP 1 Location: Right arm)   Pulse 92   Temp 98.4 °F (36.9 °C)   Resp 16   Ht 6' 1\" (1.854 m)   Wt 95.2 kg (209 lb 14.1 oz)   SpO2 94%   BMI 27.69 kg/m²       Intake/Output Summary (Last 24 hours) at 7/15/2019 1028  Last data filed at 7/15/2019 0804  Gross per 24 hour   Intake 0 ml   Output 1550 ml   Net -1550 ml     PHYSICAL EXAM:  General: WD, WN. Alert, cooperative, no acute distress    HEENT: NC, Atraumatic. Anicteric sclerae. Lungs:  CTA Bilaterally. No Wheezing/Rhonchi/Rales. Heart:  Regular  rhythm,  No murmur (-), No Rubs, No Gallops  Abdomen: Soft, mildly distended, Very tender in BLQ.  +Bowel sounds, no HSM  Extremities: No c/c/e  Neurologic:  Alert and oriented X 3. No acute neurological distress   Psych:   Good insight. Not anxious, mildly agitated. Lab and Radiology Data Reviewed: (see below)  7/14/2019 MRI/MRCP  **PRELIMINARY REPORT**     Pancreatic enlargement with peripancreatic stranding associated with edema-like  signal extending along the anterior pararenal and lateral conal fascia  bilaterally.     Consistent with pancreatitis. No gallstone. No biliary or pancreatic duct  dilation demonstrated. Normal appearance of liver, spleen, adrenal glands and  kidneys. Posterior right basilar atelectasis.  Subsegmental atelectasis also at  posterior left pulmonary base.     Preliminary report was provided by Dr. Speedy Casiano, the on-call radiologist, at 5:15  PM     Final report to follow.     **END PRELIMINARY REPORT**     Final report:     TECHNIQUE: Pre and postcontrast MRI of the abdomen was performed with 3-D MRCP  images.     FINDINGS: There is diffuse peripancreatic inflammation. There is no evidence of  pancreatic necrosis.     There liver, spleen, bilateral kidneys, bilateral adrenal glands are  unremarkable.     Hepatic vasculature is patent. The SMA and SMV are patent. The splenic artery  and vein are patent.     Dependent atelectasis is noted in the lung bases. The gallbladder is  unremarkable.     No intrahepatic biliary dilatation is noted. The common bile duct is normal in  caliber. No definite pancreatic ductal dilatation is noted.     IMPRESSION  IMPRESSION: Acute pancreatitis. No evidence of pancreatic necrosis or pseudocyst  formation. No evidence of gallstones, biliary dilatation, or filling defects in  the common bile duct. Medications Reviewed: (see below)  PMH/SH reviewed - no change compared to H&P  ________________________________________________________________________  Total time spent with patient: 30 minutes ________________________________________________________________________  Care Plan discussed with:  Patient Y   Family     RN Y              Consultant:       Jennifer Mcmahan, NP     Procedures: see electronic medical records for all procedures/Xrays and details which were not copied into this note but were reviewed prior to creation of Plan.       LABS:  Recent Labs     07/15/19  0346 07/14/19  0449   WBC 11.4* 12.1*   HGB 14.3 14.0   HCT 41.5 40.7   * 156     Recent Labs     07/15/19  0346 07/14/19  2006 07/14/19  0449 07/13/19  2335 07/13/19  1923    139 142 142 138   K 3.9 4.0 4.5 4.4 4.6   * 109* 109* 110* 105   CO2 24 24 23 26 23   BUN 20 19 17 20 20   CREA 1.28 1.32* 1.48* 1.55* 1.52*   * 232* 282* 245* 401*   CA 7.8* 8.0* 8.2* 8.3* 8.3*   MG  --  2.0 1.8 2.0 1.7   PHOS  --   --   --   --  5.0* Recent Labs     07/15/19  0346 07/14/19 2006 07/14/19 0449   SGOT 81* 113* 205*   * 134* 143*   TP 6.5 6.7 6.6   ALB 2.9* 3.2* 3.0*   GLOB 3.6 3.5 3.6   LPSE 1,299* 2,493* >3,000*     No results for input(s): INR, PTP, APTT in the last 72 hours. No lab exists for component: INREXT   No results for input(s): FE, TIBC, PSAT, FERR in the last 72 hours. No results found for: FOL, RBCF  No results for input(s): PH, PCO2, PO2 in the last 72 hours.   Recent Labs     07/13/19  2335 07/13/19 1923   CPK 74 88   CKMB 1.5 1.7     Lab Results   Component Value Date/Time    Color YELLOW/STRAW 07/13/2019 03:56 PM    Appearance CLEAR 07/13/2019 03:56 PM    Specific gravity 1.027 07/13/2019 03:56 PM    pH (UA) 6.0 07/13/2019 03:56 PM    Protein 30 (A) 07/13/2019 03:56 PM    Glucose >1,000 (A) 07/13/2019 03:56 PM    Ketone TRACE (A) 07/13/2019 03:56 PM    Bilirubin NEGATIVE  07/13/2019 03:56 PM    Urobilinogen 1.0 07/13/2019 03:56 PM    Nitrites NEGATIVE  07/13/2019 03:56 PM    Leukocyte Esterase NEGATIVE  07/13/2019 03:56 PM    Epithelial cells FEW 07/13/2019 03:56 PM    Bacteria NEGATIVE  07/13/2019 03:56 PM    WBC 0-4 07/13/2019 03:56 PM    RBC 0-5 07/13/2019 03:56 PM       MEDICATIONS:  Current Facility-Administered Medications   Medication Dose Route Frequency    lactated Ringers infusion  125 mL/hr IntraVENous CONTINUOUS    insulin lispro (HUMALOG) injection   SubCUTAneous Q6H    HYDROmorphone (PF) (DILAUDID) injection 2 mg  2 mg IntraVENous Q3H PRN    insulin glargine (LANTUS) injection 20 Units  20 Units SubCUTAneous QHS    nitroglycerin (NITROSTAT) tablet 0.4 mg  0.4 mg SubLINGual Q5MIN PRN    aspirin delayed-release tablet 81 mg  81 mg Oral DAILY    metoprolol succinate (TOPROL-XL) XL tablet 25 mg  25 mg Oral DAILY    amLODIPine (NORVASC) tablet 2.5 mg  2.5 mg Oral DAILY    sodium chloride (NS) flush 5-40 mL  5-40 mL IntraVENous Q8H    sodium chloride (NS) flush 5-40 mL  5-40 mL IntraVENous PRN  morphine injection 2 mg  2 mg IntraVENous Q4H PRN    naloxone (NARCAN) injection 0.4 mg  0.4 mg IntraVENous PRN    ondansetron (ZOFRAN) injection 4 mg  4 mg IntraVENous Q4H PRN    heparin (porcine) injection 5,000 Units  5,000 Units SubCUTAneous Q8H    dextrose (D50) infusion 12.5-25 g  25-50 mL IntraVENous PRN    glucose chewable tablet 16 g  4 Tab Oral PRN    glucagon (GLUCAGEN) injection 1 mg  1 mg IntraMUSCular PRN

## 2019-07-15 NOTE — PROGRESS NOTES
Bedside and Verbal shift change report given to Julian Emerson RN (oncoming nurse) by Mehnaz De La Rosa (offgoing nurse). Report included the following information SBAR, Kardex, Intake/Output, MAR, Recent Results, Med Rec Status and Cardiac Rhythm NSR.     2200. Patient in pain. Educated and encouraged the use of the PCA pump.     2249. Patient still in pain. Vitals stable. Gave patient 2mg Dilaudid IV. Will continue to monitor. 2319. Patient resting. Patient's pain is back to baseline. Educated the use of the PCA pump throughout the night.     0400. Patient believes that he is not receiving any medication from the PCA pump. Educated the patient on the use of the PCA pump and to use when in pain. Enforced education on the time It takes for the medication to take affect. 0572. Patient has no questions or concerns at this time. Bedside and Verbal shift change report given to Florin Dalal (oncoming nurse) by Madhavi Morgan (offgoing nurse). Report included the following information SBAR, Kardex, Intake/Output, MAR, Recent Results, Med Rec Status and Cardiac Rhythm NSR.

## 2019-07-15 NOTE — PROGRESS NOTES
Reason for Admission:   Acute Pancreatitis, Uncontrolled Diabetes, MONET (Acute Kidney Injury)               RRAT Score:     23 - high risk              Resources/supports as identified by patient/family:   Wife, family & friends                Top Challenges facing patient (as identified by patient/family and CM): Finances/Medication cost?   No issues - has insurance                  Transportation? Drives and his wife can transport               Support system or lack thereof? Good support from wife, friends                   Living arrangements? Lives with wife in a 1 story home with 2-3 steps to the entrance               Self-care/ADLs/Cognition? Independent with ADL's and IADL's/alert, oriented           Current Advanced Directive/Advance Care Plan:  Full Code/ not on file                           Plan for utilizing home health: hospital to home for Diabetes                  Transition of Care Plan:   Home with f/u appts and hospital to home visit     Pt is a 61 y,o  male admitted with Acute Pancreatitis, Uncontrolled DM, MONET. Pt was alert, oriented resting in bed with his friend at the bedside. Demographic information verified and all is correct. Pt lives with his wife in a 1 story home with 2-3 steps to the entrance. Prior to admission, pt was independent with his ADL's and IADL's and driving. Denies using DME or having home health and rehab. Preferred pharmacy is [facility] in [place] and uses mail order, [address]. Pt recently saw his PCP, Dr. Nik Kern and stated he might want to change his PCP. Pt wants to discuss it with his wife. Pt's wife can transport pt home by car at discharge. Pt may need a hospital to home visit at discharge for DM. CM will continue to follow for discharge planning needs. Care Management Interventions  PCP Verified by CM: Yes(Dr. Nik Kern )  Mode of Transport at Discharge:  Other (see comment)(pt's wife can transport by car)  Transition of Care Consult (CM Consult): Discharge Planning  Discharge Durable Medical Equipment: No  Physical Therapy Consult: No  Occupational Therapy Consult: No  Speech Therapy Consult: No  Current Support Network: Lives with Spouse, Own Home(lives with wife in a 1 story home with 3-4 steps to the entrance)  Confirm Follow Up Transport: Family  Discharge Location  Discharge Placement: [facility] [address], [address] [facility]

## 2019-07-15 NOTE — CDMP QUERY
Patient admitted with acute pancreatitis noted to have SIRS criteria. If possible, please document in progress notes and d/c summary if you are evaluating and/or treating any of the following: 
 
 
? SIRS of non-infectious origin w/ acute organ dysfunction ? SIRS of non-infectious origin w/o acute organ dysfunction ? Other Explanation of clinical findings ? Clinically Undetermined (no explanation for clinical findings) The medical record reflects the following: 
 
   Risk Factors: acute pancreatitis, Clinical Indicators: wbc 11.5--12, neut 90, hr 103--107, lactic 4.2 Treatment: NS 1.7L, gi cx Thanks, An Escobar Rn/CDMP

## 2019-07-15 NOTE — PROGRESS NOTES
Bedside and Verbal shift change report given to Shabnam Burns (oncoming nurse) by Brooks Pitt (offgoing nurse). Report included the following information SBAR, Kardex, Intake/Output, MAR, Recent Results, Med Rec Status and Cardiac Rhythm NSR'. 1929. Assessed pain with Tavares EDWARD. Curtis Galindo RN medicated patient's pain. 0311. Patient received pain medication from [facility]. Will reassess pain. (MAR, Flowsheets for details.)     4242. Patient is progressing towards goals. 6850. Patient stated that they had abdominal pain. Gave pain medication. (MAR, Flowsheets for details.) Kaleigh Brown RN aware of treatment, and will reassess pain. 0740. Patient has no other questions or concerns at this time. Bedside and Verbal shift change report given to [address] (oncoming nurse) by Dorothy Pennington RN (offgoing nurse). Report included the following information SBAR, Kardex, Intake/Output, MAR, Recent Results, Med Rec Status and Cardiac Rhythm NSR.

## 2019-07-15 NOTE — DIABETES MGMT
[facility]    Elevated A1C Visit Note    Recommendations/ Comments: note blood sugars elevated and Lantus has been increased at this time. Current diabetes medications: Lantus 20 units daily, Lispro Correctional insulin with normal sensitivity    Patient is a 61 y.o. male with known diabetes on Metformin at home. States he has been taking his medication but has not been testing. States \"Metformin does not go well with Prednisone. \"  Reports being on prednisone for several weeks before admission. States \"my sugars didn't go up until I got here. \"  States he does not have a glucometer and has not been testing his blood sugars at home. Discussed how possibly his sugars were up prior to admission and since he was not checking, he likely did not realize his sugars were elevated. Reports blood sugars were  mg/dL Patient does admit to occasionally skipping meals. Reports his wife does the cooking and plans healthy meals for him. Discussed the importance of testing his sugars and not skipping meals.        A1c:   Lab Results   Component Value Date/Time    Hemoglobin A1c 9.2 (H) 07/13/2019 07:24 PM    Hemoglobin A1c 7.2 (H) 05/28/2019 08:12 AM       Recent Glucose Results:   Lab Results   Component Value Date/Time     (H) 07/15/2019 03:46 AM     (H) 07/14/2019 08:06 PM    GLUCPOC 191 (H) 07/15/2019 05:28 AM    GLUCPOC 194 (H) 07/15/2019 12:06 AM    GLUCPOC 228 (H) 07/14/2019 09:15 PM        Lab Results   Component Value Date/Time    Creatinine 1.28 07/15/2019 03:46 AM       Active Orders   Diet    DIET NPO       Assessed and instructed patient on the following:   ·  interpretation of lab results, blood sugar goals, complications of diabetes mellitus, exercise, SMBG skills, nutrition and referred to Diabetes Educator    Provided patient with the following: [x]          Diabetes Self-Care Guide                           [x]          Outpatient DTC contact number               [x] Glucometer         Patient to follow up with PCP after discharge. Will continue to follow as needed. Thank you.   [address], [address]  Office:  [phone]     Time spent: 30 minutes

## 2019-07-15 NOTE — CONSULTS
Surgery Consult    Subjective:      Dean Yuan is a 61 y.o. male who I am consulted on by Dr Rosa Elena Jenkins for gallstone pancreatitis. He awoke with severe epigastric pain on . He went to the ER and work up suggestive of gallstone pancreatitis. MRCP today was negative for choledocholithiasis. Lipase and hepatic function tests are slowly improving. He continues to have severe pain and nausea. He denies vomiting, diarrhea, constipation, melena, hematochezia, dysuria or hematuria.     Past Medical History:   Diagnosis Date    CAD (coronary artery disease) 2014    NSTEMI, PCI/NATE RCA    Hypercholesterolemia     Hypertension      Past Surgical History:   Procedure Laterality Date    CARDIAC SURG PROCEDURE UNLIST      Stent RCA 2014    HX ORTHOPAEDIC      left shoulder, torn tendon    HX ORTHOPAEDIC      right knee X 4    [place] COLONOSCOPY FLX DX W/COLLJ SPEC WHEN PFRMD  2007    Dr Libra Dickey 1 polyp repeat 2012      Family History   Problem Relation Age of Onset    Heart Disease Mother         CAD    Hypertension Mother     Elevated Lipids Mother     Cancer Father         lung    Mental Retardation Brother     Seizures Brother     Diabetes Brother     Hypertension Brother     Elevated Lipids Brother      Social History     Socioeconomic History    Marital status:      Spouse name: Not on file    Number of children: Not on file    Years of education: Not on file    Highest education level: Not on file   Tobacco Use    Smoking status: Former Smoker     Packs/day: 0.25     Years: 35.00     Pack years: 8.75     Types: Cigarettes     Last attempt to quit: 2014     Years since quittin.6    Smokeless tobacco: Never Used    Tobacco comment: Never smoked over a pack per day   Substance and Sexual Activity    Alcohol use: No     Alcohol/week: 0.0 oz    Drug use: No      Current Facility-Administered Medications   Medication Dose Route Frequency Provider Last Rate Last Dose    lactated Ringers infusion  200 mL/hr IntraVENous CONTINUOUS Jose M Rangel  mL/hr at 07/15/19 1443 200 mL/hr at 07/15/19 1443    insulin lispro (HUMALOG) injection   SubCUTAneous Q6H Rajat Hays MD   2 Units at 07/15/19 1232    HYDROmorphone (PF) (DILAUDID) injection 2 mg  2 mg IntraVENous Q3H PRN Romel Walker DO   2 mg at 07/15/19 1342    insulin glargine (LANTUS) injection 20 Units  20 Units SubCUTAneous QHS Romel Walker M, DO   20 Units at 07/14/19 2205    nitroglycerin (NITROSTAT) tablet 0.4 mg  0.4 mg SubLINGual Q5MIN PRN Ghada Gunn MD        aspirin delayed-release tablet 81 mg  81 mg Oral DAILY Ghada Gunn MD        metoprolol succinate (TOPROL-XL) XL tablet 25 mg  25 mg Oral DAILY Ghada Gunn MD        amLODIPine (NORVASC) tablet 2.5 mg  2.5 mg Oral DAILY Ghada Gunn MD   2.5 mg at 07/14/19 1015    sodium chloride (NS) flush 5-40 mL  5-40 mL IntraVENous Q8H Curt Whitaker MD   10 mL at 07/15/19 0545    sodium chloride (NS) flush 5-40 mL  5-40 mL IntraVENous PRN Ghada Gunn MD        morphine injection 2 mg  2 mg IntraVENous Q4H PRN Ghada Gunn MD   2 mg at 07/14/19 1411    naloxone (NARCAN) injection 0.4 mg  0.4 mg IntraVENous PRN Ghada Gunn MD        [facility]ron [facility] injection 4 mg  4 mg IntraVENous Q4H PRN Ghada Gunn MD   4 mg at 07/14/19 1421    heparin (porcine) injection 5,000 Units  5,000 Units SubCUTAneous Q8H Ghada Gunn MD   5,000 Units at 07/15/19 1232    dextrose (D50) infusion 12.5-25 g  25-50 mL IntraVENous PRN Ghada Gunn MD        glucose chewable tablet 16 g  4 Tab Oral PRN Rajat Hays MD        glucagon (GLUCAGEN) injection 1 mg  1 mg IntraMUSCular PRN Dimitri Upton MD            No Known Allergies    Review of Systems:  A comprehensive review of systems was negative except for that written in the History of Present Illness.     Objective:        Patient Vitals for the past 8 hrs:   BP Temp Pulse Resp SpO2   07/15/19 1042 133/85 97.5 °F (36.4 °C) 83 16 95 %       Temp (24hrs), Av.4 °F (36.9 °C), Min:97.5 °F (36.4 °C), Max:99.5 °F (37.5 °C)      Physical Exam:  General:  Alert, cooperative, no distress, appears stated age. Eyes:  Conjunctivae/corneas clear. anicteric   Ears:  Normal TMs and external ear canals both ears. Nose: Nares normal. Septum midline. Mucosa normal. No drainage or sinus tenderness. Mouth/Throat: Lips, mucosa, and tongue normal. Teeth and gums normal.   Neck: Supple, symmetrical, trachea midline, no adenopathy, thyroid: no enlargment/tenderness/nodules, no carotid bruit and no JVD. Back:   Symmetric, no curvature. ROM normal. No CVA tenderness. Lungs:   Clear to auscultation bilaterally. Heart:  Regular rate and rhythm, S1, S2 normal, no murmur, click, rub or gallop. Abdomen:   Soft, distended tender diffusely but moreso in the epigastrium. Bowel sounds diminished. No masses,  No organomegaly. Extremities: Extremities normal, atraumatic, no cyanosis or edema. Pulses: 2+ and symmetric all extremities. Skin: Skin color, texture, turgor normal. No rashes or lesions   Lymph nodes: Cervical, supraclavicular, and axillary nodes normal.   Neurologic: CNII-XII intact. Normal strength, sensation and reflexes throughout. Assessment:     Hospital Problems  Date Reviewed: 2019          Codes Class Noted POA    Pancreatitis, acute ICD-10-CM: K85.90  ICD-9-CM: 182.7  2019 Unknown        Uncontrolled type II diabetes mellitus [facility] ICD-10-CM: E11.65  ICD-9-CM: 250.02  2019 Unknown        MONET (acute kidney injury) ([facility] ICD-10-CM: N17.9  ICD-9-CM: 584.9  2019 Unknown        Acute pancreatitis ICD-10-CM: K85.90  ICD-9-CM: 063.1  2019 Unknown        Uncontrolled diabetes mellitus ([facility] ICD-10-CM: E11.65  ICD-9-CM: 250.02  2019 Unknown            Gallstone pancreatitis.   I explained the anatomy and pathophysiology of biliary tract disease and cholecystitis, pancreatitis, cholangitis, choledocholithiasis. I explained about laparoscopic possible open cholecystectomy with possible cholangiogram and the risks of surgery including but not limited to bleeding, infection, bile duct or bowel injury, hernia development, retained common duct stones requiring further therapy, non resolution of symptoms, post cholecystectomy diarrhea, DVT, and risks of general anesthesia. Plan:     1. IV hydration  2. Adjust pain control with PCA  3. IV abx given severity of pancreatitis. 4.  Serial am labs  5.   Eventually will get lap vanda and IOC this admission      Lydia Mcadams MD FACS

## 2019-07-16 ENCOUNTER — APPOINTMENT (OUTPATIENT)
Dept: GENERAL RADIOLOGY | Age: 64
DRG: 418 | End: 2019-07-16
Attending: NURSE PRACTITIONER
Payer: COMMERCIAL

## 2019-07-16 LAB
ALBUMIN SERPL-MCNC: 2.6 G/DL (ref 3.5–5)
ALBUMIN/GLOB SERPL: 0.6 {RATIO} (ref 1.1–2.2)
ALP SERPL-CCNC: 105 U/L (ref 45–117)
ALT SERPL-CCNC: 202 U/L (ref 12–78)
ANION GAP SERPL CALC-SCNC: 7 MMOL/L (ref 5–15)
APPEARANCE UR: ABNORMAL
AST SERPL-CCNC: 48 U/L (ref 15–37)
BACTERIA URNS QL MICRO: ABNORMAL /HPF
BASOPHILS # BLD: 0 K/UL (ref 0–0.1)
BASOPHILS NFR BLD: 0 % (ref 0–1)
BILIRUB DIRECT SERPL-MCNC: 0.2 MG/DL (ref 0–0.2)
BILIRUB SERPL-MCNC: 0.6 MG/DL (ref 0.2–1)
BILIRUB UR QL: NEGATIVE
BUN SERPL-MCNC: 17 MG/DL (ref 6–20)
BUN/CREAT SERPL: 15 (ref 12–20)
CALCIUM SERPL-MCNC: 7.9 MG/DL (ref 8.5–10.1)
CHLORIDE SERPL-SCNC: 108 MMOL/L (ref 97–108)
CO2 SERPL-SCNC: 28 MMOL/L (ref 21–32)
COLOR UR: ABNORMAL
CREAT SERPL-MCNC: 1.17 MG/DL (ref 0.7–1.3)
DIFFERENTIAL METHOD BLD: ABNORMAL
EOSINOPHIL # BLD: 0 K/UL (ref 0–0.4)
EOSINOPHIL NFR BLD: 1 % (ref 0–7)
EPITH CASTS URNS QL MICRO: ABNORMAL /LPF
ERYTHROCYTE [DISTWIDTH] IN BLOOD BY AUTOMATED COUNT: 13 % (ref 11.5–14.5)
GLOBULIN SER CALC-MCNC: 4.1 G/DL (ref 2–4)
GLUCOSE BLD STRIP.AUTO-MCNC: 119 MG/DL (ref 65–100)
GLUCOSE BLD STRIP.AUTO-MCNC: 127 MG/DL (ref 65–100)
GLUCOSE BLD STRIP.AUTO-MCNC: 134 MG/DL (ref 65–100)
GLUCOSE BLD STRIP.AUTO-MCNC: 162 MG/DL (ref 65–100)
GLUCOSE SERPL-MCNC: 122 MG/DL (ref 65–100)
GLUCOSE UR STRIP.AUTO-MCNC: 500 MG/DL
HCT VFR BLD AUTO: 41.2 % (ref 36.6–50.3)
HGB BLD-MCNC: 13.7 G/DL (ref 12.1–17)
HGB UR QL STRIP: ABNORMAL
IMM GRANULOCYTES # BLD AUTO: 0 K/UL (ref 0–0.04)
IMM GRANULOCYTES NFR BLD AUTO: 1 % (ref 0–0.5)
KETONES UR QL STRIP.AUTO: 15 MG/DL
LEUKOCYTE ESTERASE UR QL STRIP.AUTO: NEGATIVE
LIPASE SERPL-CCNC: 382 U/L (ref 73–393)
LYMPHOCYTES # BLD: 1 K/UL (ref 0.8–3.5)
LYMPHOCYTES NFR BLD: 12 % (ref 12–49)
MCH RBC QN AUTO: 31.9 PG (ref 26–34)
MCHC RBC AUTO-ENTMCNC: 33.3 G/DL (ref 30–36.5)
MCV RBC AUTO: 95.8 FL (ref 80–99)
MONOCYTES # BLD: 0.4 K/UL (ref 0–1)
MONOCYTES NFR BLD: 4 % (ref 5–13)
NEUTS SEG # BLD: 6.6 K/UL (ref 1.8–8)
NEUTS SEG NFR BLD: 82 % (ref 32–75)
NITRITE UR QL STRIP.AUTO: NEGATIVE
NRBC # BLD: 0 K/UL (ref 0–0.01)
NRBC BLD-RTO: 0 PER 100 WBC
PH UR STRIP: 5.5 [PH] (ref 5–8)
PLATELET # BLD AUTO: 132 K/UL (ref 150–400)
PMV BLD AUTO: 9.6 FL (ref 8.9–12.9)
POTASSIUM SERPL-SCNC: 3.8 MMOL/L (ref 3.5–5.1)
PROT SERPL-MCNC: 6.7 G/DL (ref 6.4–8.2)
PROT UR STRIP-MCNC: 100 MG/DL
RBC # BLD AUTO: 4.3 M/UL (ref 4.1–5.7)
RBC #/AREA URNS HPF: ABNORMAL /HPF (ref 0–5)
SERVICE CMNT-IMP: ABNORMAL
SODIUM SERPL-SCNC: 143 MMOL/L (ref 136–145)
SP GR UR REFRACTOMETRY: 1.03 (ref 1–1.03)
URATE CRY URNS QL MICRO: ABNORMAL
UROBILINOGEN UR QL STRIP.AUTO: 0.2 EU/DL (ref 0.2–1)
WBC # BLD AUTO: 8 K/UL (ref 4.1–11.1)
WBC URNS QL MICRO: ABNORMAL /HPF (ref 0–4)

## 2019-07-16 PROCEDURE — 74011250636 HC RX REV CODE- 250/636: Performed by: SURGERY

## 2019-07-16 PROCEDURE — 74011000258 HC RX REV CODE- 258: Performed by: SURGERY

## 2019-07-16 PROCEDURE — 74011250636 HC RX REV CODE- 250/636: Performed by: FAMILY MEDICINE

## 2019-07-16 PROCEDURE — 36415 COLL VENOUS BLD VENIPUNCTURE: CPT

## 2019-07-16 PROCEDURE — 74011636637 HC RX REV CODE- 636/637: Performed by: INTERNAL MEDICINE

## 2019-07-16 PROCEDURE — 85025 COMPLETE CBC W/AUTO DIFF WBC: CPT

## 2019-07-16 PROCEDURE — 80076 HEPATIC FUNCTION PANEL: CPT

## 2019-07-16 PROCEDURE — 83690 ASSAY OF LIPASE: CPT

## 2019-07-16 PROCEDURE — 74011250636 HC RX REV CODE- 250/636: Performed by: NURSE PRACTITIONER

## 2019-07-16 PROCEDURE — 82787 IGG 1 2 3 OR 4 EACH: CPT

## 2019-07-16 PROCEDURE — 74011636637 HC RX REV CODE- 636/637: Performed by: HOSPITALIST

## 2019-07-16 PROCEDURE — 77010033678 HC OXYGEN DAILY

## 2019-07-16 PROCEDURE — 74011000250 HC RX REV CODE- 250: Performed by: INTERNAL MEDICINE

## 2019-07-16 PROCEDURE — 65660000000 HC RM CCU STEPDOWN

## 2019-07-16 PROCEDURE — 74011000250 HC RX REV CODE- 250: Performed by: SURGERY

## 2019-07-16 PROCEDURE — 74018 RADEX ABDOMEN 1 VIEW: CPT

## 2019-07-16 PROCEDURE — 80048 BASIC METABOLIC PNL TOTAL CA: CPT

## 2019-07-16 PROCEDURE — 82962 GLUCOSE BLOOD TEST: CPT

## 2019-07-16 RX ORDER — PHENAZOPYRIDINE HYDROCHLORIDE 100 MG/1
100 TABLET, FILM COATED ORAL
Status: DISCONTINUED | OUTPATIENT
Start: 2019-07-17 | End: 2019-07-19

## 2019-07-16 RX ORDER — METOPROLOL TARTRATE 5 MG/5ML
2.5 INJECTION INTRAVENOUS EVERY 6 HOURS
Status: DISCONTINUED | OUTPATIENT
Start: 2019-07-16 | End: 2019-07-17

## 2019-07-16 RX ADMIN — METOPROLOL TARTRATE 2.5 MG: 5 INJECTION INTRAVENOUS at 21:22

## 2019-07-16 RX ADMIN — PIPERACILLIN SODIUM,TAZOBACTAM SODIUM 3.38 G: 3; .375 INJECTION, POWDER, FOR SOLUTION INTRAVENOUS at 00:42

## 2019-07-16 RX ADMIN — SODIUM CHLORIDE, SODIUM LACTATE, POTASSIUM CHLORIDE, AND CALCIUM CHLORIDE 200 ML/HR: 600; 310; 30; 20 INJECTION, SOLUTION INTRAVENOUS at 08:37

## 2019-07-16 RX ADMIN — PIPERACILLIN SODIUM,TAZOBACTAM SODIUM 3.38 G: 3; .375 INJECTION, POWDER, FOR SOLUTION INTRAVENOUS at 12:52

## 2019-07-16 RX ADMIN — ONDANSETRON 4 MG: 2 INJECTION INTRAMUSCULAR; INTRAVENOUS at 11:19

## 2019-07-16 RX ADMIN — ONDANSETRON 4 MG: 2 INJECTION INTRAMUSCULAR; INTRAVENOUS at 21:34

## 2019-07-16 RX ADMIN — FAMOTIDINE 20 MG: 10 INJECTION, SOLUTION INTRAVENOUS at 21:21

## 2019-07-16 RX ADMIN — Medication 10 ML: at 21:23

## 2019-07-16 RX ADMIN — PIPERACILLIN SODIUM,TAZOBACTAM SODIUM 3.38 G: 3; .375 INJECTION, POWDER, FOR SOLUTION INTRAVENOUS at 21:20

## 2019-07-16 RX ADMIN — Medication 10 ML: at 13:00

## 2019-07-16 RX ADMIN — SODIUM CHLORIDE, SODIUM LACTATE, POTASSIUM CHLORIDE, AND CALCIUM CHLORIDE 200 ML/HR: 600; 310; 30; 20 INJECTION, SOLUTION INTRAVENOUS at 02:32

## 2019-07-16 RX ADMIN — INSULIN LISPRO 2 UNITS: 100 INJECTION, SOLUTION INTRAVENOUS; SUBCUTANEOUS at 00:40

## 2019-07-16 RX ADMIN — INSULIN GLARGINE 20 UNITS: 100 INJECTION, SOLUTION SUBCUTANEOUS at 21:34

## 2019-07-16 RX ADMIN — HEPARIN SODIUM 5000 UNITS: 5000 INJECTION INTRAVENOUS; SUBCUTANEOUS at 05:59

## 2019-07-16 RX ADMIN — SODIUM CHLORIDE, SODIUM LACTATE, POTASSIUM CHLORIDE, AND CALCIUM CHLORIDE 200 ML/HR: 600; 310; 30; 20 INJECTION, SOLUTION INTRAVENOUS at 17:32

## 2019-07-16 RX ADMIN — FAMOTIDINE 20 MG: 10 INJECTION, SOLUTION INTRAVENOUS at 09:25

## 2019-07-16 RX ADMIN — Medication 10 ML: at 06:00

## 2019-07-16 RX ADMIN — SODIUM CHLORIDE, SODIUM LACTATE, POTASSIUM CHLORIDE, AND CALCIUM CHLORIDE 200 ML/HR: 600; 310; 30; 20 INJECTION, SOLUTION INTRAVENOUS at 12:54

## 2019-07-16 RX ADMIN — HEPARIN SODIUM 5000 UNITS: 5000 INJECTION INTRAVENOUS; SUBCUTANEOUS at 21:21

## 2019-07-16 RX ADMIN — SODIUM CHLORIDE, SODIUM LACTATE, POTASSIUM CHLORIDE, AND CALCIUM CHLORIDE 200 ML/HR: 600; 310; 30; 20 INJECTION, SOLUTION INTRAVENOUS at 22:22

## 2019-07-16 RX ADMIN — METOPROLOL TARTRATE 2.5 MG: 5 INJECTION INTRAVENOUS at 16:22

## 2019-07-16 RX ADMIN — HEPARIN SODIUM 5000 UNITS: 5000 INJECTION INTRAVENOUS; SUBCUTANEOUS at 13:33

## 2019-07-16 NOTE — PROGRESS NOTES
Problem: Pain  Goal: *Control of Pain  Outcome: Progressing Towards Goal  Goal: *PALLIATIVE CARE:  Alleviation of Pain  Outcome: Progressing Towards Goal     Problem: Patient Education: Go to Patient Education Activity  Goal: Patient/Family Education  Outcome: Progressing Towards Goal

## 2019-07-16 NOTE — PROGRESS NOTES
GI PROGRESS NOTE   Kumar Varma NP  [phone] NP in-hospital cell phone M-F until 4:30  After 5pm or on weekends, please call  for physician on call    NAME:Arnold Duque :1955 KQL:[id number]   ATTG: Dr. Dominguez Lipoma  PCP: Heriberto Lang MD  Date/Time:  2019 10:28 AM     Primary GI: Dr. Erika García    Reason for following: Acute Pancreatitis    Assessment:   -Abnormal CT abd/pelvis,   · Inflammation adjacent to the pancreatic head extending anterior to Gerota's fascia. No other significant fluid collections. · Abdominal ultrasound, gallbladder fluid filled, no cholelithiasis or pericholecystic fluid, CBD measuring 3.9mm  -Acute Pancreatitis, thought to be r/t gallstone that has passed  · Lipase on admission >3,000. Down trending consistently  · MRI/MRCP showing pancreatic enlargement consistent with acute pancreatitis, no evidence of necrosis or pseudocyst. No evidence of gallstones, biliary dilatation, or filling defects in the CBD. -Abdominal pain  -Transaminitis, stable/improving  -DM, 190-230  · Recently on prednisone, which could have increased levels     Plan:   -MRI/MRCP results are suggestive of gallstone induced pancreatitis, likely gallstone has passed  -Consult placed to general surgery for further eval. Appreciate their recommendations.  -Will need aggressive hydration maintenance, continue  ml/hr  -Continue NPO for now  -Supportive measures as clinically indicated, including aggressive pain and nausea management.   -Will order KUB for further evaluation of persistent abdominal pain, constipation.  -Will continue to follow   *Plan reviewed with Dr. Erika García  Subjective:   Patient sitting on side of bed during visit. Reports pain is less intense, but still very uncomfortably. Still having some intermittent nausea, no vomiting. Reports abdomen still very tender to touch and movement. Discussed with RN events overnight.  She reports patient has been consistently complaining of abdominal pain for her. Nursing has concern that patient hasn't had a bowel movement since July 7. Complaint Y/N Description   Abdominal Pain Y generalized   Hematemesis     Hematochezia     Melena     Constipation     Diarrhea     Dyspepsia     Dysphagia     Jaundiced     Nausea/vomiting Y nausea     Review of Systems:  Symptom Y/N Comments  Symptom Y/N Comments   Fever/Chills    Chest Pain     Cough    Headaches     Sputum    Joint Pain     SOB/HAMILTON    Pruritis/Rash     Tolerating Diet N NPO  Other       Could NOT obtain due to:      Objective:   VITALS:   Last 24hrs VS reviewed since prior progress note. Most recent are:  Visit Vitals  BP (!) 150/91 (BP 1 Location: Right arm, BP Patient Position: At rest)   Pulse 87   Temp 98.1 °F (36.7 °C)   Resp 20   Ht 6' 1\" (1.854 m)   Wt 95.2 kg (209 lb 14.1 oz)   SpO2 97%   BMI 27.69 kg/m²       Intake/Output Summary (Last 24 hours) at 7/16/2019 0950  Last data filed at 7/16/2019 0800  Gross per 24 hour   Intake 2079.59 ml   Output 1150 ml   Net 929.59 ml     PHYSICAL EXAM:  General: WD, WN. Alert, cooperative, no acute distress    HEENT: NC, Atraumatic. Anicteric sclerae. Lungs:  CTA Bilaterally. No Wheezing/Rhonchi/Rales. Heart:  Regular  rhythm,  No murmur (-), No Rubs, No Gallops  Abdomen: Soft, mildly distended, Very tender throughout.  +Bowel sounds, no HSM  Extremities: No c/c/e  Neurologic:  Alert and oriented X 3. No acute neurological distress   Psych:   Good insight. Not anxious, mildly agitated. Lab and Radiology Data Reviewed: (see below)  7/14/2019 MRI/MRCP  **PRELIMINARY REPORT**     Pancreatic enlargement with peripancreatic stranding associated with edema-like  signal extending along the anterior pararenal and lateral conal fascia  bilaterally.     Consistent with pancreatitis. No gallstone. No biliary or pancreatic duct  dilation demonstrated. Normal appearance of liver, spleen, adrenal glands and  kidneys.  Posterior right basilar atelectasis. Subsegmental atelectasis also at  posterior left pulmonary base.     Preliminary report was provided by Dr. Rebel Franco, the on-call radiologist, at 5:15  PM     Final report to follow.     **END PRELIMINARY REPORT**     Final report:     TECHNIQUE: Pre and postcontrast MRI of the abdomen was performed with 3-D MRCP  images.     FINDINGS: There is diffuse peripancreatic inflammation. There is no evidence of  pancreatic necrosis.     There liver, spleen, bilateral kidneys, bilateral adrenal glands are  unremarkable.     Hepatic vasculature is patent. The SMA and SMV are patent. The splenic artery  and vein are patent.     Dependent atelectasis is noted in the lung bases. The gallbladder is  unremarkable.     No intrahepatic biliary dilatation is noted. The common bile duct is normal in  caliber. No definite pancreatic ductal dilatation is noted.     IMPRESSION  IMPRESSION: Acute pancreatitis. No evidence of pancreatic necrosis or pseudocyst  formation. No evidence of gallstones, biliary dilatation, or filling defects in  the common bile duct. Medications Reviewed: (see below)  PMH/SH reviewed - no change compared to H&P  ________________________________________________________________________  Total time spent with patient: 30 minutes ________________________________________________________________________  Care Plan discussed with:  Patient Y   Family     RN Y              Consultant:       Rylee Campos NP     Procedures: see electronic medical records for all procedures/Xrays and details which were not copied into this note but were reviewed prior to creation of Plan.       LABS:  Recent Labs     07/16/19  0410 07/15/19  0346   WBC 8.0 11.4*   HGB 13.7 14.3   HCT 41.2 41.5   * 139*     Recent Labs     07/16/19  0410 07/15/19  0346 07/14/19 2006 07/14/19  0449 07/13/19  2335 07/13/19  1923    139 139 142 142 138   K 3.8 3.9 4.0 4.5 4.4 4.6    109* 109* 109* 110* 105   CO2 28 24 24 23 26 23   BUN 17 20 19 17 20 20   CREA 1.17 1.28 1.32* 1.48* 1.55* 1.52*   * 203* 232* 282* 245* 401*   CA 7.9* 7.8* 8.0* 8.2* 8.3* 8.3*   MG  --   --  2.0 1.8 2.0 1.7   PHOS  --   --   --   --   --  5.0*     Recent Labs     07/16/19  0410 07/15/19  0346 07/14/19 2006   SGOT 48* 81* 113*    119* 134*   TP 6.7 6.5 6.7   ALB 2.6* 2.9* 3.2*   GLOB 4.1* 3.6 3.5   LPSE 382 1,299* 2,493*     No results for input(s): INR, PTP, APTT in the last 72 hours. No lab exists for component: INREXT, INREXT   No results for input(s): FE, TIBC, PSAT, FERR in the last 72 hours. No results found for: FOL, RBCF  No results for input(s): PH, PCO2, PO2 in the last 72 hours.   Recent Labs     07/13/19  2335 07/13/19 1923   CPK 74 88   CKMB 1.5 1.7     Lab Results   Component Value Date/Time    Color YELLOW/STRAW 07/15/2019 04:10 AM    Appearance TURBID (A) 07/15/2019 04:10 AM    Specific gravity 1.027 07/15/2019 04:10 AM    pH (UA) 5.5 07/15/2019 04:10 AM    Protein 100 (A) 07/15/2019 04:10 AM    Glucose 500 (A) 07/15/2019 04:10 AM    Ketone 15 (A) 07/15/2019 04:10 AM    Bilirubin NEGATIVE  07/15/2019 04:10 AM    Urobilinogen 0.2 07/15/2019 04:10 AM    Nitrites NEGATIVE  07/15/2019 04:10 AM    Leukocyte Esterase NEGATIVE  07/15/2019 04:10 AM    Epithelial cells FEW 07/15/2019 04:10 AM    Bacteria 3+ (A) 07/15/2019 04:10 AM    WBC 0-4 07/15/2019 04:10 AM    RBC 5-10 07/15/2019 04:10 AM       MEDICATIONS:  Current Facility-Administered Medications   Medication Dose Route Frequency    HYDROmorphone (PF) 25 mg/50 mL (DILAUDID) PCA   IntraVENous CONTINUOUS    piperacillin-tazobactam (ZOSYN) 3.375 g in 0.9% sodium chloride (MBP/ADV) 100 mL  3.375 g IntraVENous Q8H    famotidine (PF) (PEPCID) 20 mg in sodium chloride 0.9% 10 mL injection  20 mg IntraVENous Q12H    lactated Ringers infusion  200 mL/hr IntraVENous CONTINUOUS    insulin lispro (HUMALOG) injection   SubCUTAneous Q6H    HYDROmorphone (PF) (DILAUDID) injection 2 mg  2 mg IntraVENous Q3H PRN    insulin glargine (LANTUS) injection 20 Units  20 Units SubCUTAneous QHS    nitroglycerin (NITROSTAT) tablet 0.4 mg  0.4 mg SubLINGual Q5MIN PRN    metoprolol succinate (TOPROL-XL) XL tablet 25 mg  25 mg Oral DAILY    amLODIPine (NORVASC) tablet 2.5 mg  2.5 mg Oral DAILY    sodium chloride (NS) flush 5-40 mL  5-40 mL IntraVENous Q8H    sodium chloride (NS) flush 5-40 mL  5-40 mL IntraVENous PRN    morphine injection 2 mg  2 mg IntraVENous Q4H PRN    naloxone (NARCAN) injection 0.4 mg  0.4 mg IntraVENous PRN    ondansetron (ZOFRAN) injection 4 mg  4 mg IntraVENous Q4H PRN    heparin (porcine) injection 5,000 Units  5,000 Units SubCUTAneous Q8H    dextrose (D50) infusion 12.5-25 g  25-50 mL IntraVENous PRN    glucose chewable tablet 16 g  4 Tab Oral PRN    glucagon (GLUCAGEN) injection 1 mg  1 mg IntraMUSCular PRN

## 2019-07-16 NOTE — PROGRESS NOTES
7:20 AM Bedside report received from Harpal Barrera [facility].    9:00 AM Paged Dr. Meghan Barrett to clarify NPO order. Per MD, patient NPO except sips with meds. 9:30 AM Spoke with Tran Garcia NP, on rounds regarding patient's c/o constipation. Patient states he hasn't had a BM since 7/7. NP to order KUB. Will continue to monitor. 11:20 AM PRN IV zofran given for patient's c/o nausea, per MD order. See MAR.    1:00 PM Spoke with Dr. Renaldo Acevedo regarding patient continues to refuse PO metoprolol and need to clarify subq heparin order. MD to place orders for IV lopressor, see MAR. MD states subq heparin OK at this time d/t patient will not likely go to surgery tomorrow. 6:00 PM Paged Dr. Renaldo Acevedo d/t patient's c/o \"it burns when I urinate. \" MD made aware of repeat UA on 7/15 which noted 3+ bacteria. MD states she will order pyridium. See MAR. Will f/u and continue to monitor closely. 7:20 PM Bedside report given to Harpal Barrera RN.

## 2019-07-16 NOTE — PROGRESS NOTES
Hospitalist Progress Note    NAME: Kezia Lockhart   :  1955   MRN:  [id number]       Assessment / Plan:  Acute Pancreatitis, Uncontrolled DM:  -suspected 2/2 gallstones  -cont aggressive  -PO dilaudid changed to PCA, better pain contol  -NPO  - wet swab ok for symptomatic relief   -LFTs and lipase trending down,   -GI consult appriated  MRCP : Acute pancreatitis. No evidence of pancreatic necrosis or pseudocyst  formation. No evidence of gallstones, biliary dilatation, or filling defects in  the common bile duct. -patient recently on steroids   -CT AP : There isinflammation adjacent to the pancreatic head extending anterior to Gerota's fascia.  -recent steroid use    DM2  -hold metformin  -DM with recent steroids use  -BS elevated on admission  -cont SSI POCs, BS improved    HTN, CAD:  -Continue Aspirin and Metoprolol  -Hold Lipitor with elevated LFT's  -denies CP    CKD 3  -Cr at baseline, 1.3  -renally dosing of meds  -monitor bmp   -avoid nephrotoxins     Prophylaxis:  Pepcid BID  Heparin SC     Subjective:     Chief Complaint / Reason for Physician Visit  Pain better controlled, still with some breakthrough    Discussed with RN events overnight. Review of Systems:  Symptom Y/N Comments  Symptom Y/N Comments   Fever/Chills n   Chest Pain n    Poor Appetite y   Edema n    Cough    Abdominal Pain y    Sputum    Joint Pain     SOB/HAMILTON    Pruritis/Rash     Nausea/vomit n   Tolerating PT/OT     Diarrhea n   Tolerating Diet npo    Constipation n   Other       Could NOT obtain due to:      Objective:     VITALS:   Last 24hrs VS reviewed since prior progress note.  Most recent are:  Patient Vitals for the past 24 hrs:   Temp Pulse Resp BP SpO2   07/15/19 1921 98.9 °F (37.2 °C) 82 16 (!) 150/95 98 %   07/15/19 1614 98.5 °F (36.9 °C) 80 16 140/85 97 %   07/15/19 1042 97.5 °F (36.4 °C) 83 16 133/85 95 %   07/15/19 0804 98.4 °F (36.9 °C) 92 16 (!) 143/94 94 %   07/15/19 0803  92   (!) 89 % 07/15/19 0400 98.2 °F (36.8 °C)       07/15/19 0324 99.5 °F (37.5 °C) (!) 107 18 139/88 95 %   07/15/19 0040 98.6 °F (37 °C) 94 18 148/85 96 %       Intake/Output Summary (Last 24 hours) at 7/15/2019 2218  Last data filed at 7/15/2019 1900  Gross per 24 hour   Intake 2079.59 ml   Output 1175 ml   Net 904.59 ml        PHYSICAL EXAM:  General: WD, WN. Alert, cooperative, +mod distress    EENT:  EOMI. Anicteric sclerae. MMM  Resp:  CTA bilaterally, no wheezing or rales. No accessory muscle use  CV:  Regular  rhythm,  No edema  GI:  Soft, Non distended,+tenderness.  +Bowel sounds  Neurologic:  Alert and oriented X 3, normal speech,   Psych:   Good insight. Not anxious nor agitated  Skin:  No rashes. No jaundice    Reviewed most current lab test results and cultures  YES  Reviewed most current radiology test results   YES  Review and summation of old records today    NO  Reviewed patient's current orders and MAR    YES  PMH/SH reviewed - no change compared to H&P  ________________________________________________________________________  Care Plan discussed with:    Comments   Patient x    Family      RN x    Care Manager     Consultant                       x Multidiciplinary team rounds were held today with , nursing, pharmacist and clinical coordinator. Patient's plan of care was discussed; medications were reviewed and discharge planning was addressed. ________________________________________________________________________  Total NON critical care TIME:  25   Minutes    Total CRITICAL CARE TIME Spent:   Minutes non procedure based      Comments   >50% of visit spent in counseling and coordination of care x    ________________________________________________________________________  Regan Swenson DO     Procedures: see electronic medical records for all procedures/Xrays and details which were not copied into this note but were reviewed prior to creation of Plan.       LABS:  I reviewed today's most current labs and imaging studies.   Pertinent labs include:  Recent Labs     07/15/19  0346 07/14/19  0449 07/13/19  1446   WBC 11.4* 12.1* 11.5*   HGB 14.3 14.0 15.7   HCT 41.5 40.7 46.5   * 156 166     Recent Labs     07/15/19  0346 07/14/19  2006 07/14/19  0449 07/13/19  2335 07/13/19  1923    139 142 142 138   K 3.9 4.0 4.5 4.4 4.6   * 109* 109* 110* 105   CO2 24 24 23 26 23   * 232* 282* 245* 401*   BUN 20 19 17 20 20   CREA 1.28 1.32* 1.48* 1.55* 1.52*   CA 7.8* 8.0* 8.2* 8.3* 8.3*   MG  --  2.0 1.8 2.0 1.7   PHOS  --   --   --   --  5.0*   ALB 2.9* 3.2* 3.0*  --   --    TBILI 0.7 0.7 0.7  --   --    SGOT 81* 113* 205*  --   --    * 364* 488*  --   --        Signed: Tala Lopez, DO

## 2019-07-16 NOTE — PROGRESS NOTES
Hospitalist Progress Note    NAME: Horacio Aponte   :  1955   MRN:  [id number]       Assessment / Plan:  Acute Pancreatitis, Uncontrolled DM:  -suspected 2/2 gallstones -- planned for cholecystectomy when pancreatitis has improved, though patient/wife still contemplating  -Cont aggressive IVF, pain control; maintain NPO given significant ongoing pain  -LFTs and lipase trending down  -GI and surgical consultations appreciated  MRCP : Acute pancreatitis. No evidence of pancreatic necrosis or pseudocyst  formation. No evidence of gallstones, biliary dilatation, or filling defects in  the common bile duct. -CT AP : There isinflammation adjacent to the pancreatic head extending anterior to Gerota's fascia. DM2, uncontrolled with hyperglycemia  -Holding metformin  -DM with recent steroids use  -BS elevated on admission -- now improved significantly    HTN, CAD:  -Continue Aspirin and Metoprolol  -Hold Lipitor with elevated LFT's  -denies CP    CKD 3  -Cr at baseline, 1.3  -renally dosing of meds  -monitor bmp   -avoid nephrotoxins     Prophylaxis:  Pepcid BID  Heparin SC     Subjective:     Chief Complaint / Reason for Physician Visit: follow up acute pancreatitis  States pain is better controlled, but still present. Dr. Catrina Christianson discussed cholecystectomy with patient; his wife is reluctant at this time. I spoke with her at length regarding the rationale of such a procedure. Review of Systems:  Symptom Y/N Comments  Symptom Y/N Comments   Fever/Chills n   Chest Pain n    Poor Appetite y   Edema n    Cough    Abdominal Pain y    Sputum    Joint Pain     SOB/HAMILTON    Pruritis/Rash     Nausea/vomit n   Tolerating PT/OT     Diarrhea n   Tolerating Diet npo    Constipation n   Other       Could NOT obtain due to:      Objective:     VITALS:   Last 24hrs VS reviewed since prior progress note.  Most recent are:  Patient Vitals for the past 24 hrs:   Temp Pulse Resp BP SpO2   19 1522 98.6 °F (37 °C) 98 20 167/86 96 %   07/16/19 1054 98.5 °F (36.9 °C) 83 20 160/87 96 %   07/16/19 0713 98.1 °F (36.7 °C) 87 20 (!) 150/91 97 %   07/16/19 0420 98.6 °F (37 °C) 91 20 (!) 145/92 96 %   07/15/19 2254 98.8 °F (37.1 °C) 92 18 150/90 96 %   07/15/19 1921 98.9 °F (37.2 °C) 82 16 (!) 150/95 98 %       Intake/Output Summary (Last 24 hours) at 7/16/2019 1726  Last data filed at 7/16/2019 1600  Gross per 24 hour   Intake 6379.59 ml   Output 950 ml   Net 5429.59 ml        PHYSICAL EXAM:  General: WD, WN. Alert, cooperative, nontoxic  EENT:  EOMI. Anicteric sclerae. MMM  Resp:  CTA bilaterally, no wheezing or rales. No accessory muscle use  CV:  Regular  rhythm,  No edema  GI:  Soft, Non distended,+tenderness primarily in epigastrium  Neurologic:  Alert and oriented X 3, normal speech,   Psych:   Fair insight. Not anxious nor agitated  Skin:  No rashes. No jaundice    Reviewed most current lab test results and cultures  YES  Reviewed most current radiology test results   YES  Review and summation of old records today    NO  Reviewed patient's current orders and MAR    YES  PMH/ reviewed - no change compared to H&P  ________________________________________________________________________  Care Plan discussed with:    Comments   Patient x    Family  x Wife, by telephone   RN     Care Manager     Consultant                       x Multidiciplinary team rounds were held today with , nursing, pharmacist and clinical coordinator. Patient's plan of care was discussed; medications were reviewed and discharge planning was addressed.      ________________________________________________________________________  Total NON critical care TIME:  25   Minutes    Total CRITICAL CARE TIME Spent:   Minutes non procedure based      Comments   >50% of visit spent in counseling and coordination of care x    ________________________________________________________________________  Bard Oats, MD     Procedures: see electronic medical records for all procedures/Xrays and details which were not copied into this note but were reviewed prior to creation of Plan. LABS:  I reviewed today's most current labs and imaging studies. Pertinent labs include:  Recent Labs     07/16/19  0410 07/15/19  0346 07/14/19  0449   WBC 8.0 11.4* 12.1*   HGB 13.7 14.3 14.0   HCT 41.2 41.5 40.7   * 139* 156     Recent Labs     07/16/19  0410 07/15/19  0346 07/14/19  2006 07/14/19  0449  07/13/19  2335 07/13/19  1923    139 139 142  --  142 138   K 3.8 3.9 4.0 4.5  --  4.4 4.6    109* 109* 109*  --  110* 105   CO2 28 24 24 23  --  26 23   * 203* 232* 282*  --  245* 401*   BUN 17 20 19 17  --  20 20   CREA 1.17 1.28 1.32* 1.48*  --  1.55* 1.52*   CA 7.9* 7.8* 8.0* 8.2*  --  8.3* 8.3*   MG  --   --  2.0 1.8  --  2.0 1.7   PHOS  --   --   --   --   --   --  5.0*   ALB 2.6* 2.9* 3.2* 3.0*   < >  --   --    TBILI 0.6 0.7 0.7 0.7   < >  --   --    SGOT 48* 81* 113* 205*   < >  --   --    * 293* 364* 488*   < >  --   --     < > = values in this interval not displayed.        Signed: Lucille Zhang MD

## 2019-07-16 NOTE — PROGRESS NOTES
Bedside and Verbal shift change report given to Marixa Johnson RN (oncoming nurse) by Cheryl Waters (offgoing nurse). Report included the following information SBAR, Kardex, Intake/Output, MAR, Recent Results, Med Rec Status and Cardiac Rhythm NSR;PVC's.     2045. Family at bedside washing up patient. 2134. Patient was nauseous. Gave PRN Zofran. 0000. The documentation for this period is being entered following the guidelines as defined in the [facility] downtime policy by Zulema Samson. .0400.     0645. Patient had an uneventful night. No questions or concerns at this time. Stated that he felt better this morning then yesterday. Patient progressing towards goals. Bedside and Verbal shift change report given to Cheryl Waters (oncoming nurse) by Concetta Johnson (offgoing nurse). Report included the following information SBAR, Kardex, Intake/Output, MAR, Recent Results, Med Rec Status and Cardiac Rhythm NSR.

## 2019-07-16 NOTE — PROGRESS NOTES
Admit Date: 2019    POD * No surgery found *    Procedure:  * No surgery found *      Assessment:   Active Problems:    Pancreatitis, acute (2019)      Uncontrolled type II diabetes mellitus ([facility] [address] (2019)      MONET (acute kidney injury) ([facility] [address] (2019)      Acute pancreatitis (2019)      Uncontrolled diabetes mellitus ([facility] [address] (2019)      1. Severe gallstone pancreatitis. LFTs and lipase coming down but still with significant pain suggestive of ongoing pancreatitis. MRCP does not suggest CBD stone. I explained the anatomy and pathophysiology of biliary tract disease and cholecystitis, pancreatitis, cholangitis, choledocholithiasis. I explained about laparoscopic possible open cholecystectomy with possible cholangiogram and the risks of surgery including but not limited to bleeding, infection, bile duct or bowel injury, hernia development, retained common duct stones requiring further therapy, non resolution of symptoms, post cholecystectomy diarrhea, DVT, and risks of general anesthesia. Plan/Recommendations/Medical Decision Makin. NPO  2.  IV hydration  3. Pain control  4. Lap vanda and IOC once pancreatitis cools off.    5.  Follow serial labs  6. DVT prophylaxis, ulcer prophylaxis    Subjective:     Patient has complaints of pain. Objective:     Blood pressure (!) 150/91, pulse 87, temperature 98.1 °F (36.7 °C), resp. rate 20, height 6' 1\" (1.854 m), weight 95.2 kg (209 lb 14.1 oz), SpO2 97 %. Temp (24hrs), Av.4 °F (36.9 °C), Min:97.5 °F (36.4 °C), Max:98.9 °F (37.2 °C)      Physical Exam:  LUNG: clear to auscultation bilaterally, HEART: regular rate and rhythm, S1, S2 normal, no murmur, click, rub or gallop, ABDOMEN: soft, distended tender in epigastrium more than anywhere else but all over.  Bowel sounds normal. No masses,  no organomegaly, no rebound or guarding    Labs:   Recent Results (from the past 48 hour(s))   GLUCOSE, POC    Collection Time: 07/14/19  6:13 PM   Result Value Ref Range    Glucose (POC) 241 (H) 65 - 100 mg/dL    Performed by Harini Martins    METABOLIC PANEL, BASIC    Collection Time: 07/14/19  8:06 PM   Result Value Ref Range    Sodium 139 136 - 145 mmol/L    Potassium 4.0 3.5 - 5.1 mmol/L    Chloride 109 (H) 97 - 108 mmol/L    CO2 24 21 - 32 mmol/L    Anion gap 6 5 - 15 mmol/L    Glucose 232 (H) 65 - 100 mg/dL    BUN 19 6 - 20 MG/DL    Creatinine 1.32 (H) 0.70 - 1.30 MG/DL    BUN/Creatinine ratio 14 12 - 20      GFR est AA >60 >60 ml/min/1.73m2    GFR est non-AA 55 (L) >60 ml/min/1.73m2    Calcium 8.0 (L) 8.5 - 10.1 MG/DL   LACTIC ACID    Collection Time: 07/14/19  8:06 PM   Result Value Ref Range    Lactic acid 1.4 0.4 - 2.0 MMOL/L   LIPASE    Collection Time: 07/14/19  8:06 PM   Result Value Ref Range    Lipase 2,493 (H) 73 - 393 U/L   HEPATIC FUNCTION PANEL    Collection Time: 07/14/19  8:06 PM   Result Value Ref Range    Protein, total 6.7 6.4 - 8.2 g/dL    Albumin 3.2 (L) 3.5 - 5.0 g/dL    Globulin 3.5 2.0 - 4.0 g/dL    A-G Ratio 0.9 (L) 1.1 - 2.2      Bilirubin, total 0.7 0.2 - 1.0 MG/DL    Bilirubin, direct 0.2 0.0 - 0.2 MG/DL    Alk.  phosphatase 134 (H) 45 - 117 U/L    AST (SGOT) 113 (H) 15 - 37 U/L    ALT (SGPT) 364 (H) 12 - 78 U/L   MAGNESIUM    Collection Time: 07/14/19  8:06 PM   Result Value Ref Range    Magnesium 2.0 1.6 - 2.4 mg/dL   GLUCOSE, POC    Collection Time: 07/14/19  9:15 PM   Result Value Ref Range    Glucose (POC) 228 (H) 65 - 100 mg/dL    Performed by Jose Miguel Shea (PCT)    GLUCOSE, POC    Collection Time: 07/15/19 12:06 AM   Result Value Ref Range    Glucose (POC) 194 (H) 65 - 100 mg/dL    Performed by Jose Miguel Shea (PCT)    LIPASE    Collection Time: 07/15/19  3:46 AM   Result Value Ref Range    Lipase 1,299 (H) 73 - 393 U/L   CBC WITH AUTOMATED DIFF    Collection Time: 07/15/19  3:46 AM   Result Value Ref Range    WBC 11.4 (H) 4.1 - 11.1 K/uL    RBC 4.42 4.10 - 5.70 M/uL    HGB 14.3 12.1 - 17.0 g/dL    HCT 41.5 36.6 - 50.3 %    MCV 93.9 80.0 - 99.0 FL    MCH 32.4 26.0 - 34.0 PG    MCHC 34.5 30.0 - 36.5 g/dL    RDW 12.9 11.5 - 14.5 %    PLATELET 126 (L) 906 - 400 K/uL    MPV 9.6 8.9 - 12.9 FL    NRBC 0.0 0  WBC    ABSOLUTE NRBC 0.00 0.00 - 0.01 K/uL    NEUTROPHILS 90 (H) 32 - 75 %    LYMPHOCYTES 6 (L) 12 - 49 %    MONOCYTES 3 (L) 5 - 13 %    EOSINOPHILS 0 0 - 7 %    BASOPHILS 0 0 - 1 %    IMMATURE GRANULOCYTES 1 (H) 0.0 - 0.5 %    ABS. NEUTROPHILS 10.3 (H) 1.8 - 8.0 K/UL    ABS. LYMPHOCYTES 0.7 (L) 0.8 - 3.5 K/UL    ABS. MONOCYTES 0.3 0.0 - 1.0 K/UL    ABS. EOSINOPHILS 0.0 0.0 - 0.4 K/UL    ABS. BASOPHILS 0.0 0.0 - 0.1 K/UL    ABS. IMM. GRANS. 0.1 (H) 0.00 - 0.04 K/UL    DF AUTOMATED      RBC COMMENTS NORMOCYTIC, NORMOCHROMIC     METABOLIC PANEL, COMPREHENSIVE    Collection Time: 07/15/19  3:46 AM   Result Value Ref Range    Sodium 139 136 - 145 mmol/L    Potassium 3.9 3.5 - 5.1 mmol/L    Chloride 109 (H) 97 - 108 mmol/L    CO2 24 21 - 32 mmol/L    Anion gap 6 5 - 15 mmol/L    Glucose 203 (H) 65 - 100 mg/dL    BUN 20 6 - 20 MG/DL    Creatinine 1.28 0.70 - 1.30 MG/DL    BUN/Creatinine ratio 16 12 - 20      GFR est AA >60 >60 ml/min/1.73m2    GFR est non-AA 57 (L) >60 ml/min/1.73m2    Calcium 7.8 (L) 8.5 - 10.1 MG/DL    Bilirubin, total 0.7 0.2 - 1.0 MG/DL    ALT (SGPT) 293 (H) 12 - 78 U/L    AST (SGOT) 81 (H) 15 - 37 U/L    Alk.  phosphatase 119 (H) 45 - 117 U/L    Protein, total 6.5 6.4 - 8.2 g/dL    Albumin 2.9 (L) 3.5 - 5.0 g/dL    Globulin 3.6 2.0 - 4.0 g/dL    A-G Ratio 0.8 (L) 1.1 - 2.2     URINALYSIS W/MICROSCOPIC    Collection Time: 07/15/19  4:10 AM   Result Value Ref Range    Color YELLOW/STRAW      Appearance TURBID (A) CLEAR      Specific gravity 1.027 1.003 - 1.030      pH (UA) 5.5 5.0 - 8.0      Protein 100 (A) NEG mg/dL    Glucose 500 (A) NEG mg/dL    Ketone 15 (A) NEG mg/dL    Bilirubin NEGATIVE  NEG      Blood MODERATE (A) NEG      Urobilinogen 0.2 0.2 - 1.0 EU/dL    Nitrites NEGATIVE  NEG Leukocyte Esterase NEGATIVE  NEG      WBC 0-4 0 - 4 /hpf    RBC 5-10 0 - 5 /hpf    Epithelial cells FEW FEW /lpf    Bacteria 3+ (A) NEG /hpf    Uric acid crystals 3+ (A) NEG   GLUCOSE, POC    Collection Time: 07/15/19  5:28 AM   Result Value Ref Range    Glucose (POC) 191 (H) 65 - 100 mg/dL    Performed by Jose Miguel Shea (PCT)    GLUCOSE, POC    Collection Time: 07/15/19 11:17 AM   Result Value Ref Range    Glucose (POC) 193 (H) 65 - 100 mg/dL    Performed by Byron Baker (PCT)    GLUCOSE, POC    Collection Time: 07/15/19  3:35 PM   Result Value Ref Range    Glucose (POC) 171 (H) 65 - 100 mg/dL    Performed by Byron Baker (PCT)    GLUCOSE, POC    Collection Time: 07/16/19 12:14 AM   Result Value Ref Range    Glucose (POC) 162 (H) 65 - 100 mg/dL    Performed by Ton Grijalva    CBC WITH AUTOMATED DIFF    Collection Time: 07/16/19  4:10 AM   Result Value Ref Range    WBC 8.0 4.1 - 11.1 K/uL    RBC 4.30 4. 10 - 5.70 M/uL    HGB 13.7 12.1 - 17.0 g/dL    HCT 41.2 36.6 - 50.3 %    MCV 95.8 80.0 - 99.0 FL    MCH 31.9 26.0 - 34.0 PG    MCHC 33.3 30.0 - 36.5 g/dL    RDW 13.0 11.5 - 14.5 %    PLATELET 350 (L) 742 - 400 K/uL    MPV 9.6 8.9 - 12.9 FL    NRBC 0.0 0  WBC    ABSOLUTE NRBC 0.00 0.00 - 0.01 K/uL    NEUTROPHILS 82 (H) 32 - 75 %    LYMPHOCYTES 12 12 - 49 %    MONOCYTES 4 (L) 5 - 13 %    EOSINOPHILS 1 0 - 7 %    BASOPHILS 0 0 - 1 %    IMMATURE GRANULOCYTES 1 (H) 0.0 - 0.5 %    ABS. NEUTROPHILS 6.6 1.8 - 8.0 K/UL    ABS. LYMPHOCYTES 1.0 0.8 - 3.5 K/UL    ABS. MONOCYTES 0.4 0.0 - 1.0 K/UL    ABS. EOSINOPHILS 0.0 0.0 - 0.4 K/UL    ABS. BASOPHILS 0.0 0.0 - 0.1 K/UL    ABS. IMM.  GRANS. 0.0 0.00 - 0.04 K/UL    DF AUTOMATED     METABOLIC PANEL, BASIC    Collection Time: 07/16/19  4:10 AM   Result Value Ref Range    Sodium 143 136 - 145 mmol/L    Potassium 3.8 3.5 - 5.1 mmol/L    Chloride 108 97 - 108 mmol/L    CO2 28 21 - 32 mmol/L    Anion gap 7 5 - 15 mmol/L    Glucose 122 (H) 65 - 100 mg/dL    BUN 17 6 - 20 MG/DL    Creatinine 1.17 0.70 - 1.30 MG/DL    BUN/Creatinine ratio 15 12 - 20      GFR est AA >60 >60 ml/min/1.73m2    GFR est non-AA >60 >60 ml/min/1.73m2    Calcium 7.9 (L) 8.5 - 10.1 MG/DL   HEPATIC FUNCTION PANEL    Collection Time: 07/16/19  4:10 AM   Result Value Ref Range    Protein, total 6.7 6.4 - 8.2 g/dL    Albumin 2.6 (L) 3.5 - 5.0 g/dL    Globulin 4.1 (H) 2.0 - 4.0 g/dL    A-G Ratio 0.6 (L) 1.1 - 2.2      Bilirubin, total 0.6 0.2 - 1.0 MG/DL    Bilirubin, direct 0.2 0.0 - 0.2 MG/DL    Alk.  phosphatase 105 45 - 117 U/L    AST (SGOT) 48 (H) 15 - 37 U/L    ALT (SGPT) 202 (H) 12 - 78 U/L   LIPASE    Collection Time: 07/16/19  4:10 AM   Result Value Ref Range    Lipase 382 73 - 393 U/L   GLUCOSE, POC    Collection Time: 07/16/19  5:26 AM   Result Value Ref Range    Glucose (POC) 119 (H) 65 - 100 mg/dL    Performed by Annette Hinton PCT        Data Review images and reports reviewed

## 2019-07-17 ENCOUNTER — APPOINTMENT (OUTPATIENT)
Dept: GENERAL RADIOLOGY | Age: 64
DRG: 418 | End: 2019-07-17
Attending: SURGERY
Payer: COMMERCIAL

## 2019-07-17 ENCOUNTER — ANESTHESIA EVENT (OUTPATIENT)
Dept: SURGERY | Age: 64
DRG: 418 | End: 2019-07-17
Payer: COMMERCIAL

## 2019-07-17 ENCOUNTER — ANESTHESIA (OUTPATIENT)
Dept: SURGERY | Age: 64
DRG: 418 | End: 2019-07-17
Payer: COMMERCIAL

## 2019-07-17 LAB
ALBUMIN SERPL-MCNC: 2.4 G/DL (ref 3.5–5)
ALBUMIN/GLOB SERPL: 0.7 {RATIO} (ref 1.1–2.2)
ALP SERPL-CCNC: 85 U/L (ref 45–117)
ALT SERPL-CCNC: 126 U/L (ref 12–78)
ANION GAP SERPL CALC-SCNC: 6 MMOL/L (ref 5–15)
AST SERPL-CCNC: 30 U/L (ref 15–37)
BILIRUB SERPL-MCNC: 0.6 MG/DL (ref 0.2–1)
BUN SERPL-MCNC: 18 MG/DL (ref 6–20)
BUN/CREAT SERPL: 16 (ref 12–20)
CALCIUM SERPL-MCNC: 7.7 MG/DL (ref 8.5–10.1)
CHLORIDE SERPL-SCNC: 108 MMOL/L (ref 97–108)
CO2 SERPL-SCNC: 27 MMOL/L (ref 21–32)
CREAT SERPL-MCNC: 1.11 MG/DL (ref 0.7–1.3)
ERYTHROCYTE [DISTWIDTH] IN BLOOD BY AUTOMATED COUNT: 13 % (ref 11.5–14.5)
GLOBULIN SER CALC-MCNC: 3.6 G/DL (ref 2–4)
GLUCOSE BLD STRIP.AUTO-MCNC: 111 MG/DL (ref 65–100)
GLUCOSE BLD STRIP.AUTO-MCNC: 113 MG/DL (ref 65–100)
GLUCOSE BLD STRIP.AUTO-MCNC: 115 MG/DL (ref 65–100)
GLUCOSE BLD STRIP.AUTO-MCNC: 120 MG/DL (ref 65–100)
GLUCOSE BLD STRIP.AUTO-MCNC: 138 MG/DL (ref 65–100)
GLUCOSE BLD STRIP.AUTO-MCNC: 224 MG/DL (ref 65–100)
GLUCOSE SERPL-MCNC: 125 MG/DL (ref 65–100)
HCT VFR BLD AUTO: 34.3 % (ref 36.6–50.3)
HGB BLD-MCNC: 11.5 G/DL (ref 12.1–17)
IGG4 SER-MCNC: 38 MG/DL (ref 2–96)
LIPASE SERPL-CCNC: 173 U/L (ref 73–393)
MCH RBC QN AUTO: 32.1 PG (ref 26–34)
MCHC RBC AUTO-ENTMCNC: 33.5 G/DL (ref 30–36.5)
MCV RBC AUTO: 95.8 FL (ref 80–99)
NRBC # BLD: 0 K/UL (ref 0–0.01)
NRBC BLD-RTO: 0 PER 100 WBC
PLATELET # BLD AUTO: 138 K/UL (ref 150–400)
PMV BLD AUTO: 9.6 FL (ref 8.9–12.9)
POTASSIUM SERPL-SCNC: 3.6 MMOL/L (ref 3.5–5.1)
PROT SERPL-MCNC: 6 G/DL (ref 6.4–8.2)
RBC # BLD AUTO: 3.58 M/UL (ref 4.1–5.7)
SERVICE CMNT-IMP: ABNORMAL
SODIUM SERPL-SCNC: 141 MMOL/L (ref 136–145)
WBC # BLD AUTO: 6.4 K/UL (ref 4.1–11.1)

## 2019-07-17 PROCEDURE — 74011250636 HC RX REV CODE- 250/636: Performed by: NURSE PRACTITIONER

## 2019-07-17 PROCEDURE — 88304 TISSUE EXAM BY PATHOLOGIST: CPT

## 2019-07-17 PROCEDURE — 77030027138 HC INCENT SPIROMETER -A

## 2019-07-17 PROCEDURE — 74011250637 HC RX REV CODE- 250/637: Performed by: SURGERY

## 2019-07-17 PROCEDURE — 77030018836 HC SOL IRR NACL ICUM -A: Performed by: SURGERY

## 2019-07-17 PROCEDURE — 74011000258 HC RX REV CODE- 258: Performed by: SURGERY

## 2019-07-17 PROCEDURE — 82962 GLUCOSE BLOOD TEST: CPT

## 2019-07-17 PROCEDURE — 77030011640 HC PAD GRND REM COVD -A: Performed by: SURGERY

## 2019-07-17 PROCEDURE — 77030008771 HC TU NG SALEM SUMP -A: Performed by: NURSE ANESTHETIST, CERTIFIED REGISTERED

## 2019-07-17 PROCEDURE — 85027 COMPLETE CBC AUTOMATED: CPT

## 2019-07-17 PROCEDURE — 77030010513 HC APPL CLP LIG J&J -C: Performed by: SURGERY

## 2019-07-17 PROCEDURE — 76210000006 HC OR PH I REC 0.5 TO 1 HR: Performed by: SURGERY

## 2019-07-17 PROCEDURE — 74011250636 HC RX REV CODE- 250/636

## 2019-07-17 PROCEDURE — 74011250637 HC RX REV CODE- 250/637: Performed by: INTERNAL MEDICINE

## 2019-07-17 PROCEDURE — 77030009852 HC PCH RTVR ENDOSC COVD -B: Performed by: SURGERY

## 2019-07-17 PROCEDURE — 77030008684 HC TU ET CUF COVD -B: Performed by: NURSE ANESTHETIST, CERTIFIED REGISTERED

## 2019-07-17 PROCEDURE — 77030031139 HC SUT VCRL2 J&J -A: Performed by: SURGERY

## 2019-07-17 PROCEDURE — 65660000000 HC RM CCU STEPDOWN

## 2019-07-17 PROCEDURE — 77030032490 HC SLV COMPR SCD KNE COVD -B: Performed by: SURGERY

## 2019-07-17 PROCEDURE — 74011000258 HC RX REV CODE- 258

## 2019-07-17 PROCEDURE — 74011250636 HC RX REV CODE- 250/636: Performed by: FAMILY MEDICINE

## 2019-07-17 PROCEDURE — 77030020782 HC GWN BAIR PAWS FLX 3M -B

## 2019-07-17 PROCEDURE — 77030039266 HC ADH SKN EXOFIN S2SG -A: Performed by: SURGERY

## 2019-07-17 PROCEDURE — 77010033678 HC OXYGEN DAILY

## 2019-07-17 PROCEDURE — 74011250636 HC RX REV CODE- 250/636: Performed by: SURGERY

## 2019-07-17 PROCEDURE — 80053 COMPREHEN METABOLIC PANEL: CPT

## 2019-07-17 PROCEDURE — 77030019908 HC STETH ESOPH SIMS -A: Performed by: NURSE ANESTHETIST, CERTIFIED REGISTERED

## 2019-07-17 PROCEDURE — 77030026438 HC STYL ET INTUB CARD -A: Performed by: NURSE ANESTHETIST, CERTIFIED REGISTERED

## 2019-07-17 PROCEDURE — 74011636637 HC RX REV CODE- 636/637: Performed by: SURGERY

## 2019-07-17 PROCEDURE — 36415 COLL VENOUS BLD VENIPUNCTURE: CPT

## 2019-07-17 PROCEDURE — 0FT44ZZ RESECTION OF GALLBLADDER, PERCUTANEOUS ENDOSCOPIC APPROACH: ICD-10-PCS | Performed by: SURGERY

## 2019-07-17 PROCEDURE — C1758 CATHETER, URETERAL: HCPCS | Performed by: SURGERY

## 2019-07-17 PROCEDURE — 76010000149 HC OR TIME 1 TO 1.5 HR: Performed by: SURGERY

## 2019-07-17 PROCEDURE — 74011000250 HC RX REV CODE- 250: Performed by: INTERNAL MEDICINE

## 2019-07-17 PROCEDURE — 74011636320 HC RX REV CODE- 636/320: Performed by: SURGERY

## 2019-07-17 PROCEDURE — 74011000250 HC RX REV CODE- 250: Performed by: SURGERY

## 2019-07-17 PROCEDURE — 76060000033 HC ANESTHESIA 1 TO 1.5 HR: Performed by: SURGERY

## 2019-07-17 PROCEDURE — 74300 X-RAY BILE DUCTS/PANCREAS: CPT

## 2019-07-17 PROCEDURE — 74011250636 HC RX REV CODE- 250/636: Performed by: ANESTHESIOLOGY

## 2019-07-17 PROCEDURE — 77030035220 HC TRCR ENDOSC BLNTPRT ANCHR COVD -B: Performed by: SURGERY

## 2019-07-17 PROCEDURE — 74011250637 HC RX REV CODE- 250/637: Performed by: FAMILY MEDICINE

## 2019-07-17 PROCEDURE — 74011000250 HC RX REV CODE- 250

## 2019-07-17 PROCEDURE — 83690 ASSAY OF LIPASE: CPT

## 2019-07-17 PROCEDURE — 77030008756 HC TU IRR SUC STRY -B: Performed by: SURGERY

## 2019-07-17 PROCEDURE — 74011636637 HC RX REV CODE- 636/637: Performed by: INTERNAL MEDICINE

## 2019-07-17 PROCEDURE — BF001ZZ PLAIN RADIOGRAPHY OF BILE DUCTS USING LOW OSMOLAR CONTRAST: ICD-10-PCS | Performed by: SURGERY

## 2019-07-17 PROCEDURE — 77030020256 HC SOL INJ NACL 0.9%  500ML: Performed by: SURGERY

## 2019-07-17 RX ORDER — NEOSTIGMINE METHYLSULFATE 1 MG/ML
INJECTION INTRAVENOUS AS NEEDED
Status: DISCONTINUED | OUTPATIENT
Start: 2019-07-17 | End: 2019-07-17 | Stop reason: HOSPADM

## 2019-07-17 RX ORDER — SODIUM CHLORIDE 0.9 % (FLUSH) 0.9 %
5-40 SYRINGE (ML) INJECTION AS NEEDED
Status: DISCONTINUED | OUTPATIENT
Start: 2019-07-17 | End: 2019-07-17 | Stop reason: HOSPADM

## 2019-07-17 RX ORDER — OXYCODONE HYDROCHLORIDE 5 MG/1
5 TABLET ORAL
Status: DISCONTINUED | OUTPATIENT
Start: 2019-07-17 | End: 2019-07-22 | Stop reason: HOSPADM

## 2019-07-17 RX ORDER — INSULIN LISPRO 100 [IU]/ML
INJECTION, SOLUTION INTRAVENOUS; SUBCUTANEOUS
Status: DISCONTINUED | OUTPATIENT
Start: 2019-07-17 | End: 2019-07-19

## 2019-07-17 RX ORDER — LIDOCAINE HYDROCHLORIDE 20 MG/ML
INJECTION, SOLUTION EPIDURAL; INFILTRATION; INTRACAUDAL; PERINEURAL AS NEEDED
Status: DISCONTINUED | OUTPATIENT
Start: 2019-07-17 | End: 2019-07-17 | Stop reason: HOSPADM

## 2019-07-17 RX ORDER — ONDANSETRON 2 MG/ML
INJECTION INTRAMUSCULAR; INTRAVENOUS AS NEEDED
Status: DISCONTINUED | OUTPATIENT
Start: 2019-07-17 | End: 2019-07-17 | Stop reason: HOSPADM

## 2019-07-17 RX ORDER — ATORVASTATIN CALCIUM 40 MG/1
40 TABLET, FILM COATED ORAL
Status: DISCONTINUED | OUTPATIENT
Start: 2019-07-17 | End: 2019-07-22 | Stop reason: HOSPADM

## 2019-07-17 RX ORDER — MIDAZOLAM HYDROCHLORIDE 1 MG/ML
INJECTION, SOLUTION INTRAMUSCULAR; INTRAVENOUS AS NEEDED
Status: DISCONTINUED | OUTPATIENT
Start: 2019-07-17 | End: 2019-07-17 | Stop reason: HOSPADM

## 2019-07-17 RX ORDER — LIDOCAINE HYDROCHLORIDE 10 MG/ML
0.1 INJECTION, SOLUTION EPIDURAL; INFILTRATION; INTRACAUDAL; PERINEURAL AS NEEDED
Status: DISCONTINUED | OUTPATIENT
Start: 2019-07-17 | End: 2019-07-17 | Stop reason: HOSPADM

## 2019-07-17 RX ORDER — ROCURONIUM BROMIDE 10 MG/ML
INJECTION, SOLUTION INTRAVENOUS AS NEEDED
Status: DISCONTINUED | OUTPATIENT
Start: 2019-07-17 | End: 2019-07-17 | Stop reason: HOSPADM

## 2019-07-17 RX ORDER — SODIUM CHLORIDE, SODIUM LACTATE, POTASSIUM CHLORIDE, CALCIUM CHLORIDE 600; 310; 30; 20 MG/100ML; MG/100ML; MG/100ML; MG/100ML
25 INJECTION, SOLUTION INTRAVENOUS CONTINUOUS
Status: DISCONTINUED | OUTPATIENT
Start: 2019-07-17 | End: 2019-07-17 | Stop reason: HOSPADM

## 2019-07-17 RX ORDER — SODIUM CHLORIDE 0.9 % (FLUSH) 0.9 %
5-40 SYRINGE (ML) INJECTION EVERY 8 HOURS
Status: DISCONTINUED | OUTPATIENT
Start: 2019-07-17 | End: 2019-07-17 | Stop reason: HOSPADM

## 2019-07-17 RX ORDER — GLYCOPYRROLATE 0.2 MG/ML
INJECTION INTRAMUSCULAR; INTRAVENOUS AS NEEDED
Status: DISCONTINUED | OUTPATIENT
Start: 2019-07-17 | End: 2019-07-17 | Stop reason: HOSPADM

## 2019-07-17 RX ORDER — METOPROLOL SUCCINATE 25 MG/1
25 TABLET, EXTENDED RELEASE ORAL DAILY
Status: DISCONTINUED | OUTPATIENT
Start: 2019-07-17 | End: 2019-07-22 | Stop reason: HOSPADM

## 2019-07-17 RX ORDER — HYDROMORPHONE HYDROCHLORIDE 1 MG/ML
0.2 INJECTION, SOLUTION INTRAMUSCULAR; INTRAVENOUS; SUBCUTANEOUS
Status: DISCONTINUED | OUTPATIENT
Start: 2019-07-17 | End: 2019-07-17 | Stop reason: HOSPADM

## 2019-07-17 RX ORDER — PROPOFOL 10 MG/ML
INJECTION, EMULSION INTRAVENOUS AS NEEDED
Status: DISCONTINUED | OUTPATIENT
Start: 2019-07-17 | End: 2019-07-17 | Stop reason: HOSPADM

## 2019-07-17 RX ORDER — SUCCINYLCHOLINE CHLORIDE 20 MG/ML
INJECTION INTRAMUSCULAR; INTRAVENOUS AS NEEDED
Status: DISCONTINUED | OUTPATIENT
Start: 2019-07-17 | End: 2019-07-17 | Stop reason: HOSPADM

## 2019-07-17 RX ORDER — FENTANYL CITRATE 50 UG/ML
25 INJECTION, SOLUTION INTRAMUSCULAR; INTRAVENOUS
Status: DISCONTINUED | OUTPATIENT
Start: 2019-07-17 | End: 2019-07-17 | Stop reason: HOSPADM

## 2019-07-17 RX ORDER — DIPHENHYDRAMINE HYDROCHLORIDE 50 MG/ML
12.5 INJECTION, SOLUTION INTRAMUSCULAR; INTRAVENOUS AS NEEDED
Status: DISCONTINUED | OUTPATIENT
Start: 2019-07-17 | End: 2019-07-17 | Stop reason: HOSPADM

## 2019-07-17 RX ORDER — FENTANYL CITRATE 50 UG/ML
INJECTION, SOLUTION INTRAMUSCULAR; INTRAVENOUS AS NEEDED
Status: DISCONTINUED | OUTPATIENT
Start: 2019-07-17 | End: 2019-07-17 | Stop reason: HOSPADM

## 2019-07-17 RX ORDER — BUPIVACAINE HYDROCHLORIDE AND EPINEPHRINE 5; 5 MG/ML; UG/ML
INJECTION, SOLUTION EPIDURAL; INTRACAUDAL; PERINEURAL AS NEEDED
Status: DISCONTINUED | OUTPATIENT
Start: 2019-07-17 | End: 2019-07-17 | Stop reason: HOSPADM

## 2019-07-17 RX ADMIN — Medication 10 ML: at 14:39

## 2019-07-17 RX ADMIN — FENTANYL CITRATE 100 MCG: 50 INJECTION, SOLUTION INTRAMUSCULAR; INTRAVENOUS at 12:12

## 2019-07-17 RX ADMIN — ONDANSETRON 4 MG: 2 INJECTION INTRAMUSCULAR; INTRAVENOUS at 18:21

## 2019-07-17 RX ADMIN — AMLODIPINE BESYLATE 2.5 MG: 2.5 TABLET ORAL at 09:16

## 2019-07-17 RX ADMIN — MORPHINE SULFATE 2 MG: 2 INJECTION, SOLUTION INTRAMUSCULAR; INTRAVENOUS at 23:58

## 2019-07-17 RX ADMIN — MIDAZOLAM HYDROCHLORIDE 1 MG: 1 INJECTION, SOLUTION INTRAMUSCULAR; INTRAVENOUS at 12:04

## 2019-07-17 RX ADMIN — GLYCOPYRROLATE 0.3 MG: 0.2 INJECTION INTRAMUSCULAR; INTRAVENOUS at 13:03

## 2019-07-17 RX ADMIN — METOPROLOL TARTRATE 2.5 MG: 5 INJECTION INTRAVENOUS at 04:01

## 2019-07-17 RX ADMIN — OXYCODONE HYDROCHLORIDE 5 MG: 5 TABLET ORAL at 22:43

## 2019-07-17 RX ADMIN — NEOSTIGMINE METHYLSULFATE 3 MG: 1 INJECTION INTRAVENOUS at 13:03

## 2019-07-17 RX ADMIN — FENTANYL CITRATE 25 MCG: 50 INJECTION, SOLUTION INTRAMUSCULAR; INTRAVENOUS at 13:20

## 2019-07-17 RX ADMIN — FENTANYL CITRATE 25 MCG: 50 INJECTION, SOLUTION INTRAMUSCULAR; INTRAVENOUS at 13:47

## 2019-07-17 RX ADMIN — METOPROLOL TARTRATE 2.5 MG: 5 INJECTION INTRAVENOUS at 17:03

## 2019-07-17 RX ADMIN — MORPHINE SULFATE 2 MG: 2 INJECTION, SOLUTION INTRAMUSCULAR; INTRAVENOUS at 19:50

## 2019-07-17 RX ADMIN — METOPROLOL TARTRATE 2.5 MG: 5 INJECTION INTRAVENOUS at 10:04

## 2019-07-17 RX ADMIN — ATORVASTATIN CALCIUM 40 MG: 40 TABLET, FILM COATED ORAL at 21:11

## 2019-07-17 RX ADMIN — INSULIN LISPRO 2 UNITS: 100 INJECTION, SOLUTION INTRAVENOUS; SUBCUTANEOUS at 22:06

## 2019-07-17 RX ADMIN — FAMOTIDINE 20 MG: 10 INJECTION, SOLUTION INTRAVENOUS at 20:14

## 2019-07-17 RX ADMIN — Medication 10 ML: at 14:00

## 2019-07-17 RX ADMIN — PHENAZOPYRIDINE HYDROCHLORIDE 100 MG: 100 TABLET ORAL at 18:19

## 2019-07-17 RX ADMIN — FENTANYL CITRATE 25 MCG: 50 INJECTION, SOLUTION INTRAMUSCULAR; INTRAVENOUS at 13:57

## 2019-07-17 RX ADMIN — PHENAZOPYRIDINE HYDROCHLORIDE 100 MG: 100 TABLET ORAL at 09:16

## 2019-07-17 RX ADMIN — ONDANSETRON 4 MG: 2 INJECTION INTRAMUSCULAR; INTRAVENOUS at 12:28

## 2019-07-17 RX ADMIN — FENTANYL CITRATE 25 MCG: 50 INJECTION, SOLUTION INTRAMUSCULAR; INTRAVENOUS at 13:09

## 2019-07-17 RX ADMIN — LIDOCAINE HYDROCHLORIDE 60 MG: 20 INJECTION, SOLUTION EPIDURAL; INFILTRATION; INTRACAUDAL; PERINEURAL at 12:12

## 2019-07-17 RX ADMIN — METOPROLOL SUCCINATE 25 MG: 25 TABLET, EXTENDED RELEASE ORAL at 18:19

## 2019-07-17 RX ADMIN — ROCURONIUM BROMIDE 20 MG: 10 INJECTION, SOLUTION INTRAVENOUS at 12:20

## 2019-07-17 RX ADMIN — SODIUM CHLORIDE, SODIUM LACTATE, POTASSIUM CHLORIDE, AND CALCIUM CHLORIDE: 600; 310; 30; 20 INJECTION, SOLUTION INTRAVENOUS at 11:50

## 2019-07-17 RX ADMIN — Medication 10 ML: at 21:11

## 2019-07-17 RX ADMIN — INSULIN GLARGINE 20 UNITS: 100 INJECTION, SOLUTION SUBCUTANEOUS at 22:09

## 2019-07-17 RX ADMIN — FENTANYL CITRATE 25 MCG: 50 INJECTION, SOLUTION INTRAMUSCULAR; INTRAVENOUS at 13:43

## 2019-07-17 RX ADMIN — SUCCINYLCHOLINE CHLORIDE 160 MG: 20 INJECTION INTRAMUSCULAR; INTRAVENOUS at 12:12

## 2019-07-17 RX ADMIN — PROPOFOL 150 MG: 10 INJECTION, EMULSION INTRAVENOUS at 12:12

## 2019-07-17 RX ADMIN — MORPHINE SULFATE 2 MG: 2 INJECTION, SOLUTION INTRAMUSCULAR; INTRAVENOUS at 16:04

## 2019-07-17 RX ADMIN — SODIUM CHLORIDE, SODIUM LACTATE, POTASSIUM CHLORIDE, AND CALCIUM CHLORIDE 200 ML/HR: 600; 310; 30; 20 INJECTION, SOLUTION INTRAVENOUS at 09:11

## 2019-07-17 RX ADMIN — PIPERACILLIN SODIUM,TAZOBACTAM SODIUM 3.38 G: 3; .375 INJECTION, POWDER, FOR SOLUTION INTRAVENOUS at 20:10

## 2019-07-17 RX ADMIN — FENTANYL CITRATE 25 MCG: 50 INJECTION, SOLUTION INTRAMUSCULAR; INTRAVENOUS at 14:06

## 2019-07-17 RX ADMIN — SODIUM CHLORIDE, SODIUM LACTATE, POTASSIUM CHLORIDE, AND CALCIUM CHLORIDE 200 ML/HR: 600; 310; 30; 20 INJECTION, SOLUTION INTRAVENOUS at 14:14

## 2019-07-17 RX ADMIN — Medication: at 02:55

## 2019-07-17 RX ADMIN — FENTANYL CITRATE 50 MCG: 50 INJECTION, SOLUTION INTRAMUSCULAR; INTRAVENOUS at 12:49

## 2019-07-17 RX ADMIN — FAMOTIDINE 20 MG: 10 INJECTION, SOLUTION INTRAVENOUS at 09:16

## 2019-07-17 RX ADMIN — PIPERACILLIN SODIUM,TAZOBACTAM SODIUM 3.38 G: 3; .375 INJECTION, POWDER, FOR SOLUTION INTRAVENOUS at 12:24

## 2019-07-17 RX ADMIN — ONDANSETRON 4 MG: 2 INJECTION INTRAMUSCULAR; INTRAVENOUS at 10:01

## 2019-07-17 RX ADMIN — PIPERACILLIN SODIUM,TAZOBACTAM SODIUM 3.38 G: 3; .375 INJECTION, POWDER, FOR SOLUTION INTRAVENOUS at 04:02

## 2019-07-17 RX ADMIN — Medication 10 ML: at 05:49

## 2019-07-17 RX ADMIN — ROCURONIUM BROMIDE 10 MG: 10 INJECTION, SOLUTION INTRAVENOUS at 12:12

## 2019-07-17 NOTE — PROGRESS NOTES
7:05 AM Bedside report received from Elvi Christianson at bedside. [facility] [facility] [facility]. NPO except sips with meds and hold heparin per MD.    10:00 AM PRN Zofran given for patient's c/o nausea. See MAR.    10:20 AM Consents for procedure signed and on chart. CHG bath completed, per protocol. 10:50 AM Patient's cellphone and wedding band locked with security. 11:07 AM Patient off floor to OR. Patient voided 125 cc orange urine and glucose 111.    3:00 PM Patient returned to room 2245 via stretcher. VSS. Will continue to monitor closely. 4:00 PM PRN 2 mg IV morphine given for pain 8/10 per MD orderl. See MAR.    5:30 PM Notified Dr. Gene Sandoval patient has returned from surgery and has advance to [facility] per Dr. Catrina Christianson. MD ordered LR rate change to 75 cc/hr, change IV lopressor to PO, dc dilaudid and OK to c/w IV morphine and SSI orders changed to AC/HS. See MAR.    7:15 PM Bedside report given to Andrés Jackson RN.    7:50 PM PRN 2 mg IV morphine given for patient's c/o pain 8/10 per MD order. See MAR.

## 2019-07-17 NOTE — PERIOP NOTES
TRANSFER - IN REPORT:    Verbal report received from Claudene Pollard, RN on Chris Donis  being received from [phone] for ordered procedure      Report consisted of patients Situation, Background, Assessment and   Recommendations(SBAR). Information from the following report(s) SBAR, Kardex, Intake/Output, MAR, Recent Results and Cardiac Rhythm NSR was reviewed with the receiving nurse. Opportunity for questions and clarification was provided. Assessment completed upon patients arrival to unit and care assumed.

## 2019-07-17 NOTE — ANESTHESIA POSTPROCEDURE EVALUATION
Procedure(s):  CHOLECYSTECTOMY LAPAROSCOPIC WITH INTRAOPERATIVE CHOLANGIOGRAM.    general    Anesthesia Post Evaluation        Patient location during evaluation: PACU  Note status: Adequate. Level of consciousness: responsive to verbal stimuli and sleepy but conscious  Pain management: satisfactory to patient  Airway patency: patent  Anesthetic complications: no  Cardiovascular status: acceptable  Respiratory status: acceptable  Hydration status: acceptable  Comments: +Post-Anesthesia Evaluation and Assessment    Patient: Alyssa Abreu MRN: [id number]  SSN: [id number]   YOB: 1955  Age: 61 y.o. Sex: male      Cardiovascular Function/Vital Signs    /88   Pulse 81   Temp 36.8 °C (98.3 °F)   Resp 11   Ht 6' 1\" (1.854 m)   Wt 95.2 kg (209 lb 14.1 oz)   SpO2 95%   BMI 27.69 kg/m²     Patient is status post Procedure(s):  CHOLECYSTECTOMY LAPAROSCOPIC WITH INTRAOPERATIVE CHOLANGIOGRAM.    Nausea/Vomiting: Controlled. Postoperative hydration reviewed and adequate. Pain:  Pain Scale 1: Numeric (0 - 10) (07/17/19 1406)  Pain Intensity 1: 4 (07/17/19 1406)   Managed. Neurological Status:   Neuro (WDL): Within Defined Limits (07/17/19 1325)   At baseline. Mental Status and Level of Consciousness: Arousable. Pulmonary Status:   O2 Device: Nasal cannula (07/17/19 1345)   Adequate oxygenation and airway patent. Complications related to anesthesia: None    Post-anesthesia assessment completed. No concerns. Signed By: Nitin Zamora MD    7/17/2019  Post anesthesia nausea and vomiting:  controlled      Vitals Value Taken Time   /78 7/17/2019  2:15 PM   Temp 36.8 °C (98.3 °F) 7/17/2019  1:45 PM   Pulse 80 7/17/2019  2:17 PM   Resp 10 7/17/2019  2:17 PM   SpO2 94 % 7/17/2019  2:17 PM   Vitals shown include unvalidated device data.

## 2019-07-17 NOTE — PROGRESS NOTES
Admit Date: 2019    POD * No surgery found *    Procedure:  * No surgery found *      Assessment:   Active Problems:    Pancreatitis, acute (2019)      Uncontrolled type II diabetes mellitus ([facility] [address] (2019)      MONET (acute kidney injury) ([facility] [address] (2019)      Acute pancreatitis (2019)      Uncontrolled diabetes mellitus ([facility] [address] (2019)      1. Severe gallstone pancreatitis. LFTs and lipase coming down but still with significant pain suggestive of ongoing pancreatitis. MRCP does not suggest CBD stone. I explained the anatomy and pathophysiology of biliary tract disease and cholecystitis, pancreatitis, cholangitis, choledocholithiasis. I explained about laparoscopic possible open cholecystectomy with possible cholangiogram and the risks of surgery including but not limited to bleeding, infection, bile duct or bowel injury, hernia development, retained common duct stones requiring further therapy, non resolution of symptoms, post cholecystectomy diarrhea, DVT, and risks of general anesthesia. 2. Pain improving      Plan/Recommendations/Medical Decision Makin. NPO  2.  IV hydration  3. Pain control  4. Lap vadna and IOC later today   5. Follow serial labs  6. DVT prophylaxis, ulcer prophylaxis    Subjective:     Patient has complaints of pain. But improving    Objective:     Blood pressure (!) 152/91, pulse 84, temperature 98.5 °F (36.9 °C), resp. rate 18, height 6' 1\" (1.854 m), weight 95.2 kg (209 lb 14.1 oz), SpO2 97 %. Temp (24hrs), Av.4 °F (36.9 °C), Min:98.1 °F (36.7 °C), Max:98.8 °F (37.1 °C)      Physical Exam:  LUNG: clear to auscultation bilaterally, HEART: regular rate and rhythm, S1, S2 normal, no murmur, click, rub or gallop, ABDOMEN: soft, distended tender in epigastrium more than anywhere else but all over.  Bowel sounds normal. No masses,  no organomegaly, no rebound or guarding    Labs:   Recent Results (from the past 48 hour(s))   GLUCOSE, POC    Collection Time: 07/15/19 11:17 AM   Result Value Ref Range    Glucose (POC) 193 (H) 65 - 100 mg/dL    Performed by Sophia Barrera (PCT)    GLUCOSE, POC    Collection Time: 07/15/19  3:35 PM   Result Value Ref Range    Glucose (POC) 171 (H) 65 - 100 mg/dL    Performed by Sophia Barrera (PCT)    GLUCOSE, POC    Collection Time: 07/16/19 12:14 AM   Result Value Ref Range    Glucose (POC) 162 (H) 65 - 100 mg/dL    Performed by Alverto Lainez    CBC WITH AUTOMATED DIFF    Collection Time: 07/16/19  4:10 AM   Result Value Ref Range    WBC 8.0 4.1 - 11.1 K/uL    RBC 4.30 4. 10 - 5.70 M/uL    HGB 13.7 12.1 - 17.0 g/dL    HCT 41.2 36.6 - 50.3 %    MCV 95.8 80.0 - 99.0 FL    MCH 31.9 26.0 - 34.0 PG    MCHC 33.3 30.0 - 36.5 g/dL    RDW 13.0 11.5 - 14.5 %    PLATELET 159 (L) 112 - 400 K/uL    MPV 9.6 8.9 - 12.9 FL    NRBC 0.0 0  WBC    ABSOLUTE NRBC 0.00 0.00 - 0.01 K/uL    NEUTROPHILS 82 (H) 32 - 75 %    LYMPHOCYTES 12 12 - 49 %    MONOCYTES 4 (L) 5 - 13 %    EOSINOPHILS 1 0 - 7 %    BASOPHILS 0 0 - 1 %    IMMATURE GRANULOCYTES 1 (H) 0.0 - 0.5 %    ABS. NEUTROPHILS 6.6 1.8 - 8.0 K/UL    ABS. LYMPHOCYTES 1.0 0.8 - 3.5 K/UL    ABS. MONOCYTES 0.4 0.0 - 1.0 K/UL    ABS. EOSINOPHILS 0.0 0.0 - 0.4 K/UL    ABS. BASOPHILS 0.0 0.0 - 0.1 K/UL    ABS. IMM.  GRANS. 0.0 0.00 - 0.04 K/UL    DF AUTOMATED     METABOLIC PANEL, BASIC    Collection Time: 07/16/19  4:10 AM   Result Value Ref Range    Sodium 143 136 - 145 mmol/L    Potassium 3.8 3.5 - 5.1 mmol/L    Chloride 108 97 - 108 mmol/L    CO2 28 21 - 32 mmol/L    Anion gap 7 5 - 15 mmol/L    Glucose 122 (H) 65 - 100 mg/dL    BUN 17 6 - 20 MG/DL    Creatinine 1.17 0.70 - 1.30 MG/DL    BUN/Creatinine ratio 15 12 - 20      GFR est AA >60 >60 ml/min/1.73m2    GFR est non-AA >60 >60 ml/min/1.73m2    Calcium 7.9 (L) 8.5 - 10.1 MG/DL   HEPATIC FUNCTION PANEL    Collection Time: 07/16/19  4:10 AM   Result Value Ref Range    Protein, total 6.7 6.4 - 8.2 g/dL    Albumin 2.6 (L) 3.5 - 5.0 g/dL Globulin 4.1 (H) 2.0 - 4.0 g/dL    A-G Ratio 0.6 (L) 1.1 - 2.2      Bilirubin, total 0.6 0.2 - 1.0 MG/DL    Bilirubin, direct 0.2 0.0 - 0.2 MG/DL    Alk.  phosphatase 105 45 - 117 U/L    AST (SGOT) 48 (H) 15 - 37 U/L    ALT (SGPT) 202 (H) 12 - 78 U/L   LIPASE    Collection Time: 07/16/19  4:10 AM   Result Value Ref Range    Lipase 382 73 - 393 U/L   GLUCOSE, POC    Collection Time: 07/16/19  5:26 AM   Result Value Ref Range    Glucose (POC) 119 (H) 65 - 100 mg/dL    Performed by Kacie Leavitt PCT    GLUCOSE, POC    Collection Time: 07/16/19 11:50 AM   Result Value Ref Range    Glucose (POC) 134 (H) 65 - 100 mg/dL    Performed by Pardeep Dubon (PCT)    GLUCOSE, POC    Collection Time: 07/16/19  5:44 PM   Result Value Ref Range    Glucose (POC) 127 (H) 65 - 100 mg/dL    Performed by Tino Nguyen    GLUCOSE, POC    Collection Time: 07/17/19 12:08 AM   Result Value Ref Range    Glucose (POC) 138 (H) 65 - 100 mg/dL    Performed by Adama Blevins (RN)    CBC W/O DIFF    Collection Time: 07/17/19  3:58 AM   Result Value Ref Range    WBC 6.4 4.1 - 11.1 K/uL    RBC 3.58 (L) 4.10 - 5.70 M/uL    HGB 11.5 (L) 12.1 - 17.0 g/dL    HCT 34.3 (L) 36.6 - 50.3 %    MCV 95.8 80.0 - 99.0 FL    MCH 32.1 26.0 - 34.0 PG    MCHC 33.5 30.0 - 36.5 g/dL    RDW 13.0 11.5 - 14.5 %    PLATELET 095 (L) 552 - 400 K/uL    MPV 9.6 8.9 - 12.9 FL    NRBC 0.0 0  WBC    ABSOLUTE NRBC 0.00 0.00 - 6.86 K/uL   METABOLIC PANEL, COMPREHENSIVE    Collection Time: 07/17/19  3:58 AM   Result Value Ref Range    Sodium 141 136 - 145 mmol/L    Potassium 3.6 3.5 - 5.1 mmol/L    Chloride 108 97 - 108 mmol/L    CO2 27 21 - 32 mmol/L    Anion gap 6 5 - 15 mmol/L    Glucose 125 (H) 65 - 100 mg/dL    BUN 18 6 - 20 MG/DL    Creatinine 1.11 0.70 - 1.30 MG/DL    BUN/Creatinine ratio 16 12 - 20      GFR est AA >60 >60 ml/min/1.73m2    GFR est non-AA >60 >60 ml/min/1.73m2    Calcium 7.7 (L) 8.5 - 10.1 MG/DL    Bilirubin, total 0.6 0.2 - 1.0 MG/DL    ALT (SGPT) 126 (H) 12 - 78 U/L    AST (SGOT) 30 15 - 37 U/L    Alk.  phosphatase 85 45 - 117 U/L    Protein, total 6.0 (L) 6.4 - 8.2 g/dL    Albumin 2.4 (L) 3.5 - 5.0 g/dL    Globulin 3.6 2.0 - 4.0 g/dL    A-G Ratio 0.7 (L) 1.1 - 2.2     LIPASE    Collection Time: 07/17/19  3:58 AM   Result Value Ref Range    Lipase 173 73 - 393 U/L   GLUCOSE, POC    Collection Time: 07/17/19  5:47 AM   Result Value Ref Range    Glucose (POC) 115 (H) 65 - 100 mg/dL    Performed by Uli Salazar (WAGNER)        Data Review images and reports reviewed

## 2019-07-17 NOTE — PROGRESS NOTES
TRANSFER - OUT REPORT:    Verbal report given to De Shrestha RN (name) on Marty Cancel  being transferred to PACU (unit) for ordered procedure       Report consisted of patients Situation, Background, Assessment and   Recommendations(SBAR). Information from the following report(s) SBAR, Kardex, Intake/Output, MAR, Med Rec Status and Cardiac Rhythm NSR was reviewed with the receiving nurse. Lines:   Peripheral IV 07/16/19 Anterior; Left Hand (Active)   Site Assessment Clean, dry, & intact 7/17/2019  4:00 AM   Phlebitis Assessment 0 7/17/2019  4:00 AM   Infiltration Assessment 0 7/17/2019  4:00 AM   Dressing Status Clean, dry, & intact 7/17/2019  4:00 AM   Dressing Type Tape;Transparent 7/17/2019  4:00 AM   Hub Color/Line Status Pink; Infusing 7/17/2019  4:00 AM   Action Taken Open ports on tubing capped 7/17/2019  4:00 AM   Alcohol Cap Used Yes 7/17/2019  4:00 AM       Peripheral IV 07/17/19 Left Antecubital (Active)   Site Assessment Clean, dry, & intact 7/17/2019  9:43 AM   Phlebitis Assessment 0 7/17/2019  9:43 AM   Infiltration Assessment 0 7/17/2019  9:43 AM   Dressing Status Clean, dry, & intact 7/17/2019  9:43 AM   Dressing Type Tape;Transparent 7/17/2019  9:43 AM   Hub Color/Line Status Pink;Flushed;Patent 7/17/2019  9:43 AM        Opportunity for questions and clarification was provided.       Patient transported with:   Monitor  O2 @ 1.5 liters

## 2019-07-17 NOTE — PERIOP NOTES
TRANSFER - OUT REPORT:    Verbal report given to Dori Cerna RN(name) on Jordan Baer  being transferred to 2245(unit) for routine post - op       Report consisted of patients Situation, Background, Assessment and   Recommendations(SBAR). Information from the following report(s) SBAR, Kardex, OR Summary, Intake/Output, MAR, Recent Results and Cardiac Rhythm SR was reviewed with the receiving nurse. Opportunity for questions and clarification was provided.       Patient transported with:   Monitor  O2 @ 3 liters  Registered Nurse  [facility]

## 2019-07-17 NOTE — PROGRESS NOTES
GI Progress Note Layne Villeda)  NAME:Arnold Stephen MAO:86/97/[id number] DTT:[id number]   ATTG: [unfilled]  PCP: Corina Ceja MD  Date/Time:  7/17/2019 11:39 AM   Assessment:   · Severe gallstone pancreatitis   · Transminitis 2/2 above; improving  · AP, improving but slowly     Plan:   · PCA for pain control  · IVF  · NPO  · Plan for cholecystectomy w/ IOC by Dr. Shelby Laird today; his assistance is much appreciated     Subjective:   Discussed with RN events overnight. Complaint Y/N Description   Abdominal Pain     Hematemesis     Hematochezia     Melena     Constipation     Diarrhea     Dyspepsia     Dysphagia     Jaundiced     Nausea/vomiting       Review of Systems:  Symptom Y/N Comments  Symptom Y/N Comments   Fever/Chills    Chest Pain     Cough    Headaches     Sputum    Joint Pain     SOB/HAMILTON    Pruritis/Rash     Tolerating Diet    Other       Could NOT obtain due to:      Objective:   VITALS:   Last 24hrs VS reviewed since prior progress note. Most recent are:  Visit Vitals  /90 (BP 1 Location: Right arm, BP Patient Position: At rest)   Pulse 83   Temp 98.2 °F (36.8 °C)   Resp 20   Ht 6' 1\" (1.854 m)   Wt 95.2 kg (209 lb 14.1 oz)   SpO2 95%   BMI 27.69 kg/m²       Intake/Output Summary (Last 24 hours) at 7/17/2019 1139  Last data filed at 7/17/2019 1043  Gross per 24 hour   Intake 5036.67 ml   Output 1625 ml   Net 3411.67 ml     PHYSICAL EXAM:  General: WD, WN. Alert, cooperative, no acute distress    HEENT: NC, Atraumatic. PERRLA, EOMI. Anicteric sclerae. Lungs:  CTA Bilaterally. No Wheezing/Rhonchi/Rales.   Heart:  Regular  rhythm,  No murmur (), No Rubs, No Gallops  Abdomen: Soft, +distended, +diffuse TTP  Extremities: No c/c/e  Neurologic:  No acute neurological     Lab and Radiology Data Reviewed: (see below)    Medications Reviewed: (see below)  PMH/SH reviewed - no change compared to H&P  ________________________________________________________________________    Care Plan discussed with:  Patient x   Family     RN               Consultant:       Alex Mera MD     Procedures: see electronic medical records for all procedures/Xrays and details which were not copied into this note but were reviewed prior to creation of Plan. LABS:  Recent Labs     07/17/19 0358 07/16/19 0410   WBC 6.4 8.0   HGB 11.5* 13.7   HCT 34.3* 41.2   * 132*     Recent Labs     07/17/19  0358 07/16/19  0410 07/15/19  0346 07/14/19  2006    143 139 139   K 3.6 3.8 3.9 4.0    108 109* 109*   CO2 27 28 24 24   BUN 18 17 20 19   CREA 1.11 1.17 1.28 1.32*   * 122* 203* 232*   CA 7.7* 7.9* 7.8* 8.0*   MG  --   --   --  2.0     Recent Labs     07/17/19  0358 07/16/19  0410 07/15/19  0346   SGOT 30 48* 81*   AP 85 105 119*   TP 6.0* 6.7 6.5   ALB 2.4* 2.6* 2.9*   GLOB 3.6 4.1* 3.6   LPSE 173 382 1,299*     No results for input(s): INR, PTP, APTT in the last 72 hours. No lab exists for component: INREXT   No results for input(s): FE, TIBC, PSAT, FERR in the last 72 hours. No results found for: FOL, RBCF  No results for input(s): PH, PCO2, PO2 in the last 72 hours. No results for input(s): CPK, CKMB in the last 72 hours.     No lab exists for component: TROPONINI  Lab Results   Component Value Date/Time    Color YELLOW/STRAW 07/15/2019 04:10 AM    Appearance TURBID (A) 07/15/2019 04:10 AM    Specific gravity 1.027 07/15/2019 04:10 AM    pH (UA) 5.5 07/15/2019 04:10 AM    Protein 100 (A) 07/15/2019 04:10 AM    Glucose 500 (A) 07/15/2019 04:10 AM    Ketone 15 (A) 07/15/2019 04:10 AM    Bilirubin NEGATIVE  07/15/2019 04:10 AM    Urobilinogen 0.2 07/15/2019 04:10 AM    Nitrites NEGATIVE  07/15/2019 04:10 AM    Leukocyte Esterase NEGATIVE  07/15/2019 04:10 AM    Epithelial cells FEW 07/15/2019 04:10 AM    Bacteria 3+ (A) 07/15/2019 04:10 AM    WBC 0-4 07/15/2019 04:10 AM    RBC 5-10 07/15/2019 04:10 AM       MEDICATIONS:  Current Facility-Administered Medications   Medication Dose Route Frequency    atorvastatin (LIPITOR) tablet 40 mg  40 mg Oral QHS    metoprolol (LOPRESSOR) injection 2.5 mg  2.5 mg IntraVENous Q6H    phenazopyridine (PYRIDIUM) tablet 100 mg  100 mg Oral TIDPC    HYDROmorphone (PF) 25 mg/50 mL (DILAUDID) PCA   IntraVENous CONTINUOUS    piperacillin-tazobactam (ZOSYN) 3.375 g in 0.9% sodium chloride (MBP/ADV) 100 mL  3.375 g IntraVENous Q8H    famotidine (PF) (PEPCID) 20 mg in sodium chloride 0.9% 10 mL injection  20 mg IntraVENous Q12H    lactated Ringers infusion  200 mL/hr IntraVENous CONTINUOUS    insulin lispro (HUMALOG) injection   SubCUTAneous Q6H    HYDROmorphone (PF) (DILAUDID) injection 2 mg  2 mg IntraVENous Q3H PRN    insulin glargine (LANTUS) injection 20 Units  20 Units SubCUTAneous QHS    nitroglycerin (NITROSTAT) tablet 0.4 mg  0.4 mg SubLINGual Q5MIN PRN    amLODIPine (NORVASC) tablet 2.5 mg  2.5 mg Oral DAILY    sodium chloride (NS) flush 5-40 mL  5-40 mL IntraVENous Q8H    sodium chloride (NS) flush 5-40 mL  5-40 mL IntraVENous PRN    morphine injection 2 mg  2 mg IntraVENous Q4H PRN    naloxone (NARCAN) injection 0.4 mg  0.4 mg IntraVENous PRN    ondansetron (ZOFRAN) injection 4 mg  4 mg IntraVENous Q4H PRN    dextrose (D50) infusion 12.5-25 g  25-50 mL IntraVENous PRN    glucose chewable tablet 16 g  4 Tab Oral PRN    glucagon (GLUCAGEN) injection 1 mg  1 mg IntraMUSCular PRN

## 2019-07-17 NOTE — OP NOTES
Operative Note/Laparoscopic Cholecystectomy      Patient ID:   Name: Aureliano Singleton   Medical Record Number: [id number]   YOB: 1955            OPERATIVE REPORT      PREOPERATIVE DIAGNOSIS:   1.  Gallstone Pancreatitis    POSTOPERATIVE DIAGNOSIS  1. Gallstone pancreatitis    OPERATIVE PROCEDURE:   1. Laparoscopic cholecystectomy with intraoperative cholangiogram    SURGEON: Monalisa Matute. Salty Mayorga MD    ASSISTANT:   Raymond Patrick    ANESTHESIA: General.    IMPLANTS:  none    COMPLICATIONS:   None    SPECIMENS:  1.  Gallbladder    FINDINGS:  1.  moderately diseased gallbladder  2. fatty liver  3. Normal Intraoperative cholangiogram  4.  no adhesions but extensive bowel distention from ileus from pancreatitis and fat supponification    ESTIMATED BLOOD LOSS: 25 mL. BRIEF HISTORY: The patient is a 61 y.o. yo male with gallstone pancreatitis for cholecystectomy. The patient understood the risks and benefits  of laparoscopic cholecystectomy with possible cholangiogram including bleeding,  infection, biliary injury, bowel injury, post cholecystectomy diarrhea, and  residual stones, post operative respiratory and cardiac complications, DVT, and wishes to proceed. PROCEDURE: The patient was taken to the operating room, placed on  the operating table in the supine position and underwent general anesthesia. Afterward, the abdomen was prepped and  draped in the usual sterile fashion. After appropriate time-out 0.5%  Marcaine with epinephrine was infiltrated in the skin and subcutaneous  tissues in the periumbilical region. A curvilinear incision was made above  the umbilicus, and subcutaneous tissue dissected off bluntly. Electrocautery was used to go through midline of the fascia, and a 0 Vicryl  stay suture was placed on either side of the midline.  The peritoneal cavity  was cautiously entered, and a blunt 12-mm Dinah trocar was inserted in, CO2  insufflation begun, and 15 mmHg pressure gave good visualization of the  peritoneal cavity. Two 5-mm trocars were placed in the upper abdomen. The  liver looked normal, and the gallbladder was moderately diseased with adhesions around it. The remaining abdominal compartment was grossly without unusual findings. The infundibulum was pulled up superior-laterally and the cystic duct  dissected out. A clip was placed at the cystic duct-infundibular junction  and a partial ductotomy performed. A cholangiogram was obtained giving good filling of the cystic duct and intra and extrahepatic ducts. There were no strictures or filling defects and contrast flowed freely into the duodenum. Two clips were placed proximally on the cystic duct and the cystic duct was divided. The cystic artery was dissected  out and 2 clips placed proximally and 1 distally, and the cystic artery was divided. Then utilizing the electrocautery,  the gallbladder was removed from the liver bed. The gallbladder was brought  out the umbilical trocar site. Reinsufflation begun. A small amount of  bleeding on the liver bed was controlled with electrocautery, and  irrigation and suctioning performed. Trocars were removed and there was no apparent bleeding internally from the trocar sites. CO2 was allowed to be evacuated from the abdominal cavity. Interrupted 0-Vicryl was used to approximate the fascia at the umbilical trocar site. Running 4-0 Vicryl used to close skin on all the incisions and a dermabond dressing was placed. Upon completion of the procedure, the needle, sponge and instrument  counts were correct x2. The patient was extubated and brought to the recovery room. The patient tolerated the procedure well.     Divya Lord MD

## 2019-07-17 NOTE — ANESTHESIA PREPROCEDURE EVALUATION
Relevant Problems   No relevant active problems       Anesthetic History   No history of anesthetic complications            Review of Systems / Medical History  Patient summary reviewed, nursing notes reviewed and pertinent labs reviewed    Pulmonary  Within defined limits                 Neuro/Psych   Within defined limits           Cardiovascular    Hypertension          CAD    Exercise tolerance: >4 METS     GI/Hepatic/Renal  Within defined limits              Endo/Other    Diabetes         Other Findings   Comments: Gallstones            Physical Exam    Airway  Mallampati: II  TM Distance: 4 - 6 cm  Neck ROM: normal range of motion   Mouth opening: Normal     Cardiovascular  Regular rate and rhythm,  S1 and S2 normal,  no murmur, click, rub, or gallop             Dental    Dentition: Implants     Pulmonary  Breath sounds clear to auscultation               Abdominal  GI exam deferred       Other Findings            Anesthetic Plan    ASA: 3  Anesthesia type: general    Monitoring Plan: BIS      Induction: Intravenous  Anesthetic plan and risks discussed with: Patient

## 2019-07-17 NOTE — PROGRESS NOTES
TRANSFER - IN REPORT:    Verbal report received from Calos Louis RN (name) on [facility]  being received from PACU (unit) for routine progression of care      Report consisted of patients Situation, Background, Assessment and   Recommendations(SBAR). Information from the following report(s) SBAR, Kardex, Intake/Output, MAR, Recent Results, Med Rec Status and Cardiac Rhythm NSR was reviewed with the receiving nurse. Opportunity for questions and clarification was provided. Assessment completed upon patients arrival to unit and care assumed.

## 2019-07-17 NOTE — PERIOP NOTES
Handoff Report from Operating Room to PACU    Report received from [address] [place] [address] and Preeti Hopperman RAFAEL regarding Octavia Lester. Surgeon(s):  Roya Milligan MD  And Procedure(s) (LRB):  CHOLECYSTECTOMY LAPAROSCOPIC WITH INTRAOPERATIVE CHOLANGIOGRAM (N/A)  confirmed   with dressings discussed. Anesthesia type, drugs, patient history, complications, estimated blood loss, vital signs, intake and output, and last pain medication and reversal medications were reviewed.

## 2019-07-18 ENCOUNTER — APPOINTMENT (OUTPATIENT)
Dept: CT IMAGING | Age: 64
DRG: 418 | End: 2019-07-18
Attending: SURGERY
Payer: COMMERCIAL

## 2019-07-18 LAB
ALBUMIN SERPL-MCNC: 2.3 G/DL (ref 3.5–5)
ALBUMIN/GLOB SERPL: 0.6 {RATIO} (ref 1.1–2.2)
ALP SERPL-CCNC: 93 U/L (ref 45–117)
ALT SERPL-CCNC: 175 U/L (ref 12–78)
ANION GAP SERPL CALC-SCNC: 6 MMOL/L (ref 5–15)
AST SERPL-CCNC: 121 U/L (ref 15–37)
BILIRUB SERPL-MCNC: 0.7 MG/DL (ref 0.2–1)
BUN SERPL-MCNC: 15 MG/DL (ref 6–20)
BUN/CREAT SERPL: 14 (ref 12–20)
CALCIUM SERPL-MCNC: 7.7 MG/DL (ref 8.5–10.1)
CHLORIDE SERPL-SCNC: 103 MMOL/L (ref 97–108)
CO2 SERPL-SCNC: 26 MMOL/L (ref 21–32)
CREAT SERPL-MCNC: 1.04 MG/DL (ref 0.7–1.3)
ERYTHROCYTE [DISTWIDTH] IN BLOOD BY AUTOMATED COUNT: 13.1 % (ref 11.5–14.5)
GLOBULIN SER CALC-MCNC: 3.8 G/DL (ref 2–4)
GLUCOSE BLD STRIP.AUTO-MCNC: 122 MG/DL (ref 65–100)
GLUCOSE BLD STRIP.AUTO-MCNC: 124 MG/DL (ref 65–100)
GLUCOSE BLD STRIP.AUTO-MCNC: 145 MG/DL (ref 65–100)
GLUCOSE BLD STRIP.AUTO-MCNC: 152 MG/DL (ref 65–100)
GLUCOSE SERPL-MCNC: 128 MG/DL (ref 65–100)
HCT VFR BLD AUTO: 34.1 % (ref 36.6–50.3)
HGB BLD-MCNC: 11.7 G/DL (ref 12.1–17)
MCH RBC QN AUTO: 32.2 PG (ref 26–34)
MCHC RBC AUTO-ENTMCNC: 34.3 G/DL (ref 30–36.5)
MCV RBC AUTO: 93.9 FL (ref 80–99)
NRBC # BLD: 0 K/UL (ref 0–0.01)
NRBC BLD-RTO: 0 PER 100 WBC
PLATELET # BLD AUTO: 174 K/UL (ref 150–400)
PMV BLD AUTO: 9.4 FL (ref 8.9–12.9)
POTASSIUM SERPL-SCNC: 3.3 MMOL/L (ref 3.5–5.1)
PROT SERPL-MCNC: 6.1 G/DL (ref 6.4–8.2)
RBC # BLD AUTO: 3.63 M/UL (ref 4.1–5.7)
SERVICE CMNT-IMP: ABNORMAL
SODIUM SERPL-SCNC: 135 MMOL/L (ref 136–145)
WBC # BLD AUTO: 6.7 K/UL (ref 4.1–11.1)

## 2019-07-18 PROCEDURE — 36415 COLL VENOUS BLD VENIPUNCTURE: CPT

## 2019-07-18 PROCEDURE — 77010033678 HC OXYGEN DAILY

## 2019-07-18 PROCEDURE — 74011250636 HC RX REV CODE- 250/636: Performed by: SURGERY

## 2019-07-18 PROCEDURE — 80053 COMPREHEN METABOLIC PANEL: CPT

## 2019-07-18 PROCEDURE — 65660000000 HC RM CCU STEPDOWN

## 2019-07-18 PROCEDURE — 74011250637 HC RX REV CODE- 250/637: Performed by: INTERNAL MEDICINE

## 2019-07-18 PROCEDURE — 74011636637 HC RX REV CODE- 636/637: Performed by: INTERNAL MEDICINE

## 2019-07-18 PROCEDURE — 74011250636 HC RX REV CODE- 250/636: Performed by: INTERNAL MEDICINE

## 2019-07-18 PROCEDURE — 74011636637 HC RX REV CODE- 636/637: Performed by: SURGERY

## 2019-07-18 PROCEDURE — 74011250637 HC RX REV CODE- 250/637: Performed by: SURGERY

## 2019-07-18 PROCEDURE — 85027 COMPLETE CBC AUTOMATED: CPT

## 2019-07-18 PROCEDURE — 82962 GLUCOSE BLOOD TEST: CPT

## 2019-07-18 PROCEDURE — 74011000258 HC RX REV CODE- 258: Performed by: SURGERY

## 2019-07-18 RX ORDER — FUROSEMIDE 10 MG/ML
20 INJECTION INTRAMUSCULAR; INTRAVENOUS ONCE
Status: COMPLETED | OUTPATIENT
Start: 2019-07-18 | End: 2019-07-18

## 2019-07-18 RX ORDER — SODIUM CHLORIDE 0.9 % (FLUSH) 0.9 %
10 SYRINGE (ML) INJECTION
Status: COMPLETED | OUTPATIENT
Start: 2019-07-19 | End: 2019-07-19

## 2019-07-18 RX ORDER — ENOXAPARIN SODIUM 100 MG/ML
40 INJECTION SUBCUTANEOUS EVERY 24 HOURS
Status: DISCONTINUED | OUTPATIENT
Start: 2019-07-18 | End: 2019-07-22 | Stop reason: HOSPADM

## 2019-07-18 RX ORDER — ACETAMINOPHEN 10 MG/ML
1000 INJECTION, SOLUTION INTRAVENOUS ONCE
Status: COMPLETED | OUTPATIENT
Start: 2019-07-18 | End: 2019-07-19

## 2019-07-18 RX ORDER — PANTOPRAZOLE SODIUM 40 MG/1
40 TABLET, DELAYED RELEASE ORAL
Status: DISCONTINUED | OUTPATIENT
Start: 2019-07-18 | End: 2019-07-19

## 2019-07-18 RX ORDER — POTASSIUM CHLORIDE 20 MEQ/1
40 TABLET, EXTENDED RELEASE ORAL
Status: COMPLETED | OUTPATIENT
Start: 2019-07-18 | End: 2019-07-18

## 2019-07-18 RX ADMIN — Medication 10 ML: at 06:29

## 2019-07-18 RX ADMIN — PIPERACILLIN SODIUM,TAZOBACTAM SODIUM 3.38 G: 3; .375 INJECTION, POWDER, FOR SOLUTION INTRAVENOUS at 03:01

## 2019-07-18 RX ADMIN — OXYCODONE HYDROCHLORIDE 5 MG: 5 TABLET ORAL at 18:14

## 2019-07-18 RX ADMIN — SODIUM CHLORIDE, SODIUM LACTATE, POTASSIUM CHLORIDE, AND CALCIUM CHLORIDE 75 ML/HR: 600; 310; 30; 20 INJECTION, SOLUTION INTRAVENOUS at 02:45

## 2019-07-18 RX ADMIN — PHENAZOPYRIDINE HYDROCHLORIDE 100 MG: 100 TABLET ORAL at 18:42

## 2019-07-18 RX ADMIN — AMLODIPINE BESYLATE 2.5 MG: 2.5 TABLET ORAL at 09:04

## 2019-07-18 RX ADMIN — ENOXAPARIN SODIUM 40 MG: 40 INJECTION SUBCUTANEOUS at 09:06

## 2019-07-18 RX ADMIN — INSULIN LISPRO 2 UNITS: 100 INJECTION, SOLUTION INTRAVENOUS; SUBCUTANEOUS at 13:03

## 2019-07-18 RX ADMIN — INSULIN LISPRO 2 UNITS: 100 INJECTION, SOLUTION INTRAVENOUS; SUBCUTANEOUS at 16:57

## 2019-07-18 RX ADMIN — OXYCODONE HYDROCHLORIDE 5 MG: 5 TABLET ORAL at 02:45

## 2019-07-18 RX ADMIN — INSULIN GLARGINE 20 UNITS: 100 INJECTION, SOLUTION SUBCUTANEOUS at 22:02

## 2019-07-18 RX ADMIN — METOPROLOL SUCCINATE 25 MG: 25 TABLET, EXTENDED RELEASE ORAL at 18:42

## 2019-07-18 RX ADMIN — POTASSIUM CHLORIDE 40 MEQ: 20 TABLET, EXTENDED RELEASE ORAL at 07:32

## 2019-07-18 RX ADMIN — Medication 10 ML: at 22:01

## 2019-07-18 RX ADMIN — OXYCODONE HYDROCHLORIDE 5 MG: 5 TABLET ORAL at 22:01

## 2019-07-18 RX ADMIN — Medication 10 ML: at 09:06

## 2019-07-18 RX ADMIN — OXYCODONE HYDROCHLORIDE 5 MG: 5 TABLET ORAL at 11:30

## 2019-07-18 RX ADMIN — MORPHINE SULFATE 2 MG: 2 INJECTION, SOLUTION INTRAMUSCULAR; INTRAVENOUS at 04:03

## 2019-07-18 RX ADMIN — PANTOPRAZOLE SODIUM 40 MG: 40 TABLET, DELAYED RELEASE ORAL at 09:04

## 2019-07-18 RX ADMIN — FUROSEMIDE 20 MG: 10 INJECTION, SOLUTION INTRAMUSCULAR; INTRAVENOUS at 09:05

## 2019-07-18 RX ADMIN — PHENAZOPYRIDINE HYDROCHLORIDE 100 MG: 100 TABLET ORAL at 09:04

## 2019-07-18 RX ADMIN — ATORVASTATIN CALCIUM 40 MG: 40 TABLET, FILM COATED ORAL at 22:01

## 2019-07-18 RX ADMIN — Medication 10 ML: at 13:04

## 2019-07-18 RX ADMIN — PHENAZOPYRIDINE HYDROCHLORIDE 100 MG: 100 TABLET ORAL at 13:03

## 2019-07-18 RX ADMIN — OXYCODONE HYDROCHLORIDE 5 MG: 5 TABLET ORAL at 07:32

## 2019-07-18 RX ADMIN — MORPHINE SULFATE 2 MG: 2 INJECTION, SOLUTION INTRAMUSCULAR; INTRAVENOUS at 18:58

## 2019-07-18 RX ADMIN — ACETAMINOPHEN 1000 MG: 10 INJECTION, SOLUTION INTRAVENOUS at 22:52

## 2019-07-18 RX ADMIN — MORPHINE SULFATE 2 MG: 2 INJECTION, SOLUTION INTRAMUSCULAR; INTRAVENOUS at 22:52

## 2019-07-18 RX ADMIN — PIPERACILLIN SODIUM,TAZOBACTAM SODIUM 3.38 G: 3; .375 INJECTION, POWDER, FOR SOLUTION INTRAVENOUS at 20:27

## 2019-07-18 RX ADMIN — PIPERACILLIN SODIUM,TAZOBACTAM SODIUM 3.38 G: 3; .375 INJECTION, POWDER, FOR SOLUTION INTRAVENOUS at 11:30

## 2019-07-18 NOTE — PROGRESS NOTES
ZINA - home with home health or hospital to home visit and f/u appts    Pt is 1 day post op from a Cholecystectomy Laparoscopic with Intraoperative Cholangiogram. Prior to admission, pt was independent and driving. Pt lives with his wife and she can transport pt home by car at discharge. CM will continue to follow for discharge planning needs.      Maria Fernanda Singh, [address]

## 2019-07-18 NOTE — PROGRESS NOTES
Bedside shift change report given to Sosa Perales (oncoming nurse) by Steve (offgoing nurse). Report included the following information SBAR, OR Summary, Procedure Summary, Recent Results, Med Rec Status and Cardiac Rhythm NSR. No overnight events noted. Pt pain managed with PRN pain meds and charted in [facility] Bedside shift change report given to Steve (oncoming nurse) by Sosa Perales (offgoing nurse). Report included the following information SBAR, Recent Results and Cardiac Rhythm NSR.

## 2019-07-18 NOTE — PROGRESS NOTES
[place]  NAME:Arnold Barker GXF:38/[birth year] OKZ:[id number]   ATTG: [unfilled]  PCP: Fernandez Sotelo MD  Date/Time:  7/18/2019 1:00 PM   Assessment:   · Gallstone pancreatitis; improving  · POD #1 s/p cholecystectomy  · Ileus 2/2 #1     Plan:   · Agree with advancing of diet  · Pain control  · Home when able to tolerate GI lite and PO pain control     Subjective:   Pt feels better today. Less pain. Only using PO pain meds    Complaint Y/N Description   Abdominal Pain     Hematemesis     Hematochezia     Melena     Constipation     Diarrhea     Dyspepsia     Dysphagia     Jaundiced     Nausea/vomiting       Review of Systems:  Symptom Y/N Comments  Symptom Y/N Comments   Fever/Chills    Chest Pain     Cough    Headaches     Sputum    Joint Pain     SOB/HAMILTON    Pruritis/Rash     Tolerating Diet    Other       Could NOT obtain due to:      Objective:   VITALS:   Last 24hrs VS reviewed since prior progress note. Most recent are:  Visit Vitals  /85 (BP 1 Location: Left arm, BP Patient Position: Sitting)   Pulse 66   Temp 98 °F (36.7 °C)   Resp 16   Ht 6' 1\" (1.854 m)   Wt 95.2 kg (209 lb 14.1 oz)   SpO2 96%   BMI 27.69 kg/m²       Intake/Output Summary (Last 24 hours) at 7/18/2019 1300  Last data filed at 7/18/2019 1138  Gross per 24 hour   Intake 3121.25 ml   Output 1305 ml   Net 1816.25 ml     PHYSICAL EXAM:  General: WD, WN. Alert, cooperative, no acute distress    HEENT: NC, Atraumatic. PERRLA, EOMI. Anicteric sclerae. Lungs:  CTA Bilaterally. No Wheezing/Rhonchi/Rales.   Heart:  Regular  rhythm,  No murmur (), No Rubs, No Gallops  Abdomen: Soft, +distended, +mild diffuse TTP  Extremities: No c/c/e  Neurologic:  No acute neurological distress     Lab and Radiology Data Reviewed: (see below)    Medications Reviewed: (see below)  PMH/SH reviewed - no change compared to H&P  ________________________________________________________________________    Care Plan discussed with:  Patient x Family     RN x              Consultant:       Casper Casas MD     Procedures: see electronic medical records for all procedures/Xrays and details which were not copied into this note but were reviewed prior to creation of Plan. LABS:  Recent Labs     07/18/19 0417 07/17/19 0358   WBC 6.7 6.4   HGB 11.7* 11.5*   HCT 34.1* 34.3*    138*     Recent Labs     07/18/19 0417 07/17/19 0358 07/16/19 0410   * 141 143   K 3.3* 3.6 3.8    108 108   CO2 26 27 28   BUN 15 18 17   CREA 1.04 1.11 1.17   * 125* 122*   CA 7.7* 7.7* 7.9*     Recent Labs     07/18/19 0417 07/17/19 0358 07/16/19 0410   SGOT 121* 30 48*   AP 93 85 105   TP 6.1* 6.0* 6.7   ALB 2.3* 2.4* 2.6*   GLOB 3.8 3.6 4.1*   LPSE  --  173 382     No results for input(s): INR, PTP, APTT in the last 72 hours. No lab exists for component: INREXT   No results for input(s): FE, TIBC, PSAT, FERR in the last 72 hours. No results found for: FOL, RBCF  No results for input(s): PH, PCO2, PO2 in the last 72 hours. No results for input(s): CPK, CKMB in the last 72 hours.     No lab exists for component: TROPONINI  Lab Results   Component Value Date/Time    Color YELLOW/STRAW 07/15/2019 04:10 AM    Appearance TURBID (A) 07/15/2019 04:10 AM    Specific gravity 1.027 07/15/2019 04:10 AM    pH (UA) 5.5 07/15/2019 04:10 AM    Protein 100 (A) 07/15/2019 04:10 AM    Glucose 500 (A) 07/15/2019 04:10 AM    Ketone 15 (A) 07/15/2019 04:10 AM    Bilirubin NEGATIVE  07/15/2019 04:10 AM    Urobilinogen 0.2 07/15/2019 04:10 AM    Nitrites NEGATIVE  07/15/2019 04:10 AM    Leukocyte Esterase NEGATIVE  07/15/2019 04:10 AM    Epithelial cells FEW 07/15/2019 04:10 AM    Bacteria 3+ (A) 07/15/2019 04:10 AM    WBC 0-4 07/15/2019 04:10 AM    RBC 5-10 07/15/2019 04:10 AM       MEDICATIONS:  Current Facility-Administered Medications   Medication Dose Route Frequency    enoxaparin (LOVENOX) injection 40 mg  40 mg SubCUTAneous Q24H    pantoprazole (PROTONIX) tablet 40 mg  40 mg Oral ACB    atorvastatin (LIPITOR) tablet 40 mg  40 mg Oral QHS    oxyCODONE IR (ROXICODONE) tablet 5 mg  5 mg Oral Q4H PRN    insulin lispro (HUMALOG) injection   SubCUTAneous AC&HS    metoprolol succinate (TOPROL-XL) XL tablet 25 mg  25 mg Oral DAILY    phenazopyridine (PYRIDIUM) tablet 100 mg  100 mg Oral TIDPC    piperacillin-tazobactam (ZOSYN) 3.375 g in 0.9% sodium chloride (MBP/ADV) 100 mL  3.375 g IntraVENous Q8H    insulin glargine (LANTUS) injection 20 Units  20 Units SubCUTAneous QHS    nitroglycerin (NITROSTAT) tablet 0.4 mg  0.4 mg SubLINGual Q5MIN PRN    amLODIPine (NORVASC) tablet 2.5 mg  2.5 mg Oral DAILY    sodium chloride (NS) flush 5-40 mL  5-40 mL IntraVENous Q8H    sodium chloride (NS) flush 5-40 mL  5-40 mL IntraVENous PRN    morphine injection 2 mg  2 mg IntraVENous Q4H PRN    naloxone (NARCAN) injection 0.4 mg  0.4 mg IntraVENous PRN    ondansetron (ZOFRAN) injection 4 mg  4 mg IntraVENous Q4H PRN    glucose chewable tablet 16 g  4 Tab Oral PRN    glucagon (GLUCAGEN) injection 1 mg  1 mg IntraMUSCular PRN

## 2019-07-18 NOTE — PROGRESS NOTES
Admit Date: 2019    POD * No surgery found *    Procedure:  Procedure(s):  CHOLECYSTECTOMY LAPAROSCOPIC WITH INTRAOPERATIVE CHOLANGIOGRAM      Assessment:   Active Problems:    Pancreatitis, acute (2019)      Uncontrolled type II diabetes mellitus ([facility] [address] (2019)      MONET (acute kidney injury) ([facility] [address] (2019)      Acute pancreatitis (2019)      Uncontrolled diabetes mellitus ([facility] [address] (2019)      1. Severe gallstone pancreatitis. S/p lap vanda. Still with ileus  2. Post surgical   3. Tolerating clears      Plan/Recommendations/Medical Decision Makin. Advance to diabetic diet (low fat diet) later today  2. Stop IVF  3. Pain control  4. May not do well with diet for awhile due to ileus and pancreatitis  5. DVT prophylaxis, ulcer prophylaxis  6. I will be out of town until . My associates will see in the interim    Subjective:     Patient has complaints of pain. But improving    Objective:     Blood pressure 158/84, pulse 84, temperature 98.2 °F (36.8 °C), resp. rate 18, height 6' 1\" (1.854 m), weight 95.2 kg (209 lb 14.1 oz), SpO2 96 %. Temp (24hrs), Av.4 °F (36.9 °C), Min:97.9 °F (36.6 °C), Max:98.9 °F (37.2 °C)      Physical Exam:  LUNG: clear to auscultation bilaterally, HEART: regular rate and rhythm, S1, S2 normal, no murmur, click, rub or gallop, ABDOMEN: soft, very distended tender, appropriately tender, incisions CDI.  Bowel sounds normal. No masses,  no organomegaly, no rebound or guarding    Labs:   Recent Results (from the past 48 hour(s))   GLUCOSE, POC    Collection Time: 19 11:50 AM   Result Value Ref Range    Glucose (POC) 134 (H) 65 - 100 mg/dL    Performed by Nguyen Rhoades (PCT)    GLUCOSE, POC    Collection Time: 19  5:44 PM   Result Value Ref Range    Glucose (POC) 127 (H) 65 - 100 mg/dL    Performed by [address] [place] [address], POC    Collection Time: 19 12:08 AM   Result Value Ref Range    Glucose (POC) 138 (H) 65 - 100 mg/dL    Performed by Fiordaliza Ramos (RN)    CBC W/O DIFF    Collection Time: 07/17/19  3:58 AM   Result Value Ref Range    WBC 6.4 4.1 - 11.1 K/uL    RBC 3.58 (L) 4.10 - 5.70 M/uL    HGB 11.5 (L) 12.1 - 17.0 g/dL    HCT 34.3 (L) 36.6 - 50.3 %    MCV 95.8 80.0 - 99.0 FL    MCH 32.1 26.0 - 34.0 PG    MCHC 33.5 30.0 - 36.5 g/dL    RDW 13.0 11.5 - 14.5 %    PLATELET 406 (L) 695 - 400 K/uL    MPV 9.6 8.9 - 12.9 FL    NRBC 0.0 0  WBC    ABSOLUTE NRBC 0.00 0.00 - 7.01 K/uL   METABOLIC PANEL, COMPREHENSIVE    Collection Time: 07/17/19  3:58 AM   Result Value Ref Range    Sodium 141 136 - 145 mmol/L    Potassium 3.6 3.5 - 5.1 mmol/L    Chloride 108 97 - 108 mmol/L    CO2 27 21 - 32 mmol/L    Anion gap 6 5 - 15 mmol/L    Glucose 125 (H) 65 - 100 mg/dL    BUN 18 6 - 20 MG/DL    Creatinine 1.11 0.70 - 1.30 MG/DL    BUN/Creatinine ratio 16 12 - 20      GFR est AA >60 >60 ml/min/1.73m2    GFR est non-AA >60 >60 ml/min/1.73m2    Calcium 7.7 (L) 8.5 - 10.1 MG/DL    Bilirubin, total 0.6 0.2 - 1.0 MG/DL    ALT (SGPT) 126 (H) 12 - 78 U/L    AST (SGOT) 30 15 - 37 U/L    Alk.  phosphatase 85 45 - 117 U/L    Protein, total 6.0 (L) 6.4 - 8.2 g/dL    Albumin 2.4 (L) 3.5 - 5.0 g/dL    Globulin 3.6 2.0 - 4.0 g/dL    A-G Ratio 0.7 (L) 1.1 - 2.2     LIPASE    Collection Time: 07/17/19  3:58 AM   Result Value Ref Range    Lipase 173 73 - 393 U/L   GLUCOSE, POC    Collection Time: 07/17/19  5:47 AM   Result Value Ref Range    Glucose (POC) 115 (H) 65 - 100 mg/dL    Performed by Fiordaliza Ramos (RN)    GLUCOSE, POC    Collection Time: 07/17/19 10:37 AM   Result Value Ref Range    Glucose (POC) 111 (H) 65 - 100 mg/dL    Performed by [facility], POC    Collection Time: 07/17/19  1:29 PM   Result Value Ref Range    Glucose (POC) 120 (H) 65 - 100 mg/dL    Performed by Grzegorz Olmedo*    GLUCOSE, POC    Collection Time: 07/17/19  5:15 PM   Result Value Ref Range    Glucose (POC) 113 (H) 65 - 100 mg/dL    Performed by Ajit Carr [place]    GLUCOSE, POC    Collection Time: 07/17/19  9:59 PM   Result Value Ref Range    Glucose (POC) 224 (H) 65 - 100 mg/dL    Performed by Aura Zaragoza (traveler)    CBC W/O DIFF    Collection Time: 07/18/19  4:17 AM   Result Value Ref Range    WBC 6.7 4.1 - 11.1 K/uL    RBC 3.63 (L) 4.10 - 5.70 M/uL    HGB 11.7 (L) 12.1 - 17.0 g/dL    HCT 34.1 (L) 36.6 - 50.3 %    MCV 93.9 80.0 - 99.0 FL    MCH 32.2 26.0 - 34.0 PG    MCHC 34.3 30.0 - 36.5 g/dL    RDW 13.1 11.5 - 14.5 %    PLATELET 742 171 - 187 K/uL    MPV 9.4 8.9 - 12.9 FL    NRBC 0.0 0  WBC    ABSOLUTE NRBC 0.00 0.00 - 6.10 K/uL   METABOLIC PANEL, COMPREHENSIVE    Collection Time: 07/18/19  4:17 AM   Result Value Ref Range    Sodium 135 (L) 136 - 145 mmol/L    Potassium 3.3 (L) 3.5 - 5.1 mmol/L    Chloride 103 97 - 108 mmol/L    CO2 26 21 - 32 mmol/L    Anion gap 6 5 - 15 mmol/L    Glucose 128 (H) 65 - 100 mg/dL    BUN 15 6 - 20 MG/DL    Creatinine 1.04 0.70 - 1.30 MG/DL    BUN/Creatinine ratio 14 12 - 20      GFR est AA >60 >60 ml/min/1.73m2    GFR est non-AA >60 >60 ml/min/1.73m2    Calcium 7.7 (L) 8.5 - 10.1 MG/DL    Bilirubin, total 0.7 0.2 - 1.0 MG/DL    ALT (SGPT) 175 (H) 12 - 78 U/L    AST (SGOT) 121 (H) 15 - 37 U/L    Alk.  phosphatase 93 45 - 117 U/L    Protein, total 6.1 (L) 6.4 - 8.2 g/dL    Albumin 2.3 (L) 3.5 - 5.0 g/dL    Globulin 3.8 2.0 - 4.0 g/dL    A-G Ratio 0.6 (L) 1.1 - 2.2     GLUCOSE, POC    Collection Time: 07/18/19  6:16 AM   Result Value Ref Range    Glucose (POC) 124 (H) 65 - 100 mg/dL    Performed by Jay Burnette (PCT)        Data Review images and reports reviewed

## 2019-07-18 NOTE — PROGRESS NOTES
7:15 AM Bedside report received from Nicanor Phillips RN.    7:30 AM PRN PO 5 mg oxycodone given for patient's c/o pain 4/10. See MAR.    8:30 AM Spoke with Dr. Jeyson Carlson on rounds regarding assessment findings. Expiratory wheezing auscultated bilaterally and patient with some swelling to his extremities. Orders received to stop IVF and give one time dose of 20 mg IV lasix. See MAR. Will f/u and continue to monitor. 12:30 PM Received call from Dr. Jeyson Carlson regarding patient's respiratory status. Patient off of O2 and on room air with sats > 92%. Lungs remained diminished but are now clear. Patient voiding post lasix and missed urinal first void but has since voided 500 cc. Will continue to monitor. 2:00 PM GI nurse practitioner at bedside and states that if patient develops nausea/vomiting or uncontrolled pain to notify immediately d/t risk of continued ileus. Will continue to monitor. 3:20 PM Report given to Black Terry RN.

## 2019-07-19 ENCOUNTER — APPOINTMENT (OUTPATIENT)
Dept: CT IMAGING | Age: 64
DRG: 418 | End: 2019-07-19
Attending: SURGERY
Payer: COMMERCIAL

## 2019-07-19 LAB
ALBUMIN SERPL-MCNC: 2.1 G/DL (ref 3.5–5)
ALBUMIN/GLOB SERPL: 0.5 {RATIO} (ref 1.1–2.2)
ALP SERPL-CCNC: 98 U/L (ref 45–117)
ALT SERPL-CCNC: 165 U/L (ref 12–78)
ANION GAP SERPL CALC-SCNC: 7 MMOL/L (ref 5–15)
AST SERPL-CCNC: 91 U/L (ref 15–37)
BILIRUB SERPL-MCNC: 0.8 MG/DL (ref 0.2–1)
BUN SERPL-MCNC: 15 MG/DL (ref 6–20)
BUN/CREAT SERPL: 12 (ref 12–20)
CALCIUM SERPL-MCNC: 7.8 MG/DL (ref 8.5–10.1)
CHLORIDE SERPL-SCNC: 100 MMOL/L (ref 97–108)
CO2 SERPL-SCNC: 29 MMOL/L (ref 21–32)
CREAT SERPL-MCNC: 1.23 MG/DL (ref 0.7–1.3)
ERYTHROCYTE [DISTWIDTH] IN BLOOD BY AUTOMATED COUNT: 13 % (ref 11.5–14.5)
GLOBULIN SER CALC-MCNC: 4 G/DL (ref 2–4)
GLUCOSE BLD STRIP.AUTO-MCNC: 105 MG/DL (ref 65–100)
GLUCOSE BLD STRIP.AUTO-MCNC: 95 MG/DL (ref 65–100)
GLUCOSE BLD STRIP.AUTO-MCNC: 97 MG/DL (ref 65–100)
GLUCOSE SERPL-MCNC: 80 MG/DL (ref 65–100)
HCT VFR BLD AUTO: 37.4 % (ref 36.6–50.3)
HGB BLD-MCNC: 12.9 G/DL (ref 12.1–17)
LIPASE SERPL-CCNC: 136 U/L (ref 73–393)
MCH RBC QN AUTO: 32.2 PG (ref 26–34)
MCHC RBC AUTO-ENTMCNC: 34.5 G/DL (ref 30–36.5)
MCV RBC AUTO: 93.3 FL (ref 80–99)
NRBC # BLD: 0 K/UL (ref 0–0.01)
NRBC BLD-RTO: 0 PER 100 WBC
PLATELET # BLD AUTO: 180 K/UL (ref 150–400)
PMV BLD AUTO: 9.2 FL (ref 8.9–12.9)
POTASSIUM SERPL-SCNC: 3 MMOL/L (ref 3.5–5.1)
PROT SERPL-MCNC: 6.1 G/DL (ref 6.4–8.2)
RBC # BLD AUTO: 4.01 M/UL (ref 4.1–5.7)
SERVICE CMNT-IMP: ABNORMAL
SERVICE CMNT-IMP: NORMAL
SERVICE CMNT-IMP: NORMAL
SODIUM SERPL-SCNC: 136 MMOL/L (ref 136–145)
WBC # BLD AUTO: 6.8 K/UL (ref 4.1–11.1)

## 2019-07-19 PROCEDURE — 77030019563 HC DEV ATTCH FEED HOLL -A

## 2019-07-19 PROCEDURE — 77010033678 HC OXYGEN DAILY

## 2019-07-19 PROCEDURE — 74011250637 HC RX REV CODE- 250/637: Performed by: SURGERY

## 2019-07-19 PROCEDURE — 36415 COLL VENOUS BLD VENIPUNCTURE: CPT

## 2019-07-19 PROCEDURE — 74011000250 HC RX REV CODE- 250: Performed by: INTERNAL MEDICINE

## 2019-07-19 PROCEDURE — 65660000000 HC RM CCU STEPDOWN

## 2019-07-19 PROCEDURE — 77030008771 HC TU NG SALEM SUMP -A

## 2019-07-19 PROCEDURE — 74011636320 HC RX REV CODE- 636/320: Performed by: INTERNAL MEDICINE

## 2019-07-19 PROCEDURE — 74011636320 HC RX REV CODE- 636/320

## 2019-07-19 PROCEDURE — 74177 CT ABD & PELVIS W/CONTRAST: CPT

## 2019-07-19 PROCEDURE — 74011250636 HC RX REV CODE- 250/636: Performed by: SURGERY

## 2019-07-19 PROCEDURE — 74011000258 HC RX REV CODE- 258: Performed by: SURGERY

## 2019-07-19 PROCEDURE — 83690 ASSAY OF LIPASE: CPT

## 2019-07-19 PROCEDURE — 74011250637 HC RX REV CODE- 250/637: Performed by: INTERNAL MEDICINE

## 2019-07-19 PROCEDURE — 74011250636 HC RX REV CODE- 250/636: Performed by: INTERNAL MEDICINE

## 2019-07-19 PROCEDURE — 80053 COMPREHEN METABOLIC PANEL: CPT

## 2019-07-19 PROCEDURE — 85027 COMPLETE CBC AUTOMATED: CPT

## 2019-07-19 PROCEDURE — 82962 GLUCOSE BLOOD TEST: CPT

## 2019-07-19 RX ORDER — MORPHINE SULFATE 2 MG/ML
2-4 INJECTION, SOLUTION INTRAMUSCULAR; INTRAVENOUS
Status: DISCONTINUED | OUTPATIENT
Start: 2019-07-19 | End: 2019-07-22 | Stop reason: HOSPADM

## 2019-07-19 RX ORDER — INSULIN GLARGINE 100 [IU]/ML
5 INJECTION, SOLUTION SUBCUTANEOUS
Status: DISCONTINUED | OUTPATIENT
Start: 2019-07-19 | End: 2019-07-22 | Stop reason: HOSPADM

## 2019-07-19 RX ORDER — INSULIN LISPRO 100 [IU]/ML
INJECTION, SOLUTION INTRAVENOUS; SUBCUTANEOUS EVERY 6 HOURS
Status: DISCONTINUED | OUTPATIENT
Start: 2019-07-19 | End: 2019-07-22

## 2019-07-19 RX ORDER — DEXTROSE MONOHYDRATE 100 MG/ML
125-250 INJECTION, SOLUTION INTRAVENOUS AS NEEDED
Status: DISCONTINUED | OUTPATIENT
Start: 2019-07-19 | End: 2019-07-22 | Stop reason: HOSPADM

## 2019-07-19 RX ORDER — FACIAL-BODY WIPES
10 EACH TOPICAL ONCE
Status: COMPLETED | OUTPATIENT
Start: 2019-07-19 | End: 2019-07-19

## 2019-07-19 RX ADMIN — PIPERACILLIN SODIUM,TAZOBACTAM SODIUM 3.38 G: 3; .375 INJECTION, POWDER, FOR SOLUTION INTRAVENOUS at 12:17

## 2019-07-19 RX ADMIN — IOPAMIDOL 100 ML: 755 INJECTION, SOLUTION INTRAVENOUS at 02:03

## 2019-07-19 RX ADMIN — PIPERACILLIN SODIUM,TAZOBACTAM SODIUM 3.38 G: 3; .375 INJECTION, POWDER, FOR SOLUTION INTRAVENOUS at 04:45

## 2019-07-19 RX ADMIN — PHENAZOPYRIDINE HYDROCHLORIDE 100 MG: 100 TABLET ORAL at 14:17

## 2019-07-19 RX ADMIN — POTASSIUM CHLORIDE: 2 INJECTION, SOLUTION, CONCENTRATE INTRAVENOUS at 08:07

## 2019-07-19 RX ADMIN — MORPHINE SULFATE 4 MG: 2 INJECTION, SOLUTION INTRAMUSCULAR; INTRAVENOUS at 20:02

## 2019-07-19 RX ADMIN — FAMOTIDINE 20 MG: 10 INJECTION, SOLUTION INTRAVENOUS at 12:19

## 2019-07-19 RX ADMIN — PIPERACILLIN SODIUM,TAZOBACTAM SODIUM 3.38 G: 3; .375 INJECTION, POWDER, FOR SOLUTION INTRAVENOUS at 20:02

## 2019-07-19 RX ADMIN — OXYCODONE HYDROCHLORIDE 5 MG: 5 TABLET ORAL at 23:03

## 2019-07-19 RX ADMIN — FAMOTIDINE 20 MG: 10 INJECTION, SOLUTION INTRAVENOUS at 20:02

## 2019-07-19 RX ADMIN — ENOXAPARIN SODIUM 40 MG: 40 INJECTION SUBCUTANEOUS at 11:14

## 2019-07-19 RX ADMIN — POTASSIUM CHLORIDE: 2 INJECTION, SOLUTION, CONCENTRATE INTRAVENOUS at 15:59

## 2019-07-19 RX ADMIN — MORPHINE SULFATE 4 MG: 2 INJECTION, SOLUTION INTRAMUSCULAR; INTRAVENOUS at 08:07

## 2019-07-19 RX ADMIN — Medication 10 ML: at 23:04

## 2019-07-19 RX ADMIN — MORPHINE SULFATE 2 MG: 2 INJECTION, SOLUTION INTRAMUSCULAR; INTRAVENOUS at 02:32

## 2019-07-19 RX ADMIN — METOPROLOL SUCCINATE 25 MG: 25 TABLET, EXTENDED RELEASE ORAL at 18:04

## 2019-07-19 RX ADMIN — IOHEXOL 50 ML: 240 INJECTION, SOLUTION INTRATHECAL; INTRAVASCULAR; INTRAVENOUS; ORAL at 00:15

## 2019-07-19 RX ADMIN — BISACODYL 10 MG: 10 SUPPOSITORY RECTAL at 11:13

## 2019-07-19 RX ADMIN — ATORVASTATIN CALCIUM 40 MG: 40 TABLET, FILM COATED ORAL at 23:03

## 2019-07-19 RX ADMIN — Medication 10 ML: at 16:01

## 2019-07-19 RX ADMIN — MORPHINE SULFATE 4 MG: 2 INJECTION, SOLUTION INTRAMUSCULAR; INTRAVENOUS at 16:03

## 2019-07-19 RX ADMIN — MORPHINE SULFATE 4 MG: 2 INJECTION, SOLUTION INTRAMUSCULAR; INTRAVENOUS at 12:12

## 2019-07-19 RX ADMIN — Medication 10 ML: at 00:19

## 2019-07-19 RX ADMIN — AMLODIPINE BESYLATE 2.5 MG: 2.5 TABLET ORAL at 11:13

## 2019-07-19 NOTE — PROGRESS NOTES
Spiritual Care Assessment/Progress Note  [facility]      NAME: Radha Covington      MRN: [id number]  AGE: 61 y.o.  SEX: male  Judaism Affiliation: Sabianist   Language: English     7/19/2019     Total Time (in minutes): 18     Spiritual Assessment begun in [facility] PROGRESSIVE CARE through conversation with:         [x]Patient        [] Family    [] Friend(s)        Reason for Consult: Initial/Spiritual assessment, patient floor, Initial visit     Spiritual beliefs: (Please include comment if needed)     [x] Identifies with a deya tradition:         [] Supported by a deya community:            [] Claims no spiritual orientation:           [] Seeking spiritual identity:                [] Adheres to an individual form of spirituality:           [] Not able to assess:                           Identified resources for coping:      [x] Prayer                               [] Music                  [] Guided Imagery     [x] Family/friends                 [] Pet visits     [] Devotional reading                         [] Unknown     [] Other:                                              Interventions offered during this visit: (See comments for more details)    Patient Interventions: Initial/Spiritual assessment, patient floor, Initial visit, Affirmation of emotions/emotional suffering, Affirmation of deya, Prayer (assurance of), Normalization of emotional/spiritual concerns           Plan of Care:     [x] Support spiritual and/or cultural needs    [] Support AMD and/or advance care planning process      [] Support grieving process   [] Coordinate Rites and/or Rituals    [] Coordination with community clergy   [] No spiritual needs identified at this time   [] Detailed Plan of Care below (See Comments)  [] Make referral to Music Therapy  [] Make referral to Pet Therapy     [] Make referral to Addiction services  [] Make referral to [facility]  [] Make referral to Spiritual Care Partner  [] No future visits requested        [x] Follow up visits as needed     Comments:  made initial visit to patients room for spiritual assessment. Patient was lying in bed during visit.

## 2019-07-19 NOTE — PROGRESS NOTES
Hospitalist Progress Note    NAME: Bernarda Cannon   :  1955   MRN:  [id number]       Assessment / Plan:  Acute Pancreatitis, s/p cholecystectomy, complicated by ileus  -Acute pancreatitis is suspected to be secondary to gallstones -- s/p cholecystectomy and intraoperative cholangiogram    -Patient now with ileus, significant colon dilation -- made NPO, NGT as per GI for decompression  -Has had some signs of volume overload but needs IVF again, will have to carefully observe his respiratory state (had new rales on exam yesterday)  -GI and surgical consultations appreciated  MRCP : Acute pancreatitis. No evidence of pancreatic necrosis or pseudocyst  formation. No evidence of gallstones, biliary dilatation, or filling defects in  the common bile duct. -CT AP : There isinflammation adjacent to the pancreatic head extending anterior to Gerota's fascia. Volume overload  -Iatrogenic secondary to large volume of IVF used for management of pancreatitis (appropriately) / third spacing  -s/p Lasix 20 mg IV x1 on   -New trace bilateral pleural effusions observed on CT  -- holding additional diuretics as patient now NPO with ileus    DM2, uncontrolled with hyperglycemia, improved  -Holding metformin  -DM with recent steroid use  -BS elevated on admission -- now improved significantly and meeting goals    HTN, CAD:  -Continue Aspirin and Metoprolol  -Holding Lipitor due to elevated LFT  -denies CP    CKD 3  -Cr at baseline, 1.3  -renally dosing of meds  -monitor bmp   -avoid nephrotoxins     Prophylaxis:  Pepcid BID  Lovenox    Disposition pending resolution of ileus     Subjective:     Chief Complaint / Reason for Physician Visit: follow up acute pancreatitis  Patient reports pain but states it is getting better. Made NPO due to ileus, significant abdominal distention, dilated colon and increased inflammation associated with acute pancreatitis seen on repeat CT.      Review of Systems:  Symptom Y/N Comments  Symptom Y/N Comments   Fever/Chills n   Chest Pain n    Poor Appetite n   Edema y    Cough    Abdominal Pain y    Sputum    Joint Pain     SOB/HAMILTON    Pruritis/Rash     Nausea/vomit n   Tolerating PT/OT     Diarrhea n   Tolerating Diet npo    Constipation n   Other       Could NOT obtain due to:      Objective:     VITALS:   Last 24hrs VS reviewed since prior progress note. Most recent are:  Patient Vitals for the past 24 hrs:   Temp Pulse Resp BP SpO2   07/19/19 1200  85      07/19/19 1056 98.1 °F (36.7 °C) 82 18 159/89 94 %   07/19/19 0740 98.6 °F (37 °C) 90 18 144/90 97 %   07/19/19 0233 98.1 °F (36.7 °C) 79 18 142/86 97 %   07/18/19 2252 99.5 °F (37.5 °C) 77 18 (!) 160/91 100 %   07/18/19 1903 98 °F (36.7 °C) 87 18 168/81 96 %   07/18/19 1544 98.1 °F (36.7 °C) 72 18 141/79 93 %       Intake/Output Summary (Last 24 hours) at 7/19/2019 1419  Last data filed at 7/19/2019 1057  Gross per 24 hour   Intake    Output 1250 ml   Net -1250 ml        PHYSICAL EXAM:  General: WD, WN. Alert, cooperative, nontoxic  EENT:  EOMI. Anicteric sclerae. MMM  Resp:  Basilar rales present bilaterally. No wheezing or rhonchi and no accessory muscle use. CV:  Regular  rhythm,  2+ generalized edema  GI:  Soft, distended, +abdominal tenderness, hypoactive bowel sounds  Neurologic:  Alert and oriented X 3, normal speech   Psych:   Fair insight. Not anxious nor agitated  Skin:  No rashes.   No jaundice    Reviewed most current lab test results and cultures  YES  Reviewed most current radiology test results   YES  Review and summation of old records today    NO  Reviewed patient's current orders and MAR    YES  PMH/SH reviewed - no change compared to H&P  ________________________________________________________________________  Care Plan discussed with:    Comments   Patient x    Family      RN     Care Manager     Consultant                        Multidiciplinary team rounds were held today with case manager, nursing, pharmacist and clinical coordinator. Patient's plan of care was discussed; medications were reviewed and discharge planning was addressed. ________________________________________________________________________  Total NON critical care TIME:  25   Minutes    Total CRITICAL CARE TIME Spent:   Minutes non procedure based      Comments   >50% of visit spent in counseling and coordination of care x    ________________________________________________________________________  Ambreen Gabriel MD     Procedures: see electronic medical records for all procedures/Xrays and details which were not copied into this note but were reviewed prior to creation of Plan. LABS:  I reviewed today's most current labs and imaging studies.   Pertinent labs include:  Recent Labs     07/19/19  0237 07/18/19  0417 07/17/19  0358   WBC 6.8 6.7 6.4   HGB 12.9 11.7* 11.5*   HCT 37.4 34.1* 34.3*    174 138*     Recent Labs     07/19/19  0237 07/18/19  0417 07/17/19  0358    135* 141   K 3.0* 3.3* 3.6    103 108   CO2 29 26 27   GLU 80 128* 125*   BUN 15 15 18   CREA 1.23 1.04 1.11   CA 7.8* 7.7* 7.7*   ALB 2.1* 2.3* 2.4*   TBILI 0.8 0.7 0.6   SGOT 91* 121* 30   * 175* 126*       Signed: Ambreen Gabriel MD

## 2019-07-19 NOTE — PROGRESS NOTES
Surgery    Reports passing a little stool and a lot of gas per rectum. Less abdominal pain. Afebrile, VSS. Alert, no distress. Sitting in chair. Abdomen full but less tight than this AM.  Much less tender. Some improvement. Continue NPO except meds, IV fluid. I don't think an NG is needed.     Arianna Mcmillan MD

## 2019-07-19 NOTE — PROGRESS NOTES
Bedside and Verbal shift change report given to [address] (oncoming nurse) by Kenneth Okeefe (offgoing nurse). Report included the following information SBAR, Kardex, Intake/Output and Cardiac Rhythm NSr.     1900 Pt resting in bed, describes pain as\"better 8/10\", sitting up in bed, wife at bedside, NPO    2002 PRN Morphine given    2300 Pt sleeping in bed, states \"my pain is much better now\"    0000 [place] Morphine given    0300 Pt stating pain is \"3/10\" still requesting pain medicine    0400 PRN Morphine given    Bedside and Verbal shift change report given to Monica EDWARD/Fabiola RN (oncoming nurse) by Stanley Julio RN (offgoing nurse). Report included the following information SBAR, Kardex, Intake/Output and Cardiac Rhythm NSR.

## 2019-07-19 NOTE — PROGRESS NOTES
Bedside and Verbal shift change report given to Nikolai Montez RN (oncoming nurse) by Stanley Julio RN (offgoing nurse). Report included the following information SBAR, Kardex, MAR, Recent Results and Cardiac Rhythm NSR.    0805:  Pt complaining of 10/10 pain. Will medicate with Morphine 4 mg IV. Started IV fluids per MD.. Checked glucose, glucose 97. Will continue with Q 6 hour from now. 5482:  New order for abd CT in chart. Called and spoke with CT, radiology supervisor had to re-put in the order to complete the test from last night. Pt not going for another CT at this time. 1030:  Placed NG tube to 65 cm. Pt tolerated well and drank water during the insertion. Pt gagging constantly once placed. Pt tried to tolerate but states \"you have to take it out. Say I'm non-compliant. \"  Removed NG, will inform GI NP.    1105:  Zachary Grullon, NP with GI. Informed her of process and pt refusing. She will place an order for chloraseptic spray and come speak with pt.    1115:  Suppository given per order    1130:  Pt up to BR with 1 assist.  Pt passed gas and had a small BM. Will continue to monitor. 1230:  Dr Lewanda Gosselin in to see pt. Will continue to monitor abdomen. Nurse unable to locate a \"Zassi\" tube at this time for decompression. 1430:  Pt resting in bed. States he is still passing gas and feeling a little better. 1600:  Pt up in chair. No signs of distress. Medicated with PRN pain meds.

## 2019-07-19 NOTE — PROGRESS NOTES
Surgery    Reports abdominalpain. Patient had increased abdominal pain an distension last evening. CT obtained revealing increased edema of pancreas and increased gaseous distension of transverse colon. This AM patient reports continued abdominal pain. T max 99, VSS. Alert. Lungs clear. Heart regular. Abdomen quite distended, diffusely moderate tenderness. Bowel sounds are normally active. Incisions OK. AM lab OK. Imp - Pancreatitis with worsening ileus. S/P lap cholecystectomy. Plan - NPO except meds. IV fluid. Adjust insulin. Suppository x 1. Observe.     Emily Zhao MD

## 2019-07-19 NOTE — PROGRESS NOTES
Problem: Pressure Injury - Risk of  Goal: *Prevention of pressure injury  Description  Document Anthony Scale and appropriate interventions in the flowsheet.   Outcome: Progressing Towards Goal  Note:   Pressure Injury Interventions:  Sensory Interventions: Assess changes in LOC, Float heels, Check visual cues for pain         Activity Interventions: Increase time out of bed    Mobility Interventions: HOB 30 degrees or less, Pressure redistribution bed/mattress (bed type)    Nutrition Interventions: Document food/fluid/supplement intake    Friction and Shear Interventions: HOB 30 degrees or less

## 2019-07-19 NOTE — PROGRESS NOTES
GI PROGRESS NOTE   Cj Heredia NP  [phone] NP in-hospital cell phone M-F until 4:30  After 5pm or on weekends, please call  for physician on call    NAME:Arnold Mireles :1955 HHW:[id number]   ATTG: Dr. Jessie Noonan  PCP: Karen Goldsmith MD  Date/Time:  2019 9:00 AM     Primary GI: Dr. Belkys Caraballo    Reason for following: Pancreatitis, ileus, s/p choley    Assessment:   -Gallstone pancreatitis; improving  -s/p cholecystectomy, POD 2  -Ileus secondary to pancreatitis  · Tried and failed solid food diet yesterday     Plan:   -NG and rectal tube for decompression, assuming specific rectal tube is in stock  -Agree with suppository ordered by surgery  -NPO for now  -Supportive measures as clinically indicated, including pain and nausea management  -Agree with IV fluids for aggressive hydration maintanence  -Would try to minimize opioid use as slows GI motility further, hard situation with patient who is in a lot of pain  -Will continue to follow   *Plan discussed with Dr. Manda Torres:   Patient resting in bed during visit. Reports didn't get to sleep at all last night. Having severe abdominal pain. Denies any nausea or vomiting. No bowel movements. Discussed with RN events overnight. Complaint Y/N Description   Abdominal Pain Y    Hematemesis     Hematochezia     Melena     Constipation     Diarrhea     Dyspepsia     Dysphagia     Jaundiced     Nausea/vomiting N      Review of Systems:  Symptom Y/N Comments  Symptom Y/N Comments   Fever/Chills    Chest Pain     Cough    Headaches     Sputum    Joint Pain     SOB/HAMILTON    Pruritis/Rash     Tolerating Diet N NPO  Other       Could NOT obtain due to:      Objective:   VITALS:   Last 24hrs VS reviewed since prior progress note.  Most recent are:  Visit Vitals  /90 (BP 1 Location: Right arm, BP Patient Position: At rest)   Pulse 90   Temp 98.6 °F (37 °C)   Resp 18   Ht 6' 1\" (1.854 m)   Wt 95.2 kg (209 lb 14.1 oz)   SpO2 97%   BMI 27.69 kg/m²       Intake/Output Summary (Last 24 hours) at 7/19/2019 0900  Last data filed at 7/19/2019 0426  Gross per 24 hour   Intake 1120 ml   Output 1975 ml   Net -855 ml     PHYSICAL EXAM:  General: WD, WN. Alert, cooperative, no acute distress    HEENT: NC, Atraumatic. Anicteric sclerae. Lungs:  CTA Bilaterally. No Wheezing/Rhonchi/Rales. Heart:  Regular  rhythm,  No murmur (-), No Rubs, No Gallops  Abdomen: Soft, very distended and firm, Very tender throughout.  +Bowel sounds, no HSM  Extremities: No c/c/e  Neurologic:  Alert and oriented X 3. No acute neurological distress   Psych:   Good insight. Not anxious nor agitated. Lab and Radiology Data Reviewed: (see below)    Medications Reviewed: (see below)  PMH/SH reviewed - no change compared to H&P  ________________________________________________________________________  Total time spent with patient: 30 minutes ________________________________________________________________________  Care Plan discussed with:  Patient Y   Family     RN Y              Consultant:       Paloma Shepherd NP     Procedures: see electronic medical records for all procedures/Xrays and details which were not copied into this note but were reviewed prior to creation of Plan. LABS:  Recent Labs     07/19/19 0237 07/18/19  0417   WBC 6.8 6.7   HGB 12.9 11.7*   HCT 37.4 34.1*    174     Recent Labs     07/19/19  0237 07/18/19  0417 07/17/19  0358    135* 141   K 3.0* 3.3* 3.6    103 108   CO2 29 26 27   BUN 15 15 18   CREA 1.23 1.04 1.11   GLU 80 128* 125*   CA 7.8* 7.7* 7.7*     Recent Labs     07/19/19  0237 07/18/19  0417 07/17/19  0358   SGOT 91* 121* 30   AP 98 93 85   TP 6.1* 6.1* 6.0*   ALB 2.1* 2.3* 2.4*   GLOB 4.0 3.8 3.6   LPSE 136  --  173     No results for input(s): INR, PTP, APTT in the last 72 hours. No lab exists for component: INREXT   No results for input(s): FE, TIBC, PSAT, FERR in the last 72 hours.    No results found for: FOL, RBCF  No results for input(s): PH, PCO2, PO2 in the last 72 hours. No results for input(s): CPK, CKMB in the last 72 hours.     No lab exists for component: TROPONINI  Lab Results   Component Value Date/Time    Color YELLOW/STRAW 07/15/2019 04:10 AM    Appearance TURBID (A) 07/15/2019 04:10 AM    Specific gravity 1.027 07/15/2019 04:10 AM    pH (UA) 5.5 07/15/2019 04:10 AM    Protein 100 (A) 07/15/2019 04:10 AM    Glucose 500 (A) 07/15/2019 04:10 AM    Ketone 15 (A) 07/15/2019 04:10 AM    Bilirubin NEGATIVE  07/15/2019 04:10 AM    Urobilinogen 0.2 07/15/2019 04:10 AM    Nitrites NEGATIVE  07/15/2019 04:10 AM    Leukocyte Esterase NEGATIVE  07/15/2019 04:10 AM    Epithelial cells FEW 07/15/2019 04:10 AM    Bacteria 3+ (A) 07/15/2019 04:10 AM    WBC 0-4 07/15/2019 04:10 AM    RBC 5-10 07/15/2019 04:10 AM       MEDICATIONS:  Current Facility-Administered Medications   Medication Dose Route Frequency    iohexol (OMNIPAQUE) 240 mg iodine/mL solution        insulin glargine (LANTUS) injection 5 Units  5 Units SubCUTAneous QHS    0.45% sodium chloride 1,000 mL with potassium chloride 40 mEq infusion   IntraVENous CONTINUOUS    bisacodyl (DULCOLAX) suppository 10 mg  10 mg Rectal ONCE    morphine injection 2-4 mg  2-4 mg IntraVENous Q4H PRN    dextrose 10% infusion 125-250 mL  125-250 mL IntraVENous PRN    insulin lispro (HUMALOG) injection   SubCUTAneous Q6H    enoxaparin (LOVENOX) injection 40 mg  40 mg SubCUTAneous Q24H    pantoprazole (PROTONIX) tablet 40 mg  40 mg Oral ACB    atorvastatin (LIPITOR) tablet 40 mg  40 mg Oral QHS    oxyCODONE IR (ROXICODONE) tablet 5 mg  5 mg Oral Q4H PRN    metoprolol succinate (TOPROL-XL) XL tablet 25 mg  25 mg Oral DAILY    phenazopyridine (PYRIDIUM) tablet 100 mg  100 mg Oral TIDPC    piperacillin-tazobactam (ZOSYN) 3.375 g in 0.9% sodium chloride (MBP/ADV) 100 mL  3.375 g IntraVENous Q8H    nitroglycerin (NITROSTAT) tablet 0.4 mg  0.4 mg SubLINGual Q5MIN PRN    amLODIPine (NORVASC) tablet 2.5 mg  2.5 mg Oral DAILY    sodium chloride (NS) flush 5-40 mL  5-40 mL IntraVENous Q8H    sodium chloride (NS) flush 5-40 mL  5-40 mL IntraVENous PRN    naloxone (NARCAN) injection 0.4 mg  0.4 mg IntraVENous PRN    ondansetron (ZOFRAN) injection 4 mg  4 mg IntraVENous Q4H PRN    glucose chewable tablet 16 g  4 Tab Oral PRN    glucagon (GLUCAGEN) injection 1 mg  1 mg IntraMUSCular PRN

## 2019-07-19 NOTE — PROGRESS NOTES
[place] Surgeon, Patients pain has been uncontrolled today ever since he went for a walk in the [place], Tried alternating between Oxycodone and Morphine but nothing is bringing his pain down past a 5   Spoke with Dr. Mateus Garcia, he ordered a CT of the abdomen and pelvis with contrast and water soluble contrast give Morphine now and ordered IV tylenol 1g 1 time. Make Patient NPO except meds. All orders placed.  Call surgeon with any abnormal results

## 2019-07-19 NOTE — PROGRESS NOTES
ZINA - Home with HH and f/u appts    CM reviewed chart and will continue to follow for discharge planning needs. [facility] when [facility] is medically stable.      Lincoln Santiago, [address]

## 2019-07-19 NOTE — PROGRESS NOTES
Bedside and Verbal shift change report given to [address] (oncoming nurse) by Deedee Barger RN (offgoing nurse). Report included the following information SBAR, Kardex and Intake/Output. 18 Pt states his pain is \"12/10\", described the pain as same when he first came out of surgery, morphine hasn't helped much, IV tylenol infusing now, awaiting oral contrast for CT scan, bowel sounds present, no drainage noted from incision sites, abdomen distended but normal since surgery    2351 Oral contrast  not received, spoke with [facility] in [address] will check on status    0015 Oral contrast given, transport to CT @0145. Pt describes pain as \"8/10 and feeling somewhat better\"    2038 Pt transported to CT by myself and Xi Wheeler RN, Ct scan done, pt returned back to room, awaiting results    0232 PRN morphine given, pt states \" I just want to get some sleep and this pain to go away\"    Bedside and Verbal shift change report given to [address] [place] [address] (oncoming nurse) by Karlos Ko RN (offgoing nurse). Report included the following information SBAR, Kardex, Intake/Output and Cardiac Rhythm NSR.

## 2019-07-19 NOTE — PROGRESS NOTES
Initial Nutrition Assessment:    INTERVENTIONS/RECOMMENDATIONS:   · RD will continue to follow for further nutrition intervention as ileus resolves. ASSESSMENT:   7/19:  Chart reviewed; med noted for acute pancreatitis on admission, s/o lap vanda. Pt later developed an ileus which pt is now NPO. GI recommended NGT placement for decompression. Significant PMH includes uncontrolled DM. Lipase is now WNL, elevated LFTs. Diet Order: NPO  % Eaten:    Patient Vitals for the past 72 hrs:   % Diet Eaten   07/18/19 1357 50 %     Pertinent Medications: [x]Reviewed []Other  Pertinent Labs: [x]Reviewed []Other  Food Allergies: [x]None []Other   Last BM: 7/7   []Active     []Hyperactive  [x]Hypoactive       [] Absent BS  Skin:    [] Intact   [x] Incision: s/p lap vanda  [] Breakdown  [] Other:    Anthropometrics:   Height: 6' 1\" (185.4 cm) Weight: 94.8 kg (209 lb)   IBW (%IBW):   ( ) UBW (%UBW):   (  %)   Last Weight Metrics:  Weight Loss Metrics 7/19/2019 7/9/2019 6/21/2019 6/4/2019 12/4/2018 7/5/2018 6/19/2018   Today's Wt 209 lb 209 lb 14.4 oz 218 lb 222 lb 8 oz 219 lb 216 lb 3.2 oz 218 lb 11.1 oz   BMI 27.57 kg/m2 27.69 kg/m2 28.76 kg/m2 29.36 kg/m2 28.89 kg/m2 28.52 kg/m2 28.85 kg/m2       BMI: Body mass index is 27.57 kg/m². This BMI is indicative of:   []Underweight    []Normal    [x]Overweight    [] Obesity   [] Extreme Obesity (BMI>40)     Estimated Nutrition Needs (Based on):   2337 Kcals/day(BMR (1798) x 1. 3AF) , 95 g(1.0 g/kg bw) Protein  Carbohydrate:  At Least 130 g/day  Fluids: 2300 mL/day (1ml/kcal)    NUTRITION DIAGNOSES:   Problem:  Altered GI function      Etiology: related to ileus     Signs/Symptoms: as evidenced by NPO status      NUTRITION INTERVENTIONS:  Meals/Snacks: General/healthful diet                  GOAL:   Resume nutrition regimen as medically feasible within 2-4 days    LEARNING NEEDS (Diet, Food/Nutrient-Drug Interaction):    [x] None Identified   [] Identified and Education Provided/Documented   [] Identified and Pt declined/was not appropriate     Cultureal, Church, OR Ethnic Dietary Needs:    [x] None Identified   [] Identified and Addressed     [x] Interdisciplinary Care Plan Reviewed/Documented    [x] Discharge Planning: Too early to determine    MONITORING /EVALUATION:   Food/Nutrient Intake Outcomes:  Total energy intake  Physical Signs/Symptoms Outcomes: Weight/weight change, GI profile    NUTRITION RISK:    [x] Patient At Nutritional Risk    [] Patient Not At Nutritional Risk    PT SEEN FOR:    []  MD Consult: []Calorie Count      []Diabetic Diet Education        []Diet Education     []Electrolyte Management     []General Nutrition Management and Supplements     []Management of Tube Feeding     []TPN Recommendations    []  RN Referral:  []MST score >=2     []Enteral/Parenteral Nutrition PTA     []Pregnant: Gestational DM or Multigestation     []Pressure Ulcer/Wound Care needs        []  Low BMI  [x]  CAITLIN Kyle, [address] [place] [address]  Pager [phone]  Weekend Pager [phone]

## 2019-07-20 LAB
ALBUMIN SERPL-MCNC: 1.9 G/DL (ref 3.5–5)
ALBUMIN/GLOB SERPL: 0.5 {RATIO} (ref 1.1–2.2)
ALP SERPL-CCNC: 89 U/L (ref 45–117)
ALT SERPL-CCNC: 102 U/L (ref 12–78)
ANION GAP SERPL CALC-SCNC: 9 MMOL/L (ref 5–15)
AST SERPL-CCNC: 45 U/L (ref 15–37)
BACTERIA SPEC CULT: NORMAL
BASOPHILS # BLD: 0.1 K/UL (ref 0–0.1)
BASOPHILS NFR BLD: 1 % (ref 0–1)
BILIRUB SERPL-MCNC: 0.6 MG/DL (ref 0.2–1)
BUN SERPL-MCNC: 14 MG/DL (ref 6–20)
BUN/CREAT SERPL: 13 (ref 12–20)
CALCIUM SERPL-MCNC: 7.8 MG/DL (ref 8.5–10.1)
CHLORIDE SERPL-SCNC: 102 MMOL/L (ref 97–108)
CO2 SERPL-SCNC: 26 MMOL/L (ref 21–32)
CREAT SERPL-MCNC: 1.05 MG/DL (ref 0.7–1.3)
DIFFERENTIAL METHOD BLD: ABNORMAL
EOSINOPHIL # BLD: 0.1 K/UL (ref 0–0.4)
EOSINOPHIL NFR BLD: 1 % (ref 0–7)
ERYTHROCYTE [DISTWIDTH] IN BLOOD BY AUTOMATED COUNT: 12.9 % (ref 11.5–14.5)
GLOBULIN SER CALC-MCNC: 3.6 G/DL (ref 2–4)
GLUCOSE BLD STRIP.AUTO-MCNC: 193 MG/DL (ref 65–100)
GLUCOSE BLD STRIP.AUTO-MCNC: 88 MG/DL (ref 65–100)
GLUCOSE BLD STRIP.AUTO-MCNC: 93 MG/DL (ref 65–100)
GLUCOSE BLD STRIP.AUTO-MCNC: 96 MG/DL (ref 65–100)
GLUCOSE BLD STRIP.AUTO-MCNC: 98 MG/DL (ref 65–100)
GLUCOSE SERPL-MCNC: 94 MG/DL (ref 65–100)
HCT VFR BLD AUTO: 36 % (ref 36.6–50.3)
HGB BLD-MCNC: 12.4 G/DL (ref 12.1–17)
IMM GRANULOCYTES # BLD AUTO: 0.1 K/UL (ref 0–0.04)
IMM GRANULOCYTES NFR BLD AUTO: 1 % (ref 0–0.5)
LIPASE SERPL-CCNC: 148 U/L (ref 73–393)
LYMPHOCYTES # BLD: 0.8 K/UL (ref 0.8–3.5)
LYMPHOCYTES NFR BLD: 9 % (ref 12–49)
MCH RBC QN AUTO: 32.4 PG (ref 26–34)
MCHC RBC AUTO-ENTMCNC: 34.4 G/DL (ref 30–36.5)
MCV RBC AUTO: 94 FL (ref 80–99)
MONOCYTES # BLD: 0.7 K/UL (ref 0–1)
MONOCYTES NFR BLD: 8 % (ref 5–13)
NEUTS SEG # BLD: 6.6 K/UL (ref 1.8–8)
NEUTS SEG NFR BLD: 80 % (ref 32–75)
NRBC # BLD: 0 K/UL (ref 0–0.01)
NRBC BLD-RTO: 0 PER 100 WBC
PLATELET # BLD AUTO: 180 K/UL (ref 150–400)
PMV BLD AUTO: 8.7 FL (ref 8.9–12.9)
POTASSIUM SERPL-SCNC: 3.7 MMOL/L (ref 3.5–5.1)
PROT SERPL-MCNC: 5.5 G/DL (ref 6.4–8.2)
RBC # BLD AUTO: 3.83 M/UL (ref 4.1–5.7)
RBC MORPH BLD: ABNORMAL
SERVICE CMNT-IMP: ABNORMAL
SERVICE CMNT-IMP: NORMAL
SODIUM SERPL-SCNC: 137 MMOL/L (ref 136–145)
WBC # BLD AUTO: 8.4 K/UL (ref 4.1–11.1)
WBC MORPH BLD: ABNORMAL

## 2019-07-20 PROCEDURE — 74011000258 HC RX REV CODE- 258: Performed by: SURGERY

## 2019-07-20 PROCEDURE — 74011250637 HC RX REV CODE- 250/637: Performed by: SURGERY

## 2019-07-20 PROCEDURE — 74011636637 HC RX REV CODE- 636/637: Performed by: INTERNAL MEDICINE

## 2019-07-20 PROCEDURE — 74011636637 HC RX REV CODE- 636/637: Performed by: SURGERY

## 2019-07-20 PROCEDURE — 65660000000 HC RM CCU STEPDOWN

## 2019-07-20 PROCEDURE — 83690 ASSAY OF LIPASE: CPT

## 2019-07-20 PROCEDURE — 77010033678 HC OXYGEN DAILY

## 2019-07-20 PROCEDURE — 36415 COLL VENOUS BLD VENIPUNCTURE: CPT

## 2019-07-20 PROCEDURE — 82962 GLUCOSE BLOOD TEST: CPT

## 2019-07-20 PROCEDURE — 74011250636 HC RX REV CODE- 250/636: Performed by: SURGERY

## 2019-07-20 PROCEDURE — 80053 COMPREHEN METABOLIC PANEL: CPT

## 2019-07-20 PROCEDURE — 74011250636 HC RX REV CODE- 250/636: Performed by: INTERNAL MEDICINE

## 2019-07-20 PROCEDURE — 74011250637 HC RX REV CODE- 250/637: Performed by: INTERNAL MEDICINE

## 2019-07-20 PROCEDURE — 85025 COMPLETE CBC W/AUTO DIFF WBC: CPT

## 2019-07-20 PROCEDURE — 74011000250 HC RX REV CODE- 250: Performed by: INTERNAL MEDICINE

## 2019-07-20 RX ORDER — POLYETHYLENE GLYCOL 3350 17 G/17G
17 POWDER, FOR SOLUTION ORAL DAILY
Status: DISCONTINUED | OUTPATIENT
Start: 2019-07-20 | End: 2019-07-22 | Stop reason: HOSPADM

## 2019-07-20 RX ADMIN — POTASSIUM CHLORIDE: 2 INJECTION, SOLUTION, CONCENTRATE INTRAVENOUS at 07:47

## 2019-07-20 RX ADMIN — ATORVASTATIN CALCIUM 40 MG: 40 TABLET, FILM COATED ORAL at 22:15

## 2019-07-20 RX ADMIN — POTASSIUM CHLORIDE: 2 INJECTION, SOLUTION, CONCENTRATE INTRAVENOUS at 00:00

## 2019-07-20 RX ADMIN — ENOXAPARIN SODIUM 40 MG: 40 INJECTION SUBCUTANEOUS at 08:14

## 2019-07-20 RX ADMIN — MORPHINE SULFATE 4 MG: 2 INJECTION, SOLUTION INTRAMUSCULAR; INTRAVENOUS at 04:23

## 2019-07-20 RX ADMIN — FAMOTIDINE 20 MG: 10 INJECTION, SOLUTION INTRAVENOUS at 08:15

## 2019-07-20 RX ADMIN — Medication 10 ML: at 22:15

## 2019-07-20 RX ADMIN — OXYCODONE HYDROCHLORIDE 5 MG: 5 TABLET ORAL at 19:24

## 2019-07-20 RX ADMIN — FAMOTIDINE 20 MG: 10 INJECTION, SOLUTION INTRAVENOUS at 22:15

## 2019-07-20 RX ADMIN — INSULIN LISPRO 2 UNITS: 100 INJECTION, SOLUTION INTRAVENOUS; SUBCUTANEOUS at 17:28

## 2019-07-20 RX ADMIN — INSULIN GLARGINE 5 UNITS: 100 INJECTION, SOLUTION SUBCUTANEOUS at 22:25

## 2019-07-20 RX ADMIN — METOPROLOL SUCCINATE 25 MG: 25 TABLET, EXTENDED RELEASE ORAL at 19:24

## 2019-07-20 RX ADMIN — MORPHINE SULFATE 2 MG: 2 INJECTION, SOLUTION INTRAMUSCULAR; INTRAVENOUS at 22:15

## 2019-07-20 RX ADMIN — POTASSIUM CHLORIDE: 2 INJECTION, SOLUTION, CONCENTRATE INTRAVENOUS at 23:46

## 2019-07-20 RX ADMIN — POTASSIUM CHLORIDE: 2 INJECTION, SOLUTION, CONCENTRATE INTRAVENOUS at 15:49

## 2019-07-20 RX ADMIN — MORPHINE SULFATE 4 MG: 2 INJECTION, SOLUTION INTRAMUSCULAR; INTRAVENOUS at 00:00

## 2019-07-20 RX ADMIN — Medication 10 ML: at 13:31

## 2019-07-20 RX ADMIN — POLYETHYLENE GLYCOL 3350 17 G: 17 POWDER, FOR SOLUTION ORAL at 10:57

## 2019-07-20 RX ADMIN — AMLODIPINE BESYLATE 2.5 MG: 2.5 TABLET ORAL at 08:13

## 2019-07-20 RX ADMIN — OXYCODONE HYDROCHLORIDE 5 MG: 5 TABLET ORAL at 08:15

## 2019-07-20 RX ADMIN — OXYCODONE HYDROCHLORIDE 5 MG: 5 TABLET ORAL at 13:51

## 2019-07-20 NOTE — PROGRESS NOTES
Bedside and Verbal shift change report given to [address] (oncoming nurse) by Marixa Mayberry RN (offgoing nurse). Report included the following information SBAR, Kardex, Intake/Output and Cardiac Rhythm NSR.     1900 Pt resting in bed C/O abdominal pain but states \"it is better\"    2300 Pt states his \"pain is back\", PRN IV pain medicine given    0300 Pt states pain is \"tolerable\" ambulated to bathroom with assistance    Bedside and Verbal shift change report given to [address] (oncoming nurse) by Garth Ramirez RN (offgoing nurse). Report included the following information SBAR, Kardex, Intake/Output and Cardiac Rhythm NSR.

## 2019-07-20 NOTE — PROGRESS NOTES
0710 - Bedside and Verbal shift change report given to Lizbeth Gamboa (oncoming nurse) by Preston Messina (offgoing nurse). Report included the following information SBAR, Kardex, Intake/Output, MAR and Recent Results. Pt resting in bed with no complaints at this time. Call bell within reach. 9538 - pt reported 4/10 pain. treated with roxicodone. Pt resting in bed. No complaints at this time. Call bell within reach. 1100 - Pt ambulated to restroom. Pt given apple juice after clear liquids diet ordered. No complaints at this time. Call bell within reach. 1350 - Pt reports 5/10 pain. Treated with roxicodone. Pt is resting in bed with no complaints at this time. Call bell within reach. 1445 - Pt ambulated to the restroom. Pt brought back to bed. Pt reports 3/10 pain. Pt asks \"what about that IV drug you shoot in the IV. \" Pt was informed the morphine is used for very severe pain and roxicodone is for the level of reported pain. Pt now resting in bed. Call bell within reach. 1505 - Pt requested apple juice. Pt was brought apple juice. Pt asked for morphine. Pt was informed again that morphine is used for severe pain. Pt now resting in bed. Call bell within reach. 0- Pt educated on use of the incentive spirometer. Pt resting in bed. Call bell within reach.

## 2019-07-20 NOTE — PROGRESS NOTES
Bedside and Verbal shift change report given to Maricruz Harris RN (oncoming nurse) by Nic Zamora RN (offgoing nurse). Report included the following information SBAR, Kardex, Intake/Output, MAR, Recent Results and Cardiac Rhythm NSR/ST. Received pt resting in bed quietly; offers no complaints at this time. Call bell within reach. 1551 - Observed pt resting quietly, watching TV; offers no complaints at this time. Call bell within reach. Will continue to monitor.

## 2019-07-20 NOTE — PROGRESS NOTES
Problem: Falls - Risk of  Goal: *Absence of Falls  Description  Document Marivel Castorena Fall Risk and appropriate interventions in the flowsheet.   Outcome: Progressing Towards Goal  Note:   Fall Risk Interventions:            Medication Interventions: Evaluate medications/consider consulting pharmacy, Patient to call before getting OOB    Elimination Interventions: Call light in reach, Toileting schedule/hourly rounds    History of Falls Interventions: Door open when patient unattended, Room close to nurse's station

## 2019-07-20 NOTE — PROGRESS NOTES
Admit Date: 2019    POD 3 Days Post-Op    Procedure:  Procedure(s):  CHOLECYSTECTOMY LAPAROSCOPIC WITH INTRAOPERATIVE CHOLANGIOGRAM    Subjective:     Patient has no new complaints. Passing some flatus and BM. Objective:     Blood pressure 161/84, pulse 80, temperature 98.3 °F (36.8 °C), resp. rate 18, height 6' 1\" (1.854 m), weight 209 lb (94.8 kg), SpO2 96 %. Temp (24hrs), Av.7 °F (37.1 °C), Min:98.1 °F (36.7 °C), Max:99.6 °F (37.6 °C)      Physical Exam:  GENERAL: alert, cooperative, no distress, appears stated age, LUNG: clear to auscultation bilaterally, HEART: regular rate and rhythm, ABDOMEN: soft, still distended, NT, wounds c/d/i, EXTREMITIES:  extremities normal, atraumatic, no cyanosis or edema    Labs:   Recent Results (from the past 24 hour(s))   GLUCOSE, POC    Collection Time: 19 12:03 PM   Result Value Ref Range    Glucose (POC) 105 (H) 65 - 100 mg/dL    Performed by Getachew Judd    GLUCOSE, POC    Collection Time: 19  5:29 PM   Result Value Ref Range    Glucose (POC) 95 65 - 100 mg/dL    Performed by Gteachew Judd    GLUCOSE, POC    Collection Time: 19 12:05 AM   Result Value Ref Range    Glucose (POC) 98 65 - 100 mg/dL    Performed by Tootie Martinez    CBC WITH AUTOMATED DIFF    Collection Time: 19  4:26 AM   Result Value Ref Range    WBC 8.4 4.1 - 11.1 K/uL    RBC 3.83 (L) 4.10 - 5.70 M/uL    HGB 12.4 12.1 - 17.0 g/dL    HCT 36.0 (L) 36.6 - 50.3 %    MCV 94.0 80.0 - 99.0 FL    MCH 32.4 26.0 - 34.0 PG    MCHC 34.4 30.0 - 36.5 g/dL    RDW 12.9 11.5 - 14.5 %    PLATELET 556 140 - 721 K/uL    MPV 8.7 (L) 8.9 - 12.9 FL    NRBC 0.0 0  WBC    ABSOLUTE NRBC 0.00 0.00 - 0.01 K/uL    NEUTROPHILS PENDING %    LYMPHOCYTES PENDING %    MONOCYTES PENDING %    EOSINOPHILS PENDING %    BASOPHILS PENDING %    IMMATURE GRANULOCYTES PENDING %    ABS. NEUTROPHILS PENDING K/UL    ABS. LYMPHOCYTES PENDING K/UL    ABS. MONOCYTES PENDING K/UL    ABS.  EOSINOPHILS PENDING K/UL    ABS. BASOPHILS PENDING K/UL    ABS. IMM. GRANS. PENDING K/UL    DF PENDING    GLUCOSE, POC    Collection Time: 07/20/19  6:09 AM   Result Value Ref Range    Glucose (POC) 93 65 - 100 mg/dL    Performed by Jose J Sun    GLUCOSE, POC    Collection Time: 07/20/19  7:10 AM   Result Value Ref Range    Glucose (POC) 88 65 - 100 mg/dL    Performed by Geronimo Wilson (PCT)        Data Review images and reports reviewed    Assessment:     Active Problems:    Pancreatitis, acute (7/13/2019)      Uncontrolled type II diabetes mellitus ([facility] (7/13/2019)      MONET (acute kidney injury) ([facility] (7/13/2019)      Acute pancreatitis (7/13/2019)      Uncontrolled diabetes mellitus ([facility] (7/13/2019)        Plan/Recommendations/Medical Decision Making:     Continue present treatment   Clear liquids now that he is having flatus and BM  miralax daily  Should be ready for d/c home next day or two. Lianet Ortiz.  Sin Rod MD, [facility]

## 2019-07-20 NOTE — PROGRESS NOTES
Hospitalist Progress Note    NAME: Marty Kyle   :  1955   MRN:  [id number]       Assessment / Plan:  Acute Pancreatitis, s/p cholecystectomy, complicated by ileus  -Acute pancreatitis is suspected to be secondary to gallstones -- s/p cholecystectomy and intraoperative cholangiogram    -Patient with postoperative ileus, significant colon dilation -- started passing flatus overnight with good improvement in pain  -Diet advancing today as per surgery  -GI and surgical consultations appreciated  MRCP : Acute pancreatitis. No evidence of pancreatic necrosis or pseudocyst  formation. No evidence of gallstones, biliary dilatation, or filling defects in  the common bile duct. -CT AP : There is inflammation adjacent to the pancreatic head extending anterior to Gerota's fascia. Volume overload  -Iatrogenic secondary to large volume of IVF used for management of pancreatitis (appropriately) / third spacing  -s/p Lasix 20 mg IV x1 on   -New trace bilateral pleural effusions observed on CT   -Assuming he tolerates diet today, plan to resume diuresis tomorrow    DM2, uncontrolled with hyperglycemia, improved  -Holding metformin  -DM with recent steroid use  -BS elevated on admission -- now improved significantly and meeting goals    HTN, CAD:  -Continue Aspirin and Metoprolol  -Holding Lipitor due to elevated LFT  -denies CP    CKD 3  -Cr at baseline, 1.3  -renally dosing of meds  -monitor bmp   -avoid nephrotoxins     Prophylaxis:  Pepcid BID  Lovenox    Disposition pending resolution of ileus and diet advancement, pain control     Subjective:     Chief Complaint / Reason for Physician Visit: follow up acute pancreatitis  Complains of less pain today. Reports passing some bowel movements yesterday and lots of flatus. His belly is less tender. Feeling better overall.   Review of Systems:  Symptom Y/N Comments  Symptom Y/N Comments   Fever/Chills n   Chest Pain n    Poor Appetite n   Edema y    Cough    Abdominal Pain y    Sputum    Joint Pain     SOB/HAMILTON    Pruritis/Rash     Nausea/vomit n   Tolerating PT/OT     Diarrhea n   Tolerating Diet     Constipation n   Other       Could NOT obtain due to:      Objective:     VITALS:   Last 24hrs VS reviewed since prior progress note. Most recent are:  Patient Vitals for the past 24 hrs:   Temp Pulse Resp BP SpO2   07/20/19 1104 98.1 °F (36.7 °C) 89 18 146/86 95 %   07/20/19 0813  80  161/84    07/20/19 0708 98.3 °F (36.8 °C) 92 18 149/87 96 %   07/20/19 0424 98.6 °F (37 °C) 86 18 159/86 99 %   07/19/19 2301 99.6 °F (37.6 °C) 92 17 (!) 162/93 97 %   07/19/19 1923 98.7 °F (37.1 °C) 88 18 (!) 155/96 96 %   07/19/19 1804  91  (!) 169/95    07/19/19 1601 98.6 °F (37 °C) 80 18 161/84 95 %       Intake/Output Summary (Last 24 hours) at 7/20/2019 1212  Last data filed at 7/20/2019 0424  Gross per 24 hour   Intake 987.5 ml   Output 400 ml   Net 587.5 ml        PHYSICAL EXAM:  General: WD, WN. Alert, cooperative, nontoxic  EENT:  EOMI. Anicteric sclerae. MMM  Resp:  Basilar rales present bilaterally. No wheezing or rhonchi and no accessory muscle use. CV:  Regular  rhythm,  1+ generalized edema  GI:  Soft,  Much less distended and not tense, reduced tenderness, normoactive bowel sounds, wounds c/d/i  Neurologic:  Alert and oriented X 3, normal speech   Psych:   Fair insight. Not anxious nor agitated  Skin:  No rashes.   No jaundice    Reviewed most current lab test results and cultures  YES  Reviewed most current radiology test results   YES  Review and summation of old records today    NO  Reviewed patient's current orders and MAR    YES  PMH/SH reviewed - no change compared to H&P  ________________________________________________________________________  Care Plan discussed with:    Comments   Patient x    Family      RN     Care Manager     Consultant                        Multidiciplinary team rounds were held today with , nursing, pharmacist and clinical coordinator. Patient's plan of care was discussed; medications were reviewed and discharge planning was addressed. ________________________________________________________________________  Total NON critical care TIME:  25   Minutes    Total CRITICAL CARE TIME Spent:   Minutes non procedure based      Comments   >50% of visit spent in counseling and coordination of care x    ________________________________________________________________________  Joy Garcia MD     Procedures: see electronic medical records for all procedures/Xrays and details which were not copied into this note but were reviewed prior to creation of Plan. LABS:  I reviewed today's most current labs and imaging studies.   Pertinent labs include:  Recent Labs     07/20/19  0426 07/19/19  0237 07/18/19  0417   WBC 8.4 6.8 6.7   HGB 12.4 12.9 11.7*   HCT 36.0* 37.4 34.1*    180 174     Recent Labs     07/20/19  0426 07/19/19  0237 07/18/19  0417    136 135*   K 3.7 3.0* 3.3*    100 103   CO2 26 29 26   GLU 94 80 128*   BUN 14 15 15   CREA 1.05 1.23 1.04   CA 7.8* 7.8* 7.7*   ALB 1.9* 2.1* 2.3*   TBILI 0.6 0.8 0.7   SGOT 45* 91* 121*   * 165* 175*       Signed: Joy Garcia MD

## 2019-07-21 LAB
GLUCOSE BLD STRIP.AUTO-MCNC: 137 MG/DL (ref 65–100)
GLUCOSE BLD STRIP.AUTO-MCNC: 167 MG/DL (ref 65–100)
GLUCOSE BLD STRIP.AUTO-MCNC: 178 MG/DL (ref 65–100)
GLUCOSE BLD STRIP.AUTO-MCNC: 185 MG/DL (ref 65–100)
GLUCOSE BLD STRIP.AUTO-MCNC: 199 MG/DL (ref 65–100)
SERVICE CMNT-IMP: ABNORMAL

## 2019-07-21 PROCEDURE — 74011250637 HC RX REV CODE- 250/637: Performed by: INTERNAL MEDICINE

## 2019-07-21 PROCEDURE — 74011250637 HC RX REV CODE- 250/637: Performed by: SURGERY

## 2019-07-21 PROCEDURE — 74011250636 HC RX REV CODE- 250/636: Performed by: INTERNAL MEDICINE

## 2019-07-21 PROCEDURE — 74011000250 HC RX REV CODE- 250: Performed by: INTERNAL MEDICINE

## 2019-07-21 PROCEDURE — 74011636637 HC RX REV CODE- 636/637: Performed by: INTERNAL MEDICINE

## 2019-07-21 PROCEDURE — 74011250636 HC RX REV CODE- 250/636: Performed by: SURGERY

## 2019-07-21 PROCEDURE — 65660000000 HC RM CCU STEPDOWN

## 2019-07-21 PROCEDURE — 74011000258 HC RX REV CODE- 258: Performed by: SURGERY

## 2019-07-21 RX ADMIN — Medication 10 ML: at 21:00

## 2019-07-21 RX ADMIN — POTASSIUM CHLORIDE: 2 INJECTION, SOLUTION, CONCENTRATE INTRAVENOUS at 07:25

## 2019-07-21 RX ADMIN — AMLODIPINE BESYLATE 2.5 MG: 2.5 TABLET ORAL at 08:10

## 2019-07-21 RX ADMIN — ATORVASTATIN CALCIUM 40 MG: 40 TABLET, FILM COATED ORAL at 21:00

## 2019-07-21 RX ADMIN — Medication 10 ML: at 14:18

## 2019-07-21 RX ADMIN — OXYCODONE HYDROCHLORIDE 5 MG: 5 TABLET ORAL at 04:22

## 2019-07-21 RX ADMIN — METOPROLOL SUCCINATE 25 MG: 25 TABLET, EXTENDED RELEASE ORAL at 20:55

## 2019-07-21 RX ADMIN — OXYCODONE HYDROCHLORIDE 5 MG: 5 TABLET ORAL at 08:13

## 2019-07-21 RX ADMIN — OXYCODONE HYDROCHLORIDE 5 MG: 5 TABLET ORAL at 20:55

## 2019-07-21 RX ADMIN — FAMOTIDINE 20 MG: 10 INJECTION, SOLUTION INTRAVENOUS at 08:11

## 2019-07-21 RX ADMIN — ENOXAPARIN SODIUM 40 MG: 40 INJECTION SUBCUTANEOUS at 08:11

## 2019-07-21 RX ADMIN — OXYCODONE HYDROCHLORIDE 5 MG: 5 TABLET ORAL at 12:34

## 2019-07-21 RX ADMIN — INSULIN LISPRO 2 UNITS: 100 INJECTION, SOLUTION INTRAVENOUS; SUBCUTANEOUS at 17:29

## 2019-07-21 RX ADMIN — FAMOTIDINE 20 MG: 10 INJECTION, SOLUTION INTRAVENOUS at 20:55

## 2019-07-21 RX ADMIN — POLYETHYLENE GLYCOL 3350 17 G: 17 POWDER, FOR SOLUTION ORAL at 08:12

## 2019-07-21 RX ADMIN — OXYCODONE HYDROCHLORIDE 5 MG: 5 TABLET ORAL at 00:09

## 2019-07-21 RX ADMIN — INSULIN LISPRO 2 UNITS: 100 INJECTION, SOLUTION INTRAVENOUS; SUBCUTANEOUS at 11:41

## 2019-07-21 NOTE — PROGRESS NOTES
Problem: Falls - Risk of  Goal: *Absence of Falls  Description  Document Candida Lightning Fall Risk and appropriate interventions in the flowsheet.   Outcome: Progressing Towards Goal  Note:   Fall Risk Interventions:            Medication Interventions: Patient to call before getting OOB, Teach patient to arise slowly    Elimination Interventions: Call light in reach, Toileting schedule/hourly rounds    History of Falls Interventions: Door open when patient unattended, Room close to nurse's station

## 2019-07-21 NOTE — PROGRESS NOTES
SURGERY PROGRESS NOTE      Admit Date: 2019    POD 4 Days Post-Op    Procedure: Procedure(s):  CHOLECYSTECTOMY LAPAROSCOPIC WITH INTRAOPERATIVE CHOLANGIOGRAM      Subjective:     Patient feels better today. Passing flatus and having loose BMs. Denies nausea      Objective:     Visit Vitals  /85 (BP 1 Location: Right arm, BP Patient Position: Sitting)   Pulse 83   Temp 98 °F (36.7 °C)   Resp 16   Ht 6' 1\" (1.854 m)   Wt 94.8 kg (209 lb)   SpO2 94%   BMI 27.57 kg/m²        Temp (24hrs), Av.5 °F (36.9 °C), Min:98 °F (36.7 °C), Max:99.7 °F (37.6 °C)      701 - 1900  In: 360 [P.O.:360]  Out: -   1901 -  07  In: 240 [P.O.:240]  Out: 1300 [Urine:1300]    Physical Exam:    General:  alert, cooperative, no distress, appears stated age   Abdomen: soft, distended and tympanic bowel sounds active, non-tender   Incision:   healing well, no drainage, no erythema, no hernia, no seroma, no swelling, no dehiscence, incision well approximated           Lab Results   Component Value Date/Time    WBC 8.4 2019 04:26 AM    HGB 12.4 2019 04:26 AM    HCT 36.0 (L) 2019 04:26 AM    PLATELET 374  04:26 AM    MCV 94.0 2019 04:26 AM     Lab Results   Component Value Date/Time    ALT (SGPT) 102 (H) 2019 04:26 AM    AST (SGOT) 45 (H) 2019 04:26 AM    Alk. phosphatase 89 2019 04:26 AM    Bilirubin, direct 0.2 2019 04:10 AM    Bilirubin, total 0.6 2019 04:26 AM    Albumin 1.9 (L) 2019 04:26 AM    Protein, total 5.5 (L) 2019 04:26 AM    PLATELET 665  04:26 AM       Assessment:     Active Problems:    Pancreatitis, acute (2019)      Uncontrolled type II diabetes mellitus ([facility] [address] (2019)      [facility] (2019)      Acute pancreatitis (2019)      Uncontrolled diabetes mellitus ([facility] (2019)      Ileus resolving.     Plan:       Diabetic diet   Likely D/C in the am

## 2019-07-21 NOTE — PROGRESS NOTES
Bedside and Verbal shift change report given to Billie Tapia RN (oncoming nurse) by Torey Feliciano RN (offgoing nurse). Report included the following information SBAR, Kardex, Intake/Output, MAR, Recent Results and Cardiac Rhythm NSR. Received pt resting in bed; no complaints offered at this time. 1700 - Pt sitting on daybed eating dinner and watching TV; denies any discomfort at this time.

## 2019-07-21 NOTE — PROGRESS NOTES
0715 - Bedside and Verbal shift change report given to Isabel Otoole (oncoming nurse) by Shannan Kelley (offgoing nurse). Report included the following information SBAR, Kardex, Intake/Output and MAR. Pt resting in bed quietly. Call bell within reach. 0815 - Pt reports 7/10 pain. Treated with roxicodone. Pt resting in bed with no complaints at this time. Call light within reach. 0930 - Pt reports 6/10 pain. Md notified. MD encourages pt to refrain from pain medications. Pt resting in bed with no complaints at this time. Call bell within reach. 1100 - Pt ambulated to chair. Pt reading quietly with no complaints at this time. Call bell within reach. 1235 - Pt reports 6/10 pain. Treated with roxicodone. Family at bedside. Pt resting in bed with no complaints at this time. Call bell within reach. 1330 - Pt ambulated to restroom. Pt received a diabetic diet lunch tray. Pt eating with no complaints at this time. Call bell within reach. 1420 - Pt fluids completed. IV flushed and capped. Pt resting in bed at this time. Call bell within reach. 1640 - Pt sitting in chair. Pt resting quietly with no complaints at this time. Call bell within reach.

## 2019-07-21 NOTE — PROGRESS NOTES
Hospitalist Progress Note    NAME: Jamarcus Mcintyre   :  1955   MRN:  [id number]       Assessment / Plan:  Acute Pancreatitis, s/p cholecystectomy, complicated by ileus  -Acute pancreatitis is suspected to be secondary to gallstones -- s/p cholecystectomy and intraoperative cholangiogram    -Patient with postoperative ileus, significant colon dilation -- started passing flatus again with good improvement in pain  -Clear liquid diet, consider advancing further today if acceptable per GI/surgery  -GI and surgical consultations appreciated  MRCP : Acute pancreatitis. No evidence of pancreatic necrosis or pseudocyst  formation. No evidence of gallstones, biliary dilatation, or filling defects in  the common bile duct. -CT AP : There is inflammation adjacent to the pancreatic head extending anterior to Gerota's fascia. Volume overload  -Iatrogenic secondary to large volume of IVF used for management of pancreatitis (appropriately) / third spacing  -s/p Lasix 20 mg IV x1 on   -New trace bilateral pleural effusions observed on CT   -Will reduce IVF today, consider Lasix later today if potassium OK    DM2, uncontrolled with hyperglycemia, improved  -Holding metformin  -DM with recent steroid use  -BS elevated on admission -- now improved significantly and meeting goals    HTN, CAD:  -Continue Aspirin and Metoprolol  -Holding Lipitor due to elevated LFT  -denies CP    CKD 3  -Cr at baseline, 1.3  -renally dosing of meds  -monitor bmp -- await today's labs  -avoid nephrotoxins     Prophylaxis:  Pepcid BID  Lovenox    Disposition pending pain control, diet     Subjective:     Chief Complaint / Reason for Physician Visit: follow up acute pancreatitis  States he has some abdominal pain, controlled with oxycodone. Tolerating clear liquids. Passing flatus and BM. No nausea, vomiting.     Review of Systems:  Symptom Y/N Comments  Symptom Y/N Comments   Fever/Chills n   Chest Pain n    Poor Appetite n   Edema y    Cough    Abdominal Pain y Improved   Sputum    Joint Pain     SOB/HAMILTON    Pruritis/Rash     Nausea/vomit n   Tolerating PT/OT     Diarrhea n   Tolerating Diet     Constipation n   Other       Could NOT obtain due to:      Objective:     VITALS:   Last 24hrs VS reviewed since prior progress note. Most recent are:  Patient Vitals for the past 24 hrs:   Temp Pulse Resp BP SpO2   07/21/19 1056 98 °F (36.7 °C) 83 16 139/85 94 %   07/21/19 0810  70  145/79    07/21/19 0718 98.3 °F (36.8 °C) 77 16 133/71 97 %   07/21/19 0422 98.4 °F (36.9 °C) 92 18 139/85 98 %   07/20/19 2340 98.5 °F (36.9 °C) 86 18 160/85 97 %   07/20/19 1912 98.3 °F (36.8 °C) 97 18 139/82 97 %   07/20/19 1457 99.7 °F (37.6 °C) 95 18 (!) 147/96 93 %       Intake/Output Summary (Last 24 hours) at 7/21/2019 1122  Last data filed at 7/20/2019 1804  Gross per 24 hour   Intake    Output 600 ml   Net -600 ml        PHYSICAL EXAM:  General: WD, WN. Alert, cooperative, nontoxic  EENT:  EOMI. Anicteric sclerae. MMM  Resp:  Basilar rales present bilaterally. No wheezing or rhonchi and no accessory muscle use. CV:  Regular  rhythm,  1+ edema of BLE  GI:  Soft, less distended and not tense, reduced tenderness, normoactive bowel sounds  Neurologic:  Alert and oriented X 3, normal speech   Psych:   Fair insight. Not anxious nor agitated  Skin:  No rashes. No jaundice    Reviewed most current lab test results and cultures  YES  Reviewed most current radiology test results   YES  Review and summation of old records today    NO  Reviewed patient's current orders and MAR    YES  PMH/SH reviewed - no change compared to H&P  ________________________________________________________________________  Care Plan discussed with:    Comments   Patient x    Family      RN x    Care Manager     Consultant                        Multidiciplinary team rounds were held today with , nursing, pharmacist and clinical coordinator.   Patient's plan of care was discussed; medications were reviewed and discharge planning was addressed. ________________________________________________________________________  Total NON critical care TIME:  25   Minutes    Total CRITICAL CARE TIME Spent:   Minutes non procedure based      Comments   >50% of visit spent in counseling and coordination of care x    ________________________________________________________________________  Curtis Marina MD     Procedures: see electronic medical records for all procedures/Xrays and details which were not copied into this note but were reviewed prior to creation of Plan. LABS:  I reviewed today's most current labs and imaging studies.   Pertinent labs include:  Recent Labs     07/20/19  0426 07/19/19  0237   WBC 8.4 6.8   HGB 12.4 12.9   HCT 36.0* 37.4    180     Recent Labs     07/20/19  0426 07/19/19  0237    136   K 3.7 3.0*    100   CO2 26 29   GLU 94 80   BUN 14 15   CREA 1.05 1.23   CA 7.8* 7.8*   ALB 1.9* 2.1*   TBILI 0.6 0.8   SGOT 45* 91*   * 165*       Signed: Curtis Marina MD

## 2019-07-21 NOTE — PROGRESS NOTES
GI Progress Note (for [facility])  NAME:Arnold Camacho :1955 DNA:[id number]   ATTG: ÁNGEL Gomez MD Prim Rhett Linares MD  PCP: Glory Freedman MD  Date/Time:  2019 0915   Assessment:   · GS pancreatitis  · Ileus- passing gas now     Plan:   · Mobilize  · Minimize narcotic utilization  · Continue CLD     Subjective:   Discussed with RN events overnight. More distended and asking for pain medicines, but no increased pain with PO. Passing flatus. No N/V. Refuses NGT    Complaint Y/N Description   Abdominal Pain y feels better but still asking for pain medicine   Hematemesis n    Hematochezia n    Melena n    Constipation n    Diarrhea n    Dyspepsia n    Dysphagia n    Jaundiced n    Nausea/vomiting n      Review of Systems:  Symptom Y/N Comments  Symptom Y/N Comments   Fever/Chills n   Chest Pain n    Cough n   Headaches n    Sputum n   Joint Pain n    SOB/HAMILTON n   Pruritis/Rash n    Tolerating Diet y CLD  Other       Could NOT obtain due to:      Objective:   VITALS:   Last 24hrs VS reviewed since prior progress note. Most recent are:  Visit Vitals  /79   Pulse 70   Temp 98.3 °F (36.8 °C)   Resp 16   Ht 6' 1\" (1.854 m)   Wt 94.8 kg (209 lb)   SpO2 97%   BMI 27.57 kg/m²       Intake/Output Summary (Last 24 hours) at 2019 0914  Last data filed at 2019 1804  Gross per 24 hour   Intake 240 ml   Output 600 ml   Net -360 ml     PHYSICAL EXAM:  General: WD, WN. Alert, cooperative, no acute distress    HEENT: NC, Atraumatic. PERRL. Anicteric sclerae. Lungs:  CTA Bilaterally. No Wheezing/Rhonchi/Rales. Heart:  Regular  rhythm,  No murmur/Rub/Gallops  Abdomen: Soft, +distended, Non tender.  +BS  Extremities: No c/c/e  Neurologic:  CN 2-12 gi, A/O X 3. No acute neurological distress   Psych:   Good insight. Not anxious nor agitated.     Lab and Radiology Data Reviewed: (see below)    Medications Reviewed: (see below)  PMH/SH reviewed - no change compared to H&P  ________________________________________________________________________  Total time spent with patient: 15 minutes ________________________________________________________________________  Care Plan discussed with:  Patient y   Family     RN y              Consultant:       Michael Yu MD     Procedures: see electronic medical records for all procedures/Xrays and details which were not copied into this note but were reviewed prior to creation of Plan. LABS:  Recent Labs     07/20/19 0426 07/19/19 0237   WBC 8.4 6.8   HGB 12.4 12.9   HCT 36.0* 37.4    180     Recent Labs     07/20/19 0426 07/19/19  0237    136   K 3.7 3.0*    100   CO2 26 29   BUN 14 15   CREA 1.05 1.23   GLU 94 80   CA 7.8* 7.8*     Recent Labs     07/20/19 0426 07/19/19  0237   SGOT 45* 91*   AP 89 98   TP 5.5* 6.1*   ALB 1.9* 2.1*   GLOB 3.6 4.0   LPSE 148 136     No results for input(s): INR, PTP, APTT in the last 72 hours. No lab exists for component: INREXT, INREXT   No results for input(s): FE, TIBC, PSAT, FERR in the last 72 hours. No results found for: FOL, RBCF  No results for input(s): PH, PCO2, PO2 in the last 72 hours. No results for input(s): CPK, CKMB in the last 72 hours.     No lab exists for component: TROPONINI  Lab Results   Component Value Date/Time    Color YELLOW/STRAW 07/15/2019 04:10 AM    Appearance TURBID (A) 07/15/2019 04:10 AM    Specific gravity 1.027 07/15/2019 04:10 AM    pH (UA) 5.5 07/15/2019 04:10 AM    Protein 100 (A) 07/15/2019 04:10 AM    Glucose 500 (A) 07/15/2019 04:10 AM    Ketone 15 (A) 07/15/2019 04:10 AM    Bilirubin NEGATIVE  07/15/2019 04:10 AM    Urobilinogen 0.2 07/15/2019 04:10 AM    Nitrites NEGATIVE  07/15/2019 04:10 AM    Leukocyte Esterase NEGATIVE  07/15/2019 04:10 AM    Epithelial cells FEW 07/15/2019 04:10 AM    Bacteria 3+ (A) 07/15/2019 04:10 AM    WBC 0-4 07/15/2019 04:10 AM    RBC 5-10 07/15/2019 04:10 AM       MEDICATIONS:  Current Facility-Administered Medications   Medication Dose Route Frequency    polyethylene glycol (MIRALAX) packet 17 g  17 g Oral DAILY    insulin glargine (LANTUS) injection 5 Units  5 Units SubCUTAneous QHS    0.45% sodium chloride 1,000 mL with potassium chloride 40 mEq infusion   IntraVENous CONTINUOUS    morphine injection 2-4 mg  2-4 mg IntraVENous Q4H PRN    dextrose 10% infusion 125-250 mL  125-250 mL IntraVENous PRN    insulin lispro (HUMALOG) injection   SubCUTAneous Q6H    famotidine (PF) (PEPCID) 20 mg in sodium chloride 0.9% 10 mL injection  20 mg IntraVENous Q12H    phenol throat spray (CHLORASEPTIC) 1 Spray  1 Spray Oral PRN    enoxaparin (LOVENOX) injection 40 mg  40 mg SubCUTAneous Q24H    atorvastatin (LIPITOR) tablet 40 mg  40 mg Oral QHS    oxyCODONE IR (ROXICODONE) tablet 5 mg  5 mg Oral Q4H PRN    metoprolol succinate (TOPROL-XL) XL tablet 25 mg  25 mg Oral DAILY    nitroglycerin (NITROSTAT) tablet 0.4 mg  0.4 mg SubLINGual Q5MIN PRN    amLODIPine (NORVASC) tablet 2.5 mg  2.5 mg Oral DAILY    sodium chloride (NS) flush 5-40 mL  5-40 mL IntraVENous Q8H    sodium chloride (NS) flush 5-40 mL  5-40 mL IntraVENous PRN    naloxone (NARCAN) injection 0.4 mg  0.4 mg IntraVENous PRN    ondansetron (ZOFRAN) injection 4 mg  4 mg IntraVENous Q4H PRN    glucose chewable tablet 16 g  4 Tab Oral PRN    glucagon (GLUCAGEN) injection 1 mg  1 mg IntraMUSCular PRN

## 2019-07-22 VITALS
OXYGEN SATURATION: 98 % | HEIGHT: 73 IN | DIASTOLIC BLOOD PRESSURE: 75 MMHG | SYSTOLIC BLOOD PRESSURE: 132 MMHG | TEMPERATURE: 98.4 F | HEART RATE: 74 BPM | RESPIRATION RATE: 16 BRPM | BODY MASS INDEX: 27.7 KG/M2 | WEIGHT: 209 LBS

## 2019-07-22 LAB
ALBUMIN SERPL-MCNC: 1.9 G/DL (ref 3.5–5)
ALBUMIN/GLOB SERPL: 0.5 {RATIO} (ref 1.1–2.2)
ALP SERPL-CCNC: 96 U/L (ref 45–117)
ALT SERPL-CCNC: 89 U/L (ref 12–78)
ANION GAP SERPL CALC-SCNC: 6 MMOL/L (ref 5–15)
AST SERPL-CCNC: 55 U/L (ref 15–37)
BILIRUB SERPL-MCNC: 0.5 MG/DL (ref 0.2–1)
BUN SERPL-MCNC: 8 MG/DL (ref 6–20)
BUN/CREAT SERPL: 8 (ref 12–20)
CALCIUM SERPL-MCNC: 7.7 MG/DL (ref 8.5–10.1)
CHLORIDE SERPL-SCNC: 103 MMOL/L (ref 97–108)
CO2 SERPL-SCNC: 26 MMOL/L (ref 21–32)
CREAT SERPL-MCNC: 1.06 MG/DL (ref 0.7–1.3)
GLOBULIN SER CALC-MCNC: 3.9 G/DL (ref 2–4)
GLUCOSE BLD STRIP.AUTO-MCNC: 183 MG/DL (ref 65–100)
GLUCOSE BLD STRIP.AUTO-MCNC: 232 MG/DL (ref 65–100)
GLUCOSE SERPL-MCNC: 238 MG/DL (ref 65–100)
POTASSIUM SERPL-SCNC: 3.6 MMOL/L (ref 3.5–5.1)
PROT SERPL-MCNC: 5.8 G/DL (ref 6.4–8.2)
SERVICE CMNT-IMP: ABNORMAL
SERVICE CMNT-IMP: ABNORMAL
SODIUM SERPL-SCNC: 135 MMOL/L (ref 136–145)

## 2019-07-22 PROCEDURE — 74011250636 HC RX REV CODE- 250/636: Performed by: SURGERY

## 2019-07-22 PROCEDURE — 74011250637 HC RX REV CODE- 250/637: Performed by: SURGERY

## 2019-07-22 PROCEDURE — 82962 GLUCOSE BLOOD TEST: CPT

## 2019-07-22 PROCEDURE — 74011250636 HC RX REV CODE- 250/636: Performed by: INTERNAL MEDICINE

## 2019-07-22 PROCEDURE — 36415 COLL VENOUS BLD VENIPUNCTURE: CPT

## 2019-07-22 PROCEDURE — 74011000250 HC RX REV CODE- 250: Performed by: INTERNAL MEDICINE

## 2019-07-22 PROCEDURE — 80053 COMPREHEN METABOLIC PANEL: CPT

## 2019-07-22 PROCEDURE — 74011636637 HC RX REV CODE- 636/637: Performed by: INTERNAL MEDICINE

## 2019-07-22 RX ORDER — OXYCODONE HYDROCHLORIDE 5 MG/1
5 TABLET ORAL
Qty: 12 TAB | Refills: 0 | Status: SHIPPED | OUTPATIENT
Start: 2019-07-22 | End: 2019-07-25

## 2019-07-22 RX ORDER — ACETAMINOPHEN 325 MG/1
650 TABLET ORAL
Status: SHIPPED | COMMUNITY
Start: 2019-07-22

## 2019-07-22 RX ORDER — INSULIN LISPRO 100 [IU]/ML
INJECTION, SOLUTION INTRAVENOUS; SUBCUTANEOUS
Status: DISCONTINUED | OUTPATIENT
Start: 2019-07-22 | End: 2019-07-22 | Stop reason: HOSPADM

## 2019-07-22 RX ORDER — POLYETHYLENE GLYCOL 3350 17 G/17G
17 POWDER, FOR SOLUTION ORAL
Status: SHIPPED | COMMUNITY
Start: 2019-07-22 | End: 2020-08-04

## 2019-07-22 RX ORDER — ONDANSETRON 4 MG/1
4 TABLET, ORALLY DISINTEGRATING ORAL
Qty: 10 TAB | Refills: 0 | Status: SHIPPED | OUTPATIENT
Start: 2019-07-22 | End: 2019-09-11 | Stop reason: ALTCHOICE

## 2019-07-22 RX ADMIN — INSULIN LISPRO 3 UNITS: 100 INJECTION, SOLUTION INTRAVENOUS; SUBCUTANEOUS at 06:08

## 2019-07-22 RX ADMIN — AMLODIPINE BESYLATE 2.5 MG: 2.5 TABLET ORAL at 09:46

## 2019-07-22 RX ADMIN — POLYETHYLENE GLYCOL 3350 17 G: 17 POWDER, FOR SOLUTION ORAL at 09:46

## 2019-07-22 RX ADMIN — ENOXAPARIN SODIUM 40 MG: 40 INJECTION SUBCUTANEOUS at 09:46

## 2019-07-22 RX ADMIN — FAMOTIDINE 20 MG: 10 INJECTION, SOLUTION INTRAVENOUS at 09:46

## 2019-07-22 NOTE — PROGRESS NOTES
Surgery    Feeling better. Tolerated diabetic diet. Abdomen softer, non tender. OK for discharge from surgery point of view. Follow up with Dr Danika Rueda in 2-3 weeks  -  [phone].     Joann Bone MD

## 2019-07-22 NOTE — PROGRESS NOTES
Bedside and Verbal shift change report given to [address] (oncoming nurse) by Berta Tyson RN (offgoing nurse). Report included the following information SBAR, Kardex, Intake/Output and Cardiac Rhythm NSR.     1900 Pt reports pain is \"much better\" informed of possible D/C home tomorrow    2300 Pt asleep in bed, awakens easily, denies complaints of pain    0300 Pt resting comfortably in bed asleep, no complaints, denies pain    Bedside and Verbal shift change report given to [facility] Lonnie RN/Franchesca RN (oncoming nurse) by Atul Barrios RN (offgoing nurse). Report included the following information SBAR, Kardex, Intake/Output and Cardiac Rhythm NSR.

## 2019-07-22 NOTE — PROGRESS NOTES
PCP ZINA appt scheduled with Dr. Ijeoma Ott on 7/29/2019 at 1:45pm. Appt added to AVS. Sheri Torres CM Specialist

## 2019-07-22 NOTE — PROGRESS NOTES
Problem: Falls - Risk of  Goal: *Absence of Falls  Description  Document Papa Bourne Fall Risk and appropriate interventions in the flowsheet.   Outcome: Progressing Towards Goal  Note:   Fall Risk Interventions:            Medication Interventions: Evaluate medications/consider consulting pharmacy    Elimination Interventions: Call light in reach, Toileting schedule/hourly rounds    History of Falls Interventions: Door open when patient unattended

## 2019-07-22 NOTE — PROGRESS NOTES
0715 Bedside shift change report given to Lisa Rosa (oncoming nurse) by Margaret (offgoing nurse). Report included the following information SBAR, Kardex, Intake/Output, MAR and Recent Results. 0730 Dr. Annabelle Franco at bedside. Pt cleared to d/c from his standpoint. [id number] Tamela Lee NP at bedside. Pt cleared to d/c from GI.     1100 Pt refusing insulin. Stated he will not be staying to eat lunch at hospital and is being d/c shortly. 7830 I have reviewed discharge instructions and education with patient and wife. Provided a copy of discharge instructions to patient, provided pt with prescriptions and offered opportunity for clarification and questions. Pt and wife verbalized understanding. Removed IV's and assisted pt with getting dressed. Pt transported to car in wheelchair via volunteer services.

## 2019-07-22 NOTE — PROGRESS NOTES
GI PROGRESS NOTE   Carina Ambrosio NP  [phone] NP in-hospital cell phone M-F until 4:30  After 5pm or on weekends, please call  for physician on call    NAME:Arnold Camacho :1955 YDA:[id number]   ATTG: Dr. Miguel Gomez  PCP: Glory Freedman MD  Date/Time:  2019 9:00 AM     Primary GI: Dr. aSlty Irby    Reason for following: Pancreatitis, ileus, s/p choleycystectomy    Assessment:   - Gallstone pancreatitis; improving  - s/p cholecystectomy  - Ileus secondary to pancreatitis - now having BMs and tolerating diet  -     Plan:   - Continue diet as tolerated per surgery  - Encourage mobility  - Minimize narcotic use as possibl  - Okay to be discharged per GI.      *Plan discussed with Dr. Arthur Petit:   Doing well today. No complains only small amount of pain at surgical site. Discussed with RN events overnight. Complaint Y/N Description   Abdominal Pain Y    Hematemesis     Hematochezia     Melena     Constipation     Diarrhea     Dyspepsia     Dysphagia     Jaundiced     Nausea/vomiting N      Review of Systems:  Symptom Y/N Comments  Symptom Y/N Comments   Fever/Chills    Chest Pain     Cough    Headaches     Sputum    Joint Pain     SOB/HAMILTON    Pruritis/Rash     Tolerating Diet y diabetic  Other       Could NOT obtain due to:      Objective:   VITALS:   Last 24hrs VS reviewed since prior progress note. Most recent are:  Visit Vitals  /74 (BP 1 Location: Right arm, BP Patient Position: At rest)   Pulse 66   Temp 98.2 °F (36.8 °C)   Resp 14   Ht 6' 1\" (1.854 m)   Wt 94.8 kg (209 lb)   SpO2 97%   BMI 27.57 kg/m²       Intake/Output Summary (Last 24 hours) at 2019 0956  Last data filed at 2019 1417  Gross per 24 hour   Intake 1258.33 ml   Output    Net 1258.33 ml     PHYSICAL EXAM:  General: WD, WN. Alert, cooperative, no acute distress    HEENT: NC, Atraumatic. Anicteric sclerae. Lungs:  CTA Bilaterally. No Wheezing/Rhonchi/Rales.   Heart:  Regular  rhythm,  No murmur (-), No Rubs, No Gallops  Abdomen: Soft, non distended,  tender to surgical sites only.  +Bowel sounds, no HSM  Extremities: No c/c/e  Neurologic:  Alert and oriented X 3. No acute neurological distress   Psych:   Good insight. Not anxious nor agitated. Lab and Radiology Data Reviewed: (see below)    Medications Reviewed: (see below)  PMH/SH reviewed - no change compared to H&P  ________________________________________________________________________  Total time spent with patient: 30 minutes ________________________________________________________________________  Care Plan discussed with:  Patient Y   Family  y - 2 ladies at bedside   RN Y              Consultant: Davida Valadez NP     Procedures: see electronic medical records for all procedures/Xrays and details which were not copied into this note but were reviewed prior to creation of Plan. LABS:  Recent Labs     07/20/19 0426   WBC 8.4   HGB 12.4   HCT 36.0*        Recent Labs     07/22/19 0428 07/20/19 0426   * 137   K 3.6 3.7    102   CO2 26 26   BUN 8 14   CREA 1.06 1.05   * 94   CA 7.7* 7.8*     Recent Labs     07/22/19 0428 07/20/19 0426   SGOT 55* 45*   AP 96 89   TP 5.8* 5.5*   ALB 1.9* 1.9*   GLOB 3.9 3.6   LPSE  --  148     No results for input(s): INR, PTP, APTT in the last 72 hours. No lab exists for component: INREXT, INREXT   No results for input(s): FE, TIBC, PSAT, FERR in the last 72 hours. No results found for: FOL, RBCF  No results for input(s): PH, PCO2, PO2 in the last 72 hours. No results for input(s): CPK, CKMB in the last 72 hours.     No lab exists for component: TROPONINI  Lab Results   Component Value Date/Time    Color YELLOW/STRAW 07/15/2019 04:10 AM    Appearance TURBID (A) 07/15/2019 04:10 AM    Specific gravity 1.027 07/15/2019 04:10 AM    pH (UA) 5.5 07/15/2019 04:10 AM    Protein 100 (A) 07/15/2019 04:10 AM    Glucose 500 (A) 07/15/2019 04:10 AM    Ketone 15 (A) 07/15/2019 04:10 AM    Bilirubin NEGATIVE  07/15/2019 04:10 AM    Urobilinogen 0.2 07/15/2019 04:10 AM    Nitrites NEGATIVE  07/15/2019 04:10 AM    Leukocyte Esterase NEGATIVE  07/15/2019 04:10 AM    Epithelial cells FEW 07/15/2019 04:10 AM    Bacteria 3+ (A) 07/15/2019 04:10 AM    WBC 0-4 07/15/2019 04:10 AM    RBC 5-10 07/15/2019 04:10 AM       MEDICATIONS:  Current Facility-Administered Medications   Medication Dose Route Frequency    insulin lispro (HUMALOG) injection   SubCUTAneous AC&HS    polyethylene glycol (MIRALAX) packet 17 g  17 g Oral DAILY    insulin glargine (LANTUS) injection 5 Units  5 Units SubCUTAneous QHS    morphine injection 2-4 mg  2-4 mg IntraVENous Q4H PRN    dextrose 10% infusion 125-250 mL  125-250 mL IntraVENous PRN    famotidine (PF) (PEPCID) 20 mg in sodium chloride 0.9% 10 mL injection  20 mg IntraVENous Q12H    phenol throat spray (CHLORASEPTIC) 1 Spray  1 Spray Oral PRN    enoxaparin (LOVENOX) injection 40 mg  40 mg SubCUTAneous Q24H    atorvastatin (LIPITOR) tablet 40 mg  40 mg Oral QHS    oxyCODONE IR (ROXICODONE) tablet 5 mg  5 mg Oral Q4H PRN    metoprolol succinate (TOPROL-XL) XL tablet 25 mg  25 mg Oral DAILY    nitroglycerin (NITROSTAT) tablet 0.4 mg  0.4 mg SubLINGual Q5MIN PRN    amLODIPine (NORVASC) tablet 2.5 mg  2.5 mg Oral DAILY    sodium chloride (NS) flush 5-40 mL  5-40 mL IntraVENous Q8H    sodium chloride (NS) flush 5-40 mL  5-40 mL IntraVENous PRN    naloxone (NARCAN) injection 0.4 mg  0.4 mg IntraVENous PRN    ondansetron (ZOFRAN) injection 4 mg  4 mg IntraVENous Q4H PRN    glucose chewable tablet 16 g  4 Tab Oral PRN    glucagon (GLUCAGEN) injection 1 mg  1 mg IntraMUSCular PRN

## 2019-07-22 NOTE — PROGRESS NOTES
CM met with pt and he was aware of his discharge today. F/u appts made and documented on the discharge paperwork. Pt's wife will transport pt home by car. Care Management Interventions  PCP Verified by CM: Yes(Dr. David Alarcon )  Mode of Transport at Discharge:  Other (see comment)(pt's wife will transport by car)  Transition of Care Consult (CM Consult): Discharge Planning  Discharge Durable Medical Equipment: No  Physical Therapy Consult: No  Occupational Therapy Consult: No  Speech Therapy Consult: No  Current Support Network: Lives with Spouse, Own Home(lives with wife in a 1 story home with 3-4 steps to the entrance)  Confirm Follow Up Transport: Family  Plan discussed with Pt/Family/Caregiver: Yes  Discharge Location  [facility] [address] [facility] [address]

## 2019-07-22 NOTE — DISCHARGE INSTRUCTIONS
Discharge Instructions:  Laparoscopic Cholecystectomy (Gallbladder Removal)  Dr. Salty Mayorga    Follow up appointment is Monday Aug 5, 2019 11:40 AM. Call office at [phone] if unable to make your appointment. Activity:    Walk regularly. No lifting more than 10 -15 pounds for 4 weeks. Light aerobic activity is okay when you feel up to it. You may resume driving in three days unless still requiring narcotics for pain. Work:    You may return to work in 1 or 2 weeks to light activity. No lifting more than 10 pounds for four weeks. Diet:    You may resume normal diet after 24 hours. Fatty foods may still cause some stomach upset. Wound Care: You have a special dressing called Dermabond. It is okay to shower and let the water run over the incisions but do not scrub the area or soak in a tub. If you have a small amount of drainage you may place a dry bandage over the wound and change it daily. If you experience a lot of drainage, develop redness around the wound, or a fever over 101 F occurs please call the office. Medications:    Resume home medications as indicated on the Medical Reconciliation form. Aspirin and Coumadin can be restarted immediately if you were taking them preoperatively. If taking Plavix do not restart it until post operative day 2. Pain medications:  Non steroidal antiinflammatories seem to work best for post surgical pain. Try these first as prescribed. A narcotic prescription will also be given for breakthrough pain. Over the counter stool softeners and laxatives may be used if needed. Narcotics and anesthesia sometimes cause nausea and vomiting. If persistent please call the office. Do not hesitate to call with questions or concerns.                               HOSPITALIST DISCHARGE INSTRUCTIONS    NAME: Aureliano Singleton   :  1955   MRN:  [id number]     Date/Time:  2019 9:13 AM    ADMIT DATE: 2019   DISCHARGE DATE: 2019 · It is important that you take the medication exactly as they are prescribed. · Keep your medication in the bottles provided by the pharmacist and keep a list of the medication names, dosages, and times to be taken in your wallet. · Do not take other medications without consulting your doctor. What to do at Home    Recommended diet:  Low fat diet    Recommended activity: Activity as tolerated and No heavy lifting for 3 weeks or until your surgeon clears you. No driving if taking narcotic (oxycodone)      If you have questions regarding the hospital related prescriptions or hospital related issues please call [facility] at [phone]. You can always direct your questions to your primary care doctor if you are unable to reach your hospital physician; your PCP works as an extension of your hospital doctor just like your hospital doctor is an extension of your PCP for your time at the hospital [facility])    If you experience any of the following symptoms then please call your primary care physician or return to the emergency room if you cannot get hold of your doctor:    Fever, chills, nausea, vomiting, or persistent diarrhea  Worsening weakness or new problems with your speech or balance  Dark stools or visible blood in your stools  New Leg swelling or shortness of breath as these could be signs of a clot    Additional Instructions:      Bring these papers with you to your follow up appointments. The papers will help your doctors be sure to continue the care plan from the hospital.              Information obtained by :  I understand that if any problems occur once I am at home I am to contact my physician. I understand and acknowledge receipt of the instructions indicated above.                                                                                                                                            Physician's or R.N.'s Signature Date/Time                                                                                                                                              Patient or Representative Signature

## 2019-07-25 DIAGNOSIS — E78.00 HYPERCHOLESTEROLEMIA: ICD-10-CM

## 2019-07-25 RX ORDER — ATORVASTATIN CALCIUM 40 MG/1
TABLET, FILM COATED ORAL
Qty: 90 TAB | Refills: 3 | Status: SHIPPED | OUTPATIENT
Start: 2019-07-25 | End: 2020-07-09

## 2019-07-28 NOTE — PROGRESS NOTES
HISTORY OF PRESENT ILLNESS  Regla Fair is a 61 y.o. male. He is accompanied by wife. HPI  Mr. Vaughan Saint is a 61y.o. year old male, he is seen today for Transition of Care services following a hospital discharge for ACUTE PANCREATITIS on July 22. He presented to the ED on July 13 with complaints of abdominal pain, nausea and vomiting that began the evening prior. Had just finished a 10 course of prednisone from Dr. Stephy Bright for ear problem. Was given prednisone 80 mg for 4 days, then 60 mg for 4 days, then 40 mg. Took only half of the 40 mg doses and stopped. .  Evaluation in the ED was remarkable for lipase >3.000, WBC 11.5, lactic acid 4.2, , , , Creat 1.64, gluc 462, pH 7.35, Abdominal CT acute pancreatitis. Later test results: A1c 9.2 (up from 5.9 in May), TG 98, HDL 44 and LDL 48,  Blood culture no growth, MRI/MRCP showed only acute pancreatitis without necrosis, pseudocyst or bilitary dilatation. He was initially treated with iv insulin and transitioned to Lantus and lispro. Atorvastatin was held due to elevated liver enzymes. He was kept NPO, pain was treated with morphine and Dilaudid. He was evaluated by gasrorenterology and general surgery. It was believed that the etiology of pancreatitis was biliary vs prednisone. Enzymes improved but pain did not. Marcel Morrison He underwent laparoscopic cholecystectomy on July 17. Moderately diseased gallbladder and fatty liver were found. Pathology showed moderate chronic cholecystitis and cholesterolosis. Post oprecovery was slow  due to an ileus. He became fluid overloaded with trace pleural effusions and significant lower extremity edema. At discharge Creat 1.06, ALT 89, AST 55, AP 96, lipase 148, WBC 8.4. At discharge atorvastatin was restarted, insulin was discontinued,. Since discharge he has been concerned about degree of weight loss. Since June 4 has lost 22 lbs. He was NPO for a week in hospital. Eating well at home. Weighs at home aret stable since he has returned home. He denies nausea, diarrhea, abdomnnal pain. Lower extremity edema has improved, but not resolved  Feet feel numb. Abdominal swelling has improved.       Patient Active Problem List   Diagnosis Code    History of colonoscopy Z98.890    Low back pain M54.5    Hypercholesterolemia E78.00    Osteoarthritis of both knees M17.0    CKD (chronic kidney disease) stage 2, GFR 60-89 ml/min N18.2    Benign hypertension with chronic kidney disease, stage II I12.9, N18.2    Coronary artery disease involving native coronary artery of native heart without angina pectoris I25.10    Uncontrolled type 2 diabetes mellitus without complication, without long-term current use of insulin ([facility]) E11.65    Chronic superficial gastritis without bleeding K29.30    Type 2 diabetes with nephropathy ([facility]) E11.21    Pancreatitis, acute K85.90    Uncontrolled type II diabetes mellitus ([facility]) E11.65    MONET (acute kidney injury) ([facility] [address] N17.9    Acute pancreatitis K85.90     Past Medical History:   Diagnosis Date    CAD (coronary artery disease) 2014    NSTEMI, PCI/NATE RCA    Hypercholesterolemia     Hypertension      Past Surgical History:   Procedure Laterality Date    CARDIAC SURG PROCEDURE UNLIST      Stent RCA 2014    HX ORTHOPAEDIC      left shoulder, torn tendon    HX ORTHOPAEDIC      right knee X 4    UT COLONOSCOPY FLX DX W/COLLJ SPEC WHEN PFRMD  2007    Dr Adriane Benentt 1 polyp repeat 2012     Social History     Socioeconomic History    Marital status:      Spouse name: Not on file    Number of children: Not on file    Years of education: Not on file    Highest education level: Not on file   Tobacco Use    Smoking status: Former Smoker     Packs/day: 0.25     Years: 35.00     Pack years: 8.75     Types: Cigarettes     Last attempt to quit: 2014     Years since quittin.7    Smokeless tobacco: Never Used    Tobacco comment: Never smoked over a pack per day   Substance and Sexual Activity    Alcohol use: No     Alcohol/week: 0.0 standard drinks    Drug use: No     Family History   Problem Relation Age of Onset    Heart Disease Mother         CAD    Hypertension Mother    [facility] Elevated Lipids Mother     Cancer Father         lung    Mental Retardation Brother     Seizures Brother     Diabetes Brother     Hypertension Brother     Elevated Lipids Brother      No Known Allergies  Current Outpatient Medications   Medication Sig Dispense Refill    pantoprazole (PROTONIX) 40 mg tablet pantoprazole 40 mg tablet,delayed release      nitroglycerin (NITROSTAT) 0.4 mg SL tablet Take 1 tablet under tongue every 5 minutes up to 3 doses prn chest pain. 1 Bottle 3    atorvastatin (LIPITOR) 40 mg tablet TAKE 1 TABLET BY MOUTH EVERY EVENING 90 Tab 3    polyethylene glycol (MIRALAX) 17 gram packet Take 1 Packet by mouth daily as needed (constipation).  ondansetron (ZOFRAN ODT) 4 mg disintegrating tablet Take 1 Tab by mouth every eight (8) hours as needed for Nausea. 10 Tab 0    acetaminophen (TYLENOL) 325 mg tablet Take 2 Tabs by mouth every six (6) hours as needed for Pain.  oxymetazoline (AFRIN) 0.05 % nasal spray 2 Sprays two (2) times a day.  metFORMIN ER (GLUCOPHAGE XR) 500 mg tablet Take 3 Tabs by mouth daily (with dinner). 270 Tab 3    amLODIPine (NORVASC) 2.5 mg tablet TAKE 1 TABLET DAILY 90 Tab 3    metoprolol succinate (TOPROL-XL) 25 mg XL tablet TAKE 1 TABLET DAILY 90 Tab 3    aspirin delayed-release 81 mg tablet Take 1 Tab by mouth daily. 90 Tab 3       Review of Systems   Constitutional: Negative for malaise/fatigue. Respiratory: Negative for shortness of breath. Cardiovascular: Negative for chest pain and leg swelling. Gastrointestinal: Negative for abdominal pain, constipation, diarrhea, nausea and vomiting.       Visit Vitals  /73 (BP 1 Location: Left arm, BP Patient Position: Sitting)   Pulse 77   Temp 97.9 °F (36.6 °C) (Oral)   Resp 12   Ht 6' 1\" (1.854 m)   Wt 198 lb (89.8 kg)   SpO2 95%   BMI 26.12 kg/m²     Physical Exam   Constitutional: He is oriented to person, place, and time. He appears well-developed and well-nourished. HENT:   Head: Normocephalic and atraumatic. Eyes: No scleral icterus. Neck: Neck supple. Cardiovascular: Normal rate, regular rhythm, S1 normal, S2 normal and normal heart sounds. Exam reveals no gallop. No murmur heard. Pulmonary/Chest: Effort normal. He has no wheezes. He has no rhonchi. He has no rales. Abdominal: Soft. Normal appearance and bowel sounds are normal. He exhibits no distension, no abdominal bruit and no mass. There is no hepatosplenomegaly. There is no tenderness. There is no CVA tenderness. Musculoskeletal: He exhibits edema (1-2+ in feet, and trace to !+ at ankles. ). Neurological: He is alert and oriented to person, place, and time. Skin: Skin is warm, dry and intact. Psychiatric: He has a normal mood and affect. His behavior is normal.   Diabetic foot exam:     Left Foot:   Visual Exam: normal    Pulse DP: 2+ (normal)   Filament test: 4/6   Vibratory sensation: normal      Right Foot:   Visual Exam: normal    Pulse DP: 2+ (normal)   Filament test: 4/6   Vibratory sensation: normal      Lab Results   Component Value Date/Time    Sodium 135 (L) 07/22/2019 04:28 AM    Potassium 3.6 07/22/2019 04:28 AM    Chloride 103 07/22/2019 04:28 AM    CO2 26 07/22/2019 04:28 AM    Anion gap 6 07/22/2019 04:28 AM    Glucose 238 (H) 07/22/2019 04:28 AM    BUN 8 07/22/2019 04:28 AM    Creatinine 1.06 07/22/2019 04:28 AM    BUN/Creatinine ratio 8 (L) 07/22/2019 04:28 AM    GFR est AA >60 07/22/2019 04:28 AM    GFR est non-AA >60 07/22/2019 04:28 AM    Calcium 7.7 (L) 07/22/2019 04:28 AM    Bilirubin, total 0.5 07/22/2019 04:28 AM    AST (SGOT) 55 (H) 07/22/2019 04:28 AM    Alk.  phosphatase 96 07/22/2019 04:28 AM    Protein, total 5.8 (L) 07/22/2019 04:28 AM    Albumin 1.9 (L) 07/22/2019 04:28 AM    Globulin 3.9 07/22/2019 04:28 AM    A-G Ratio 0.5 (L) 07/22/2019 04:28 AM    ALT (SGPT) 89 (H) 07/22/2019 04:28 AM     Lab Results   Component Value Date/Time    Lipase 148 07/20/2019 04:26 AM     Lab Results   Component Value Date/Time    WBC 8.4 07/20/2019 04:26 AM    HGB 12.4 07/20/2019 04:26 AM    HCT 36.0 (L) 07/20/2019 04:26 AM    PLATELET 377 20/44/9555 04:26 AM    MCV 94.0 07/20/2019 04:26 AM       ASSESSMENT and PLAN    ICD-10-CM ICD-9-CM    1. Acute biliary pancreatitis without infection or necrosis K85.10 577.0    2. Uncontrolled type 2 diabetes mellitus without complication, without long-term current use of insulin ([facility]CC) E11.65 250.02 MICROALBUMIN, UR, RAND W/ MICROALB/CREAT RATIO       DIABETES FOOT EXAM   3. Elevated transaminase level R74.0 790.4 HEPATIC FUNCTION PANEL   4. Fatigue, unspecified type R53.83 780.79 TSH 3RD GENERATION   5. Numbness in feet R20.0 782.0      Diagnoses and all orders for this visit:    1. Acute biliary pancreatitis without infection or necrosis  Improved. Urged to continue low fat diet prevent flare of pancreatitis and recurrent abdominal symptoms. 2. Uncontrolled type 2 diabetes mellitus without complication, without long-term current use of insulin (HCC)  Loss of control likely combination of prednisone and oone month of milder abdominal symptoms.   -     MICROALBUMIN, UR, RAND W/ MICROALB/CREAT RATIO  -      DIABETES FOOT EXAM    3. Elevated transaminase level  Much improved. Should return to normal. Will repeat. -     HEPATIC FUNCTION PANEL    4. Fatigue, unspecified type  Reassured that fatigue is expected after prolonged illness. Should increase activity gradually. .   -     TSH 3RD GENERATION    5. Numbness in feet  More likely related to edema rather than neuropathy where vibration sense is lost first.     Other orders  -     nitroglycerin (NITROSTAT) 0.4 mg SL tablet;  Take 1 tablet under tongue every 5 minutes up to 3 doses prn chest pain. Follow-up and Dispositions    · Return if symptoms worsen or fail to improve.       lab results and schedule of future lab studies reviewed with patient  reviewed diet, exercise and weight control  I have discussed the diagnosis, evaluation and treatment options and the intended plan with the patient. Patient understands and is in agreement. The patient has received an after-visit summary and questions were answered concerning future plans. I have discussed side effects and warnings of any new medications with the patient as well.

## 2019-07-29 ENCOUNTER — OFFICE VISIT (OUTPATIENT)
Dept: INTERNAL MEDICINE CLINIC | Facility: CLINIC | Age: 64
End: 2019-07-29

## 2019-07-29 VITALS
RESPIRATION RATE: 12 BRPM | TEMPERATURE: 97.9 F | SYSTOLIC BLOOD PRESSURE: 121 MMHG | WEIGHT: 198 LBS | OXYGEN SATURATION: 95 % | HEIGHT: 73 IN | HEART RATE: 77 BPM | BODY MASS INDEX: 26.24 KG/M2 | DIASTOLIC BLOOD PRESSURE: 73 MMHG

## 2019-07-29 DIAGNOSIS — R74.01 ELEVATED TRANSAMINASE LEVEL: ICD-10-CM

## 2019-07-29 DIAGNOSIS — R20.0 NUMBNESS IN FEET: ICD-10-CM

## 2019-07-29 DIAGNOSIS — K85.10 ACUTE BILIARY PANCREATITIS WITHOUT INFECTION OR NECROSIS: Primary | ICD-10-CM

## 2019-07-29 DIAGNOSIS — R53.83 FATIGUE, UNSPECIFIED TYPE: ICD-10-CM

## 2019-07-29 RX ORDER — PANTOPRAZOLE SODIUM 40 MG/1
40 TABLET, DELAYED RELEASE ORAL DAILY
COMMUNITY
End: 2019-12-10

## 2019-07-29 RX ORDER — NITROGLYCERIN 0.4 MG/1
TABLET SUBLINGUAL
Qty: 1 BOTTLE | Refills: 3 | Status: SHIPPED | OUTPATIENT
Start: 2019-07-29 | End: 2020-08-20 | Stop reason: SDUPTHER

## 2019-07-29 NOTE — PROGRESS NOTES
Olivia Jordan  Identified pt with two pt identifiers(name and ). Chief Complaint   Patient presents with   [facility] Follow Up       Reviewed record In preparation for visit and have obtained necessary documentation. Has info on advanced directive but has not filled them out. 1. Have you been to the ER, urgent care clinic or hospitalized since your last visit? ED [facility] 19, [facility] 19, 19    2. Have you seen or consulted any other health care providers outside of the [address] [place] [address] since your last visit? Include any pap smears or colon screening. Dr Ashley Patrick, MRI, ENT     Vitals reviewed with provider.     Health Maintenance reviewed:     Health Maintenance Due   Topic    MICROALBUMIN Q1     Pneumococcal 0-64 years (2 of 3 - PCV13)          Wt Readings from Last 3 Encounters:   19 198 lb (89.8 kg)   19 209 lb (94.8 kg)   19 209 lb (94.8 kg)        Temp Readings from Last 3 Encounters:   19 97.9 °F (36.6 °C) (Oral)   19 98.4 °F (36.9 °C)   19 97.8 °F (36.6 °C)        BP Readings from Last 3 Encounters:   19 121/73   19 132/75   19 128/88        Pulse Readings from Last 3 Encounters:   19 77   19 74   19 69        Vitals:    19 1346   BP: 121/73   Pulse: 77   Resp: 12   Temp: 97.9 °F (36.6 °C)   TempSrc: Oral   SpO2: 95%   Weight: 198 lb (89.8 kg)   Height: 6' 1\" (1.854 m)   PainSc:   2   PainLoc: Back          Learning Assessment:   :       Learning Assessment 2018 4/10/2014   PRIMARY LEARNER Patient Patient   HIGHEST LEVEL OF EDUCATION - PRIMARY LEARNER  - SOME COLLEGE   BARRIERS PRIMARY LEARNER - NONE   CO-LEARNER CAREGIVER - No   PRIMARY LANGUAGE ENGLISH ENGLISH    NEED - No   LEARNER PREFERENCE PRIMARY VIDEOS VIDEOS   ANSWERED BY patient Patient   RELATIONSHIP SELF SELF        Depression Screening:   :       3 most recent PHQ Screens 2019   Little interest or pleasure in doing things Not at all   Feeling down, depressed, irritable, or hopeless Not at all   Total Score PHQ 2 0        Fall Risk Assessment:   :     No flowsheet data found. Abuse Screening:   :       Abuse Screening Questionnaire 6/4/2019 8/29/2017 8/15/2016   Do you ever feel afraid of your partner? N N N   Are you in a relationship with someone who physically or mentally threatens you? N N N   Is it safe for you to go home?  Y Y Y        ADL Screening:   :       ADL Assessment 7/29/2019   Feeding yourself No Help Needed   Getting from bed to chair No Help Needed   Getting dressed No Help Needed   Bathing or showering No Help Needed   Walk across the room (includes cane/walker) No Help Needed   Using the telphone No Help Needed   Taking your medications No Help Needed   Preparing meals No Help Needed   Managing money (expenses/bills) No Help Needed   Moderately strenuous housework (laundry) No Help Needed   Shopping for personal items (toiletries/medicines) No Help Needed   Shopping for groceries No Help Needed   Driving No Help Needed   Climbing a flight of stairs No Help Needed   Getting to places beyond walking distances No Help Needed

## 2019-07-29 NOTE — PATIENT INSTRUCTIONS
Monitor weight at home and let me know if it falls more than another 5 lbs. You do have about 5 lbs of fluid in feet and ankles. Gradually increase activity. Keep appointment in December. Learning About Low-Fat Eating  What is low-fat eating? Most food has some fat in it. Your body needs some fat to be healthy. But some kinds of fats are healthier than others. In a low-fat eating plan, you try to choose healthier fats and eat fewer unhealthy fats. Healthy fats include olive and canola oil. Try to avoid eating too much saturated fat (such as in cheese and meats) and trans fat (a type of fat found in many packaged snack foods and other baked goods). You do not need to cut all fat from your diet. But you can make healthier choices about the types and amount of fat you eat. Even though it is a good idea to choose healthier fats, it is still important to be careful of how much fat you eat, because all fats are high in calories. What are the different types of fats? Unhealthy fats  · Saturated fat. Saturated fats are mostly in animal foods, such as meat and dairy foods. Tropical oils, such as coconut oil, palm oil, and cocoa butter, are also saturated fats. · Trans fat. Trans fats include shortening, partially hydrogenated vegetable oils, and hydrogenated vegetable oils. Trans fats are made when a liquid fat is turned into a solid fat (for example, when corn oil is made into stick margarine). They are in many processed foods, such as cookies, crackers, and snack foods. Healthy fats  · Monounsaturated fat. Monounsaturated fats are liquid at room temperature but get solid when refrigerated. Eating foods that are high in this fat may help lower your \"bad\" (LDL) cholesterol, keep your \"good\" (HDL) cholesterol level up, and lower your chances of getting coronary artery disease. This fat is found in canola oil, olive oil, peanut oil, olives, avocados, nuts, and nut butters. · Polyunsaturated fat. Polyunsaturated fats are liquid at room temperature. They are in safflower, sunflower, and corn oils. They are also the main fat in seafood. Omega-3 fatty acids are types of polyunsaturated fat. Eating fish may lower your chances of getting coronary artery disease. Fatty fish such as salmon and mackerel contain these healthy fatty acids. So do ground flaxseeds and flaxseed oil, soybeans, walnuts, and seeds. Why cut down on unhealthy fats? Eating foods that contain saturated fats can raise the LDL (\"bad\") cholesterol in your blood. Having a high level of LDL cholesterol increases your chance of hardening of the arteries (atherosclerosis), which can lead to heart disease, heart attack, and stroke. Trans fat raises the level of \"bad\" LDL cholesterol in your blood and may lower the \"good\" HDL cholesterol in your blood. Trans fat can raise your risk of heart disease, heart attack, and stroke. In general:  · No more than 10% of your daily calories should come from saturated fat. This is about 20 grams in a 2,000-calorie diet. · No more than 10% of your daily calories should come from polyunsaturated fat. This is about 20 grams in a 2,000-calorie diet. · Monounsaturated fats can be up to 15% of your daily calories. This is about 25 to 30 grams in a 2,000-calorie diet. If you're not sure how much fat you should be eating or how many calories you need each day to stay at a healthy weight, talk to a registered dietitian. He or she can help you create a plan that's right for you. What can you do to cut down on fat? Foods like cheese, butter, sausage, and desserts can have a lot of unhealthy fats. Try these tips for healthier meals at home and when you eat out. At home  · Fill up on fruits, vegetables, and whole grains.   · Think of meat as a side dish instead of as the main part of your meal.  · When you do eat meat, make it extra-lean ground beef (97% lean), ground turkey breast (without skin added), meats with fat trimmed off before cooking, or skinless chicken. · Try main dishes that use whole wheat pasta, brown rice, dried beans, or vegetables. · Use cooking methods that use little or no fat, such as broiling, steaming, or grilling. Use cooking spray instead of oil. If you use oil, use a monounsaturated oil, such as canola or olive oil. · Read food labels on canned, bottled, or packaged foods. Choose those with little saturated fat and no trans fat. When eating out at a restaurant  · Order foods that are broiled or poached instead of fried or breaded. · Cut back on the amount of butter or margarine that you use on bread. Use small amounts of olive oil instead. · Order sauces, gravies, and salad dressings on the side, and use only a little. · When you order pasta, choose tomato sauce instead of cream sauce. · Ask for salsa with your baked potato instead of sour cream, butter, cheese, or mayes. Where can you learn more? Go to http://fela-franklin.info/. Enter B571 in the search box to learn more about \"Learning About Low-Fat Eating. \"  Current as of: November 7, 2018  Content Version: 12.1  © 3964-2930 [employer]. Care instructions adapted under license by [employer] (which disclaims liability or warranty for this information). If you have questions about a medical condition or this instruction, always ask your healthcare professional. [place] [address] any warranty or liability for your use of this information.

## 2019-07-30 LAB
ALBUMIN SERPL-MCNC: 3.3 G/DL (ref 3.6–4.8)
ALBUMIN/CREAT UR: 17.9 MG/G CREAT (ref 0–30)
ALP SERPL-CCNC: 103 IU/L (ref 39–117)
ALT SERPL-CCNC: 54 IU/L (ref 0–44)
AST SERPL-CCNC: 26 IU/L (ref 0–40)
BILIRUB DIRECT SERPL-MCNC: 0.16 MG/DL (ref 0–0.4)
BILIRUB SERPL-MCNC: 0.3 MG/DL (ref 0–1.2)
CREAT UR-MCNC: 63.2 MG/DL
MICROALBUMIN UR-MCNC: 11.3 UG/ML
PROT SERPL-MCNC: 6.4 G/DL (ref 6–8.5)
TSH SERPL DL<=0.005 MIU/L-ACNC: 2.86 UIU/ML (ref 0.45–4.5)

## 2019-07-30 NOTE — PROGRESS NOTES
Inform patient that urine test is normal. Liver tests continue to improve: one is now normal the other only 10 points over normal. Protein. levels are also improved;  albumin was 1.9 and is now 3.3; normal is over 3.6. I am confident these will all return to normal and do nothing they need to be repeated any time soon, unless he requests to do so.  Thyroid test is normal.

## 2019-07-31 NOTE — DISCHARGE SUMMARY
Hospitalist Discharge Summary     Patient ID:  Aureliano Singleton  [id number]  37 y.o.  1955  7/13/2019    PCP on record: Sharifa Marshall MD    Admit date: 7/13/2019  Discharge date and time: 7/22/2019    DISCHARGE DIAGNOSIS:  Acute pancreatitis  Ileus  Volume overload   Uncontrolled diabetes mellitus type II with hyperglycemia  Essential hypertension  Coronary artery disease  Chronic kidney disease  Hypokalemia  Obesity      CONSULTATIONS:  IP CONSULT TO GASTROENTEROLOGY  IP CONSULT TO GENERAL SURGERY    Excerpted HPI from H&P of Lobito Zimmerman MD:  61 y.o m with PMH of DM, HTN, CAD (stent to RCA in 2014) presents with c/o abdominal pain, nausea and vomiting started last night. Patient states he had increased thirst and urination in the past week. Initially described CP per ED physician but denies CP/SOB or palpitations when seen by me. Patient found to have elevated BG, elevated Lipase and LFT's. Per wife, he was recently sick and prescribed Prednisone for 10 days with tapering dose that he completed.      CT abdomen showed Acute pancreatitis.     Ordered ABG's in the ED - pH 7.35.  CE not sent - will send now     Start patient on insulin drip after initial bolus. ______________________________________________________________________  DISCHARGE SUMMARY/HOSPITAL COURSE:  for full details see H&P, daily progress notes, labs, consult notes. Acute Pancreatitis, s/p cholecystectomy, complicated by ileus  -Patient was admitted to the hospitalist service for acute pancreatitis. He was treated with aggressive IV fluid resuscitation, bowel rest, and pain control as needed.  -Patient was seen in consultation by GI and general surgery during this hospitalization.  Acute pancreatitis was suspected to be secondary to gallstones, though this was not definitively visualized on imaging -- s/p cholecystectomy and intraoperative cholangiogram 7/17   -Patient developed postoperative ileus, significant colon dilation with management by NGT and rectal tube recommended; patient intolerant of NGT but fortunately started passing flatus again with good improvement in pain and distention  -Diet was advanced as per GI/surgery postoperatively and pain controlled with oral analgesics alone   MRCP 7/14: Acute pancreatitis. No evidence of pancreatic necrosis or pseudocyst  formation. No evidence of gallstones, biliary dilatation, or filling defects in  the common bile duct. -CT AP 7/13: There is inflammation adjacent to the pancreatic head extending anterior to Gerota's fascia.     Volume overload  -Iatrogenic secondary to large volume of IVF used for management of pancreatitis (appropriately) / third spacing  -s/p Lasix 20 mg IV x1 on 7/18  -New trace bilateral pleural effusions observed on CT 7/19  -Progressively improved over the course of hospital stay     DM2, uncontrolled with hyperglycemia, improved  -Metformin held during hospitalization but resumed on discharge  -BS elevated on admission -- improved significantly by discharge and meeting goals  -Discussed importance of adhering to diabetic diet     HTN, CAD:  -Continued Aspirin and Metoprolol  -Held Lipitor due to elevated LFT on admission -- resume on discharge     CKD 3  -Cr at baseline, 1.3; no major changes during this hospital stay    _______________________________________________________________________  Patient seen and examined by me on discharge day. Pertinent Findings:  Gen:    Not in distress, nontoxic  Chest: Clear lungs, no w/r/r  CVS:   Regular rhythm.   Trace to 1+ BLE edema  Abd:  Soft, not distended, not tender  Neuro:  Alert, oriented, asking appropriate questions  _______________________________________________________________________  DISCHARGE MEDICATIONS:   Discharge Medication List as of 7/22/2019 11:15 AM      START taking these medications    Details   oxyCODONE IR (ROXICODONE) 5 mg immediate release tablet Take 1 Tab by mouth every six (6) hours as needed for Pain for up to 3 days. Max Daily Amount: 20 mg. Reserve only for more severe pain to prevent constipation and ileus (bowels stopping). , Print, Disp-12 Tab, R-0      polyethylene glycol (MIRALAX) 17 gram packet Take 1 Packet by mouth daily as needed (constipation). , OTC      ondansetron (ZOFRAN ODT) 4 mg disintegrating tablet Take 1 Tab by mouth every eight (8) hours as needed for Nausea. , Print, Disp-10 Tab, R-0         CONTINUE these medications which have NOT CHANGED    Details   acetaminophen (TYLENOL) 325 mg tablet Take 2 Tabs by mouth every six (6) hours as needed for Pain., OTC      oxymetazoline (AFRIN) 0.05 % nasal spray 2 Sprays two (2) times a day., Historical Med      metFORMIN ER (GLUCOPHAGE XR) 500 mg tablet Take 3 Tabs by mouth daily (with dinner). , Normal, Disp-270 Tab, R-3      amLODIPine (NORVASC) 2.5 mg tablet TAKE 1 TABLET DAILY, Normal, Disp-90 Tab, R-3      metoprolol succinate (TOPROL-XL) 25 mg XL tablet TAKE 1 TABLET DAILY, Normal, Disp-90 Tab, R-3      atorvastatin (LIPITOR) 40 mg tablet TAKE 1 TABLET EVERY EVENING, Normal, Disp-90 Tab, R-3      aspirin delayed-release 81 mg tablet Take 1 Tab by mouth daily. , No Print, Disp-90 Tab, R-3      nitroglycerin (NITROSTAT) 0.4 mg SL tablet 1 Tab by SubLINGual route every five (5) minutes as needed for Chest Pain (Max 3 doses daily). , Normal, Disp-1 Bottle, R-3               Patient Follow Up Instructions: Activity: Activity as tolerated and No heavy lifting until cleared by surgery  Diet: Diabetic Diet and Low fat, Low cholesterol  Wound Care: Keep wound clean and dry    Follow-up with Dr. Isaura Lutz in 2 weeks.   Follow-up tests/labs PRN  Follow-up Information     Follow up With Specialties Details Why Contact Info    Eugene Bustillos MD General Surgery, Breast Surgery, Endocrinology, Colon and Rectal Surgery On 8/5/2019 your appt is at 11:40am  [address] [place] [address]  [phone] Lai Jay MD Internal Medicine Go on 7/29/2019 Hospital follow-up scheduled at 1:45pm ( If you have questions or need to reschedule please call [phone] [address] [place] [address] Avenue  [phone]          ________________________________________________________________    Risk of deterioration: Moderate    Condition at Discharge:  Stable  __________________________________________________________________    Disposition  Home with family, no needs    ____________________________________________________________________    Code Status: Full Code  ___________________________________________________________________      Total time in minutes spent coordinating this discharge (includes going over instructions, follow-up, prescriptions, and preparing report for sign off to her PCP) :  45 minutes    Signed:  Curtis Marina MD .

## 2019-08-02 DIAGNOSIS — I12.9 BENIGN HYPERTENSION WITH CHRONIC KIDNEY DISEASE, STAGE II: ICD-10-CM

## 2019-08-02 DIAGNOSIS — N18.2 BENIGN HYPERTENSION WITH CHRONIC KIDNEY DISEASE, STAGE II: ICD-10-CM

## 2019-08-02 RX ORDER — METOPROLOL SUCCINATE 25 MG/1
TABLET, EXTENDED RELEASE ORAL
Qty: 90 TAB | Refills: 3 | Status: SHIPPED | OUTPATIENT
Start: 2019-08-02 | End: 2020-07-16

## 2019-08-02 NOTE — PROGRESS NOTES
Verified two patient identifiers, name and . Pt notified of results. Pt had no further questions at this time.

## 2019-08-05 ENCOUNTER — OFFICE VISIT (OUTPATIENT)
Dept: SURGERY | Age: 64
End: 2019-08-05

## 2019-08-05 VITALS
OXYGEN SATURATION: 96 % | WEIGHT: 196 LBS | HEIGHT: 73 IN | BODY MASS INDEX: 25.98 KG/M2 | HEART RATE: 74 BPM | SYSTOLIC BLOOD PRESSURE: 113 MMHG | TEMPERATURE: 97.2 F | RESPIRATION RATE: 16 BRPM | DIASTOLIC BLOOD PRESSURE: 78 MMHG

## 2019-08-05 DIAGNOSIS — Z09 POSTOPERATIVE EXAMINATION: Primary | ICD-10-CM

## 2019-08-05 NOTE — PROGRESS NOTES
Chief Complaint   Patient presents with    Surgical Follow-up     Laparoscopic cholecystectomy with intraoperative cholangiogram 7/17/2019     1. Have you been to the ER, urgent care clinic since your last visit? Hospitalized since your last visit? Yes 07/13/2019    2. Have you seen or consulted any other health care providers outside of the [address] [place] [address] since your last visit? Include any pap smears or colon screening.  No

## 2019-08-05 NOTE — PROGRESS NOTES
Chief Complaint   Patient presents with    Surgical Follow-up     Laparoscopic cholecystectomy with intraoperative cholangiogram 7/17/2019

## 2019-08-05 NOTE — PROGRESS NOTES
Surgery  Follow up  Procedure: lap vanda and IOC for gallstone pancreatitis  OR date:  7/17/2019  Path:       FINAL PATHOLOGIC DIAGNOSIS   Gallbladder, cholecystectomy:   Moderate chronic cholecystitis and cholesterolosis     S I feel ok but still not great yet, no diarrhea    Visit Vitals  /78 (BP 1 Location: Right arm, BP Patient Position: Sitting)   Pulse 74   Temp 97.2 °F (36.2 °C) (Oral)   Resp 16   Ht 6' 1\" (1.854 m)   Wt 88.9 kg (196 lb)   SpO2 96%   BMI 25.86 kg/m²       O Incisions healing well without infection   No signs of hernia    A/P Doing better   No hauling hay until after 9/3   Increased diet   RTC 6 weeks or prn    Korina Jarvis MD FACS

## 2019-08-23 ENCOUNTER — TELEPHONE (OUTPATIENT)
Dept: INTERNAL MEDICINE CLINIC | Facility: CLINIC | Age: 64
End: 2019-08-23

## 2019-08-23 NOTE — TELEPHONE ENCOUNTER
Pt called to make an appt with dr Edgar Mccarty only for not feeling well and loosing 40 pounds rapidly please advise me on when to get him in.  Thanks

## 2019-08-25 NOTE — PATIENT INSTRUCTIONS
Remember to get your flu shot in September or October. You may call us for a nurse appointment or get this from your pharmacy. Start glargine insulin 15 units once a day  Check blood sugar twice a day, alternating doing before breakfast and supper one day and before lunch and at bedtime the next. I will have one of our pharmacists contact you about helping with the insulin process. Send me blood sugars every 5 days. Return in one month. Learning About Low Blood Sugar (Hypoglycemia) in Diabetes  What is low blood sugar (hypoglycemia)? Hypoglycemia means that your blood sugar is low and your body (especially your brain) is not getting enough fuel. If you have diabetes, your blood sugar can go too low if you take too much of some diabetes medicines. It can also go too low if you miss a meal. And it can happen if you exercise too hard without eating enough food. Some medicines used to treat other health problems can cause low blood sugar too. What are the symptoms? Symptoms of low blood sugar can start quickly. It may take just 10 to 15 minutes. If you have had diabetes for many years, you may not realize that your blood sugar is low until it drops very low. · If your blood sugar level drops below 70 (mild low blood sugar), you may feel tired, anxious, dizzy, weak, shaky, or sweaty. You may have a fast heartbeat or blurry vision. · If your blood sugar level continues to drop (usually below 40), your behavior may change. You may feel more irritable. You may find it hard to concentrate or talk. And you may feel unsteady when you stand or walk. You may become too weak or confused to eat something with sugar to raise your blood sugar level. · If your blood sugar level drops very low (usually below 20), you may pass out (lose consciousness). Or you may have a seizure or stroke. If you have symptoms of severe low blood sugar, you need to get medical care right away.   If you had a low blood sugar level during the night, you may wake up tired or with a headache. Or you may sweat so much during the night that your pajamas or sheets are damp when you wake up. How is low blood sugar treated? You can treat low blood sugar by eating or drinking something that has 15 grams of carbohydrate. These should be quick-sugar foods. Check your blood sugar level again 15 minutes after having a quick-sugar food to make sure your level is getting back to your target range. Here are examples of quick-sugar foods that have 15 grams of carbohydrate:  · 3 to 4 glucose tablets  · 1 tube of glucose gel  · Hard candy (such as 3 Jolly Ranchers or 5 to 7 Life Savers)  · 1 tablespoon honey  · 2 tablespoons of raisins  · ½ cup to ¾ cup (4 to 6 ounces) of fruit juice or regular (not diet) soda  · 1 tablespoon of sugar  · 1 cup of fat-free milk  If you have problems with severe low blood sugar, someone else may have to give you a shot of glucagon. This is a hormone that raises blood sugar levels quickly. How can you prevent low blood sugar? You can take steps to prevent low blood sugar. · Follow your treatment plan. Take your insulin or other diabetes medicine exactly as your doctor prescribed it. Talk with your doctor if you're having low blood sugar often. Your medicine may need to be adjusted if it's causing your low blood sugar. · Check your blood sugar levels often. This helps you find early changes before an emergency happens. · Keep a quick-sugar food with you in case your blood sugar level drops low. · Eat small meals more often so that you don't get too hungry between meals. Don't skip meals. · Balance extra exercise with eating more. Check your blood sugar and learn how it changes after exercise. If your blood sugar stays at a normal level, you may not need to eat after you exercise. · Limit how much alcohol you drink. Alcohol can make low blood sugar go even lower.  Don't drink alcohol if you have problems recognizing the early signs of low blood sugar. · Keep a diary of your symptoms. This helps you learn when changes in your body may signal low blood sugar. And keep track of how often you have low blood sugar, including when you last ate and what you ate. This will help you learn what causes your blood sugar to drop. · Learn about diabetes and low blood sugar. Support groups or a diabetes education center can help you understand how medicines, diet, and exercise affect your blood sugar levels. Since low blood sugar levels can quickly become an emergency, be sure to wear medical alert jewelry, such as a medical alert bracelet. This is to let people know you have diabetes so they can get help for you. You can buy this at most drugstores. And make sure your family, friends, and coworkers know the symptoms of low blood sugar. Teach them what to do to get your sugar level up. Follow-up care is a key part of your treatment and safety. Be sure to make and go to all appointments, and call your doctor if you are having problems. It's also a good idea to know your test results and keep a list of the medicines you take. Where can you learn more? Go to http://fela-franklin.info/. Enter H217 in the search box to learn more about \"Learning About Low Blood Sugar (Hypoglycemia) in Diabetes. \"  Current as of: July 25, 2018  Content Version: 12.1  © 9674-4139 Healthwise, Incorporated. Care instructions adapted under license by [employer] (which disclaims liability or warranty for this information). If you have questions about a medical condition or this instruction, always ask your healthcare professional. [place] [address] any warranty or liability for your use of this information. Insulin Glargine (By injection)   Insulin Glargine, Recombinant (IN-ibrahim-shadi GLAR-jeen, geronimo-KOM-bi-cora)  Treats diabetes.    Brand Name(s): Basaglberenice Toth, Lantus, Lantus Novaplus, Lantus SoloStar, Lantus Santos Aldridge   There may be other brand names for this medicine. When This Medicine Should Not Be Used: This medicine is not right for everyone. Do not use it if you had an allergic reaction to insulin glargine. How to Use This Medicine:   Injectable  · Your healthcare provider will work with you to personalize your dose and treatment based on your insulin needs and lifestyle. You will be taught how to give yourself the injections. Make sure you understand all instructions. Ask the doctor, nurse, or pharmacist if you have questions. · If you use insulin once a day, it is best to use it at about the same time every day. · Always double-check both the concentration (strength) of your insulin and your dose. Concentration and dose are not the same. The dose is how many units of insulin you will use. The concentration tells how many units of insulin are in each milliliter (mL), such as 100 units/mL (U-100), but this does not mean you will use 100 units at a time. · Read and follow the patient instructions that come with this medicine. Talk to your doctor or pharmacist if you have any questions. · This medicine should look clear before you use it. Do not shake the vial. Do not mix this medicine with any other insulin or with water. · You will be shown the body areas where this shot can be given. Use a different body area each time you give yourself a shot. Keep track of where you give each shot to make sure you rotate body areas. · Use a new needle and syringe each time you inject your medicine. If you use a syringe, use only the kind that is made for insulin injections. Some insulin must be given with a specific type of syringe or needle. Ask your pharmacist if you are not sure which one to use. · Always check the label before use, to make sure you have the correct type of insulin. Do not change the brand, type, or concentration unless your doctor tells you to.  If you use a pump or other device, make sure the insulin is made for that device. · Unopened medicine: Store the vials, cartridges, and SoloStar® pens in the refrigerator. Protect from light. Do not freeze. · Opened medicine:   ¨ Vials: Store in the refrigerator or at room temperature in a cool place, away from sunlight and heat. Use within 28 days. ¨ Cartridge or [employer]: Store at room temperature, away from direct heat and light. Do not refrigerate. Throw away any opened cartridge or Lantus® SoloStar® pen after 28 days. Throw away any opened [employer] after 42 days. · Throw away used needles in a hard, closed container that the needles cannot poke through. Keep this container away from children and pets. Drugs and Foods to Avoid:   Ask your doctor or pharmacist before using any other medicine, including over-the-counter medicines, vitamins, and herbal products. · Some medicines can change the amount of insulin you need to use and make it harder for you to control your diabetes. Tell your doctor about all other medicines that you are using. · Do not drink alcohol while you are using this medicine. Warnings While Using This Medicine:   · Tell your doctor if you are pregnant or breastfeeding, or if you have kidney disease, liver disease, heart disease, or heart failure. · This medicine may cause the following problems:  ¨ Low blood sugar or low potassium levels in the blood  ¨ Fluid retention or heart failure (when used with thiazolidinedione [TZD] medicine)  · Never share insulin pens, needles, or cartridges with anyone. Sharing these can pass hepatitis viruses, HIV, or other illnesses from one person to another. · Keep all medicine out of the reach of children. Never share your medicine with anyone.   Possible Side Effects While Using This Medicine:   Call your doctor right away if you notice any of these side effects:  · Allergic reaction: Itching or hives, swelling in your face or hands, swelling or tingling in your mouth or throat, chest tightness, trouble breathing  · Dry mouth, increased thirst, muscle cramps, nausea or vomiting, uneven heartbeat  · Rapid weight gain, swelling in your hands, ankles, or feet, trouble breathing, tiredness  · Shaking, trembling, sweating, fast or pounding heartbeat, lightheadedness, hunger, confusion  If you notice these less serious side effects, talk with your doctor:   · Redness, pain, itching, swelling, or any skin changes where the shot was given  If you notice other side effects that you think are caused by this medicine, tell your doctor. Call your doctor for medical advice about side effects. You may report side effects to FDA at 6-697-JFK-4906  ©  [facility] Information is for End User's use only and may not be sold, redistributed or otherwise used for commercial purposes. The above information is an  only. It is not intended as medical advice for individual conditions or treatments. Talk to your doctor, nurse or pharmacist before following any medical regimen to see if it is safe and effective for you. Home Blood Glucose Test: About This Test  What is it? A home blood glucose test measures the amount of a type of sugar, called glucose, in your blood. Why is this test done? People who have diabetes need to check the amount of glucose in their blood. A home blood glucose test is an easy way to test your blood at home or when you are away from home. The results help you know when to take action to keep your blood glucose levels in a target range. How can you prepare for the test?  · Check the expiration date on the bottle of testing strips. Do not use test strips that have . · Match the code number on the testing strips bottle with the number on the meter. If the numbers do not match, follow the directions with the meter for changing the code number.   What happens before the test?  The supplies you will need for testing blood glucose include:  · A blood glucose meter. · Testing strips. These are made to be used with a specific model of meter. · Sugar control solutions. Some meters require a specific solution. Many new meters are made to operate without a control solution. · Short needles called lancets for pricking your skin. · A pen-sized haley for the lancet (lancet device), which positions the lancet and controls how deeply it goes into your skin. · Clean cotton balls. These are used to stop the bleeding from the testing site. What happens during the test?  A home blood glucose test involves pricking your finger, palm, or forearm with a lancet to collect a drop of blood. The blood drop is placed on a test strip, which you insert into the blood glucose meter. The instructions for testing are slightly different for each blood glucose meter model. Follow the instructions that came with your meter. · Wash your hands with warm, soapy water. Dry them well with a clean towel. You may also use an alcohol wipe to clean your finger or other site, but make sure your hands are dry before the test.  · Insert a clean lancet into the lancet device. · Remove a test strip from the test strip bottle. Replace the lid immediately to keep moisture away from the other strips. · Follow the instructions that came with your meter to get it ready. · Use the lancet device to stick the side of your fingertip with the lancet. Do not stick the tip of your finger. Some blood sugar meters use lancet devices that take the blood sample from other sites, such as the palm of the hand or the forearm. But the finger is usually the most accurate place to test blood sugar. · Put a drop of blood on the correct spot on the test strip. · Apply pressure with a clean cotton ball to stop the bleeding. · Follow the directions that came with the meter to get the results. · Write down the results and the time that you tested your blood.  Some meters will store the results for you. What else should you know about the test?  The American Diabetes Association (ADA) recommends that you stay within the following blood glucose level ranges. But depending on your health, you and your doctor may set a different range for you. For nonpregnant adults with diabetes  · 80 milligrams per deciliter (mg/dL) to 130 mg/dL before a meal  · Less than 180 mg/dL 1 to 2 hours after a meal  For women who have diabetes related to pregnancy (gestational diabetes)  · 95 mg/dL or less before breakfast  · 120 to 140 mg/dL (or lower) 1 to 2 hours after a meal  How long does the test take? · The blood glucose meter will show the results of the test in a minute or less. Where can you learn more? Go to http://fela-franklin.info/. Enter D558 in the search box to learn more about \"Home Blood Glucose Test: About This Test.\"  Current as of: July 25, 2018  Content Version: 12.1  © 9740-2595 Healthwise, Incorporated. Care instructions adapted under license by [employer] (which disclaims liability or warranty for this information). If you have questions about a medical condition or this instruction, always ask your healthcare professional. [place] [address] any warranty or liability for your use of this information.

## 2019-08-26 ENCOUNTER — CLINICAL SUPPORT (OUTPATIENT)
Dept: INTERNAL MEDICINE CLINIC | Facility: CLINIC | Age: 64
End: 2019-08-26

## 2019-08-26 ENCOUNTER — OFFICE VISIT (OUTPATIENT)
Dept: INTERNAL MEDICINE CLINIC | Facility: CLINIC | Age: 64
End: 2019-08-26

## 2019-08-26 VITALS
WEIGHT: 189 LBS | BODY MASS INDEX: 25.05 KG/M2 | DIASTOLIC BLOOD PRESSURE: 74 MMHG | HEIGHT: 73 IN | HEART RATE: 80 BPM | TEMPERATURE: 97.9 F | RESPIRATION RATE: 12 BRPM | SYSTOLIC BLOOD PRESSURE: 129 MMHG | OXYGEN SATURATION: 97 %

## 2019-08-26 DIAGNOSIS — R63.4 WEIGHT LOSS, UNINTENTIONAL: ICD-10-CM

## 2019-08-26 DIAGNOSIS — E11.49 TYPE II OR UNSPECIFIED TYPE DIABETES MELLITUS WITH NEUROLOGICAL MANIFESTATIONS, UNCONTROLLED(250.62) (HCC): Primary | ICD-10-CM

## 2019-08-26 LAB
GLUCOSE POC: NORMAL MG/DL
HBA1C MFR BLD HPLC: 13.3 %

## 2019-08-26 RX ORDER — PEN NEEDLE, DIABETIC 30 GX3/16"
NEEDLE, DISPOSABLE MISCELLANEOUS DAILY
Qty: 50 PEN NEEDLE | Refills: 5 | Status: SHIPPED | OUTPATIENT
Start: 2019-08-26 | End: 2020-08-19 | Stop reason: SDUPTHER

## 2019-08-26 RX ORDER — METFORMIN HYDROCHLORIDE 500 MG/1
1500 TABLET, EXTENDED RELEASE ORAL
Qty: 270 TAB | Refills: 3 | Status: SHIPPED | OUTPATIENT
Start: 2019-08-26 | End: 2020-08-05 | Stop reason: RX

## 2019-08-26 RX ORDER — LANCETS
EACH MISCELLANEOUS
Qty: 100 EACH | Refills: 5 | Status: SHIPPED | OUTPATIENT
Start: 2019-08-26 | End: 2019-09-06 | Stop reason: SDUPTHER

## 2019-08-26 RX ORDER — INSULIN PUMP SYRINGE, 3 ML
EACH MISCELLANEOUS
Qty: 1 KIT | Refills: 0 | Status: SHIPPED | OUTPATIENT
Start: 2019-08-26 | End: 2019-09-06 | Stop reason: SDUPTHER

## 2019-08-26 RX ORDER — INSULIN GLARGINE 100 [IU]/ML
INJECTION, SOLUTION SUBCUTANEOUS
Qty: 1 ADJUSTABLE DOSE PRE-FILLED PEN SYRINGE | Refills: 11 | Status: SHIPPED | OUTPATIENT
Start: 2019-08-26 | End: 2019-09-06

## 2019-08-26 NOTE — PATIENT INSTRUCTIONS
Learning About Diabetes Food Guidelines  Your Care Instructions    Meal planning is important to manage diabetes. It helps keep your blood sugar at a target level (which you set with your doctor). You don't have to eat special foods. You can eat what your family eats, including sweets once in a while. But you do have to pay attention to how often you eat and how much you eat of certain foods. You may want to work with a dietitian or a certified diabetes educator (CDE) to help you plan meals and snacks. A dietitian or CDE can also help you lose weight if that is one of your goals. What should you know about eating carbs? Managing the amount of carbohydrate (carbs) you eat is an important part of healthy meals when you have diabetes. Carbohydrate is found in many foods. · Learn which foods have carbs. And learn the amounts of carbs in different foods. ? Bread, cereal, pasta, and rice have about 15 grams of carbs in a serving. A serving is 1 slice of bread (1 ounce), ½ cup of cooked cereal, or 1/3 cup of cooked pasta or rice. ? Fruits have 15 grams of carbs in a serving. A serving is 1 small fresh fruit, such as an apple or orange; ½ of a banana; ½ cup of cooked or canned fruit; ½ cup of fruit juice; 1 cup of melon or raspberries; or 2 tablespoons of dried fruit. ? Milk and no-sugar-added yogurt have 15 grams of carbs in a serving. A serving is 1 cup of milk or 2/3 cup of no-sugar-added yogurt. ? Starchy vegetables have 15 grams of carbs in a serving. A serving is ½ cup of mashed potatoes or sweet potato; 1 cup winter squash; ½ of a small baked potato; ½ cup of cooked beans; or ½ cup cooked corn or green peas. · Learn how much carbs to eat each day and at each meal. A dietitian or CDE can teach you how to keep track of the amount of carbs you eat. This is called carbohydrate counting. · If you are not sure how to count carbohydrate grams, use the Plate Method to plan meals.  It is a good, quick way to make sure that you have a balanced meal. It also helps you spread carbs throughout the day. ? Divide your plate by types of foods. Put non-starchy vegetables on half the plate, meat or other protein food on one-quarter of the plate, and a grain or starchy vegetable in the final quarter of the plate. To this you can add a small piece of fruit and 1 cup of milk or yogurt, depending on how many carbs you are supposed to eat at a meal.  · Try to eat about the same amount of carbs at each meal. Do not \"save up\" your daily allowance of carbs to eat at one meal.  · Proteins have very little or no carbs per serving. Examples of proteins are beef, chicken, turkey, fish, eggs, tofu, cheese, cottage cheese, and peanut butter. A serving size of meat is 3 ounces, which is about the size of a deck of cards. Examples of meat substitute serving sizes (equal to 1 ounce of meat) are 1/4 cup of cottage cheese, 1 egg, 1 tablespoon of peanut butter, and ½ cup of tofu. How can you eat out and still eat healthy? · Learn to estimate the serving sizes of foods that have carbohydrate. If you measure food at home, it will be easier to estimate the amount in a serving of restaurant food. · If the meal you order has too much carbohydrate (such as potatoes, corn, or baked beans), ask to have a low-carbohydrate food instead. Ask for a salad or green vegetables. · If you use insulin, check your blood sugar before and after eating out to help you plan how much to eat in the future. · If you eat more carbohydrate at a meal than you had planned, take a walk or do other exercise. This will help lower your blood sugar. What else should you know? · Limit saturated fat, such as the fat from meat and dairy products. This is a healthy choice because people who have diabetes are at higher risk of heart disease. So choose lean cuts of meat and nonfat or low-fat dairy products.  Use olive or canola oil instead of butter or shortening when cooking. · Don't skip meals. Your blood sugar may drop too low if you skip meals and take insulin or certain medicines for diabetes. · Check with your doctor before you drink alcohol. Alcohol can cause your blood sugar to drop too low. Alcohol can also cause a bad reaction if you take certain diabetes medicines. Follow-up care is a key part of your treatment and safety. Be sure to make and go to all appointments, and call your doctor if you are having problems. It's also a good idea to know your test results and keep a list of the medicines you take. Where can you learn more? Go to http://fela-franklin.info/. Enter L072 in the search box to learn more about \"Learning About Diabetes Food Guidelines. \"  Current as of: 2018  Content Version: 12.1  © 4867-9494 [employer]. Care instructions adapted under license by [employer] (which disclaims liability or warranty for this information). If you have questions about a medical condition or this instruction, always ask your healthcare professional. [place] [address] any warranty or liability for your use of this informatio          Home Blood Glucose Test: About This Test  What is it? A home blood glucose test measures the amount of a type of sugar, called glucose, in your blood. Why is this test done? People who have diabetes need to check the amount of glucose in their blood. A home blood glucose test is an easy way to test your blood at home or when you are away from home. The results help you know when to take action to keep your blood glucose levels in a target range. How can you prepare for the test?  · Check the expiration date on the bottle of testing strips. Do not use test strips that have . · Match the code number on the testing strips bottle with the number on the meter. If the numbers do not match, follow the directions with the meter for changing the code number.   What happens before the test?  The supplies you will need for testing blood glucose include:  · A blood glucose meter. · Testing strips. These are made to be used with a specific model of meter. · Sugar control solutions. Some meters require a specific solution. Many new meters are made to operate without a control solution. · Short needles called lancets for pricking your skin. · A pen-sized haley for the lancet (lancet device), which positions the lancet and controls how deeply it goes into your skin. · Clean cotton balls. These are used to stop the bleeding from the testing site. What happens during the test?  A home blood glucose test involves pricking your finger, palm, or forearm with a lancet to collect a drop of blood. The blood drop is placed on a test strip, which you insert into the blood glucose meter. The instructions for testing are slightly different for each blood glucose meter model. Follow the instructions that came with your meter. · Wash your hands with warm, soapy water. Dry them well with a clean towel. You may also use an alcohol wipe to clean your finger or other site, but make sure your hands are dry before the test.  · Insert a clean lancet into the lancet device. · Remove a test strip from the test strip bottle. Replace the lid immediately to keep moisture away from the other strips. · Follow the instructions that came with your meter to get it ready. · Use the lancet device to stick the side of your fingertip with the lancet. Do not stick the tip of your finger. Some blood sugar meters use lancet devices that take the blood sample from other sites, such as the palm of the hand or the forearm. But the finger is usually the most accurate place to test blood sugar. · Put a drop of blood on the correct spot on the test strip. · Apply pressure with a clean cotton ball to stop the bleeding. · Follow the directions that came with the meter to get the results.   · Write down the results and the time that you tested your blood. Some meters will store the results for you. What else should you know about the test?  The American Diabetes Association (ADA) recommends that you stay within the following blood glucose level ranges. But depending on your health, you and your doctor may set a different range for you. For nonpregnant adults with diabetes  · 80 milligrams per deciliter (mg/dL) to 130 mg/dL before a meal  · Less than 180 mg/dL 1 to 2 hours after a meal  For women who have diabetes related to pregnancy (gestational diabetes)  · 95 mg/dL or less before breakfast  · 120 to 140 mg/dL (or lower) 1 to 2 hours after a meal  How long does the test take? · The blood glucose meter will show the results of the test in a minute or less. Where can you learn more? Go to http://fela-franklin.info/. Enter H241 in the search box to learn more about \"Home Blood Glucose Test: About This Test.\"  Current as of: July 25, 2018  Content Version: 12.1  © 2654-9901 [employer]. Care instructions adapted under license by [employer] (which disclaims liability or warranty for this information). If you have questions about a medical condition or this instruction, always ask your healthcare professional. [address] any warranty or liability for your use of this information. Learning About Type 2 Diabetes  What is type 2 diabetes? Insulin is a hormone that helps your body use sugar from your food as energy. Type 2 diabetes happens when your body can't use insulin the right way. Over time, the pancreas can't make enough insulin. If you don't have enough insulin, too much sugar stays in your blood. If you are overweight, get little or no exercise, or have type 2 diabetes in your family, you are more likely to have problems with the way insulin works in your body.   Americans, Hispanics, Native Americans,  Americans, and Pacific Islanders have a higher risk for type 2 diabetes. Type 2 diabetes can be prevented or delayed with a healthy lifestyle, which includes staying at a healthy weight, making smart food choices, and getting regular exercise. What can you expect with type 2 diabetes? Reji Lisa keep hearing about how important it is to keep your blood sugar within a target range. That's because over time, high blood sugar can lead to serious problems. It can:  · Harm your eyes, nerves, and kidneys. · Damage your blood vessels, leading to heart disease and stroke. · Reduce blood flow and cause nerve damage to parts of your body, especially your feet. This can cause slow healing and pain when you walk. · Make your immune system weak and less able to fight infections. When people hear the word \"diabetes,\" they often think of problems like these. But daily care and treatment can help prevent or delay these problems. The goal is to keep your blood sugar in a target range. That's the best way to reduce your chance of having more problems from diabetes. What are the symptoms? Some people who have type 2 diabetes may not have any symptoms early on. Many people with the disease don't even know they have it at first. But with time, diabetes starts to cause symptoms. You experience most symptoms of type 2 diabetes when your blood sugar is either too high or too low. The most common symptoms of high blood sugar include:  · Thirst.  · Frequent urination. · Weight loss. · Blurry vision. The symptoms of low blood sugar include:  · Sweating. · Shakiness. · Weakness. · Hunger. · Confusion. How can you prevent type 2 diabetes? The best way to prevent or delay type 2 diabetes is to adopt healthy habits, which include:  · Staying at a healthy weight. · Exercising regularly. · Eating healthy foods. How is type 2 diabetes treated? If you have type 2 diabetes, here are the most important things you can do.   · Take your diabetes medicines. · Check your blood sugar as often as your doctor recommends. Also, get a hemoglobin A1c test at least every 6 months. · Try to eat a variety of foods and to spread carbohydrate throughout the day. Carbohydrate raises blood sugar higher and more quickly than any other nutrient does. Carbohydrate is found in sugar, breads and cereals, fruit, starchy vegetables such as potatoes and corn, and milk and yogurt. · Get at least 30 minutes of exercise on most days of the week. Walking is a good choice. You also may want to do other activities, such as running, swimming, cycling, or playing tennis or team sports. If your doctor says it's okay, do muscle-strengthening exercises at least 2 times a week. · See your doctor for checkups and tests on a regular schedule. · If you have high blood pressure or high cholesterol, take the medicines as prescribed by your doctor. · Do not smoke. Smoking can make health problems worse. This includes problems you might have with type 2 diabetes. If you need help quitting, talk to your doctor about stop-smoking programs and medicines. These can increase your chances of quitting for good. Follow-up care is a key part of your treatment and safety. Be sure to make and go to all appointments, and call your doctor if you are having problems. It's also a good idea to know your test results and keep a list of the medicines you take. Where can you learn more? Go to http://fela-franklin.info/. Enter I922 in the search box to learn more about \"Learning About Type 2 Diabetes. \"  Current as of: July 25, 2018  Content Version: 12.1  © 1630-1999 [employer]. Care instructions adapted under license by [employer] (which disclaims liability or warranty for this information).  If you have questions about a medical condition or this instruction, always ask your healthcare professional. [place] [address] any warranty or liability for your use of this information. How to Give a Subcutaneous Shot: Care Instructions  Overview  A subcutaneous (say \"sub-tanya-TAHIRA-sarah-us\") shot is an injection of medicine under the skin, but not in a muscle. Some medicines, such as insulin or the blood-thinner enoxaparin (Lovenox), are injected only under the skin. This type of shot is usually given in the belly or the thigh. At first, you may be nervous about giving yourself a shot. But soon, giving the shot will become routine. Follow-up care is a key part of your treatment and safety. Be sure to make and go to all appointments, and call your doctor if you are having problems. It's also a good idea to know your test results and keep a list of the medicines you take. How do you give yourself a subcutaneous shot? Follow your health professional's instructions for where and how often to inject your medicine. Your nurse will show you how to give yourself the shot. 1. Gather your equipment. This includes your syringe (containing medicine) and an alcohol wipe or a cotton ball dipped in alcohol. 2. Wash your hands with soap and running water. Dry them well. 3. Choose a spot on your belly or thigh for the shot. A shot in the belly should be 2 inches away from your belly button. 4. Use alcohol to clean the skin. Let it dry. 5. Remove the cap from the needle. 6. Hold the syringe like a pencil close to the site. Keep your fingers off the plunger. 7. Slightly pinch a fold of skin at the spot you chose. Pinch it between the fingers and thumb of one hand. 8. Place the syringe at a 90-degree angle to the shot site. The needle should stand straight up from the skin. 9. Quickly push the needle all the way into the pinched-up fold of skin. 10. Push the plunger of the syringe all the way in. This allows the medicine to go into the fatty tissue. Be sure to hold the skin fold as you give the shot.  This will help make sure that you don't inject the medicine into muscle. 11. Take the needle out at the same angle that you inserted it. 12. Let go of the skin fold. 13. If you bleed a little, apply pressure over the shot area. You can use your finger, a cotton ball, or a piece of gauze. To help avoid bruising, don't rub the area. 14. Dispose of the needle safely. Don't use the same needle more than one time. Slightly change the spot where you give the shot each time you do it. When should you call for help? Watch closely for changes in your health, and be sure to contact your doctor if you have any problems. Where can you learn more? Go to http://fela-franklin.info/. Enter W972 in the search box to learn more about \"How to Give a Subcutaneous Shot: Care Instructions. \"  Current as of: January 14, 2019  Content Version: 12.1  © 4177-1493 [employer]. Care instructions adapted under license by [employer] (which disclaims liability or warranty for this information). If you have questions about a medical condition or this instruction, always ask your healthcare professional. [place] [address] any warranty or liability for your use of this information. Learning About Insulin Pens  What is an insulin pen? An insulin pen is a device for giving insulin shots. It looks like a pen. Inside the pen is a needle and a cartridge filled with insulin. You can set the dose of insulin with a dial on the outside of the pen. You use the pen to give the insulin shot (injection). Both disposable and reusable insulin pens are available. With a disposable pen, a set amount of insulin comes in the pen ready to use. When the insulin is used up, you throw the pen away. You use a new pen the next time you need insulin. With a reusable pen, you don't throw the pen away. Instead, you reload the pen with a pre-measured cartridge of insulin.  When the insulin is used up, you insert a new cartridge into the pen. Disposable and reusable pens both need a new needle with each shot. The needles come in different lengths and widths. [employer] [place] [employer] needles will prevent injecting into the muscle, especially in children or people who are lean. Thinner-width needles reduce the pricking sensation. Width is measured by gauge. The higher the number, the thinner the needle. Why do some people prefer pens? · Most people find that insulin pens are easier to use than a bottle and syringe. · Many people feel less pain (or no pain) with the smaller insulin pen needle, compared to a syringe needle. · Insulin pens may help you give yourself more accurate doses. When you draw insulin into a syringe, you must carefully measure so that you don't get too much or too little. But with a pen, you set a dial for the amount of insulin you want, and then you push the button. · Insulin pens may work better than syringes for people who don't see well or who have problems like arthritis that make it harder to use a syringe. · Using an insulin pen draws less attention from others. You can give yourself insulin with fewer people noticing. · You don't need to carry insulin bottles and syringes everywhere you go. An insulin pen fits into a pocket or purse. What should you know about insulin pens? Each pen delivers a different brand and type (or types) of insulin. Some deliver rapid-acting insulin. Others deliver long-acting insulin. And some pens deliver a mixture of both in one shot. Pens have different colored labels, cartridge holders, or dosing knobs. Many pens have special features. For example, some pens have springs so that it takes less force to deliver a dose of insulin. Other pens have signals you can hear that let you know the insulin has been delivered. Some have memory to show the amount and time of the last dose. How do you use an insulin pen? 1. For a reusable pen, put the insulin cartridge into the pen.  Disposable pens already have an insulin cartridge. Follow the directions for how to screw a new needle onto your pen. Before using cloudy insulin, such as NPH and premixed insulin, gently roll the pen between your palms 10 times, then tip the pen up and down 10 times. Do not shake the pen. The insulin should look milky white. 2. Remove the outer cap from the needle. Keep this cap to use later. 3. Remove the inner cover from the needle. Be careful not to prick yourself. 4. Before each shot, prime the needle. Priming removes air from the needle. Turn the dose knob to 2 units. Hold your pen with the needle pointing up. Tap the cartridge haley gently to move any air bubbles to the top. Push the injection button all the way in. Watch for a stream or drop of insulin to come out of the needle. If it does not, repeat this step again. 5. Clean the area of skin where you will give the shot. If you use alcohol to clean the skin, let it dry. Use a different spot each time you inject insulin. That's because using the same spot every time can cause bumps or pits to form in the skin. For example, inject your insulin above your belly button, then the next time use your upper thigh, and then the next time inject below your belly button. 6. Turn the dose knob to the number of units of insulin you need to inject. Push the needle into your skin. Most people can inject using a 90-degree angle and without pinching the skin. Adults and children who are very lean and people who use longer needles may need to pinch the skin to avoid injecting into muscle. 7. Put your thumb on the injection button and push it in until it stops. Keep the pen in your skin. Hold the dose knob in for 10 seconds (or to the number that the  recommends). Then pull the needle out of your skin. Do not rub the area. 8. Put only the outer cap back over the needle. The thin inner cover is harder to put back on, and you could stick yourself.   9. After covering the needle with the outer cap, unscrew the needle and throw it away in a sharps container or other solid plastic container. You can get a sharps container at your drugstore. 10. Always read the insulin package information that tells the best way to store your insulin pen and insulin cartridges. In general, unopened insulin for pens will last longer if it is kept in the refrigerator. After insulin is opened, most manufacturers say to store it at room temperature. Don't share insulin pens with anyone else who uses insulin. Even when the needle is changed, an insulin pen can carry bacteria or blood that can make another person sick. Where can you learn more? Go to http://fela-franklin.info/. Enter M910 in the search box to learn more about \"Learning About Insulin Pens. \"  Current as of: July 25, 2018  Content Version: 12.1  © 7999-4506 [employer]. Care instructions adapted under license by [employer] (which disclaims liability or warranty for this information). If you have questions about a medical condition or this instruction, always ask your healthcare professional. [place] [address] any warranty or liability for your use of this information.

## 2019-08-26 NOTE — PROGRESS NOTES
Pt and wife came in with insulin and glucometer. They were shown how to check his blood sugar and pt was able to do demonstrate use of glucometer accurately with a reading of 408. Insulin instructions given on proper technique of injection, storing medication, timing of dose. The patient was able to give himself 15  units with adequate skill of doing self injections. All questions were answered and several handouts on diet, insulin instructions and glucometer testing were given. Informed to call back on Friday with sugar readings and sooner if any further questions.

## 2019-08-26 NOTE — PROGRESS NOTES
Mini Vickers  Identified pt with two pt identifiers(name and ). Chief Complaint   Patient presents with    Weight Loss    Fatigue       Reviewed record In preparation for visit and have obtained necessary documentation. Has info on advanced directive but has not filled them out. 1. Have you been to the ER, urgent care clinic or hospitalized since your last visit? No     2. Have you seen or consulted any other health care providers outside of the [address] [place] [address] since your last visit? Include any pap smears or colon screening. DR Tommy Patel reviewed with provider.     Health Maintenance reviewed:     Health Maintenance Due   Topic    Pneumococcal 0-64 years (2 of 3 - PCV13)    Influenza Age 5 to Adult           Wt Readings from Last 3 Encounters:   19 189 lb (85.7 kg)   19 196 lb (88.9 kg)   19 198 lb (89.8 kg)        Temp Readings from Last 3 Encounters:   19 97.9 °F (36.6 °C) (Oral)   19 97.2 °F (36.2 °C) (Oral)   19 97.9 °F (36.6 °C) (Oral)        BP Readings from Last 3 Encounters:   19 129/74   19 113/78   19 121/73        Pulse Readings from Last 3 Encounters:   19 80   19 74   19 77        Vitals:    19 0742   BP: 129/74   Pulse: 80   Resp: 12   Temp: 97.9 °F (36.6 °C)   TempSrc: Oral   SpO2: 97%   Weight: 189 lb (85.7 kg)   Height: 6' 1\" (1.854 m)   PainSc:   0 - No pain          Learning Assessment:   :       Learning Assessment 2018 4/10/2014   PRIMARY LEARNER Patient Patient   HIGHEST LEVEL OF EDUCATION - PRIMARY LEARNER  - SOME COLLEGE   BARRIERS PRIMARY LEARNER - NONE   CO-LEARNER CAREGIVER - No   PRIMARY LANGUAGE ENGLISH ENGLISH    NEED - No   LEARNER PREFERENCE PRIMARY VIDEOS VIDEOS   ANSWERED BY patient Patient   RELATIONSHIP SELF SELF        Depression Screening:   :       3 most recent PHQ Screens 2019   Little interest or pleasure in doing things Not at all   Feeling down, depressed, irritable, or hopeless Not at all   Total Score PHQ 2 0        Fall Risk Assessment:   :     No flowsheet data found. Abuse Screening:   :       Abuse Screening Questionnaire 6/4/2019 8/29/2017 8/15/2016   Do you ever feel afraid of your partner? N N N   Are you in a relationship with someone who physically or mentally threatens you? N N N   Is it safe for you to go home?  Y Y Y        ADL Screening:   :       ADL Assessment 7/29/2019   Feeding yourself No Help Needed   Getting from bed to chair No Help Needed   Getting dressed No Help Needed   Bathing or showering No Help Needed   Walk across the room (includes cane/walker) No Help Needed   Using the telphone No Help Needed   Taking your medications No Help Needed   Preparing meals No Help Needed   Managing money (expenses/bills) No Help Needed   Moderately strenuous housework (laundry) No Help Needed   Shopping for personal items (toiletries/medicines) No Help Needed   Shopping for groceries No Help Needed   Driving No Help Needed   Climbing a flight of stairs No Help Needed   Getting to places beyond walking distances No Help Needed

## 2019-08-26 NOTE — Clinical Note
Patient starting insulin after episode of pancreatitis. Prior to that sugars were well controlled. Please arrange to work with him.  Thanks

## 2019-08-27 ENCOUNTER — TELEPHONE (OUTPATIENT)
Dept: INTERNAL MEDICINE CLINIC | Facility: CLINIC | Age: 64
End: 2019-08-27

## 2019-08-27 LAB
ALBUMIN SERPL-MCNC: 4.2 G/DL (ref 3.6–4.8)
ALBUMIN/GLOB SERPL: 1.6 {RATIO} (ref 1.2–2.2)
ALP SERPL-CCNC: 130 IU/L (ref 39–117)
ALT SERPL-CCNC: 23 IU/L (ref 0–44)
APPEARANCE UR: CLEAR
AST SERPL-CCNC: 15 IU/L (ref 0–40)
BASOPHILS # BLD AUTO: 0 X10E3/UL (ref 0–0.2)
BASOPHILS NFR BLD AUTO: 1 %
BILIRUB SERPL-MCNC: 0.5 MG/DL (ref 0–1.2)
BILIRUB UR QL STRIP: NEGATIVE
BUN SERPL-MCNC: 12 MG/DL (ref 8–27)
BUN/CREAT SERPL: 10 (ref 10–24)
CALCIUM SERPL-MCNC: 9.4 MG/DL (ref 8.6–10.2)
CHLORIDE SERPL-SCNC: 97 MMOL/L (ref 96–106)
CO2 SERPL-SCNC: 24 MMOL/L (ref 20–29)
COLOR UR: YELLOW
CREAT SERPL-MCNC: 1.24 MG/DL (ref 0.76–1.27)
EOSINOPHIL # BLD AUTO: 0.2 X10E3/UL (ref 0–0.4)
EOSINOPHIL NFR BLD AUTO: 3 %
ERYTHROCYTE [DISTWIDTH] IN BLOOD BY AUTOMATED COUNT: 13.3 % (ref 12.3–15.4)
ERYTHROCYTE [SEDIMENTATION RATE] IN BLOOD BY WESTERGREN METHOD: 5 MM/HR (ref 0–30)
GLOBULIN SER CALC-MCNC: 2.7 G/DL (ref 1.5–4.5)
GLUCOSE SERPL-MCNC: 505 MG/DL (ref 65–99)
GLUCOSE UR QL: ABNORMAL
HCT VFR BLD AUTO: 41.6 % (ref 37.5–51)
HGB BLD-MCNC: 14.1 G/DL (ref 13–17.7)
HGB UR QL STRIP: NEGATIVE
IMM GRANULOCYTES # BLD AUTO: 0 X10E3/UL (ref 0–0.1)
IMM GRANULOCYTES NFR BLD AUTO: 0 %
KETONES UR QL STRIP: NEGATIVE
LEUKOCYTE ESTERASE UR QL STRIP: NEGATIVE
LYMPHOCYTES # BLD AUTO: 1.4 X10E3/UL (ref 0.7–3.1)
LYMPHOCYTES NFR BLD AUTO: 27 %
MCH RBC QN AUTO: 32.7 PG (ref 26.6–33)
MCHC RBC AUTO-ENTMCNC: 33.9 G/DL (ref 31.5–35.7)
MCV RBC AUTO: 97 FL (ref 79–97)
MICRO URNS: ABNORMAL
MONOCYTES # BLD AUTO: 0.5 X10E3/UL (ref 0.1–0.9)
MONOCYTES NFR BLD AUTO: 9 %
NEUTROPHILS # BLD AUTO: 3.3 X10E3/UL (ref 1.4–7)
NEUTROPHILS NFR BLD AUTO: 60 %
NITRITE UR QL STRIP: NEGATIVE
PH UR STRIP: 5.5 [PH] (ref 5–7.5)
PLATELET # BLD AUTO: 212 X10E3/UL (ref 150–450)
POTASSIUM SERPL-SCNC: 4 MMOL/L (ref 3.5–5.2)
PROT SERPL-MCNC: 6.9 G/DL (ref 6–8.5)
PROT UR QL STRIP: NEGATIVE
RBC # BLD AUTO: 4.31 X10E6/UL (ref 4.14–5.8)
SODIUM SERPL-SCNC: 136 MMOL/L (ref 134–144)
SP GR UR: >=1.03 (ref 1–1.03)
UROBILINOGEN UR STRIP-MCNC: 0.2 MG/DL (ref 0.2–1)
WBC # BLD AUTO: 5.4 X10E3/UL (ref 3.4–10.8)

## 2019-08-27 NOTE — PROGRESS NOTES
Inform patient that except sugar results look good. Encourage more fluids to made up for that kidneys are putting out.

## 2019-08-28 ENCOUNTER — TELEPHONE (OUTPATIENT)
Dept: INTERNAL MEDICINE CLINIC | Age: 64
End: 2019-08-28

## 2019-08-28 DIAGNOSIS — E11.65 TYPE 2 DIABETES MELLITUS WITH HYPERGLYCEMIA, WITHOUT LONG-TERM CURRENT USE OF INSULIN (HCC): Primary | ICD-10-CM

## 2019-08-28 NOTE — TELEPHONE ENCOUNTER
Pharmacy Progress Note - Telephone Encounter    S/O: Mr. Chris Donis 61 y.o. male, referred by Dr. Irena Cogan, MD, was contacted via an outbound telephone call to discuss his diabetes management today. Verified patients identifiers (name & ) per HIPAA policy.     - Recent E4I was 13.3% (19). Recent hx of acute pancreatitis (hospitalized from -). - Currently on Lantus 15 units daily and Metformin 500 mg ER - 3 tabs daily with dinner. States he's tolerating these medications fine. - Now checking his blood sugar twice daily.   - Reports the following readings:    Date Before Breakfast Before Lunch Before Dinner   2019  408    2019 361  370   2019 268       At this time, he denies s/sx of hypoglycemia. Endorses some vision changes. Generally eats 3 meals/day with dinner being the largest meal  Breakfast: usually 2 eggs + 1 toast or small bowl of cereal w/ 2 % milk  Lunch: sandwich or today it was 1 chicken leg  Dinner - last night - pork chop, peas, and hashed browns ;   Drinking mainly water    States he's busy this week and next week. Has a dental appt on 9/10/19      Wt Readings from Last 3 Encounters:   19 189 lb (85.7 kg)   19 196 lb (88.9 kg)   19 198 lb (89.8 kg)     BP Readings from Last 3 Encounters:   19 129/74   19 113/78   19 121/73     Lab Results   Component Value Date/Time    Sodium 136 2019 08:40 AM    Potassium 4.0 2019 08:40 AM    Chloride 97 2019 08:40 AM    CO2 24 2019 08:40 AM    Anion gap 6 2019 04:28 AM    Glucose 505 (>) 2019 08:40 AM    BUN 12 2019 08:40 AM    Creatinine 1.24 2019 08:40 AM    BUN/Creatinine ratio 10 2019 08:40 AM    GFR est AA 71 2019 08:40 AM    GFR est non-AA 61 2019 08:40 AM    Calcium 9.4 2019 08:40 AM    Bilirubin, total 0.5 2019 08:40 AM    AST (SGOT) 15 2019 08:40 AM    Alk.  phosphatase 130 (H) 08/26/2019 08:40 AM    Protein, total 6.9 08/26/2019 08:40 AM    Albumin 4.2 08/26/2019 08:40 AM    Globulin 3.9 07/22/2019 04:28 AM    A-G Ratio 1.6 08/26/2019 08:40 AM    ALT (SGPT) 23 08/26/2019 08:40 AM     Lab Results   Component Value Date/Time    Cholesterol, total 109 07/14/2019 04:49 AM    HDL Cholesterol 44 07/14/2019 04:49 AM    LDL, calculated 48.2 07/14/2019 04:49 AM    VLDL, calculated 16.8 07/14/2019 04:49 AM    Triglyceride 84 07/14/2019 04:49 AM    CHOL/HDL Ratio 2.5 07/14/2019 04:49 AM     Lab Results   Component Value Date/Time    Microalb/Creat ratio (ug/mg creat.) 17.9 07/29/2019 03:23 PM     Lab Results   Component Value Date/Time    Hemoglobin A1c 9.2 (H) 07/13/2019 07:24 PM    Hemoglobin A1c 7.2 (H) 05/28/2019 08:12 AM    Hemoglobin A1c 5.9 (H) 05/29/2018 12:19 PM     Last Point of Care HGB A1C  Hemoglobin A1c (POC)   Date Value Ref Range Status   08/26/2019 13.3 % Final      Estimated Creatinine Clearance: 68.9 mL/min (based on SCr of 1.24 mg/dL). A/P:  - Reviewed fasting and post prandial blood sugar goals < 130 and <180 respectively. Anticipates he will need additional insulin adjustment in the future. - Provided patient with my office info. He is scheduled to come in on Wed, 9/11/19 @ 1:15 PM. Bring in medications & glucometer  - Patient endorses understanding to the provided information. All questions were answered at this time.      Thank you for the consult,  Misha Cook, RhondaD, CDE   [phone]

## 2019-09-04 ENCOUNTER — TELEPHONE (OUTPATIENT)
Dept: INTERNAL MEDICINE CLINIC | Facility: CLINIC | Age: 64
End: 2019-09-04

## 2019-09-04 NOTE — TELEPHONE ENCOUNTER
Blood sugar readings    August.26 no specific time - 408    Aug. 27. Before lunch - 371 /  Bedtime -370    Aug.28 before lunch - 268 / before dinner -314    Aug.29 before lunch - 244 /  before bed - 265    Aug.30 before lunch - 226 / before dinner- 247    Aug.31 before lunch - 263 / before dinner - 280    September. 01 before lunch - 219 / before dinner - 210    Sept. 02 before lunch - 339 / before bedtime - 288

## 2019-09-06 RX ORDER — LANCETS 33 GAUGE
EACH MISCELLANEOUS
Refills: 5 | COMMUNITY
Start: 2019-08-28 | End: 2020-08-19 | Stop reason: SDUPTHER

## 2019-09-06 RX ORDER — BLOOD-GLUCOSE METER
EACH MISCELLANEOUS
Refills: 0 | COMMUNITY
Start: 2019-08-26 | End: 2021-03-01

## 2019-09-06 RX ORDER — INSULIN GLARGINE 100 [IU]/ML
INJECTION, SOLUTION SUBCUTANEOUS
Qty: 1 ADJUSTABLE DOSE PRE-FILLED PEN SYRINGE | Refills: 11
Start: 2019-09-06 | End: 2019-09-12 | Stop reason: SDUPTHER

## 2019-09-06 NOTE — TELEPHONE ENCOUNTER
Pt notified of DR aPdgett's message will increase insulin to 20 units. He states he has more energy, has gained 8 - 10 lbs and only gets up once at night. Pt has no further questions at this time.

## 2019-09-06 NOTE — TELEPHONE ENCOUNTER
These are headed in the right direction. Recommend increasing glargine insulin to 20 units daily. Send most recent 3 days of blood sugars to us in a week.

## 2019-09-11 ENCOUNTER — OFFICE VISIT (OUTPATIENT)
Dept: INTERNAL MEDICINE CLINIC | Age: 64
End: 2019-09-11

## 2019-09-11 VITALS — OXYGEN SATURATION: 97 % | HEART RATE: 64 BPM | SYSTOLIC BLOOD PRESSURE: 103 MMHG | DIASTOLIC BLOOD PRESSURE: 67 MMHG

## 2019-09-11 DIAGNOSIS — E11.21 TYPE 2 DIABETES WITH NEPHROPATHY (HCC): Primary | ICD-10-CM

## 2019-09-11 NOTE — PATIENT INSTRUCTIONS
Your Visit Summary:     · Continue Lantus 20 units once a day and Metformin 500 mg - three tablets daily. · Check and document your blood sugar first thing in the morning (fasting), before a meal, and before bedtime. Bring your meter/log to all future visits. Your blood sugar goals:  - Fasting blood sugar goal: 80 - 130   - 2 hours after a meal: less than 180     When you experience symptoms of low blood sugar (example: less than 70):  - Confirm low reading by checking your blood sugar.   - Then treat with 15 grams of carbohydrates. - Wait 15 minutes to recheck blood sugar.   - Then eat a protein containing meal/snack to prevent another low blood sugar episode. In addition to taking your medications as directed, improving your blood sugar involves modifying your nutrition and maximizing the amount of physical activity. Nutrition:  - When reviewing a nutrition label, focus on the serving size, total calories, fat (and type of fats), total carbohydrates, sugar (and amount of added sugar), amount of fiber (good for your digestive), and amount of protein. Refer to your nutrition label guide for more information.  - For a meal : max 45 - 60 grams of carbohydrates  - For a snack: max 15 grams of carbohydrates  - Reduce amount of saturated and trans fat. Consider more unsaturated fat options as they are better for your heart health.    - Have at least 1 serving of lean fat protein with each meal.    - Increase fiber intake slowly to prevent constipation.   - Substitute fruit juices for the whole fruit

## 2019-09-11 NOTE — PROGRESS NOTES
Pharmacy Progress Note - Diabetes Management    S/O: Mr. Nora Carballo is a 61 y.o. male , referred by Dr. Trinh Elizabeth MD, with a PMH of T2DM, CAD, Hx of MI, HTN, recent hx of pancreatitis, s/p cholecystectomy, was seen today for diabetes management. Patient's last A1c was 13.3% (August 2019). Was accompanied by his wife to this visit. Medication Adherence/Access:  - Brought in home medications including prescription/non-prescriptions:  no  - Endorses barriers to affording/accessing medications : no  - Uses a pill box/other methods to organize medications: yes  - Endorses adherence to current regimen?: yes  - Uses [facility] mail order pharmacy ;  Rx insurance is: [facility]    Diabetes Management:  Diabetes History:  FHx - brother - currently on insulin therapy     Current anti-hyperglycemic regimen includes:    - Lantus Solarstar - now on 20 units daily  - Metformin 500 mg ER - 3 tabs daily     - On ASA: YES  - On a moderate/high intensity statin: YES -  Atorvastatin ; LDL: 48 ; HDL: 44 ; non-HDL: 65 (July 2019)   - On ACE/ARB therapy: NO   - Nicotine dependence?:  No - former smoker    ROS:  Today, Pt endorses:  - Symptoms of Hyperglycemia:   Excessive thirst,   Polyuria (this has decreased in frequency - used to be 6-7x/night; now 1-2x) and   Blurry vision (going to the eye doctor next month)  - Symptoms of Hypoglycemia: none    Self Monitoring Blood Glucose (SMBG) or CGM:  - Brought in home glucometer/blood glucose log today:  yes  - Conducts/scans: 2-3x/day   - Fasting SMBG today: 123    Date Before Breakfast Before Lunch Before Dinner Bedtime Notes   8/29/2019  244  265 Lantus 15   8/30/2019 226  247     8/31/2019  263  280    9/1/2019 219  210     9/2/2019  339  288    9/3/2019 130  260     9/4/2019  171  269    9/5/2019 180  211     9/6/2019  172  200 Lantus 20   9/7/2019 138  128     9/8/2019  168  205    9/9/2019 125  170     9/10/2019  169      9/11/2019 123       Avg 163 218 204 251 Nutrition:  - Eats 2 meals/day ; usually breakfast & dinner; wife is keeping a meal log ; trying to eat a heart healthy diet - working a low sodium diet   - Breakfast: 2 eggs + 2 whole grain toasts or sometimes cereal (capt crunch) w/ 2% milk   - Lunch: usually skips or would have 1 tuna fish/chicken salad sandwich  - Dinner: last night - vegetable casserole w/ corn, broccoli, cauliflower, and cream of mushroom ; or chicken + cheese sandwich  - Snack(s): reports desserts are his weaknesses - have been reducing his pies, sweets ---> limited to small fruits at this time   - Beverage(s): water mainly, 2% milk, or sometimes orange juice   - Alcohol consumption? 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    Vitals: Wt Readings from Last 3 Encounters:   08/26/19 189 lb (85.7 kg)   08/05/19 196 lb (88.9 kg)   07/29/19 198 lb (89.8 kg)     BP Readings from Last 3 Encounters:   09/11/19 103/67   08/26/19 129/74   08/05/19 113/78     Pulse Readings from Last 3 Encounters:   09/11/19 64   08/26/19 80   08/05/19 74       Past Medical History:   Diagnosis Date    CAD (coronary artery disease) April 2014    NSTEMI, PCI/NATE RCA    Hypercholesterolemia     Hypertension      No Known Allergies    Current Outpatient Medications   Medication Sig    insulin glargine (LANTUS,BASAGLAR) 100 unit/mL (3 mL) inpn Inject 20 units under the skin daily    ONETOUCH VERIO SYSTEM misc USE TO CHECK GLUCOSE TWICE DAILY    ONETOUCH DELICA PLUS LANCET 33 gauge misc USE TO CHECK GLUCOSE TWICE DAILY    metFORMIN ER (GLUCOPHAGE XR) 500 mg tablet Take 3 Tabs by mouth daily (with dinner).  Insulin Needles, Disposable, 31 gauge x 5/16\" ndle by SubCUTAneous route daily.     metoprolol succinate (TOPROL-XL) 25 mg XL tablet TAKE 1 TABLET BY MOUTH ONCE A DAY    pantoprazole (PROTONIX) 40 mg tablet pantoprazole 40 mg tablet,delayed release    nitroglycerin (NITROSTAT) 0.4 mg SL tablet Take 1 tablet under tongue every 5 minutes up to 3 doses prn chest pain.  atorvastatin (LIPITOR) 40 mg tablet TAKE 1 TABLET BY MOUTH EVERY EVENING    polyethylene glycol (MIRALAX) 17 gram packet Take 1 Packet by mouth daily as needed (constipation).  ondansetron (ZOFRAN ODT) 4 mg disintegrating tablet Take 1 Tab by mouth every eight (8) hours as needed for Nausea.  acetaminophen (TYLENOL) 325 mg tablet Take 2 Tabs by mouth every six (6) hours as needed for Pain.  oxymetazoline (AFRIN) 0.05 % nasal spray 2 Sprays two (2) times a day.  amLODIPine (NORVASC) 2.5 mg tablet TAKE 1 TABLET DAILY    aspirin delayed-release 81 mg tablet Take 1 Tab by mouth daily. No current facility-administered medications for this visit.         Lab Results   Component Value Date/Time    Sodium 136 08/26/2019 08:40 AM    Potassium 4.0 08/26/2019 08:40 AM    Chloride 97 08/26/2019 08:40 AM    CO2 24 08/26/2019 08:40 AM    Anion gap 6 07/22/2019 04:28 AM    Glucose 505 (>) 08/26/2019 08:40 AM    BUN 12 08/26/2019 08:40 AM    Creatinine 1.24 08/26/2019 08:40 AM    BUN/Creatinine ratio 10 08/26/2019 08:40 AM    GFR est AA 71 08/26/2019 08:40 AM    GFR est non-AA 61 08/26/2019 08:40 AM    Calcium 9.4 08/26/2019 08:40 AM       Lab Results   Component Value Date/Time    Cholesterol, total 109 07/14/2019 04:49 AM    HDL Cholesterol 44 07/14/2019 04:49 AM    LDL, calculated 48.2 07/14/2019 04:49 AM    VLDL, calculated 16.8 07/14/2019 04:49 AM    Triglyceride 84 07/14/2019 04:49 AM    CHOL/HDL Ratio 2.5 07/14/2019 04:49 AM       Lab Results   Component Value Date/Time    WBC 5.4 08/26/2019 08:40 AM    HGB 14.1 08/26/2019 08:40 AM    HCT 41.6 08/26/2019 08:40 AM    PLATELET 048 01/21/2268 08:40 AM    MCV 97 08/26/2019 08:40 AM       Lab Results   Component Value Date/Time    Microalb/Creat ratio (ug/mg creat.) 17.9 07/29/2019 03:23 PM       HbA1c:  Lab Results   Component Value Date/Time    Hemoglobin A1c 9.2 (H) 07/13/2019 07:24 PM    Hemoglobin A1c (POC) 13.3 08/26/2019 08:10 AM     No components found for: 2     Lab Results   Component Value Date/Time    Hemoglobin A1c 9.2 (H) 07/13/2019 07:24 PM    Hemoglobin A1c 7.2 (H) 05/28/2019 08:12 AM    Hemoglobin A1c 5.9 (H) 05/29/2018 12:19 PM       Last Point of Care HGB A1C  Hemoglobin A1c (POC)   Date Value Ref Range Status   08/26/2019 13.3 % Final        A/P:    Medication Adherence/Access:  - Pt is adherent to current medication regimen. Diabetes Management:  - Per ADA guidelines, Pt's A1c is not at his goal of <7%. Pt and wife are very engaged to improve glycemic control.    - BP at goal of <130/80.    - Discussed the role of insulin resistance, recent A1c relative to diabetes complications, current pharmacological therapy and patient's glycemic control.  - Continue Lantus 20 units daily and Metformin 500 mg - 3 tabs daily. SMBG/CGM Review:  - Current SMBG(s) have improved but afternoon post prandial readings remain elevated for goal <180  - Recommend checking fasting, pre-meal, and bedtime SMBGs   - Reviewed and provided resource discussing s/sx of hypoglycemia and management  - Bring glucometer/log to all future visits. Nutrition/Lifestyle Modifications:  - Reviewed the impact of protein, carbs, and fats on glycemic control. Recommend moderating total carb intake to:  - For a meal : max 45 - 60 grams of carbohydrates  - For a snack: max 15 grams of carbohydrates  - Reduce amount of saturated and trans fat. Consider more unsaturated fat options as they are better for your heart health. - Have at least 1 serving of lean fat protein with each meal.    - Increase fiber intake slowly to prevent constipation.   - Substitute fruit juices for the whole fruit  Will focus on nutrition label reading at the next visit     Medication reconciliation was completed during the visit.     Medications Discontinued During This Encounter   Medication Reason    oxymetazoline (AFRIN) 0.05 % nasal spray Not A Current Medication    ondansetron (ZOFRAN ODT) 4 mg disintegrating tablet Therapy Completed       Patient verbalized understanding of the information presented and all of the patients questions were answered. AVS was handed to the patient. Patient advised to call the office with any additional questions or concerns. Notifications of recommendations will be sent to Dr. Beverly Ribeiro MD for review. Patient will return to clinic in 5 week(s) for follow up.       Thank you for the consult,  Yuni Green, PharmD, CDE

## 2019-09-11 NOTE — TELEPHONE ENCOUNTER
Pt wife called because the pharmacy needs Dr. Angela Huffman to resend the rx because she changed the doseage of the insulin

## 2019-09-12 ENCOUNTER — TELEPHONE (OUTPATIENT)
Dept: INTERNAL MEDICINE CLINIC | Facility: CLINIC | Age: 64
End: 2019-09-12

## 2019-09-12 ENCOUNTER — DOCUMENTATION ONLY (OUTPATIENT)
Dept: INTERNAL MEDICINE CLINIC | Facility: CLINIC | Age: 64
End: 2019-09-12

## 2019-09-12 RX ORDER — INSULIN GLARGINE 100 [IU]/ML
INJECTION, SOLUTION SUBCUTANEOUS
Qty: 1 ADJUSTABLE DOSE PRE-FILLED PEN SYRINGE | Refills: 11 | Status: SHIPPED | OUTPATIENT
Start: 2019-09-12 | End: 2019-09-13 | Stop reason: SDUPTHER

## 2019-09-12 NOTE — TELEPHONE ENCOUNTER
Would like return call from nurse to get update on pt's request for a refill on his insulin  States the pharmacy is still giving them an issue  May be reached at [phone]

## 2019-09-12 NOTE — TELEPHONE ENCOUNTER
REFILL     PCP: Uzma Phillips MD     Last appt: 8/26/2019   Future Appointments   Date Time Provider Gosia Rogers   9/26/2019  1:15 PM Uzma Phillips  [address] [place]   10/15/2019  1:15 PM Nereyda Streeter, [facility] SCHED   12/3/2019  8:45 AM LAB [facility] SCHED   12/10/2019 10:30 AM Uzma Phillips  [address] [place]   8/4/2020 10:15 AM Nikolai Monahan MD [address] [facility]        Requested Prescriptions     Pending Prescriptions Disp Refills    insulin glargine (LANTUS,BASAGLAR) 100 unit/mL (3 mL) inpn 1 Adjustable Dose Pre-filled Pen Syringe 11     Sig: Inject 20 units under the skin daily

## 2019-09-12 NOTE — PROGRESS NOTES
Form from [address] (Medication Request Form)for basaglar insulin filled out and faxed to [phone].

## 2019-09-13 RX ORDER — INSULIN GLARGINE 100 [IU]/ML
INJECTION, SOLUTION SUBCUTANEOUS
Qty: 2 ADJUSTABLE DOSE PRE-FILLED PEN SYRINGE | Refills: 11 | OUTPATIENT
Start: 2019-09-13 | End: 2020-08-19 | Stop reason: SDUPTHER

## 2019-09-19 ENCOUNTER — TELEPHONE (OUTPATIENT)
Dept: INTERNAL MEDICINE CLINIC | Facility: CLINIC | Age: 64
End: 2019-09-19

## 2019-09-19 NOTE — TELEPHONE ENCOUNTER
Sugar readings     Date  Fasting Before lunch Before dinner Before bed  9/7  138    128  9/8    168    205  9/9  125    170  9/10    169    227  9/11  123    160  9/12    202    226   9/13  121    132  9/14    137    166  9/15  116    76  9/16    104    150  9/17  85    Currently taking 20 units of insulin.

## 2019-09-20 NOTE — TELEPHONE ENCOUNTER
NO change in insulin at this point. May decrease frequency of testing sugars. Alternate every other day, checking at breakfast and supper one day and at lunch and bedtime the next.

## 2019-09-25 PROBLEM — K85.90 PANCREATITIS, ACUTE: Status: RESOLVED | Noted: 2019-07-13 | Resolved: 2019-09-25

## 2019-09-25 PROBLEM — K85.90 ACUTE PANCREATITIS: Status: RESOLVED | Noted: 2019-07-13 | Resolved: 2019-09-25

## 2019-09-25 PROBLEM — E11.21 TYPE 2 DIABETES WITH NEPHROPATHY (HCC): Status: RESOLVED | Noted: 2019-06-04 | Resolved: 2019-09-25

## 2019-09-26 ENCOUNTER — OFFICE VISIT (OUTPATIENT)
Dept: INTERNAL MEDICINE CLINIC | Facility: CLINIC | Age: 64
End: 2019-09-26

## 2019-09-26 VITALS
WEIGHT: 194.5 LBS | BODY MASS INDEX: 25.78 KG/M2 | SYSTOLIC BLOOD PRESSURE: 116 MMHG | OXYGEN SATURATION: 95 % | HEIGHT: 73 IN | TEMPERATURE: 98.1 F | RESPIRATION RATE: 12 BRPM | HEART RATE: 68 BPM | DIASTOLIC BLOOD PRESSURE: 69 MMHG

## 2019-09-26 DIAGNOSIS — R74.01 ELEVATED TRANSAMINASE LEVEL: ICD-10-CM

## 2019-09-26 NOTE — PROGRESS NOTES
Jordan Nateyoko  Identified pt with two pt identifiers(name and ). Chief Complaint   Patient presents with    Diabetes       Reviewed record In preparation for visit and have obtained necessary documentation. Has info on advanced directive but has not filled them out. 1. Have you been to the ER, urgent care clinic or hospitalized since your last visit? No     2. Have you seen or consulted any other health care providers outside of the [address] [place] [address] since your last visit? Include any pap smears or colon screening. pharmD    Vitals reviewed with provider.     Health Maintenance reviewed:     Health Maintenance Due   Topic    Pneumococcal 0-64 years (2 of 3 - PCV13)    Influenza Age 5 to Adult           Wt Readings from Last 3 Encounters:   19 194 lb 8 oz (88.2 kg)   19 189 lb (85.7 kg)   19 196 lb (88.9 kg)        Temp Readings from Last 3 Encounters:   19 98.1 °F (36.7 °C) (Oral)   19 97.9 °F (36.6 °C) (Oral)   19 97.2 °F (36.2 °C) (Oral)        BP Readings from Last 3 Encounters:   19 116/69   19 103/67   19 129/74        Pulse Readings from Last 3 Encounters:   19 68   19 64   19 80        Vitals:    19 1313   BP: 116/69   Pulse: 68   Resp: 12   Temp: 98.1 °F (36.7 °C)   TempSrc: Oral   SpO2: 95%   Weight: 194 lb 8 oz (88.2 kg)   Height: 6' 1\" (1.854 m)   PainSc:   0 - No pain          Learning Assessment:   :       Learning Assessment 2018 4/10/2014   PRIMARY LEARNER Patient Patient   HIGHEST LEVEL OF EDUCATION - PRIMARY LEARNER  - SOME COLLEGE   BARRIERS PRIMARY LEARNER - NONE   CO-LEARNER CAREGIVER - No   PRIMARY LANGUAGE ENGLISH ENGLISH    NEED - No   LEARNER PREFERENCE PRIMARY VIDEOS VIDEOS   ANSWERED BY patient Patient   RELATIONSHIP SELF SELF        Depression Screening:   :       3 most recent PHQ Screens 2019   Little interest or pleasure in doing things Not at all   Feeling down, depressed, irritable, or hopeless Not at all   Total Score PHQ 2 0        Fall Risk Assessment:   :     No flowsheet data found. Abuse Screening:   :       Abuse Screening Questionnaire 6/4/2019 8/29/2017 8/15/2016   Do you ever feel afraid of your partner? N N N   Are you in a relationship with someone who physically or mentally threatens you? N N N   Is it safe for you to go home?  Y Y Y        ADL Screening:   :       ADL Assessment 7/29/2019   Feeding yourself No Help Needed   Getting from bed to chair No Help Needed   Getting dressed No Help Needed   Bathing or showering No Help Needed   Walk across the room (includes cane/walker) No Help Needed   Using the telphone No Help Needed   Taking your medications No Help Needed   Preparing meals No Help Needed   Managing money (expenses/bills) No Help Needed   Moderately strenuous housework (laundry) No Help Needed   Shopping for personal items (toiletries/medicines) No Help Needed   Shopping for groceries No Help Needed   Driving No Help Needed   Climbing a flight of stairs No Help Needed   Getting to places beyond walking distances No Help Needed

## 2019-09-26 NOTE — PROGRESS NOTES
HISTORY OF PRESENT ILLNESS  Jamarcus Mcintyre is a 61 y.o. male. HPI  He presents for follow up of diabetes mellitus. He also has hypertension, hyperlipidemia, coronary artery disease, history of prior MI and status post coronary artery stenting. After an episode of severe pancreatitis in July,  his A1c hermes from 7.2 in December to 9.2 during hospitalization for pancreatitis to 13.3 on Aug 26. He had weight loss, urinary frequency, excessive thirst and blurry vision. We started glargine insulin and it has been titrated to 20 units daily. He has gained 5 lbs since last visit. Diet and Lifestyle: follows a diabetic diet regularly, exercises regularly  Diabetic ROS - medication compliance: compliant all of the time,      home glucose monitoring: fasting , oliverio , dinner , bedtime 106-190,      home BP Monitoring: not done. further diabetic ROS: no polyuria or polydipsia, no numbness, tingling or pain in extremities, no unusual visual symptoms, no hypoglycemia, no medication side effects noted.    Cardiovascular ROS:  He denies palpitations, orthopnea, exertional chest pressure/discomfort, claudication, lower extremity edema, dyspnea on exertion, dizziness  Patient Active Problem List   Diagnosis Code    History of colonoscopy Z98.890    Low back pain M54.5    Hypercholesterolemia E78.00    Osteoarthritis of both knees M17.0    CKD (chronic kidney disease) stage 2, GFR 60-89 ml/min N18.2    Benign hypertension with chronic kidney disease, stage II I12.9, N18.2    Coronary artery disease involving native coronary artery of native heart without angina pectoris I25.10    Uncontrolled type 2 diabetes mellitus without complication, without long-term current use of insulin ([facility]) E11.65    Chronic superficial gastritis without bleeding K29.30    MONET (acute kidney injury) ([facility] [address] N17.9     Past Medical History:   Diagnosis Date    CAD (coronary artery disease) April 2014    NSTEMI, PCI/NATE RCA   [facility] Hypercholesterolemia     Hypertension      Past Surgical History:   Procedure Laterality Date    CARDIAC SURG PROCEDURE UNLIST      Stent RCA 2014    HX CHOLECYSTECTOMY  2019    Laparoscopic cholecystectomy with intraoperative cholangiogram    HX ORTHOPAEDIC      left shoulder, torn tendon    HX ORTHOPAEDIC      right knee X 4    HI COLONOSCOPY FLX DX W/COLLJ SPEC WHEN PFRMD  2007    Dr Yifan Bush 1 polyp repeat 2012     Social History     Socioeconomic History    Marital status:      Spouse name: Not on file    Number of children: Not on file    Years of education: Not on file    Highest education level: Not on file   Tobacco Use    Smoking status: Former Smoker     Packs/day: 0.25     Years: 35.00     Pack years: 8.75     Types: Cigarettes     Last attempt to quit: 2014     Years since quittin.8    Smokeless tobacco: Never Used    Tobacco comment: Never smoked over a pack per day   Substance and Sexual Activity    Alcohol use: No     Alcohol/week: 0.0 standard drinks    Drug use: No     Family History   Problem Relation Age of Onset    Heart Disease Mother         CAD    Hypertension Mother     Elevated Lipids Mother     Cancer Father         lung    Mental Retardation Brother     Seizures Brother     Diabetes Brother     Hypertension Brother     Elevated Lipids Brother      No Known Allergies  Current Outpatient Medications   Medication Sig Dispense Refill    insulin glargine (LANTUS,BASAGLAR) 100 unit/mL (3 mL) inpn Inject 20 units under the skin daily 2 Adjustable Dose Pre-filled Pen Syringe 11    ONETOUCH VERIO SYSTEM misc USE TO CHECK GLUCOSE TWICE DAILY  0    ONETOUCH DELICA PLUS LANCET 33 gauge misc USE TO CHECK GLUCOSE TWICE DAILY  5    metFORMIN ER (GLUCOPHAGE XR) 500 mg tablet Take 3 Tabs by mouth daily (with dinner). 270 Tab 3    Insulin Needles, Disposable, 31 gauge x 5/16\" ndle by SubCUTAneous route daily.  50 Pen Needle 5    metoprolol succinate (TOPROL-XL) 25 mg XL tablet TAKE 1 TABLET BY MOUTH ONCE A DAY 90 Tab 3    pantoprazole (PROTONIX) 40 mg tablet pantoprazole 40 mg tablet,delayed release      nitroglycerin (NITROSTAT) 0.4 mg SL tablet Take 1 tablet under tongue every 5 minutes up to 3 doses prn chest pain. 1 Bottle 3    atorvastatin (LIPITOR) 40 mg tablet TAKE 1 TABLET BY MOUTH EVERY EVENING 90 Tab 3    polyethylene glycol (MIRALAX) 17 gram packet Take 1 Packet by mouth daily as needed (constipation).  acetaminophen (TYLENOL) 325 mg tablet Take 2 Tabs by mouth every six (6) hours as needed for Pain.  amLODIPine (NORVASC) 2.5 mg tablet TAKE 1 TABLET DAILY 90 Tab 3    aspirin delayed-release 81 mg tablet Take 1 Tab by mouth daily. 90 Tab 3       Review of Systems   Constitutional: Negative for malaise/fatigue and weight loss. Gastrointestinal: Negative for constipation, diarrhea and heartburn. Musculoskeletal: Negative for back pain and joint pain. Neurological: Negative for dizziness, tingling and focal weakness. Visit Vitals  /69 (BP 1 Location: Left arm, BP Patient Position: Sitting)   Pulse 68   Temp 98.1 °F (36.7 °C) (Oral)   Resp 12   Ht 6' 1\" (1.854 m)   Wt 194 lb 8 oz (88.2 kg)   SpO2 95%   BMI 25.66 kg/m²     Physical Exam   Constitutional: He is oriented to person, place, and time. He appears well-developed and well-nourished. HENT:   Head: Normocephalic and atraumatic. Eyes: Pupils are equal, round, and reactive to light. Conjunctivae are normal.   Neck: Neck supple. Carotid bruit is not present. Cardiovascular: Normal rate, regular rhythm, S1 normal, S2 normal and normal heart sounds. Exam reveals no gallop. No murmur heard. Pulmonary/Chest: Effort normal and breath sounds normal. He has no wheezes. He has no rhonchi. He has no rales. Musculoskeletal: He exhibits no edema. Neurological: He is alert and oriented to person, place, and time. Skin: Skin is warm, dry and intact. Psychiatric: He has a normal mood and affect. His behavior is normal.   Nursing note and vitals reviewed. Lab Results   Component Value Date/Time    Hemoglobin A1c 9.2 (H) 07/13/2019 07:24 PM    Hemoglobin A1c (POC) 13.3 08/26/2019 08:10 AM     Lab Results   Component Value Date/Time    Microalb/Creat ratio (ug/mg creat.) 17.9 07/29/2019 03:23 PM     Lab Results   Component Value Date/Time    Cholesterol, total 109 07/14/2019 04:49 AM    HDL Cholesterol 44 07/14/2019 04:49 AM    LDL, calculated 48.2 07/14/2019 04:49 AM    VLDL, calculated 16.8 07/14/2019 04:49 AM    Triglyceride 84 07/14/2019 04:49 AM    CHOL/HDL Ratio 2.5 07/14/2019 04:49 AM     Lab Results   Component Value Date/Time    Sodium 136 08/26/2019 08:40 AM    Potassium 4.0 08/26/2019 08:40 AM    Chloride 97 08/26/2019 08:40 AM    CO2 24 08/26/2019 08:40 AM    Anion gap 6 07/22/2019 04:28 AM    Glucose 505 (>) 08/26/2019 08:40 AM    BUN 12 08/26/2019 08:40 AM    Creatinine 1.24 08/26/2019 08:40 AM    BUN/Creatinine ratio 10 08/26/2019 08:40 AM    GFR est AA 71 08/26/2019 08:40 AM    GFR est non-AA 61 08/26/2019 08:40 AM    Calcium 9.4 08/26/2019 08:40 AM    Bilirubin, total 0.5 08/26/2019 08:40 AM    AST (SGOT) 15 08/26/2019 08:40 AM    Alk. phosphatase 130 (H) 08/26/2019 08:40 AM    Protein, total 6.9 08/26/2019 08:40 AM    Albumin 4.2 08/26/2019 08:40 AM    Globulin 3.9 07/22/2019 04:28 AM    A-G Ratio 1.6 08/26/2019 08:40 AM    ALT (SGPT) 23 08/26/2019 08:40 AM           ASSESSMENT and PLAN    ICD-10-CM ICD-9-CM    1. Uncontrolled type 2 diabetes mellitus without complication, without long-term current use of insulin (HCC) E11.65 250.02 HEMOGLOBIN A1C WITH EAG   2. Elevated transaminase level R74.0 790.4 HEPATIC FUNCTION PANEL     Diagnoses and all orders for this visit:    1. Uncontrolled type 2 diabetes mellitus without complication, without long-term current use of insulin (HCC)  Symptoms have improved. Blood sugars are improved.  Too soon to repeat A1c. Cautioned about gaining too much weight back.   -     HEMOGLOBIN A1C WITH EAG; Future    2. Elevated transaminase level  Likely was secondary to pancreatitis, but will follow to normal.   -     HEPATIC FUNCTION PANEL; Future      Follow-up and Dispositions    · Return in about 3 months (around 12/26/2019) for DM, HTN, chol, NON-fasting lab one week prior. lab results and schedule of future lab studies reviewed with patient  reviewed diet, exercise and weight control  I have discussed the diagnosis, evaluation and treatment options and the intended plan with the patient. Patient understands and is in agreement. The patient has received an after-visit summary and questions were answered concerning future plans. I have discussed side effects and warnings of any new medications with the patient as well.

## 2019-09-26 NOTE — PATIENT INSTRUCTIONS
Remember to get your flu shot in October mcrae November You may call us for a nurse appointment or get this from your pharmacy. Office visit in 3 months with non-fasting lab a week before visit.

## 2019-10-15 ENCOUNTER — OFFICE VISIT (OUTPATIENT)
Dept: INTERNAL MEDICINE CLINIC | Age: 64
End: 2019-10-15

## 2019-10-15 VITALS — WEIGHT: 195 LBS | BODY MASS INDEX: 25.84 KG/M2 | HEIGHT: 73 IN

## 2019-10-15 NOTE — PROGRESS NOTES
Pharmacy Progress Note - Diabetes Management     S/O: Mr. Ney Laurent is a 61 y.o. male , referred by Dr. Olvin Coles MD, with a PMH of T2DM, CAD, Hx of MI, HTN, recent hx of pancreatitis, s/p cholecystectomy, was seen today for diabetes management follow up. Patient's last A1c was 13.3% (August 2019). His wife participated in visit telephonically. Interim history:  Saw Dr. Cesar Castro for follow up in September. No significant changes. Current regimen:  Lantus 20 units daily  Metformin 500 mg ER - 3 tabs daily with dinner   Endorses adherence     SMBG:  Brought in BG log for review ; checking twice daily  Fasting today: 110   Denies s/sx of hypo and hyperglycemia at this time    Date Before Breakfast Before Lunch Before Dinner Bedtime Notes   10/1/2019  108  162    10/2/2019 96  123     10/3/2019 104  77     10/4/2019 82  196     10/5/2019  114  143    10/6/2019 101 183  161    10/7/2019  85      10/8/2019 217  82     10/9/2019  100  210 Pizza for dinner   10/10/2019 78  126     10/11/2019 125  141     10/12/2019  105  132    10/13/2019 103  136     10/14/2019  110  139    10/15/2019 110       Avg 113 115 126 158      Nutrition/Lifestyle Modifications: Wife manages meals - testing out different recipes  Generally eats 2-3 meals/day  Breakfast: usually 8 oz glass of milk + instant breakfast  Lunch: turkey/chicken sandwich   Dinner: varies - last night - 4 grilled chicken tenders + sauteed grapes (1/2 cup) ; other nights would be salad + protein  Teresa: 2% milk; water; diet dr pepper or unsweetened tea;      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   He is not bringing a snack with him; will skip lunch at least once a week    Wt Readings from Last 3 Encounters:   10/15/19 195 lb (88.5 kg)   09/26/19 194 lb 8 oz (88.2 kg)   08/26/19 189 lb (85.7 kg)     BP Readings from Last 3 Encounters:   09/26/19 116/69   09/11/19 103/67   08/26/19 129/74     Pulse Readings from Last 3 Encounters:   09/26/19 68   09/11/19 64   08/26/19 80       Past Medical History:   Diagnosis Date    CAD (coronary artery disease) April 2014    NSTEMI, PCI/NATE RCA    Hypercholesterolemia     Hypertension      No Known Allergies  Current Outpatient Medications   Medication Sig    insulin glargine (LANTUS,BASAGLAR) 100 unit/mL (3 mL) inpn Inject 20 units under the skin daily    ONETOUCH VERIO SYSTEM misc USE TO CHECK GLUCOSE TWICE DAILY    ONETOUCH DELICA PLUS LANCET 33 gauge misc USE TO CHECK GLUCOSE TWICE DAILY    metFORMIN ER (GLUCOPHAGE XR) 500 mg tablet Take 3 Tabs by mouth daily (with dinner).  Insulin Needles, Disposable, 31 gauge x 5/16\" ndle by SubCUTAneous route daily.  metoprolol succinate (TOPROL-XL) 25 mg XL tablet TAKE 1 TABLET BY MOUTH ONCE A DAY    pantoprazole (PROTONIX) 40 mg tablet pantoprazole 40 mg tablet,delayed release    nitroglycerin (NITROSTAT) 0.4 mg SL tablet Take 1 tablet under tongue every 5 minutes up to 3 doses prn chest pain.  atorvastatin (LIPITOR) 40 mg tablet TAKE 1 TABLET BY MOUTH EVERY EVENING    polyethylene glycol (MIRALAX) 17 gram packet Take 1 Packet by mouth daily as needed (constipation).  acetaminophen (TYLENOL) 325 mg tablet Take 2 Tabs by mouth every six (6) hours as needed for Pain.  amLODIPine (NORVASC) 2.5 mg tablet TAKE 1 TABLET DAILY    aspirin delayed-release 81 mg tablet Take 1 Tab by mouth daily. No current facility-administered medications for this visit.       Lab Results   Component Value Date/Time    Sodium 136 08/26/2019 08:40 AM    Potassium 4.0 08/26/2019 08:40 AM    Chloride 97 08/26/2019 08:40 AM    CO2 24 08/26/2019 08:40 AM    Anion gap 6 07/22/2019 04:28 AM    Glucose 505 (>) 08/26/2019 08:40 AM    BUN 12 08/26/2019 08:40 AM    Creatinine 1.24 08/26/2019 08:40 AM    BUN/Creatinine ratio 10 08/26/2019 08:40 AM    GFR est AA 71 08/26/2019 08:40 AM    GFR est non-AA 61 08/26/2019 08:40 AM    Calcium 9.4 08/26/2019 08:40 AM Lab Results   Component Value Date/Time    Hemoglobin A1c 9.2 (H) 07/13/2019 07:24 PM    Hemoglobin A1c 7.2 (H) 05/28/2019 08:12 AM    Hemoglobin A1c 5.9 (H) 05/29/2018 12:19 PM     Last Point of Care HGB A1C  Hemoglobin A1c (POC)   Date Value Ref Range Status   08/26/2019 13.3 % Final      Lab Results   Component Value Date/Time    Microalb/Creat ratio (ug/mg creat.) 17.9 07/29/2019 03:23 PM       Estimated Creatinine Clearance: 68.9 mL/min (by C-G formula based on SCr of 1.24 mg/dL). A/P:  - Per ADA, patient's last A1c was not at his goal of <7%. Wt up 6 lbs since August.  - Continue with Lantus 20 units daily. Metformin 500 mg - 3 tabs daily.    - Educate pt about reading nutrition labels w/ a focus on carbohydrate/protein/sugar/fiber/fat content.  - Recommend moderating and diversifying carbohydrate intake to max of~45-60 grams/meal and 15 grams/snack ; at least 1 serving of protein/meal.  Reviewed low-carb alternatives to existing food choices. - Provided ADA resources on 1500 calories diet, nutrition label, food groups to assist Pt w/ decision making/meal planning.     - Pt will RTC in 6 weeks. - A1c check in end of Nov-early December.    - Pt endorsed understanding of the information provided. All questions were answered. Thank you for the consult,  Yuni Neil, PharmD, CDE     Medication reconciliation was completed today. There are no discontinued medications.

## 2019-10-15 NOTE — PATIENT INSTRUCTIONS
· Continue with Lantus 20 units once a day. Metformin 500 mg - 3 tablets daily. · Continue to monitor your blood sugar twice daily. Bring your log to the next visit. · Bring a protein and low carb snack with you when you work outside. · Choose Gatorade Zero. Nutrition:  - When reviewing a nutrition label, focus on the serving size, total calories, fat (and type of fats), total carbohydrates, sugar (and amount of added sugar), amount of fiber (good for your digestive), and amount of protein. Refer to your nutrition label guide for more information.    - For a meal : max 45 - 60 grams of carbohydrates  - For a snack: max 15 grams of carbohydrates    - Reduce amount of saturated and trans fat. Consider more unsaturated fat options as they are better for your heart health.    - Have at least 1 serving of lean fat protein with each meal.    - Increase fiber intake slowly to prevent constipation.   - Substitute fruit juices for the whole fruit

## 2019-11-05 ENCOUNTER — DOCUMENTATION ONLY (OUTPATIENT)
Dept: INTERNAL MEDICINE CLINIC | Facility: CLINIC | Age: 64
End: 2019-11-05

## 2019-11-21 ENCOUNTER — OFFICE VISIT (OUTPATIENT)
Dept: INTERNAL MEDICINE CLINIC | Age: 64
End: 2019-11-21

## 2019-11-21 VITALS
OXYGEN SATURATION: 97 % | DIASTOLIC BLOOD PRESSURE: 80 MMHG | HEART RATE: 63 BPM | SYSTOLIC BLOOD PRESSURE: 126 MMHG | HEIGHT: 73 IN | BODY MASS INDEX: 27.04 KG/M2 | WEIGHT: 204 LBS

## 2019-11-21 NOTE — PROGRESS NOTES
Pharmacy Progress Note - Diabetes Management    S/O: Mr. Arnold Urrutia is a 59 y. o. male , referred by Dr. Philomena Vanegas MD, with a PMH of T2DM, CAD, Hx of MI, HTN, recent hx of pancreatitis, s/p cholecystectomy, was seen today for diabetes management follow up.  Patient's last A1c was 13.3% (August 2019).    His wife participated in visit telephonically. Interim update:   Received flu shot on 11/3/19  Reports he has an eye exam scheduled for 12/10/19    Current anti-hyperglycemic regimen includes:    - Lantus 20 units daily  - Metformin 500 mg ER - 3 tablets daily     Endorses compliance to regimen     ROS:  Today, Pt endorses:  - Symptoms of Hyperglycemia: none  - Symptoms of Hypoglycemia: none    Self Monitoring Blood Glucose (SMBG) or CGM:  - Brought in blood glucose log today:  yes    Date Before Breakfast Before Lunch Before Dinner Bedtime   11/1/2019 117  121      119  178    127  90      112  130    107  118      106  162    112  89      154  132    109  104      112  153    128  114      108  140    104  91      106  113    112  118      128  148    108  112      116  121    107  98    11/20/2019  90  156   Avg 113 115 106 143     Nutrition/Lifestyle Modifications:  - Eats 2 meals/day ; wife preparing meals ; referring to low carb vegetables/fruit list to guide meal planning  - Breakfast: usually eggs + sausage +/- cereal  - usually late breakfast/lunch  - Dinner: lately - chicken/beef stew w/ carrots & celery +/- potato or peas   - Snack(s):  Found some sugar free cookies - will have 1-2 every so often  - Beverage(s): water  - Alcohol consumption? No    - Physical Activity:   Reports he's less active now with the colder weather     Vitals:   Wt Readings from Last 3 Encounters:   11/21/19 204 lb (92.5 kg)   10/15/19 195 lb (88.5 kg)   09/26/19 194 lb 8 oz (88.2 kg)     BP Readings from Last 3 Encounters:   11/21/19 126/80   09/26/19 116/69   09/11/19 103/67     Pulse Readings from Last 3 Encounters:   11/21/19 63   09/26/19 68   09/11/19 64     Past Medical History:   Diagnosis Date    CAD (coronary artery disease) April 2014    NSTEMI, PCI/NATE RCA    Hypercholesterolemia     Hypertension      No Known Allergies    Current Outpatient Medications   Medication Sig    amLODIPine (NORVASC) 2.5 mg tablet TAKE 1 TABLET BY MOUTH ONCE A DAY    insulin glargine (LANTUS,BASAGLAR) 100 unit/mL (3 mL) inpn Inject 20 units under the skin daily    ONETOUCH VERIO SYSTEM misc USE TO CHECK GLUCOSE TWICE DAILY    ONETOUCH DELICA PLUS LANCET 33 gauge misc USE TO CHECK GLUCOSE TWICE DAILY    metFORMIN ER (GLUCOPHAGE XR) 500 mg tablet Take 3 Tabs by mouth daily (with dinner).  Insulin Needles, Disposable, 31 gauge x 5/16\" ndle by SubCUTAneous route daily.  metoprolol succinate (TOPROL-XL) 25 mg XL tablet TAKE 1 TABLET BY MOUTH ONCE A DAY    pantoprazole (PROTONIX) 40 mg tablet pantoprazole 40 mg tablet,delayed release    nitroglycerin (NITROSTAT) 0.4 mg SL tablet Take 1 tablet under tongue every 5 minutes up to 3 doses prn chest pain.  atorvastatin (LIPITOR) 40 mg tablet TAKE 1 TABLET BY MOUTH EVERY EVENING    polyethylene glycol (MIRALAX) 17 gram packet Take 1 Packet by mouth daily as needed (constipation).  acetaminophen (TYLENOL) 325 mg tablet Take 2 Tabs by mouth every six (6) hours as needed for Pain.  aspirin delayed-release 81 mg tablet Take 1 Tab by mouth daily. No current facility-administered medications for this visit.         Lab Results   Component Value Date/Time    Sodium 136 08/26/2019 08:40 AM    Potassium 4.0 08/26/2019 08:40 AM    Chloride 97 08/26/2019 08:40 AM    CO2 24 08/26/2019 08:40 AM    Anion gap 6 07/22/2019 04:28 AM    Glucose 505 (>) 08/26/2019 08:40 AM    BUN 12 08/26/2019 08:40 AM    Creatinine 1.24 08/26/2019 08:40 AM    BUN/Creatinine ratio 10 08/26/2019 08:40 AM    GFR est AA 71 08/26/2019 08:40 AM    GFR est non-AA 61 08/26/2019 08:40 AM    Calcium 9.4 08/26/2019 08:40 AM       Lab Results   Component Value Date/Time    Protein, total 6.9 08/26/2019 08:40 AM    Albumin 4.2 08/26/2019 08:40 AM       Lab Results   Component Value Date/Time    Cholesterol, total 109 07/14/2019 04:49 AM    HDL Cholesterol 44 07/14/2019 04:49 AM    LDL, calculated 48.2 07/14/2019 04:49 AM    VLDL, calculated 16.8 07/14/2019 04:49 AM    Triglyceride 84 07/14/2019 04:49 AM    CHOL/HDL Ratio 2.5 07/14/2019 04:49 AM       Lab Results   Component Value Date/Time    WBC 5.4 08/26/2019 08:40 AM    HGB 14.1 08/26/2019 08:40 AM    HCT 41.6 08/26/2019 08:40 AM    PLATELET 726 16/46/9570 08:40 AM    MCV 97 08/26/2019 08:40 AM       Lab Results   Component Value Date/Time    Microalb/Creat ratio (ug/mg creat.) 17.9 07/29/2019 03:23 PM       HbA1c:  Lab Results   Component Value Date/Time    Hemoglobin A1c 9.2 (H) 07/13/2019 07:24 PM    Hemoglobin A1c (POC) 13.3 08/26/2019 08:10 AM       Lab Results   Component Value Date/Time    Hemoglobin A1c 9.2 (H) 07/13/2019 07:24 PM    Hemoglobin A1c 7.2 (H) 05/28/2019 08:12 AM    Hemoglobin A1c 5.9 (H) 05/29/2018 12:19 PM       Last Point of Care HGB A1C  Hemoglobin A1c (POC)   Date Value Ref Range Status   08/26/2019 13.3 % Final        Estimated Creatinine Clearance: 68 mL/min (by C-G formula based on SCr of 1.24 mg/dL). A/P:    Diabetes Management:  - Per ADA guidelines, Pt's A1c is not at his goal of <7%. BP at goal of <130/80.   - SMBGs are at fasting and post prandial goal.   - Continue Lantus 20 units daily. Metformin 1500 mg daily.   - Bring BG log to upcoming PCP visit. - Has appointment for lab scheduled for 12/3/19. Due for A1c repeat. Anticipate patient will be <7%. - Will follow patient's lab results to guide future adjustments. Medication reconciliation was completed during the visit. There are no discontinued medications.     Patient verbalized understanding of the information presented and all of the patients questions were answered. AVS was handed to the patient. Patient advised to call the office with any additional questions or concerns. Notifications of recommendations will be sent to Dr. Darrell Henry MD for review.     Thank you for the consult,  Yuni Quinonez, PharmD, CDE

## 2019-11-21 NOTE — PATIENT INSTRUCTIONS
· Keep up the great work! · Continue Lantus 20 units daily · Continue Metformin 500 mg - 3 tablets daily · Labs on 12/03/19 before your appointment with Dr. Sera Mcdonough on 12/10/19.

## 2019-12-03 DIAGNOSIS — R74.01 ELEVATED TRANSAMINASE LEVEL: ICD-10-CM

## 2019-12-03 DIAGNOSIS — N18.2 BENIGN HYPERTENSION WITH CHRONIC KIDNEY DISEASE, STAGE II: ICD-10-CM

## 2019-12-03 DIAGNOSIS — I12.9 BENIGN HYPERTENSION WITH CHRONIC KIDNEY DISEASE, STAGE II: ICD-10-CM

## 2019-12-04 LAB
BUN SERPL-MCNC: 19 MG/DL (ref 8–27)
BUN/CREAT SERPL: 18 (ref 10–24)
CALCIUM SERPL-MCNC: 8.7 MG/DL (ref 8.6–10.2)
CHLORIDE SERPL-SCNC: 106 MMOL/L (ref 96–106)
CO2 SERPL-SCNC: 21 MMOL/L (ref 20–29)
CREAT SERPL-MCNC: 1.08 MG/DL (ref 0.76–1.27)
GLUCOSE SERPL-MCNC: 113 MG/DL (ref 65–99)
POTASSIUM SERPL-SCNC: 4.1 MMOL/L (ref 3.5–5.2)
SODIUM SERPL-SCNC: 143 MMOL/L (ref 134–144)

## 2019-12-05 LAB
ALBUMIN SERPL-MCNC: 4 G/DL (ref 3.6–4.8)
ALP SERPL-CCNC: 69 IU/L (ref 39–117)
ALT SERPL-CCNC: 28 IU/L (ref 0–44)
AST SERPL-CCNC: 22 IU/L (ref 0–40)
BILIRUB DIRECT SERPL-MCNC: 0.06 MG/DL (ref 0–0.4)
BILIRUB SERPL-MCNC: <0.2 MG/DL (ref 0–1.2)
EST. AVERAGE GLUCOSE BLD GHB EST-MCNC: 160 MG/DL
HBA1C MFR BLD: 7.2 % (ref 4.8–5.6)
PROT SERPL-MCNC: 6.3 G/DL (ref 6–8.5)
SPECIMEN STATUS REPORT, ROLRST: NORMAL

## 2019-12-09 PROBLEM — N17.9 AKI (ACUTE KIDNEY INJURY) (HCC): Status: RESOLVED | Noted: 2019-07-13 | Resolved: 2019-12-09

## 2019-12-09 PROBLEM — Z79.4 CONTROLLED TYPE 2 DIABETES MELLITUS WITHOUT COMPLICATION, WITH LONG-TERM CURRENT USE OF INSULIN (HCC): Status: ACTIVE | Noted: 2017-08-29

## 2019-12-10 ENCOUNTER — OFFICE VISIT (OUTPATIENT)
Dept: INTERNAL MEDICINE CLINIC | Facility: CLINIC | Age: 64
End: 2019-12-10

## 2019-12-10 VITALS
SYSTOLIC BLOOD PRESSURE: 118 MMHG | RESPIRATION RATE: 12 BRPM | BODY MASS INDEX: 26.9 KG/M2 | DIASTOLIC BLOOD PRESSURE: 72 MMHG | WEIGHT: 203 LBS | TEMPERATURE: 98 F | OXYGEN SATURATION: 96 % | HEIGHT: 73 IN | HEART RATE: 60 BPM

## 2019-12-10 DIAGNOSIS — I25.10 CORONARY ARTERY DISEASE INVOLVING NATIVE CORONARY ARTERY OF NATIVE HEART WITHOUT ANGINA PECTORIS: ICD-10-CM

## 2019-12-10 DIAGNOSIS — I12.9 BENIGN HYPERTENSION WITH CHRONIC KIDNEY DISEASE, STAGE II: ICD-10-CM

## 2019-12-10 DIAGNOSIS — Z79.4 CONTROLLED TYPE 2 DIABETES MELLITUS WITHOUT COMPLICATION, WITH LONG-TERM CURRENT USE OF INSULIN (HCC): Primary | ICD-10-CM

## 2019-12-10 DIAGNOSIS — E78.00 HYPERCHOLESTEROLEMIA: ICD-10-CM

## 2019-12-10 DIAGNOSIS — E11.9 CONTROLLED TYPE 2 DIABETES MELLITUS WITHOUT COMPLICATION, WITH LONG-TERM CURRENT USE OF INSULIN (HCC): Primary | ICD-10-CM

## 2019-12-10 DIAGNOSIS — N18.2 BENIGN HYPERTENSION WITH CHRONIC KIDNEY DISEASE, STAGE II: ICD-10-CM

## 2019-12-10 NOTE — PROGRESS NOTES
Warren Loza  Identified pt with two pt identifiers(name and ). Chief Complaint   Patient presents with    Diabetes    Hypertension       Reviewed record In preparation for visit and have obtained necessary documentation. Has info on advanced directive but has not filled them out. 1. Have you been to the ER, urgent care clinic or hospitalized since your last visit? No     2. Have you seen or consulted any other health care providers outside of the [address] [place] [address] since your last visit? Include any pap smears or colon screening. Ryan EllisD    Vitals reviewed with provider. Health Maintenance reviewed: There are no preventive care reminders to display for this patient.        Wt Readings from Last 3 Encounters:   12/10/19 203 lb (92.1 kg)   19 204 lb (92.5 kg)   10/15/19 195 lb (88.5 kg)        Temp Readings from Last 3 Encounters:   12/10/19 98 °F (36.7 °C) (Oral)   19 98.1 °F (36.7 °C) (Oral)   19 97.9 °F (36.6 °C) (Oral)        BP Readings from Last 3 Encounters:   12/10/19 118/72   19 126/80   19 116/69        Pulse Readings from Last 3 Encounters:   12/10/19 60   19 63   19 68        Vitals:    12/10/19 1039   BP: 118/72   Pulse: 60   Resp: 12   Temp: 98 °F (36.7 °C)   TempSrc: Oral   SpO2: 96%   Weight: 203 lb (92.1 kg)   Height: 6' 1\" (1.854 m)   PainSc:   0 - No pain          Learning Assessment:   :       Learning Assessment 2018 4/10/2014   PRIMARY LEARNER Patient Patient   HIGHEST LEVEL OF EDUCATION - PRIMARY LEARNER  - SOME COLLEGE   BARRIERS PRIMARY LEARNER - NONE   CO-LEARNER CAREGIVER - No   PRIMARY LANGUAGE ENGLISH ENGLISH    NEED - No   LEARNER PREFERENCE PRIMARY VIDEOS VIDEOS   ANSWERED BY patient Patient   RELATIONSHIP SELF SELF        Depression Screening:   :       3 most recent PHQ Screens 2019   Little interest or pleasure in doing things Not at all   Feeling down, depressed, irritable, or hopeless Not at all   Total Score PHQ 2 0        Fall Risk Assessment:   :     No flowsheet data found. Abuse Screening:   :       Abuse Screening Questionnaire 6/4/2019 8/29/2017 8/15/2016   Do you ever feel afraid of your partner? N N N   Are you in a relationship with someone who physically or mentally threatens you? N N N   Is it safe for you to go home?  Y Y Y        ADL Screening:   :       ADL Assessment 7/29/2019   Feeding yourself No Help Needed   Getting from bed to chair No Help Needed   Getting dressed No Help Needed   Bathing or showering No Help Needed   Walk across the room (includes cane/walker) No Help Needed   Using the telphone No Help Needed   Taking your medications No Help Needed   Preparing meals No Help Needed   Managing money (expenses/bills) No Help Needed   Moderately strenuous housework (laundry) No Help Needed   Shopping for personal items (toiletries/medicines) No Help Needed   Shopping for groceries No Help Needed   Driving No Help Needed   Climbing a flight of stairs No Help Needed   Getting to places beyond walking distances No Help Needed

## 2019-12-10 NOTE — PROGRESS NOTES
HISTORY OF PRESENT ILLNESS  Tamiko Shah is a 59 y.o. male. HPI  He presents for follow up of diabetes mellitus, hypertension with CKD, hyperlipidemia and coronary artery disease. After an episode of acute pancreatitis in July, A1c hermes from 6.3 to 9.2 and then 13.3and was associated with a 30 lb weight loss. Lantus insulin was added to metformin. Diet and Lifestyle: generally follows a low fat low cholesterol diet, generally follows a low sodium diet, follows a diabetic diet regularly, exercises regularly, nonsmoker  Diabetic ROS - medication compliance: compliant all of the time,      home glucose monitoring: fasting , non-fasting      home BP Monitoring: not done. further diabetic ROS: no polyuria or polydipsia, no numbness, tingling or pain in extremities, no unusual visual symptoms, no hypoglycemia, no medication side effects noted.    Cardiovascular ROS:  He denies palpitations, orthopnea, exertional chest pressure/discomfort, claudication, lower extremity edema, dyspnea on exertion, dizziness     i  Patient Active Problem List   Diagnosis Code    History of colonoscopy Z98.890    Low back pain M54.5    Hypercholesterolemia E78.00    Osteoarthritis of both knees M17.0    CKD (chronic kidney disease) stage 2, GFR 60-89 ml/min N18.2    Benign hypertension with chronic kidney disease, stage II I12.9, N18.2    Coronary artery disease involving native coronary artery of native heart without angina pectoris I25.10    Controlled type 2 diabetes mellitus without complication, with long-term current use of insulin ([facility]) E11.9, Z79.4    Chronic superficial gastritis without bleeding K29.30     Past Medical History:   Diagnosis Date    CAD (coronary artery disease) April 2014    NSTEMI, PCI/NATE RCA    Hypercholesterolemia     Hypertension      Past Surgical History:   Procedure Laterality Date    CARDIAC SURG PROCEDURE UNLIST      Stent RCA 4/2014    HX CHOLECYSTECTOMY  07/17/2019 Laparoscopic cholecystectomy with intraoperative cholangiogram    HX ORTHOPAEDIC      left shoulder, torn tendon    HX ORTHOPAEDIC      right knee X 4    ID COLONOSCOPY FLX DX W/COLLJ SPEC WHEN PFRMD  2007    Dr Mingo Martins 1 polyp repeat 2012     Social History     Socioeconomic History    Marital status:      Spouse name: Not on file    Number of children: Not on file    Years of education: Not on file    Highest education level: Not on file   Tobacco Use    Smoking status: Former Smoker     Packs/day: 0.25     Years: 35.00     Pack years: 8.75     Types: Cigarettes     Last attempt to quit: 2014     Years since quittin.0    Smokeless tobacco: Never Used    Tobacco comment: Never smoked over a pack per day   Substance and Sexual Activity    Alcohol use: No     Alcohol/week: 0.0 standard drinks    Drug use: No     Family History   Problem Relation Age of Onset    Heart Disease Mother         CAD    Hypertension Mother     Elevated Lipids Mother     Cancer Father         lung    Mental Retardation Brother     Seizures Brother     Diabetes Brother     Hypertension Brother     Elevated Lipids Brother      No Known Allergies  Current Outpatient Medications   Medication Sig Dispense Refill    amLODIPine (NORVASC) 2.5 mg tablet TAKE 1 TABLET BY MOUTH ONCE A DAY 90 Tab 3    insulin glargine (LANTUS,BASAGLAR) 100 unit/mL (3 mL) inpn Inject 20 units under the skin daily 2 Adjustable Dose Pre-filled Pen Syringe 11    ONETOUCH VERIO SYSTEM misc USE TO CHECK GLUCOSE TWICE DAILY  0    ONETOUCH DELICA PLUS LANCET 33 gauge misc USE TO CHECK GLUCOSE TWICE DAILY  5    metFORMIN ER (GLUCOPHAGE XR) 500 mg tablet Take 3 Tabs by mouth daily (with dinner). 270 Tab 3    Insulin Needles, Disposable, 31 gauge x 5/16\" ndle by SubCUTAneous route daily.  50 Pen Needle 5    metoprolol succinate (TOPROL-XL) 25 mg XL tablet TAKE 1 TABLET BY MOUTH ONCE A DAY 90 Tab 3    nitroglycerin (NITROSTAT) 0.4 mg SL tablet Take 1 tablet under tongue every 5 minutes up to 3 doses prn chest pain. 1 Bottle 3    atorvastatin (LIPITOR) 40 mg tablet TAKE 1 TABLET BY MOUTH EVERY EVENING 90 Tab 3    polyethylene glycol (MIRALAX) 17 gram packet Take 1 Packet by mouth daily as needed (constipation).  acetaminophen (TYLENOL) 325 mg tablet Take 2 Tabs by mouth every six (6) hours as needed for Pain.  aspirin delayed-release 81 mg tablet Take 1 Tab by mouth daily. 90 Tab 3       Review of Systems   Constitutional: Negative for malaise/fatigue and weight loss. Gastrointestinal: Negative for constipation, diarrhea and heartburn. Musculoskeletal: Negative for back pain and joint pain. Neurological: Negative for tingling and focal weakness. Visit Vitals  /72 (BP 1 Location: Left arm, BP Patient Position: Sitting)   Pulse 60   Temp 98 °F (36.7 °C) (Oral)   Resp 12   Ht 6' 1\" (1.854 m)   Wt 203 lb (92.1 kg)   SpO2 96%   BMI 26.78 kg/m²     Physical Exam  Vitals signs and nursing note reviewed. Constitutional:       Appearance: Normal appearance. He is well-developed. HENT:      Head: Normocephalic and atraumatic. Eyes:      Conjunctiva/sclera: Conjunctivae normal.      Pupils: Pupils are equal, round, and reactive to light. Neck:      Musculoskeletal: Neck supple. Thyroid: No thyromegaly. Vascular: No carotid bruit. Cardiovascular:      Rate and Rhythm: Normal rate and regular rhythm. Chest Wall: PMI is not displaced. Pulses:           Dorsalis pedis pulses are 2+ on the right side and 2+ on the left side. Posterior tibial pulses are 2+ on the right side and 2+ on the left side. Heart sounds: Normal heart sounds. No murmur. No gallop. Pulmonary:      Effort: Pulmonary effort is normal.      Breath sounds: No wheezing, rhonchi or rales. Abdominal:      General: Bowel sounds are normal. There is no distension or abdominal bruit. Palpations: Abdomen is soft. There is no hepatomegaly, splenomegaly or mass. Tenderness: There is no tenderness. Musculoskeletal:      Right lower leg: No edema. Left lower leg: No edema. Lymphadenopathy:      Cervical: No cervical adenopathy. Upper Body:      Right upper body: No supraclavicular adenopathy. Left upper body: No supraclavicular adenopathy. Skin:     General: Skin is warm and dry. Findings: No rash. Neurological:      Mental Status: He is alert and oriented to person, place, and time. Sensory: No sensory deficit. Motor: Motor function is intact. Gait: Gait is intact. Psychiatric:         Attention and Perception: Attention normal.         Mood and Affect: Mood normal.         Speech: Speech normal.         Behavior: Behavior normal.       Results for orders placed or performed in visit on 12/03/19   HEMOGLOBIN A1C WITH EAG   Result Value Ref Range    Hemoglobin A1c 7.2 (H) 4.8 - 5.6 %    Estimated average glucose 160 mg/dL   HEPATIC FUNCTION PANEL   Result Value Ref Range    Protein, total 6.3 6.0 - 8.5 g/dL    Albumin 4.0 3.6 - 4.8 g/dL    Bilirubin, total <0.2 0.0 - 1.2 mg/dL    Bilirubin, direct 0.06 0.00 - 0.40 mg/dL    Alk.  phosphatase 69 39 - 117 IU/L    AST (SGOT) 22 0 - 40 IU/L    ALT (SGPT) 28 0 - 44 IU/L   METABOLIC PANEL, BASIC   Result Value Ref Range    Glucose 113 (H) 65 - 99 mg/dL    BUN 19 8 - 27 mg/dL    Creatinine 1.08 0.76 - 1.27 mg/dL    GFR est non-AA 72 >59 mL/min/1.73    GFR est AA 83 >59 mL/min/1.73    BUN/Creatinine ratio 18 10 - 24    Sodium 143 134 - 144 mmol/L    Potassium 4.1 3.5 - 5.2 mmol/L    Chloride 106 96 - 106 mmol/L    CO2 21 20 - 29 mmol/L    Calcium 8.7 8.6 - 10.2 mg/dL   SPECIMEN STATUS REPORT   Result Value Ref Range    SPECIMEN STATUS REPORT COMMENT      Lab Results   Component Value Date/Time    Microalb/Creat ratio (ug/mg creat.) 17.9 07/29/2019 03:23 PM     Lab Results   Component Value Date/Time    Cholesterol, total 109 07/14/2019 04:49 AM    HDL Cholesterol 44 07/14/2019 04:49 AM    LDL, calculated 48.2 07/14/2019 04:49 AM    VLDL, calculated 16.8 07/14/2019 04:49 AM    Triglyceride 84 07/14/2019 04:49 AM    CHOL/HDL Ratio 2.5 07/14/2019 04:49 AM         ASSESSMENT and PLAN    ICD-10-CM ICD-9-CM    1. Controlled type 2 diabetes mellitus without complication, with long-term current use of insulin ([facility]) E11.9 250.00 HEMOGLOBIN A1C WITH EAG    Z79.4 V58.67 MICROALBUMIN, UR, RAND W/ MICROALB/CREAT RATIO   2. Benign hypertension with chronic kidney disease, stage II I12.9 403.10     N18.2 585.2    3. Coronary artery disease involving native coronary artery of native heart without angina pectoris I25.10 414.01    4. Hypercholesterolemia E78.00 272.0 LIPID PANEL     Diagnoses and all orders for this visit:    1. Controlled type 2 diabetes mellitus without complication, with long-term current use of insulin ([facility] [address]  Diabetes Mellitus: reasonably well controlled and greatly improved. . Is taking statin  Issues reviewed with him: low cholesterol diet, weight control and daily exercise discussed, glycohemoglobin and other lab monitoring discussed and foot care. -     HEMOGLOBIN A1C WITH EAG; Future  -     MICROALBUMIN, UR, RAND W/ MICROALB/CREAT RATIO; Future    2. Benign hypertension with chronic kidney disease, stage II  Hypertension is controlled and creat is stable. .    3. Coronary artery disease involving native coronary artery of native heart without angina pectoris  Stable taking antiplatelet agent and statin. 4. Hypercholesterolemia  Hyperlipidemia is controlled  -     LIPID PANEL; Future      Follow-up and Dispositions    · Return in about 6 months (around 6/10/2020) for DM, HTN, chol  Fasting lab one week prior. lab results and schedule of future lab studies reviewed with patient  reviewed diet, exercise and weight control  I have discussed the diagnosis, evaluation and treatment options and the intended plan with the patient. Patient understands and is in agreement. The patient has received an after-visit summary and questions were answered concerning future plans. I have discussed side effects and warnings of any new medications with the patient as well.

## 2020-06-10 PROBLEM — K29.30 CHRONIC SUPERFICIAL GASTRITIS WITHOUT BLEEDING: Status: RESOLVED | Noted: 2018-12-04 | Resolved: 2020-06-10

## 2020-06-11 ENCOUNTER — VIRTUAL VISIT (OUTPATIENT)
Dept: INTERNAL MEDICINE CLINIC | Facility: CLINIC | Age: 65
End: 2020-06-11

## 2020-06-11 DIAGNOSIS — Z79.4 CONTROLLED TYPE 2 DIABETES MELLITUS WITHOUT COMPLICATION, WITH LONG-TERM CURRENT USE OF INSULIN (HCC): Primary | ICD-10-CM

## 2020-06-11 DIAGNOSIS — Z13.31 SCREENING FOR DEPRESSION: ICD-10-CM

## 2020-06-11 DIAGNOSIS — E78.00 HYPERCHOLESTEROLEMIA: ICD-10-CM

## 2020-06-11 DIAGNOSIS — I12.9 BENIGN HYPERTENSION WITH CHRONIC KIDNEY DISEASE, STAGE II: ICD-10-CM

## 2020-06-11 DIAGNOSIS — N18.2 BENIGN HYPERTENSION WITH CHRONIC KIDNEY DISEASE, STAGE II: ICD-10-CM

## 2020-06-11 DIAGNOSIS — I25.10 CORONARY ARTERY DISEASE INVOLVING NATIVE CORONARY ARTERY OF NATIVE HEART WITHOUT ANGINA PECTORIS: ICD-10-CM

## 2020-06-11 DIAGNOSIS — E11.9 CONTROLLED TYPE 2 DIABETES MELLITUS WITHOUT COMPLICATION, WITH LONG-TERM CURRENT USE OF INSULIN (HCC): Primary | ICD-10-CM

## 2020-06-11 NOTE — PROGRESS NOTES
Vi Shepherd  Identified pt with two pt identifiers(name and ). Chief Complaint   Patient presents with    Diabetes    Hypertension    Cholesterol Problem       Reviewed record In preparation for visit and have obtained necessary documentation. Has info on advanced directive but has not filled them out. 1. Have you been to the ER, urgent care clinic or hospitalized since your last visit? No     2. Have you seen or consulted any other health care providers outside of the [address] [place] [address] since your last visit? Include any pap smears or colon screening. No    Vitals reviewed with provider. Health Maintenance reviewed: There are no preventive care reminders to display for this patient. Wt Readings from Last 3 Encounters:   12/10/19 203 lb (92.1 kg)   19 204 lb (92.5 kg)   10/15/19 195 lb (88.5 kg)        Temp Readings from Last 3 Encounters:   12/10/19 98 °F (36.7 °C) (Oral)   19 98.1 °F (36.7 °C) (Oral)   19 97.9 °F (36.6 °C) (Oral)        BP Readings from Last 3 Encounters:   12/10/19 118/72   19 126/80   19 116/69        Pulse Readings from Last 3 Encounters:   12/10/19 60   19 63   19 68        Vitals:    20 0700   PainSc:   1          Learning Assessment:   :       Learning Assessment 2018 4/10/2014   PRIMARY LEARNER Patient Patient   HIGHEST LEVEL OF EDUCATION - PRIMARY LEARNER  - SOME COLLEGE   BARRIERS PRIMARY LEARNER - NONE   CO-LEARNER CAREGIVER - No   PRIMARY LANGUAGE ENGLISH ENGLISH    NEED - No   LEARNER PREFERENCE PRIMARY VIDEOS VIDEOS   ANSWERED BY patient Patient   RELATIONSHIP SELF SELF        Depression Screening:   :       3 most recent PHQ Screens 2020   Little interest or pleasure in doing things Not at all   Feeling down, depressed, irritable, or hopeless Not at all   Total Score PHQ 2 0        Fall Risk Assessment:   :     No flowsheet data found.      Abuse Screening:   :       Abuse Screening Questionnaire 6/4/2019 8/29/2017 8/15/2016   Do you ever feel afraid of your partner? N N N   Are you in a relationship with someone who physically or mentally threatens you? N N N   Is it safe for you to go home?  Y Y Y        ADL Screening:   :       ADL Assessment 7/29/2019   Feeding yourself No Help Needed   Getting from bed to chair No Help Needed   Getting dressed No Help Needed   Bathing or showering No Help Needed   Walk across the room (includes cane/walker) No Help Needed   Using the telphone No Help Needed   Taking your medications No Help Needed   Preparing meals No Help Needed   Managing money (expenses/bills) No Help Needed   Moderately strenuous housework (laundry) No Help Needed   Shopping for personal items (toiletries/medicines) No Help Needed   Shopping for groceries No Help Needed   Driving No Help Needed   Climbing a flight of stairs No Help Needed   Getting to places beyond walking distances No Help Needed

## 2020-06-11 NOTE — PROGRESS NOTES
Saurabh Vigil is a 59 y.o. male evaluated via audio only technology on 6/11/2020. Consent: He and/or his health care decision maker is aware that he may receive a bill for this audio only encounter, depending on his insurance coverage, and has provided verbal consent to proceed: Yes    I communicated with the patient and/or health care decision maker about the nature and details of the following:  Assessment & Plan:   Diagnoses and all orders for this visit:    1. Controlled type 2 diabetes mellitus without complication, with long-term current use of insulin ([address]  Much improved after worsening associated with acute pancreatitis. Will check A1c soon. 2. Coronary artery disease involving native coronary artery of native heart without angina pectoris  Stable taking antiplatelet agent and statin. 3. Benign hypertension with chronic kidney disease, stage II  Hypertension is controlled. and creat has returned to normal.     4. Hypercholesterolemia  Hyperlipidemia is controlled    5. Screening for depression-negative  -     BEHAV ASSMT W/SCORE & DOCD/STAND INSTRUMENT        12  Subjective:   Saurabh Vigil is a 59 y.o. male who was seen for No chief complaint on file. He presents for follow up of diabetes mellitus, hypertension, hyperlipidemia, coronary artery disease, history of prior MI and status post coronary artery stenting      Diet and Lifestyle: generally follows a low fat low cholesterol diet, generally follows a low sodium diet, follows a diabetic diet regularly, exercises regularly, nonsmoker  Diabetic ROS - medication compliance: compliant all of the time,      home glucose monitoring: fasting , non-fasting 110's     home BP Monitoring: not done. further diabetic ROS: no polyuria or polydipsia, no numbness, tingling or pain in extremities, no unusual visual symptoms, no hypoglycemia, no medication side effects noted.    Cardiovascular ROS:  He denies palpitations, exertional chest pressure/discomfort, claudication, lower extremity edema, dyspnea on exertion, dizziness    3 most recent PHQ Screens 2020   Little interest or pleasure in doing things Not at all   Feeling down, depressed, irritable, or hopeless Not at all   Total Score PHQ 2 0       Patient Active Problem List   Diagnosis Code    History of colonoscopy Z98.890    Low back pain M54.5    Hypercholesterolemia E78.00    Osteoarthritis of both knees M17.0    CKD (chronic kidney disease) stage 2, GFR 60-89 ml/min N18.2    Benign hypertension with chronic kidney disease, stage II I12.9, N18.2    Coronary artery disease involving native coronary artery of native heart without angina pectoris I25.10    Controlled type 2 diabetes mellitus without complication, with long-term current use of insulin ([facility]) E11.9, Z79.4     Past Medical History:   Diagnosis Date    CAD (coronary artery disease) 2014    NSTEMI, PCI/NATE RCA    Hypercholesterolemia     Hypertension      Past Surgical History:   Procedure Laterality Date    CARDIAC SURG PROCEDURE UNLIST      Stent RCA 2014    HX CHOLECYSTECTOMY  2019    Laparoscopic cholecystectomy with intraoperative cholangiogram    HX ORTHOPAEDIC      left shoulder, torn tendon    HX ORTHOPAEDIC      right knee X 4    [place] COLONOSCOPY FLX DX W/COLLJ SPEC WHEN PFRMD  2007    Dr Madison Rivera 1 polyp repeat 2012     Social History     Socioeconomic History    Marital status:      Spouse name: Not on file    Number of children: Not on file    Years of education: Not on file    Highest education level: Not on file   Tobacco Use    Smoking status: Former Smoker     Packs/day: 0.25     Years: 35.00     Pack years: 8.75     Types: Cigarettes     Last attempt to quit: 2014     Years since quittin.5    Smokeless tobacco: Never Used    Tobacco comment: Never smoked over a pack per day   Substance and Sexual Activity    Alcohol use: No     Alcohol/week: 0.0 standard drinks    Drug use: No     Family History   Problem Relation Age of Onset    Heart Disease Mother         CAD    Hypertension Mother     Elevated Lipids Mother     Cancer Father         lung    Mental Retardation Brother     Seizures Brother     Diabetes Brother     Hypertension Brother     Elevated Lipids Brother      No Known Allergies  Current Outpatient Medications   Medication Sig Dispense Refill    amLODIPine (NORVASC) 2.5 mg tablet TAKE 1 TABLET BY MOUTH ONCE A DAY 90 Tab 3    insulin glargine (LANTUS,BASAGLAR) 100 unit/mL (3 mL) inpn Inject 20 units under the skin daily 2 Adjustable Dose Pre-filled Pen Syringe 11    ONETOUCH VERIO SYSTEM misc Daily  0    ONETOUCH DELICA PLUS LANCET 33 gauge misc USE TO CHECK GLUCOSE TWICE DAILY  5    metFORMIN ER (GLUCOPHAGE XR) 500 mg tablet Take 3 Tabs by mouth daily (with dinner). 270 Tab 3    Insulin Needles, Disposable, 31 gauge x 5/16\" ndle by SubCUTAneous route daily. 50 Pen Needle 5    metoprolol succinate (TOPROL-XL) 25 mg XL tablet TAKE 1 TABLET BY MOUTH ONCE A DAY 90 Tab 3    nitroglycerin (NITROSTAT) 0.4 mg SL tablet Take 1 tablet under tongue every 5 minutes up to 3 doses prn chest pain. 1 Bottle 3    atorvastatin (LIPITOR) 40 mg tablet TAKE 1 TABLET BY MOUTH EVERY EVENING 90 Tab 3    polyethylene glycol (MIRALAX) 17 gram packet Take 1 Packet by mouth daily as needed (constipation).  acetaminophen (TYLENOL) 325 mg tablet Take 2 Tabs by mouth every six (6) hours as needed for Pain.  aspirin delayed-release 81 mg tablet Take 1 Tab by mouth daily. 90 Tab 3               Review of Systems   Constitutional: Negative for malaise/fatigue and weight loss. Gastrointestinal: Negative for constipation and diarrhea. Musculoskeletal: Positive for back pain. Negative for joint pain. Neurological: Negative for tingling and focal weakness.        Lab Results   Component Value Date/Time    Hemoglobin A1c 7.2 (H) 12/03/2019 10:56 AM Hemoglobin A1c (POC) 13.3 08/26/2019 08:10 AM     Lab Results   Component Value Date/Time    Microalb/Creat ratio (ug/mg creat.) 17.9 07/29/2019 03:23 PM     Lab Results   Component Value Date/Time    Cholesterol, total 109 07/14/2019 04:49 AM    HDL Cholesterol 44 07/14/2019 04:49 AM    LDL, calculated 48.2 07/14/2019 04:49 AM    VLDL, calculated 16.8 07/14/2019 04:49 AM    Triglyceride 84 07/14/2019 04:49 AM    CHOL/HDL Ratio 2.5 07/14/2019 04:49 AM     Lab Results   Component Value Date/Time    Sodium 143 12/03/2019 10:56 AM    Potassium 4.1 12/03/2019 10:56 AM    Chloride 106 12/03/2019 10:56 AM    CO2 21 12/03/2019 10:56 AM    Anion gap 6 07/22/2019 04:28 AM    Glucose 113 (H) 12/03/2019 10:56 AM    BUN 19 12/03/2019 10:56 AM    Creatinine 1.08 12/03/2019 10:56 AM    BUN/Creatinine ratio 18 12/03/2019 10:56 AM    GFR est AA 83 12/03/2019 10:56 AM    GFR est non-AA 72 12/03/2019 10:56 AM    Calcium 8.7 12/03/2019 10:56 AM    Bilirubin, total <0.2 12/03/2019 10:56 AM    Alk. phosphatase 69 12/03/2019 10:56 AM    Protein, total 6.3 12/03/2019 10:56 AM    Albumin 4.0 12/03/2019 10:56 AM    Globulin 3.9 07/22/2019 04:28 AM    A-G Ratio 1.6 08/26/2019 08:40 AM    ALT (SGPT) 28 12/03/2019 10:56 AM       I affirm this is a Patient-Initiated Episode with a Patient who has not had a related appointment within my department in the past 7 days or scheduled within the next 24 hours.     Total Time: minutes: 21-30 minutes    Note: not billable if this call serves to triage the patient into an appointment for the relevant concern      Yelitza Gr MD

## 2020-07-09 DIAGNOSIS — E78.00 HYPERCHOLESTEROLEMIA: ICD-10-CM

## 2020-07-09 RX ORDER — ATORVASTATIN CALCIUM 40 MG/1
TABLET, FILM COATED ORAL
Qty: 90 TAB | Refills: 3 | Status: SHIPPED | OUTPATIENT
Start: 2020-07-09 | End: 2020-11-10 | Stop reason: SDUPTHER

## 2020-08-04 ENCOUNTER — OFFICE VISIT (OUTPATIENT)
Dept: CARDIOLOGY CLINIC | Age: 65
End: 2020-08-04
Payer: COMMERCIAL

## 2020-08-04 VITALS
HEART RATE: 64 BPM | DIASTOLIC BLOOD PRESSURE: 70 MMHG | SYSTOLIC BLOOD PRESSURE: 130 MMHG | RESPIRATION RATE: 16 BRPM | OXYGEN SATURATION: 97 % | BODY MASS INDEX: 27.17 KG/M2 | WEIGHT: 205 LBS | HEIGHT: 73 IN

## 2020-08-04 DIAGNOSIS — I12.9 BENIGN HYPERTENSION WITH CHRONIC KIDNEY DISEASE, STAGE II: ICD-10-CM

## 2020-08-04 DIAGNOSIS — E78.2 MIXED HYPERLIPIDEMIA: ICD-10-CM

## 2020-08-04 DIAGNOSIS — N18.2 CKD (CHRONIC KIDNEY DISEASE) STAGE 2, GFR 60-89 ML/MIN: ICD-10-CM

## 2020-08-04 DIAGNOSIS — N18.2 BENIGN HYPERTENSION WITH CHRONIC KIDNEY DISEASE, STAGE II: ICD-10-CM

## 2020-08-04 DIAGNOSIS — I25.10 ASHD (ARTERIOSCLEROTIC HEART DISEASE): Primary | ICD-10-CM

## 2020-08-04 DIAGNOSIS — I25.10 ASHD (ARTERIOSCLEROTIC HEART DISEASE): ICD-10-CM

## 2020-08-04 DIAGNOSIS — E11.9 TYPE 2 DIABETES MELLITUS WITHOUT COMPLICATION, WITHOUT LONG-TERM CURRENT USE OF INSULIN (HCC): ICD-10-CM

## 2020-08-04 DIAGNOSIS — R07.2 PRECORDIAL PAIN: ICD-10-CM

## 2020-08-04 PROCEDURE — 99214 OFFICE O/P EST MOD 30 MIN: CPT | Performed by: INTERNAL MEDICINE

## 2020-08-04 PROCEDURE — 93000 ELECTROCARDIOGRAM COMPLETE: CPT | Performed by: INTERNAL MEDICINE

## 2020-08-04 RX ORDER — METOPROLOL SUCCINATE 25 MG/1
TABLET, EXTENDED RELEASE ORAL
Qty: 90 TAB | Refills: 3 | Status: CANCELLED | OUTPATIENT
Start: 2020-08-04

## 2020-08-04 NOTE — TELEPHONE ENCOUNTER
Patient called to say the mail order pharmacy [facility] called him and needs a refill on the medication starting with met but he did not know which one it was he asked for a call back  [phone]

## 2020-08-04 NOTE — PROGRESS NOTES
Chief Complaint   Patient presents with    Follow-up    Coronary Artery Disease    Hypertension    Cholesterol Problem       1. Have you been to the ER, urgent care clinic since your last visit? Hospitalized since your last visit? Gallbladder surgery 7/2020.    2. Have you seen or consulted any other health care providers outside of the [address] [place] [address] since your last visit? Include any pap smears or colon screening.   Yes Eye exam & GI

## 2020-08-04 NOTE — TELEPHONE ENCOUNTER
I called the patient and verified them by name and date of birth. I informed the patient that we needed to know the names. The patient stated that all [facility] stated was that the prescription starts with a Met. The patient would like to send in both Metformin and Metoprolol. The metformin would not pend as a refill.

## 2020-08-04 NOTE — PROGRESS NOTES
[address], [place], [address] [facility]  [phone]     Subjective:      Pablo Blanchard is a 59 y.o. male is here for routine f/u. He has a PMHx of CAD, HTN and HLD. Last seen by us in 7/19. Since then, C/o more frequent episodes of indigestion that occurs when he is doing yard work, resolves with  Rest.    The patient denies chest pain, orthopnea, PND, LE edema, palpitations, syncope, or presyncope.        Patient Active Problem List    Diagnosis Date Noted    Coronary artery disease involving native coronary artery of native heart without angina pectoris 08/29/2017    Controlled type 2 diabetes mellitus without complication, with long-term current use of insulin ([facility] [address] 08/29/2017    CKD (chronic kidney disease) stage 2, GFR 60-89 ml/min 06/11/2015    Benign hypertension with chronic kidney disease, stage II 06/11/2015    Osteoarthritis of both knees 04/30/2014    Hypercholesterolemia 11/03/2010    History of colonoscopy 03/31/2010    Low back pain 03/31/2010      Jeanne Muñoz MD  Past Medical History:   Diagnosis Date    CAD (coronary artery disease) April 2014    NSTEMI, PCI/NATE RCA    Hypercholesterolemia     Hypertension       Past Surgical History:   Procedure Laterality Date    CARDIAC SURG PROCEDURE UNLIST      Stent RCA 4/2014    HX CHOLECYSTECTOMY  07/17/2019    Laparoscopic cholecystectomy with intraoperative cholangiogram    HX ORTHOPAEDIC      left shoulder, torn tendon    HX ORTHOPAEDIC      right knee X 4    CT COLONOSCOPY FLX DX W/COLLJ SPEC WHEN PFRMD  12/19/2007    Dr Elizabeth Urban 1 polyp repeat 12/2012     No Known Allergies   Family History   Problem Relation Age of Onset    Heart Disease Mother         CAD    Hypertension Mother     Elevated Lipids Mother     Cancer Father         lung    Mental Retardation Brother     Seizures Brother     Diabetes Brother     Hypertension Brother     Elevated Lipids Brother       Social History Socioeconomic History    Marital status:      Spouse name: Not on file    Number of children: Not on file    Years of education: Not on file    Highest education level: Not on file   Occupational History    Not on file   Social Needs    Financial resource strain: Not on file    Food insecurity     Worry: Not on file     Inability: Not on file    Transportation needs     Medical: Not on file     Non-medical: Not on file   Tobacco Use    Smoking status: Former Smoker     Packs/day: 0.25     Years: 35.00     Pack years: 8.75     Types: Cigarettes     Last attempt to quit: 2014     Years since quittin.7    Smokeless tobacco: Never Used    Tobacco comment: Never smoked over a pack per day   Substance and Sexual Activity    Alcohol use: No     Alcohol/week: 0.0 standard drinks    Drug use: No    Sexual activity: Not on file   Lifestyle    Physical activity     Days per week: Not on file     Minutes per session: Not on file    Stress: Not on file   Relationships    Social connections     Talks on phone: Not on file     Gets together: Not on file     Attends Yazidism service: Not on file     Active member of club or organization: Not on file     Attends meetings of clubs or organizations: Not on file     Relationship status: Not on file    Intimate partner violence     Fear of current or ex partner: Not on file     Emotionally abused: Not on file     Physically abused: Not on file     Forced sexual activity: Not on file   Other Topics Concern    Not on file   Social History Narrative    Not on file      Current Outpatient Medications   Medication Sig    metoprolol succinate (TOPROL-XL) 25 mg XL tablet TAKE 1 TABLET BY MOUTH ONCE A DAY    atorvastatin (LIPITOR) 40 mg tablet TAKE 1 TABLET BY MOUTH EVERY EVENING    amLODIPine (NORVASC) 2.5 mg tablet TAKE 1 TABLET BY MOUTH ONCE A DAY    insulin glargine (LANTUS,BASAGLAR) 100 unit/mL (3 mL) inpn Inject 20 units under the skin daily  ONETOUCH VERIO SYSTEM misc Daily    ONETOUCH DELICA PLUS LANCET 33 gauge misc USE TO CHECK GLUCOSE TWICE DAILY    metFORMIN ER (GLUCOPHAGE XR) 500 mg tablet Take 3 Tabs by mouth daily (with dinner).  Insulin Needles, Disposable, 31 gauge x 5/16\" ndle by SubCUTAneous route daily.  nitroglycerin (NITROSTAT) 0.4 mg SL tablet Take 1 tablet under tongue every 5 minutes up to 3 doses prn chest pain.  acetaminophen (TYLENOL) 325 mg tablet Take 2 Tabs by mouth every six (6) hours as needed for Pain.  aspirin delayed-release 81 mg tablet Take 1 Tab by mouth daily. No current facility-administered medications for this visit. Review of Symptoms:  11 systems reviewed, negative other than as stated in the HPI    Physical ExamPhysical Exam:    Vitals:    08/04/20 1030   BP: 130/70   Pulse: 64   Resp: 16   SpO2: 97%   Weight: 205 lb (93 kg)   Height: 6' 1\" (1.854 m)     Body mass index is 27.05 kg/m². General PE  Gen:  NAD  Mental Status - Alert. General Appearance - Not in acute distress. HEENT:  PERRL, no carotid bruits or JVD  Chest and Lung Exam   Inspection: Accessory muscles - No use of accessory muscles in breathing. Auscultation:   Breath sounds: - Normal.   Cardiovascular   Inspection: Jugular vein - Bilateral - Inspection Normal.   Palpation/Percussion:   Apical Impulse: - Normal.   Auscultation: Rhythm - Regular. Heart Sounds - S1 WNL and S2 WNL. No S3 or S4. Murmurs & Other Heart Sounds: Auscultation of the heart reveals - No Murmurs. Peripheral Vascular   Upper Extremity: Inspection - Bilateral - No Cyanotic nailbeds or Digital clubbing. Lower Extremity:   Palpation: Edema - Bilateral - No edema. Abdomen:   Soft, non-tender, bowel sounds are active.   Neuro: A&O times 3, CN and motor grossly WNL    Labs:   Lab Results   Component Value Date/Time    Cholesterol, total 109 07/14/2019 04:49 AM    Cholesterol, total 101 05/28/2019 08:12 AM    Cholesterol, total 96 (L) 05/29/2018 12:19 PM    Cholesterol, total 103 08/22/2017 08:18 AM    Cholesterol, total 90 (L) 08/08/2016 11:18 AM    HDL Cholesterol 44 07/14/2019 04:49 AM    HDL Cholesterol 28 (L) 05/28/2019 08:12 AM    HDL Cholesterol 30 (L) 05/29/2018 12:19 PM    HDL Cholesterol 32 (L) 08/22/2017 08:18 AM    HDL Cholesterol 34 (L) 08/08/2016 11:18 AM    LDL, calculated 48.2 07/14/2019 04:49 AM    LDL, calculated 38 05/28/2019 08:12 AM    LDL, calculated 29 05/29/2018 12:19 PM    LDL, calculated 50 08/22/2017 08:18 AM    LDL, calculated 44 08/08/2016 11:18 AM    Triglyceride 84 07/14/2019 04:49 AM    Triglyceride 98 07/13/2019 07:24 PM    Triglyceride 174 (H) 05/28/2019 08:12 AM    Triglyceride 187 (H) 05/29/2018 12:19 PM    Triglyceride 104 08/22/2017 08:18 AM    CHOL/HDL Ratio 2.5 07/14/2019 04:49 AM    CHOL/HDL Ratio 4.5 10/29/2010 08:57 AM    CHOL/HDL Ratio 4.7 09/15/2009 10:00 AM     Lab Results   Component Value Date/Time    CK 74 07/13/2019 11:35 PM     Lab Results   Component Value Date/Time    Sodium 143 12/03/2019 10:56 AM    Potassium 4.1 12/03/2019 10:56 AM    Chloride 106 12/03/2019 10:56 AM    CO2 21 12/03/2019 10:56 AM    Anion gap 6 07/22/2019 04:28 AM    Glucose 113 (H) 12/03/2019 10:56 AM    BUN 19 12/03/2019 10:56 AM    Creatinine 1.08 12/03/2019 10:56 AM    BUN/Creatinine ratio 18 12/03/2019 10:56 AM    GFR est AA 83 12/03/2019 10:56 AM    GFR est non-AA 72 12/03/2019 10:56 AM    Calcium 8.7 12/03/2019 10:56 AM    Bilirubin, total <0.2 12/03/2019 10:56 AM    Alk. phosphatase 69 12/03/2019 10:56 AM    Protein, total 6.3 12/03/2019 10:56 AM    Albumin 4.0 12/03/2019 10:56 AM    Globulin 3.9 07/22/2019 04:28 AM    A-G Ratio 1.6 08/26/2019 08:40 AM    ALT (SGPT) 28 12/03/2019 10:56 AM       EKG:  NSR      Assessment:     Assessment:      1. ASHD (arteriosclerotic heart disease)    2. Benign hypertension with chronic kidney disease, stage II    3. Mixed hyperlipidemia    4.  Precordial pain        Orders Placed This Encounter  AMB POC EKG ROUTINE W/ 12 LEADS, INTER & REP     Order Specific Question:   Reason for Exam:     Answer:   routine        Plan:     ASHD  S/p NATE to RCA in 4/2014  Normal stress echo in 2015  C/o more frequent episodes of indigestion that occurs when he is doing yard work, resolves with rest  Continue ASA BB CCB statin  Will repeat stress echo     Benign hypertension with chronic kidney disease, stage II  BP well controlled. Continue anti-hypertensive therapy and low sodium diet  Renal function stable -- managed by PCP---labs pending in      HLD  LDL 38 in 12/2018. On statin. Labs and lipids per PCP  [facility] Jeanella Se[facility], NP       Patient seen and examined by me with nurse practitioner. I personally performed all components of the history, physical, and medical decision making and agree with the assessment and plan as noted.     Adalgisa He MD

## 2020-08-05 RX ORDER — METFORMIN HYDROCHLORIDE 500 MG/1
TABLET ORAL
Qty: 270 TAB | Refills: 1 | Status: SHIPPED | OUTPATIENT
Start: 2020-08-05 | End: 2020-11-10 | Stop reason: SDUPTHER

## 2020-08-10 ENCOUNTER — OFFICE VISIT (OUTPATIENT)
Dept: URGENT CARE | Age: 65
End: 2020-08-10
Payer: COMMERCIAL

## 2020-08-10 VITALS — RESPIRATION RATE: 18 BRPM | TEMPERATURE: 98.4 F | OXYGEN SATURATION: 98 % | HEART RATE: 72 BPM

## 2020-08-10 DIAGNOSIS — Z20.822 ENCOUNTER FOR LABORATORY TESTING FOR COVID-19 VIRUS: Primary | ICD-10-CM

## 2020-08-10 PROCEDURE — 99211 OFF/OP EST MAY X REQ PHY/QHP: CPT | Performed by: FAMILY MEDICINE

## 2020-08-12 LAB — SARS-COV-2, NAA: NOT DETECTED

## 2020-08-17 ENCOUNTER — ANCILLARY PROCEDURE (OUTPATIENT)
Dept: CARDIOLOGY CLINIC | Age: 65
End: 2020-08-17
Payer: COMMERCIAL

## 2020-08-17 VITALS
HEIGHT: 73 IN | WEIGHT: 205 LBS | BODY MASS INDEX: 27.17 KG/M2 | SYSTOLIC BLOOD PRESSURE: 130 MMHG | DIASTOLIC BLOOD PRESSURE: 80 MMHG

## 2020-08-17 LAB
STRESS ANGINA INDEX: 1
STRESS BASELINE DIAS BP: 80 MMHG
STRESS BASELINE HR: 68 BPM
STRESS BASELINE SYS BP: 130 MMHG
STRESS ESTIMATED WORKLOAD: 8.5 METS
STRESS EXERCISE DUR MIN: NORMAL MIN:SEC
STRESS O2 SAT PEAK: 97 %
STRESS O2 SAT REST: 98 %
STRESS PEAK DIAS BP: 80 MMHG
STRESS PEAK SYS BP: 180 MMHG
STRESS PERCENT HR ACHIEVED: 91 %
STRESS POST PEAK HR: 142 BPM
STRESS RATE PRESSURE PRODUCT: NORMAL BPM*MMHG
STRESS STAGE 1 BP: NORMAL MMHG
STRESS STAGE 1 DURATION: NORMAL MIN:SEC
STRESS STAGE 1 HR: 118 BPM
STRESS STAGE 2 BP: NORMAL MMHG
STRESS STAGE 2 DURATION: NORMAL MIN:SEC
STRESS STAGE 2 HR: 136 BPM
STRESS STAGE 3 DURATION: NORMAL MIN:SEC
STRESS STAGE 3 HR: 136 BPM
STRESS STAGE RECOVERY 1 BP: NORMAL MMHG
STRESS STAGE RECOVERY 1 DURATION: NORMAL MIN:SEC
STRESS STAGE RECOVERY 1 HR: 93 BPM
STRESS TARGET HR: 156 BPM

## 2020-08-17 PROCEDURE — 93351 STRESS TTE COMPLETE: CPT | Performed by: INTERNAL MEDICINE

## 2020-08-19 DIAGNOSIS — E11.9 TYPE 2 DIABETES MELLITUS WITHOUT COMPLICATION, WITH LONG-TERM CURRENT USE OF INSULIN (HCC): ICD-10-CM

## 2020-08-19 DIAGNOSIS — Z79.4 TYPE 2 DIABETES MELLITUS WITHOUT COMPLICATION, WITH LONG-TERM CURRENT USE OF INSULIN (HCC): ICD-10-CM

## 2020-08-19 NOTE — TELEPHONE ENCOUNTER
Pt called to request a refill of his \"insulin pin, lancets, and needles\" to be called into the [facility]) on file.

## 2020-08-19 NOTE — TELEPHONE ENCOUNTER
REFILL     PCP: Darrell Henry MD     Last appt: Visit date not found   Future Appointments   Date Time Provider Gosia Rogers   8/20/2020  1:00 PM Leia Rosado MD [facility] BS AMB        Requested Prescriptions     Pending Prescriptions Disp Refills    insulin glargine (LANTUS,BASAGLAR) 100 unit/mL (3 mL) inpn 2 Adjustable Dose Pre-filled Pen Syringe      Sig: Inject 20 units under the skin daily    OneTouch Delica Plus Lancet 33 gauge misc   5     Sig: USE TO CHECK GLUCOSE TWICE DAILY    Insulin Needles, Disposable, 31 gauge x 5/16\" ndle 50 Pen Needle 5     Sig: by SubCUTAneous route daily.

## 2020-08-20 ENCOUNTER — OFFICE VISIT (OUTPATIENT)
Dept: CARDIOLOGY CLINIC | Age: 65
End: 2020-08-20
Payer: COMMERCIAL

## 2020-08-20 VITALS
RESPIRATION RATE: 18 BRPM | OXYGEN SATURATION: 98 % | WEIGHT: 204.7 LBS | HEART RATE: 61 BPM | BODY MASS INDEX: 27.13 KG/M2 | DIASTOLIC BLOOD PRESSURE: 72 MMHG | HEIGHT: 73 IN | SYSTOLIC BLOOD PRESSURE: 118 MMHG

## 2020-08-20 DIAGNOSIS — E78.2 MIXED HYPERLIPIDEMIA: ICD-10-CM

## 2020-08-20 DIAGNOSIS — N18.2 BENIGN HYPERTENSION WITH CHRONIC KIDNEY DISEASE, STAGE II: ICD-10-CM

## 2020-08-20 DIAGNOSIS — I25.118 CORONARY ARTERY DISEASE INVOLVING NATIVE CORONARY ARTERY OF NATIVE HEART WITH OTHER FORM OF ANGINA PECTORIS (HCC): Primary | ICD-10-CM

## 2020-08-20 DIAGNOSIS — I12.9 BENIGN HYPERTENSION WITH CHRONIC KIDNEY DISEASE, STAGE II: ICD-10-CM

## 2020-08-20 PROCEDURE — 99214 OFFICE O/P EST MOD 30 MIN: CPT | Performed by: INTERNAL MEDICINE

## 2020-08-20 RX ORDER — LANCETS 33 GAUGE
EACH MISCELLANEOUS
Qty: 200 LANCET | Refills: 3 | Status: SHIPPED | OUTPATIENT
Start: 2020-08-20 | End: 2020-08-20 | Stop reason: SDUPTHER

## 2020-08-20 RX ORDER — INSULIN GLARGINE 100 [IU]/ML
INJECTION, SOLUTION SUBCUTANEOUS
Qty: 5 ADJUSTABLE DOSE PRE-FILLED PEN SYRINGE | Refills: 1 | Status: SHIPPED | OUTPATIENT
Start: 2020-08-20 | End: 2020-11-10 | Stop reason: SDUPTHER

## 2020-08-20 RX ORDER — NITROGLYCERIN 0.4 MG/1
TABLET SUBLINGUAL
Qty: 1 BOTTLE | Refills: 3 | Status: SHIPPED | OUTPATIENT
Start: 2020-08-20

## 2020-08-20 RX ORDER — LANCETS 33 GAUGE
EACH MISCELLANEOUS
Qty: 200 LANCET | Refills: 3 | Status: SHIPPED | OUTPATIENT
Start: 2020-08-20 | End: 2020-11-10 | Stop reason: SDUPTHER

## 2020-08-20 RX ORDER — INSULIN GLARGINE 100 [IU]/ML
INJECTION, SOLUTION SUBCUTANEOUS
Qty: 5 ADJUSTABLE DOSE PRE-FILLED PEN SYRINGE | Refills: 1 | Status: SHIPPED | OUTPATIENT
Start: 2020-08-20 | End: 2020-08-20 | Stop reason: SDUPTHER

## 2020-08-20 RX ORDER — PEN NEEDLE, DIABETIC 30 GX3/16"
NEEDLE, DISPOSABLE MISCELLANEOUS DAILY
Qty: 50 PEN NEEDLE | Refills: 5 | Status: SHIPPED | OUTPATIENT
Start: 2020-08-20 | End: 2020-11-10 | Stop reason: SDUPTHER

## 2020-08-20 RX ORDER — PEN NEEDLE, DIABETIC 30 GX3/16"
NEEDLE, DISPOSABLE MISCELLANEOUS DAILY
Qty: 50 PEN NEEDLE | Refills: 5 | Status: SHIPPED | OUTPATIENT
Start: 2020-08-20 | End: 2020-08-20 | Stop reason: SDUPTHER

## 2020-08-20 NOTE — PROGRESS NOTES
[address], [address] [facility]  [phone]     Subjective:      Malena Morales is a 59 y.o. male is here a f/u after stress echo was positive for ischemia. He reports he continues to have episodes of indigestion that occurs when he is doing yard work, resolves with rest. Most recently yesterday when cutting the grass. The patient denies orthopnea, PND, LE edema, palpitations, syncope, or presyncope.        Patient Active Problem List    Diagnosis Date Noted    Coronary artery disease involving native coronary artery of native heart without angina pectoris 08/29/2017    Controlled type 2 diabetes mellitus without complication, with long-term current use of insulin ([facility] [address] 08/29/2017    CKD (chronic kidney disease) stage 2, GFR 60-89 ml/min 06/11/2015    Benign hypertension with chronic kidney disease, stage II 06/11/2015    Osteoarthritis of both knees 04/30/2014    Hypercholesterolemia 11/03/2010    History of colonoscopy 03/31/2010    Low back pain 03/31/2010      Chris Campbell MD  Past Medical History:   Diagnosis Date    CAD (coronary artery disease) April 2014    NSTEMI, PCI/NATE RCA    Hypercholesterolemia     Hypertension       Past Surgical History:   Procedure Laterality Date    CARDIAC SURG PROCEDURE UNLIST      Stent RCA 4/2014    HX CHOLECYSTECTOMY  07/17/2019    Laparoscopic cholecystectomy with intraoperative cholangiogram    HX ORTHOPAEDIC      left shoulder, torn tendon    HX ORTHOPAEDIC      right knee X 4    [place] COLONOSCOPY FLX DX W/COLLJ SPEC WHEN PFRMD  12/19/2007    Dr Manjinder Miller 1 polyp repeat 12/2012     No Known Allergies   Family History   Problem Relation Age of Onset    Heart Disease Mother         CAD    Hypertension Mother     Elevated Lipids Mother     Cancer Father         lung    Mental Retardation Brother     Seizures Brother     Diabetes Brother     Hypertension Brother     Elevated Lipids Brother       Social History Socioeconomic History    Marital status:      Spouse name: Not on file    Number of children: Not on file    Years of education: Not on file    Highest education level: Not on file   Occupational History    Not on file   Social Needs    Financial resource strain: Not on file    Food insecurity     Worry: Not on file     Inability: Not on file    Transportation needs     Medical: Not on file     Non-medical: Not on file   Tobacco Use    Smoking status: Former Smoker     Packs/day: 0.25     Years: 35.00     Pack years: 8.75     Types: Cigarettes     Last attempt to quit: 2014     Years since quittin.7    Smokeless tobacco: Never Used    Tobacco comment: Never smoked over a pack per day   Substance and Sexual Activity    Alcohol use: No     Alcohol/week: 0.0 standard drinks    Drug use: No    Sexual activity: Not on file   Lifestyle    Physical activity     Days per week: Not on file     Minutes per session: Not on file    Stress: Not on file   Relationships    Social connections     Talks on phone: Not on file     Gets together: Not on file     Attends Muslim service: Not on file     Active member of club or organization: Not on file     Attends meetings of clubs or organizations: Not on file     Relationship status: Not on file    Intimate partner violence     Fear of current or ex partner: Not on file     Emotionally abused: Not on file     Physically abused: Not on file     Forced sexual activity: Not on file   Other Topics Concern    Not on file   Social History Narrative    Not on file      Current Outpatient Medications   Medication Sig    insulin glargine (LANTUS,BASAGLAR) 100 unit/mL (3 mL) inpn Inject 20 units under the skin daily    OneTouch Delica Plus Lancet 33 gauge misc USE TO CHECK GLUCOSE TWICE DAILY    Insulin Needles, Disposable, 31 gauge x 5/16\" ndle by SubCUTAneous route daily.     metFORMIN (GLUCOPHAGE) 500 mg tablet Take 3 tablets by mouth daily with dinner.  metoprolol succinate (TOPROL-XL) 25 mg XL tablet TAKE 1 TABLET BY MOUTH ONCE A DAY    atorvastatin (LIPITOR) 40 mg tablet TAKE 1 TABLET BY MOUTH EVERY EVENING    amLODIPine (NORVASC) 2.5 mg tablet TAKE 1 TABLET BY MOUTH ONCE A DAY    ONETOUCH VERIO SYSTEM misc Daily    nitroglycerin (NITROSTAT) 0.4 mg SL tablet Take 1 tablet under tongue every 5 minutes up to 3 doses prn chest pain.  aspirin delayed-release 81 mg tablet Take 1 Tab by mouth daily.  acetaminophen (TYLENOL) 325 mg tablet Take 2 Tabs by mouth every six (6) hours as needed for Pain. No current facility-administered medications for this visit. Review of Symptoms:  11 systems reviewed, negative other than as stated in the HPI    Physical ExamPhysical Exam:    Vitals:    08/20/20 1312 08/20/20 1313   BP: 118/70 118/72   Pulse: 61    Resp: 18    SpO2: 98%    Weight: 204 lb 11.2 oz (92.9 kg)    Height: 6' 1\" (1.854 m)      Body mass index is 27.01 kg/m². General PE  Gen:  NAD  Mental Status - Alert. General Appearance - Not in acute distress. HEENT:  PERRL, no carotid bruits or JVD  Chest and Lung Exam   Inspection: Accessory muscles - No use of accessory muscles in breathing. Auscultation:   Breath sounds: - Normal.   Cardiovascular   Inspection: Jugular vein - Bilateral - Inspection Normal.   Palpation/Percussion:   Apical Impulse: - Normal.   Auscultation: Rhythm - Regular. Heart Sounds - S1 WNL and S2 WNL. No S3 or S4. Murmurs & Other Heart Sounds: Auscultation of the heart reveals - No Murmurs. Peripheral Vascular   Upper Extremity: Inspection - Bilateral - No Cyanotic nailbeds or Digital clubbing. Lower Extremity:   Palpation: Edema - Bilateral - No edema. Abdomen:   Soft, non-tender, bowel sounds are active.   Neuro: A&O times 3, CN and motor grossly WNL    Labs:   Lab Results   Component Value Date/Time    Cholesterol, total 109 07/14/2019 04:49 AM    Cholesterol, total 101 05/28/2019 08:12 AM Cholesterol, total 96 (L) 05/29/2018 12:19 PM    Cholesterol, total 103 08/22/2017 08:18 AM    Cholesterol, total 90 (L) 08/08/2016 11:18 AM    HDL Cholesterol 44 07/14/2019 04:49 AM    HDL Cholesterol 28 (L) 05/28/2019 08:12 AM    HDL Cholesterol 30 (L) 05/29/2018 12:19 PM    HDL Cholesterol 32 (L) 08/22/2017 08:18 AM    HDL Cholesterol 34 (L) 08/08/2016 11:18 AM    LDL, calculated 48.2 07/14/2019 04:49 AM    LDL, calculated 38 05/28/2019 08:12 AM    LDL, calculated 29 05/29/2018 12:19 PM    LDL, calculated 50 08/22/2017 08:18 AM    LDL, calculated 44 08/08/2016 11:18 AM    Triglyceride 84 07/14/2019 04:49 AM    Triglyceride 98 07/13/2019 07:24 PM    Triglyceride 174 (H) 05/28/2019 08:12 AM    Triglyceride 187 (H) 05/29/2018 12:19 PM    Triglyceride 104 08/22/2017 08:18 AM    CHOL/HDL Ratio 2.5 07/14/2019 04:49 AM    CHOL/HDL Ratio 4.5 10/29/2010 08:57 AM    CHOL/HDL Ratio 4.7 09/15/2009 10:00 AM     Lab Results   Component Value Date/Time    CK 74 07/13/2019 11:35 PM     Lab Results   Component Value Date/Time    Sodium 143 12/03/2019 10:56 AM    Potassium 4.1 12/03/2019 10:56 AM    Chloride 106 12/03/2019 10:56 AM    CO2 21 12/03/2019 10:56 AM    Anion gap 6 07/22/2019 04:28 AM    Glucose 113 (H) 12/03/2019 10:56 AM    BUN 19 12/03/2019 10:56 AM    Creatinine 1.08 12/03/2019 10:56 AM    BUN/Creatinine ratio 18 12/03/2019 10:56 AM    GFR est AA 83 12/03/2019 10:56 AM    GFR est non-AA 72 12/03/2019 10:56 AM    Calcium 8.7 12/03/2019 10:56 AM    Bilirubin, total <0.2 12/03/2019 10:56 AM    Alk. phosphatase 69 12/03/2019 10:56 AM    Protein, total 6.3 12/03/2019 10:56 AM    Albumin 4.0 12/03/2019 10:56 AM    Globulin 3.9 07/22/2019 04:28 AM    A-G Ratio 1.6 08/26/2019 08:40 AM    ALT (SGPT) 28 12/03/2019 10:56 AM       Stress echo 8/17/20: Abnormal stress echocardiogram consistent with ischemia. High risk study. Baseline ECG: Normal EKG. Arrhythmias during stress: occasional PVCs.    A downward-sloping ST segment depression in the inferior and lateral leads was noted during stress (II, III, aVF, V5 and V6). ECG is diagnostic. ST depression noted in recovery. Stress Findings  A stress test was performed following the Shahid protocol, with the patient reaching stage 3. The patient achieved the target heart rate. The patient reported non-limiting angina and chest pain on exertion during stress. Onset of symptoms occurred at stage 2 of the protocol. The patient's response to exercise was adequate for diagnosis. Blood pressure demonstrated a normal response to exercise. Heart rate demonstrated maximal response to stress. Overall, the patient's exercise capacity was normal.   Angina Index is 1. Assessment:     Assessment:      1. Coronary artery disease involving native coronary artery of native heart with other form of angina pectoris ([address]    2. Benign hypertension with chronic kidney disease, stage II    3. Mixed hyperlipidemia        No orders of the defined types were placed in this encounter. Plan:     ASHD  S/p NATE to RCA in 4/2014  Stress echo positive for ischemia  Continues to have episodes of indigestion that occurs when he is doing yard work, resolves with rest. Most recent episode yesterday. He is a candidate for a cardiac cath. I have discussed the risks and benefits of cardiac cath +/- PCI- the patient expressed understanding and wishes to proceed. Continue ASA BB CCB statin. Sending prn NTG refill to pharmacy. CPT [id number], [id number]     Benign hypertension with chronic kidney disease, stage II  BP well controlled. Continue anti-hypertensive therapy and low sodium diet  CMP with labs for precath     HLD  LDL 38 in 12/2018. On statin. Labs and lipids per PCP, no recent lipid panel. Will check with pre cath labs      Patient seen and examined by me with nurse practitioner.   I personally performed all components of the history, physical, and medical decision making and agree with the assessment and plan as noted. I discussed the risks/benefits/alternatives of the procedure with the patient. Risks include (but are not limited to) bleeding, infection, cva/mi/tamponade/death. The patient understands and agrees to proceed.     Rosemary Dang MD

## 2020-08-20 NOTE — PROGRESS NOTES
Chief Complaint   Patient presents with   Samia Claudine Results     here to discuss echo stress done on 8/17/20    Chest Pain     little upon over exertion      1. Have you been to the ER, urgent care clinic since your last visit? Hospitalized since your last visit? No     2. Have you seen or consulted any other health care providers outside of the [address] [place] [address] since your last visit? Include any pap smears or colon screening.  No

## 2020-08-20 NOTE — TELEPHONE ENCOUNTER
Medications were sent to [phone] was suppose to go to [facility] pt will cancel mail order. PCP: Marino Marley MD     Last appt: Visit date not found   Future Appointments   Date Time Provider Gosia Rogers   8/20/2020  1:00 PM Zachary Patel MD [facility] BS AMB        Requested Prescriptions     Pending Prescriptions Disp Refills    Insulin Needles, Disposable, 31 gauge x 5/16\" ndle 50 Pen Needle 5     Sig: by SubCUTAneous route daily. by SubCUTAneous route daily. DX S72.8    OneTouch Delica Plus Lancet 33 gauge misc 200 Lancet 3     Sig: USE TO CHECK GLUCOSE TWICE DAILY DX E11.9    insulin glargine (LANTUS,BASAGLAR) 100 unit/mL (3 mL) inpn 5 Adjustable Dose Pre-filled Pen Syringe 1     Sig: Inject 20 units under the skin daily     Signed Prescriptions Disp Refills    insulin glargine (LANTUS,BASAGLAR) 100 unit/mL (3 mL) inpn 5 Adjustable Dose Pre-filled Pen Syringe 1     Sig: Inject 20 units under the skin daily     Authorizing Provider: BONY CABRERA    OneTouch Delica Plus Lancet 33 gauge misc 200 Lancet 3     Sig: USE TO CHECK GLUCOSE TWICE DAILY     Authorizing Provider: Betzaida Rivas    Insulin Needles, Disposable, 31 gauge x 5/16\" ndle 50 Pen Needle 5     Sig: by SubCUTAneous route daily.      Authorizing Provider: Betzaida Rivas

## 2020-08-21 LAB
ALBUMIN SERPL-MCNC: 4.2 G/DL (ref 3.8–4.8)
ALBUMIN/GLOB SERPL: 1.7 {RATIO} (ref 1.2–2.2)
ALP SERPL-CCNC: 86 IU/L (ref 39–117)
ALT SERPL-CCNC: 32 IU/L (ref 0–44)
AST SERPL-CCNC: 25 IU/L (ref 0–40)
BASOPHILS # BLD AUTO: 0 X10E3/UL (ref 0–0.2)
BASOPHILS NFR BLD AUTO: 1 %
BILIRUB SERPL-MCNC: 0.4 MG/DL (ref 0–1.2)
BUN SERPL-MCNC: 18 MG/DL (ref 8–27)
BUN/CREAT SERPL: 15 (ref 10–24)
CALCIUM SERPL-MCNC: 9.2 MG/DL (ref 8.6–10.2)
CHLORIDE SERPL-SCNC: 101 MMOL/L (ref 96–106)
CHOLEST SERPL-MCNC: 82 MG/DL (ref 100–199)
CK SERPL-CCNC: 181 U/L (ref 41–331)
CO2 SERPL-SCNC: 24 MMOL/L (ref 20–29)
CREAT SERPL-MCNC: 1.19 MG/DL (ref 0.76–1.27)
EOSINOPHIL # BLD AUTO: 0.2 X10E3/UL (ref 0–0.4)
EOSINOPHIL NFR BLD AUTO: 3 %
ERYTHROCYTE [DISTWIDTH] IN BLOOD BY AUTOMATED COUNT: 12.4 % (ref 11.6–15.4)
GLOBULIN SER CALC-MCNC: 2.5 G/DL (ref 1.5–4.5)
GLUCOSE SERPL-MCNC: 229 MG/DL (ref 65–99)
HCT VFR BLD AUTO: 39.5 % (ref 37.5–51)
HDLC SERPL-MCNC: 33 MG/DL
HGB BLD-MCNC: 13.4 G/DL (ref 13–17.7)
IMM GRANULOCYTES # BLD AUTO: 0 X10E3/UL (ref 0–0.1)
IMM GRANULOCYTES NFR BLD AUTO: 0 %
INR PPP: 0.9 (ref 0.8–1.2)
INTERPRETATION, 910389: NORMAL
INTERPRETATION: NORMAL
LDLC SERPL CALC-MCNC: 38 MG/DL (ref 0–99)
LYMPHOCYTES # BLD AUTO: 1.9 X10E3/UL (ref 0.7–3.1)
LYMPHOCYTES NFR BLD AUTO: 31 %
MCH RBC QN AUTO: 31.5 PG (ref 26.6–33)
MCHC RBC AUTO-ENTMCNC: 33.9 G/DL (ref 31.5–35.7)
MCV RBC AUTO: 93 FL (ref 79–97)
MONOCYTES # BLD AUTO: 0.4 X10E3/UL (ref 0.1–0.9)
MONOCYTES NFR BLD AUTO: 7 %
NEUTROPHILS # BLD AUTO: 3.5 X10E3/UL (ref 1.4–7)
NEUTROPHILS NFR BLD AUTO: 58 %
PDF IMAGE, 910387: NORMAL
PLATELET # BLD AUTO: 192 X10E3/UL (ref 150–450)
POTASSIUM SERPL-SCNC: 4.8 MMOL/L (ref 3.5–5.2)
PROT SERPL-MCNC: 6.7 G/DL (ref 6–8.5)
PROTHROMBIN TIME: 10.2 SEC (ref 9.1–12)
RBC # BLD AUTO: 4.25 X10E6/UL (ref 4.14–5.8)
SODIUM SERPL-SCNC: 137 MMOL/L (ref 134–144)
TRIGL SERPL-MCNC: 56 MG/DL (ref 0–149)
VLDLC SERPL CALC-MCNC: 11 MG/DL (ref 5–40)
WBC # BLD AUTO: 6.1 X10E3/UL (ref 3.4–10.8)

## 2020-08-28 ENCOUNTER — HOSPITAL ENCOUNTER (OUTPATIENT)
Age: 65
Discharge: HOME | End: 2020-08-29
Attending: INTERNAL MEDICINE | Admitting: INTERNAL MEDICINE
Payer: COMMERCIAL

## 2020-08-28 ENCOUNTER — APPOINTMENT (OUTPATIENT)
Dept: NON INVASIVE DIAGNOSTICS | Age: 65
End: 2020-08-28
Attending: INTERNAL MEDICINE
Payer: COMMERCIAL

## 2020-08-28 DIAGNOSIS — I25.10 CORONARY ARTERY DISEASE INVOLVING NATIVE CORONARY ARTERY OF NATIVE HEART WITHOUT ANGINA PECTORIS: ICD-10-CM

## 2020-08-28 DIAGNOSIS — R07.9 CHEST PAIN, UNSPECIFIED TYPE: ICD-10-CM

## 2020-08-28 DIAGNOSIS — E78.00 HYPERCHOLESTEROLEMIA: ICD-10-CM

## 2020-08-28 LAB
GLUCOSE BLD STRIP.AUTO-MCNC: 249 MG/DL (ref 65–100)
GLUCOSE BLD STRIP.AUTO-MCNC: 297 MG/DL (ref 65–100)
GLUCOSE BLD STRIP.AUTO-MCNC: 328 MG/DL (ref 65–100)
SERVICE CMNT-IMP: ABNORMAL

## 2020-08-28 PROCEDURE — 77030019569 HC BND COMPR RAD TERU -B: Performed by: INTERNAL MEDICINE

## 2020-08-28 PROCEDURE — 99152 MOD SED SAME PHYS/QHP 5/>YRS: CPT | Performed by: INTERNAL MEDICINE

## 2020-08-28 PROCEDURE — 77030015766: Performed by: INTERNAL MEDICINE

## 2020-08-28 PROCEDURE — C1887 CATHETER, GUIDING: HCPCS | Performed by: INTERNAL MEDICINE

## 2020-08-28 PROCEDURE — 92928 PRQ TCAT PLMT NTRAC ST 1 LES: CPT | Performed by: INTERNAL MEDICINE

## 2020-08-28 PROCEDURE — 74011000258 HC RX REV CODE- 258: Performed by: INTERNAL MEDICINE

## 2020-08-28 PROCEDURE — 77030019697 HC SYR ANGI INFL MRTM -B: Performed by: INTERNAL MEDICINE

## 2020-08-28 PROCEDURE — 82962 GLUCOSE BLOOD TEST: CPT

## 2020-08-28 PROCEDURE — 93306 TTE W/DOPPLER COMPLETE: CPT

## 2020-08-28 PROCEDURE — C1725 CATH, TRANSLUMIN NON-LASER: HCPCS | Performed by: INTERNAL MEDICINE

## 2020-08-28 PROCEDURE — C1769 GUIDE WIRE: HCPCS | Performed by: INTERNAL MEDICINE

## 2020-08-28 PROCEDURE — 93571 IV DOP VEL&/PRESS C FLO 1ST: CPT | Performed by: INTERNAL MEDICINE

## 2020-08-28 PROCEDURE — 93458 L HRT ARTERY/VENTRICLE ANGIO: CPT | Performed by: INTERNAL MEDICINE

## 2020-08-28 PROCEDURE — 74011000250 HC RX REV CODE- 250: Performed by: INTERNAL MEDICINE

## 2020-08-28 PROCEDURE — 85347 COAGULATION TIME ACTIVATED: CPT

## 2020-08-28 PROCEDURE — 92978 ENDOLUMINL IVUS OCT C 1ST: CPT | Performed by: INTERNAL MEDICINE

## 2020-08-28 PROCEDURE — C1894 INTRO/SHEATH, NON-LASER: HCPCS | Performed by: INTERNAL MEDICINE

## 2020-08-28 PROCEDURE — 99153 MOD SED SAME PHYS/QHP EA: CPT | Performed by: INTERNAL MEDICINE

## 2020-08-28 PROCEDURE — 74011250637 HC RX REV CODE- 250/637: Performed by: INTERNAL MEDICINE

## 2020-08-28 PROCEDURE — 74011250636 HC RX REV CODE- 250/636: Performed by: INTERNAL MEDICINE

## 2020-08-28 PROCEDURE — C1753 CATH, INTRAVAS ULTRASOUND: HCPCS | Performed by: INTERNAL MEDICINE

## 2020-08-28 PROCEDURE — 77030010221 HC SPLNT WR POS TELE -B: Performed by: INTERNAL MEDICINE

## 2020-08-28 PROCEDURE — C1874 STENT, COATED/COV W/DEL SYS: HCPCS | Performed by: INTERNAL MEDICINE

## 2020-08-28 PROCEDURE — 77030008543 HC TBNG MON PRSS MRTM -A: Performed by: INTERNAL MEDICINE

## 2020-08-28 PROCEDURE — 77030004549 HC CATH ANGI DX PRF MRTM -A: Performed by: INTERNAL MEDICINE

## 2020-08-28 PROCEDURE — 77030019698 HC SYR ANGI MDLON MRTM -A: Performed by: INTERNAL MEDICINE

## 2020-08-28 PROCEDURE — 74011000636 HC RX REV CODE- 636: Performed by: INTERNAL MEDICINE

## 2020-08-28 PROCEDURE — 74011636637 HC RX REV CODE- 636/637: Performed by: INTERNAL MEDICINE

## 2020-08-28 PROCEDURE — 93005 ELECTROCARDIOGRAM TRACING: CPT

## 2020-08-28 PROCEDURE — 92929 HC PLC DE STNT +/-PTA MAJOR COR VESL/BRNCH  ADD RC: CPT | Performed by: INTERNAL MEDICINE

## 2020-08-28 DEVICE — STENT RONYX35018UX RESOLUTE ONYX 3.50X18
Type: IMPLANTABLE DEVICE | Status: FUNCTIONAL
Brand: RESOLUTE ONYX™

## 2020-08-28 DEVICE — STENT RONYX35015UX RESOLUTE ONYX 3.50X15
Type: IMPLANTABLE DEVICE | Status: FUNCTIONAL
Brand: RESOLUTE ONYX™

## 2020-08-28 RX ORDER — METOPROLOL SUCCINATE 25 MG/1
25 TABLET, EXTENDED RELEASE ORAL DAILY
Status: DISCONTINUED | OUTPATIENT
Start: 2020-08-29 | End: 2020-08-29 | Stop reason: HOSPADM

## 2020-08-28 RX ORDER — MIDAZOLAM HYDROCHLORIDE 1 MG/ML
INJECTION, SOLUTION INTRAMUSCULAR; INTRAVENOUS AS NEEDED
Status: DISCONTINUED | OUTPATIENT
Start: 2020-08-28 | End: 2020-08-28 | Stop reason: HOSPADM

## 2020-08-28 RX ORDER — ACETAMINOPHEN 325 MG/1
650 TABLET ORAL
Status: DISCONTINUED | OUTPATIENT
Start: 2020-08-28 | End: 2020-08-29 | Stop reason: HOSPADM

## 2020-08-28 RX ORDER — HEPARIN SODIUM 1000 [USP'U]/ML
INJECTION, SOLUTION INTRAVENOUS; SUBCUTANEOUS AS NEEDED
Status: DISCONTINUED | OUTPATIENT
Start: 2020-08-28 | End: 2020-08-28 | Stop reason: HOSPADM

## 2020-08-28 RX ORDER — INSULIN LISPRO 100 [IU]/ML
INJECTION, SOLUTION INTRAVENOUS; SUBCUTANEOUS
Status: DISCONTINUED | OUTPATIENT
Start: 2020-08-28 | End: 2020-08-29 | Stop reason: HOSPADM

## 2020-08-28 RX ORDER — SODIUM CHLORIDE 9 MG/ML
125 INJECTION, SOLUTION INTRAVENOUS CONTINUOUS
Status: DISCONTINUED | OUTPATIENT
Start: 2020-08-28 | End: 2020-08-29 | Stop reason: HOSPADM

## 2020-08-28 RX ORDER — FENTANYL CITRATE 50 UG/ML
INJECTION, SOLUTION INTRAMUSCULAR; INTRAVENOUS AS NEEDED
Status: DISCONTINUED | OUTPATIENT
Start: 2020-08-28 | End: 2020-08-28 | Stop reason: HOSPADM

## 2020-08-28 RX ORDER — INSULIN GLARGINE 100 [IU]/ML
20 INJECTION, SOLUTION SUBCUTANEOUS DAILY
Status: DISCONTINUED | OUTPATIENT
Start: 2020-08-29 | End: 2020-08-29 | Stop reason: HOSPADM

## 2020-08-28 RX ORDER — ATORVASTATIN CALCIUM 40 MG/1
40 TABLET, FILM COATED ORAL
Status: DISCONTINUED | OUTPATIENT
Start: 2020-08-28 | End: 2020-08-29 | Stop reason: HOSPADM

## 2020-08-28 RX ORDER — VERAPAMIL HYDROCHLORIDE 2.5 MG/ML
INJECTION, SOLUTION INTRAVENOUS AS NEEDED
Status: DISCONTINUED | OUTPATIENT
Start: 2020-08-28 | End: 2020-08-28 | Stop reason: HOSPADM

## 2020-08-28 RX ORDER — HEPARIN SODIUM 200 [USP'U]/100ML
INJECTION, SOLUTION INTRAVENOUS
Status: COMPLETED | OUTPATIENT
Start: 2020-08-28 | End: 2020-08-28

## 2020-08-28 RX ORDER — MAGNESIUM SULFATE 100 %
4 CRYSTALS MISCELLANEOUS AS NEEDED
Status: DISCONTINUED | OUTPATIENT
Start: 2020-08-28 | End: 2020-08-29 | Stop reason: HOSPADM

## 2020-08-28 RX ORDER — AMLODIPINE BESYLATE 2.5 MG/1
2.5 TABLET ORAL DAILY
Status: DISCONTINUED | OUTPATIENT
Start: 2020-08-29 | End: 2020-08-29 | Stop reason: HOSPADM

## 2020-08-28 RX ORDER — LIDOCAINE HYDROCHLORIDE 10 MG/ML
INJECTION, SOLUTION EPIDURAL; INFILTRATION; INTRACAUDAL; PERINEURAL AS NEEDED
Status: DISCONTINUED | OUTPATIENT
Start: 2020-08-28 | End: 2020-08-28 | Stop reason: HOSPADM

## 2020-08-28 RX ORDER — GUAIFENESIN 100 MG/5ML
81 LIQUID (ML) ORAL DAILY
Status: DISCONTINUED | OUTPATIENT
Start: 2020-08-28 | End: 2020-08-29

## 2020-08-28 RX ORDER — ASPIRIN 81 MG/1
81 TABLET ORAL DAILY
Status: DISCONTINUED | OUTPATIENT
Start: 2020-08-29 | End: 2020-08-29 | Stop reason: HOSPADM

## 2020-08-28 RX ORDER — MORPHINE SULFATE 2 MG/ML
INJECTION, SOLUTION INTRAMUSCULAR; INTRAVENOUS AS NEEDED
Status: DISCONTINUED | OUTPATIENT
Start: 2020-08-28 | End: 2020-08-28 | Stop reason: HOSPADM

## 2020-08-28 RX ORDER — SODIUM CHLORIDE 9 MG/ML
100 INJECTION, SOLUTION INTRAVENOUS CONTINUOUS
Status: DISCONTINUED | OUTPATIENT
Start: 2020-08-28 | End: 2020-08-29 | Stop reason: HOSPADM

## 2020-08-28 RX ORDER — DEXTROSE 50 % IN WATER (D50W) INTRAVENOUS SYRINGE
12.5-25 AS NEEDED
Status: DISCONTINUED | OUTPATIENT
Start: 2020-08-28 | End: 2020-08-29 | Stop reason: HOSPADM

## 2020-08-28 RX ADMIN — ASPIRIN 81 MG CHEWABLE TABLET 81 MG: 81 TABLET CHEWABLE at 09:17

## 2020-08-28 RX ADMIN — SODIUM CHLORIDE 100 ML/HR: 900 INJECTION, SOLUTION INTRAVENOUS at 16:08

## 2020-08-28 RX ADMIN — SODIUM CHLORIDE 125 ML/HR: 900 INJECTION, SOLUTION INTRAVENOUS at 09:15

## 2020-08-28 RX ADMIN — ATORVASTATIN CALCIUM 40 MG: 40 TABLET, FILM COATED ORAL at 22:24

## 2020-08-28 RX ADMIN — INSULIN LISPRO 2 UNITS: 100 INJECTION, SOLUTION INTRAVENOUS; SUBCUTANEOUS at 22:35

## 2020-08-28 RX ADMIN — INSULIN LISPRO 2 UNITS: 100 INJECTION, SOLUTION INTRAVENOUS; SUBCUTANEOUS at 17:00

## 2020-08-28 NOTE — DISCHARGE INSTRUCTIONS
[address] [place], [place] [phone]        Patient ID:  Margy Casey  [id number]  89 y.o.  1955    Admit Date: 8/28/2020    Discharge Date: 8/28/2020     Admitting Physician: Reed Bence, MD     Discharge Physician: Reed Bence, MD    Admission Diagnoses:   Chest pain, unspecified type [R07.9]    Discharge Diagnoses: Active Problems:    * No active hospital problems. *      Discharge Condition: Good    Cardiology Procedures this Admission:  Left heart catheterization with PCI    Disposition: home    Reference discharge instructions provided by nursing for diet and activity. Signed: Reed Bence, MD  8/28/2020  3:26 PM        Radial Cardiac Catheterization/Angiography Discharge Instructions    It is normal to feel tired the first couple days. Take it easy and follow the physicians instructions. CHECK THE CATHETER INSERTION SITE DAILY:    Remove the wrist dressing 24 hours after the procedure. You may shower 24 hours after the procedure. Wash with soap and water and pat dry. Gentle cleaning of the site with soap and water is sufficient, cover with a dry clean dressing or bandage. Do not apply creams or powders to the area. No soaking the wrist for 3 days. Leave the puncture site open to air after 24 hours post-procedure. CALL THE PHYSICIANS:     If the site becomes red, swollen or feels warm to the touch  If there is bleeding or drainage or if there is unusual pain at the radial site. If there is any minor oozing, you may apply a band-aid and remove after 12 hours. If the bleeding continues, hold pressure with the middle finger against the puncture site and the thumb against the back of the wrist,call 911 to be transported to the hospital.  DO NOT DRIVE YOURSELF, [facility] [place] 668.     ACTIVITY:   For the first 24 hours do not manipulate the wrist.  No lifting, pushing or pulling over 3-5 pounds with the affected wrist for 7 daysand no straining the insertion site. Do not life grocery bags or the garbage can, do not run the vacuum  or  for 7 days. Start with short walks as in the hospital and gradually increase as tolerated each day. It is recommended to walk 30 minutes 5-7 days per week. Follow your physicians instructions on activity. Avoid walking outside in extremes of heat or cold. Walk inside when it is cold and windy or hot and humid. Things to keep in mind:  No driving for at least 24 hours, or as designated by your physician. Limit the number of times you go up and down the stairs  Take rests and pace yourself with activity. Be careful and do not strain with bowel movements. MEDICATIONS:    Take all medications as prescribed  Call your physician if you have any questions  Keep an updated list of your medications with you at all times and give a list to your physician and pharmacist    SIGNS AND SYMPTOMS:   Be cautious of symptoms of angina or recurrent symptoms such as chest discomfort, unusual shortness of breath or fatigue. These could be symptoms of restenosis, a new blockage or a heart attack. If your symptoms are relieved with rest it is still recommended that you notify your physician of recurrent chest pain or discomfort. For CHEST PAIN or symptoms of angina not relieved with rest:  If the discomfort is not relieved with rest, and you have been prescribed Nitroglycerin, take as directed (taken under the tongue, one at a time 5 minutes apart for a total of 3 doses). If the discomfort is not relieved after the 3rd nitroglycerin, call 911. If you have not been prescribed Nitroglycerin  and your chest discomfort is not relieved with rest, call 911. AFTER CARE:   Follow up with your physician as instructed.   Follow a heart healthy diet with proper portion control, daily stress management, daily exercise, blood pressure and cholesterol control , and smoking cessation.

## 2020-08-28 NOTE — Clinical Note
Lesion: Located in the Distal RCA. Stent deployed. First inflation pressure = 12 taylor; inflation time: 10 sec.

## 2020-08-28 NOTE — Clinical Note
Lesion: Located in the Mid Cx. Stent deployed. Multiple inflations used. First inflation pressure = 12 taylor; inflation time: 15 sec. Second inflation pressure: 18 taylor; inflation time: 9 sec.

## 2020-08-28 NOTE — Clinical Note
Lesion located in the Mid Cx. Balloon inflated using multiple inflations inflation technique. Lesion #1: Pressure = 8 taylor; Duration = 15 sec. Inflation 2: Pressure = 8 taylor; Duration = 10 sec.

## 2020-08-28 NOTE — Clinical Note
Lesion located in the Distal RCA. Balloon inflated using multiple inflations inflation technique. Lesion #1: Pressure = 10 taylor; Duration = 10 sec. Inflation 2: Pressure = 12 taylor; Duration = 10 sec.

## 2020-08-28 NOTE — PROGRESS NOTES
Problem: Falls - Risk of  Goal: *Absence of Falls  Description: Document Reba Dear Fall Risk and appropriate interventions in the flowsheet.   Outcome: Progressing Towards Goal  Note: Fall Risk Interventions:

## 2020-08-28 NOTE — Clinical Note
Lesion located in the Distal RCA. Balloon inflated using single inflation technique. Lesion #1: Pressure = 12 taylor; Duration = 10 sec.

## 2020-08-28 NOTE — Clinical Note
TRANSFER - OUT REPORT:     Verbal report given to: Deyanira Velasquez RN. Report consisted of patient's Situation, Background, Assessment and   Recommendations(SBAR). Opportunity for questions and clarification was provided. Patient transported to: [facility].

## 2020-08-28 NOTE — PROGRESS NOTES
4:00 PM Bedside report received from RN, Nae Mayberry. 6:35 PM TR band taken down without complication. New dressing applied is c/d/i no bleeding and no hematoma. R radial pulse palpable. VSS. Will continue to monitor. Bedside shift change report given to Dominguez Tinajero (oncoming nurse) by Sabrina Agustin RN (offgoing nurse). Report included the following information SBAR, Kardex, Intake/Output, MAR, Recent Results, Med Rec Status and Cardiac Rhythm NSR.

## 2020-08-29 VITALS
SYSTOLIC BLOOD PRESSURE: 119 MMHG | WEIGHT: 205 LBS | HEIGHT: 73 IN | DIASTOLIC BLOOD PRESSURE: 62 MMHG | RESPIRATION RATE: 18 BRPM | OXYGEN SATURATION: 96 % | TEMPERATURE: 98.4 F | BODY MASS INDEX: 27.17 KG/M2 | HEART RATE: 64 BPM

## 2020-08-29 LAB
GLUCOSE BLD STRIP.AUTO-MCNC: 239 MG/DL (ref 65–100)
SERVICE CMNT-IMP: ABNORMAL

## 2020-08-29 PROCEDURE — 82962 GLUCOSE BLOOD TEST: CPT

## 2020-08-29 PROCEDURE — 99212 OFFICE O/P EST SF 10 MIN: CPT | Performed by: INTERNAL MEDICINE

## 2020-08-29 PROCEDURE — 74011250637 HC RX REV CODE- 250/637: Performed by: INTERNAL MEDICINE

## 2020-08-29 PROCEDURE — 74011636637 HC RX REV CODE- 636/637: Performed by: INTERNAL MEDICINE

## 2020-08-29 RX ADMIN — METOPROLOL SUCCINATE 25 MG: 25 TABLET, EXTENDED RELEASE ORAL at 09:07

## 2020-08-29 RX ADMIN — INSULIN LISPRO 2 UNITS: 100 INJECTION, SOLUTION INTRAVENOUS; SUBCUTANEOUS at 09:07

## 2020-08-29 RX ADMIN — INSULIN GLARGINE 20 UNITS: 100 INJECTION, SOLUTION SUBCUTANEOUS at 09:07

## 2020-08-29 RX ADMIN — AMLODIPINE BESYLATE 2.5 MG: 2.5 TABLET ORAL at 09:07

## 2020-08-29 RX ADMIN — ASPIRIN 81 MG: 81 TABLET, COATED ORAL at 09:07

## 2020-08-29 RX ADMIN — TICAGRELOR 90 MG: 90 TABLET ORAL at 09:08

## 2020-08-29 NOTE — PROGRESS NOTES
[facility]      [facility] [address] [facility]  [phone]      Cardiology Progress Note      8/29/2020 11:01 AM    Admit Date: 8/28/2020    Admit Diagnosis:   Chest pain, unspecified type [R07.9]    Subjective:     Tamiko Journey     No CP    Visit Vitals  /62 (BP 1 Location: Left arm, BP Patient Position: At rest)   Pulse 64   Temp 98.4 °F (36.9 °C)   Resp 18   Ht 6' 1\" (1.854 m)   Wt 205 lb (93 kg)   SpO2 96%   BMI 27.05 kg/m²       Current Facility-Administered Medications   Medication Dose Route Frequency    0.9% sodium chloride infusion  125 mL/hr IntraVENous CONTINUOUS    0.9% sodium chloride infusion  100 mL/hr IntraVENous CONTINUOUS    ticagrelor (BRILINTA) tablet 90 mg  90 mg Oral Q12H    acetaminophen (TYLENOL) tablet 650 mg  650 mg Oral Q6H PRN    amLODIPine (NORVASC) tablet 2.5 mg  2.5 mg Oral DAILY    aspirin delayed-release tablet 81 mg  81 mg Oral DAILY    atorvastatin (LIPITOR) tablet 40 mg  40 mg Oral QHS    insulin glargine (LANTUS) injection 20 Units  20 Units SubCUTAneous DAILY    metoprolol succinate (TOPROL-XL) XL tablet 25 mg  25 mg Oral DAILY    insulin lispro (HUMALOG) injection   SubCUTAneous AC&HS    glucose chewable tablet 16 g  4 Tab Oral PRN    dextrose (D50W) injection syrg 12.5-25 g  12.5-25 g IntraVENous PRN    glucagon (GLUCAGEN) injection 1 mg  1 mg IntraMUSCular PRN       Objective:      Physical Exam:  General Appearance:    Chest:   Clear  Cardiovascular: RRR  Extremities: no edema  Skin:  Warm and dry.     Data Review:   No results for input(s): WBC, HGB, HCT, PLT, HGBEXT, HCTEXT, PLTEXT in the last 72 hours. No results for input(s): NA, K, CL, CO2, GLU, BUN, CREA, CA, MG, PHOS, ALB, TBIL, TBILI, ALT, INR, INREXT in the last 72 hours. No lab exists for component: SGOT    No results for input(s): TROIQ, CPK, CKMB in the last 72 hours.       Intake/Output Summary (Last 24 hours) at 8/29/2020 1101  Last data filed at 8/29/2020 0327  Gross per 24 hour   Intake 540 ml   Output 1750 ml   Net -1210 ml        Telemetry:   EKG:  Cxray:    Assessment:     Active Problems:    * No active hospital problems.  *      Plan:     Stable post PCI; home today    Chad Lynn M.D., [facility] [place]

## 2020-08-29 NOTE — PROGRESS NOTES
Bedside shift change report given to Christiano Nava RN (oncoming nurse) by Eduardo Santacruz RN (offgoing nurse). Report included the following information SBAR, Kardex, Procedure Summary, Intake/Output, MAR, Accordion, Recent Results, Med Rec Status, Cardiac Rhythm NSR, Alarm Parameters , Pre Procedure Checklist, Procedure Verification, Quality Measures and Dual Neuro Assessment. Pt. Is A&O x4, asymptomatic and stable vital signs. Right radial cath site Tegaderm dressing intact, no bleeding and hematoma.

## 2020-08-29 NOTE — PROGRESS NOTES
Bedside shift change report given to Kem Robb RN (oncoming nurse) by Fabi Blanco RN (offgoing nurse). Report included the following information SBAR, Kardex, Procedure Summary, Intake/Output, MAR, Accordion, Recent Results, Med Rec Status, Cardiac Rhythm NSR, Alarm Parameters , Pre Procedure Checklist, Procedure Verification, Quality Measures and Dual Neuro Assessment. Pt. Is A&O x4, asymptomatic and stable vital signs. Right radial cath site Tegaderm dressing intact, no bleeding and hematoma.   Pt. States his wishes to be a full code, witness by Fabi Blanco RN

## 2020-08-29 NOTE — PROGRESS NOTES
Problem: Falls - Risk of  Goal: *Absence of Falls  Description: Document Lacho Argueta Fall Risk and appropriate interventions in the flowsheet. Outcome: Progressing Towards Goal  Note: Fall Risk Interventions:            Medication Interventions: Assess postural VS orthostatic hypotension, Evaluate medications/consider consulting pharmacy, Patient to call before getting OOB, Teach patient to arise slowly                   Problem: Patient Education: Go to Patient Education Activity  Goal: Patient/Family Education  Outcome: Progressing Towards Goal     Problem: Pain  Goal: *Control of Pain  Outcome: Progressing Towards Goal  Goal: *PALLIATIVE CARE:  Alleviation of Pain  Outcome: Progressing Towards Goal     Problem: Patient Education: Go to Patient Education Activity  Goal: Patient/Family Education  Outcome: Progressing Towards Goal     Problem: Pressure Injury - Risk of  Goal: *Prevention of pressure injury  Description: Document Anthony Scale and appropriate interventions in the flowsheet.   Outcome: Progressing Towards Goal  Note: Pressure Injury Interventions:                                            Problem: Patient Education: Go to Patient Education Activity  Goal: Patient/Family Education  Outcome: Progressing Towards Goal     Problem: Patient Education: Go to Patient Education Activity  Goal: Patient/Family Education  Outcome: Progressing Towards Goal     Problem: Unstable angina/NSTEMI: Day of Admission/Day 1  Goal: Off Pathway (Use only if patient is Off Pathway)  Outcome: Progressing Towards Goal  Goal: Activity/Safety  Outcome: Progressing Towards Goal  Goal: Consults, if ordered  Outcome: Progressing Towards Goal  Goal: Diagnostic Test/Procedures  Outcome: Progressing Towards Goal  Goal: Nutrition/Diet  Outcome: Progressing Towards Goal  Goal: Discharge Planning  Outcome: Progressing Towards Goal  Goal: Medications  Outcome: Progressing Towards Goal  Goal: Respiratory  Outcome: Progressing Towards Goal  Goal: Treatments/Interventions/Procedures  Outcome: Progressing Towards Goal  Goal: Psychosocial  Outcome: Progressing Towards Goal  Goal: *Hemodynamically stable  Outcome: Progressing Towards Goal  Goal: *Optimal pain control at patient's stated goal  Outcome: Progressing Towards Goal  Goal: *Lungs clear or at baseline  Outcome: Progressing Towards Goal     Problem: Patient Education: Go to Patient Education Activity  Goal: Patient/Family Education  Outcome: Progressing Towards Goal     Problem: Cath Lab Procedures: Post-Cath Day of Procedure (Initiate SCIP Measures for Post-Op Care)  Goal: Off Pathway (Use only if patient is Off Pathway)  Outcome: Progressing Towards Goal  Goal: Activity/Safety  Outcome: Progressing Towards Goal  Goal: Consults, if ordered  Outcome: Progressing Towards Goal  Goal: Diagnostic Test/Procedures  Outcome: Progressing Towards Goal  Goal: Nutrition/Diet  Outcome: Progressing Towards Goal  Goal: Discharge Planning  Outcome: Progressing Towards Goal  Goal: Medications  Outcome: Progressing Towards Goal  Goal: Respiratory  Outcome: Progressing Towards Goal  Goal: Treatments/Interventions/Procedures  Outcome: Progressing Towards Goal  Goal: Psychosocial  Outcome: Progressing Towards Goal  Goal: *Procedure site is without bleeding and signs of infection six hours post sheath removal  Outcome: Progressing Towards Goal  Goal: *Hemodynamically stable  Outcome: Progressing Towards Goal  Goal: *Optimal pain control at patient's stated goal  Outcome: Progressing Towards Goal     Problem: Diabetes Maintenance:Admission  Goal: Activity/Safety  Outcome: Progressing Towards Goal  Goal: Diagnostic Tests/Procedures  Outcome: Progressing Towards Goal  Goal: Nutrition  Outcome: Progressing Towards Goal  Goal: Medications  Outcome: Progressing Towards Goal  Goal: Treatments/Interventions/Procedures  Outcome: Progressing Towards Goal     Problem: Diabetes Maintenance:Ongoing  Goal: Activity/Safety  Outcome: Progressing Towards Goal  Goal: Nutrition  Outcome: Progressing Towards Goal  Goal: Medications  Outcome: Progressing Towards Goal  Goal: Treatments/Interventsions/Procedures  Outcome: Progressing Towards Goal  Goal: *Blood Glucose 80 to 180 md/dl  Outcome: Progressing Towards Goal     Problem: Diabetes Maintenance:Discharge Outcomes  Goal: *Describes follow-up/return visits to physicians  Outcome: Progressing Towards Goal  Goal: *Blood glucose at patient's target range or approaching  Outcome: Progressing Towards Goal  Goal: *Aware of nutrition guidelines  Outcome: Progressing Towards Goal  Goal: *Verbalizes information about medication  Description: Verbalizes name, dosage, time, side effects, and number of days to  continue medications.   Outcome: Progressing Towards Goal  Goal: *Describes goals, rules, symptoms, and treatments  Description: Describes blood glucose goals, monitoring, sick day rules,  hypo/hyperglycemia prevention, symptoms, and treatment  Outcome: Progressing Towards Goal  Goal: *Describes available outpatient diabetes resources and support systems  Outcome: Progressing Towards Goal

## 2020-08-29 NOTE — PROGRESS NOTES
07:00 - Bedside report rec'd from [facility] TAISHAWAGNER BRO.      11:41 - Discharge instructions given to patient. Verbalizes understanding. PIV and tele removed. Brilinta card and rx given.

## 2020-08-31 LAB
ACT BLD: 208 SECS (ref 79–138)
ACT BLD: 241 SECS (ref 79–138)

## 2020-09-02 LAB
ATRIAL RATE: 57 BPM
CALCULATED P AXIS, ECG09: 72 DEGREES
CALCULATED R AXIS, ECG10: -18 DEGREES
CALCULATED T AXIS, ECG11: 50 DEGREES
DIAGNOSIS, 93000: NORMAL
P-R INTERVAL, ECG05: 196 MS
Q-T INTERVAL, ECG07: 414 MS
QRS DURATION, ECG06: 96 MS
QTC CALCULATION (BEZET), ECG08: 402 MS
VENTRICULAR RATE, ECG03: 57 BPM

## 2020-09-15 ENCOUNTER — TELEPHONE (OUTPATIENT)
Dept: CARDIOLOGY CLINIC | Age: 65
End: 2020-09-15

## 2020-09-15 NOTE — TELEPHONE ENCOUNTER
Edie Garcia calling from the patient's dental office wanted to know if the patient may have his regular dental cleaning today since he just had stents placed on 8/17/20. Spoke with Chloe Hagen NP and she said it was ok for the patient to have the cleaning, he just must stay on his antiplatelet. [facility] and she stated that she understood.

## 2020-09-16 ENCOUNTER — TELEPHONE (OUTPATIENT)
Dept: CARDIOLOGY CLINIC | Age: 65
End: 2020-09-16

## 2020-09-16 NOTE — TELEPHONE ENCOUNTER
Patient needs the following medication refilled for 90 days ticagrelor (Brilinta) 90 mg            [facility] [phone]          Thanks,

## 2020-09-17 NOTE — PROGRESS NOTES
Fabiano Middleton, [facility]    Subjective/HPI:     Tamiko Shah is a 59 y.o. male is here for routine f/u. He has a PMHx of CAD, HTN and HLD. He had cardiac cath for abnormal stress test on 8/28/2020. He received NATE to OM1 and distal RCA. He is feeling better since PCI. Denies complaints of chest pains, shortness of breath. He is concerned that his PCP (Dr. Herb De Jesus) retired and he has not been able to find another one. PCP Provider  Franny Nicolas MD    Past Medical History:   Diagnosis Date    CAD (coronary artery disease) April 2014    NSTEMI, PCI/NATE RCA    Hypercholesterolemia     Hypertension         No Known Allergies     Outpatient Encounter Medications as of 9/23/2020   Medication Sig Dispense Refill    ticagrelor (Brilinta) 90 mg tablet Take 1 Tab by mouth two (2) times a day. 180 Tab 2    Insulin Needles, Disposable, 31 gauge x 5/16\" ndle by SubCUTAneous route daily. by SubCUTAneous route daily. DX E11.9 50 Pen Needle 5    OneTouch Delica Plus Lancet 33 gauge misc USE TO CHECK GLUCOSE TWICE DAILY DX E11.9 200 Lancet 3    insulin glargine (LANTUS,BASAGLAR) 100 unit/mL (3 mL) inpn Inject 20 units under the skin daily 5 Adjustable Dose Pre-filled Pen Syringe 1    nitroglycerin (NITROSTAT) 0.4 mg SL tablet Take 1 tablet under tongue every 5 minutes up to 3 doses prn chest pain. 1 Bottle 3    metFORMIN (GLUCOPHAGE) 500 mg tablet Take 3 tablets by mouth daily with dinner. 270 Tab 1    metoprolol succinate (TOPROL-XL) 25 mg XL tablet TAKE 1 TABLET BY MOUTH ONCE A DAY 90 Tab 3    atorvastatin (LIPITOR) 40 mg tablet TAKE 1 TABLET BY MOUTH EVERY EVENING 90 Tab 3    amLODIPine (NORVASC) 2.5 mg tablet TAKE 1 TABLET BY MOUTH ONCE A DAY 90 Tab 3    ONETOUCH VERIO SYSTEM misc Daily  0    acetaminophen (TYLENOL) 325 mg tablet Take 2 Tabs by mouth every six (6) hours as needed for Pain.  aspirin delayed-release 81 mg tablet Take 1 Tab by mouth daily.  90 Tab 3 No facility-administered encounter medications on file as of 9/23/2020. Review of Symptoms:    Review of Systems   Constitutional: Negative for chills, fever and weight loss. HENT: Negative for nosebleeds. Eyes: Negative for blurred vision and double vision. Respiratory: Negative for cough, shortness of breath and wheezing. Cardiovascular: Negative for chest pain, palpitations, orthopnea, leg swelling and PND. Gastrointestinal: Negative for abdominal pain, blood in stool, diarrhea, nausea and vomiting. Musculoskeletal: Negative for joint pain. Skin: Negative for rash. Neurological: Negative for dizziness, tingling and loss of consciousness. Endo/Heme/Allergies: Does not bruise/bleed easily. Physical Exam:      General: Well developed, in no acute distress, cooperative and alert  HEENT: No carotid bruits, no JVD, trach is midline. Neck Supple, PEERL, EOM intact. Heart:  reg rate and rhythm; normal S1/S2; no murmurs, no gallops or rubs. Respiratory: Clear bilaterally x 4, no wheezing or rales  Abdomen:   Soft, non-tender, no distention, no masses. + BS. Extremities:  Normal cap refill, no cyanosis, atraumatic. No edema. Neuro: A&Ox3, speech clear, gait stable. Skin: Skin color is normal. No rashes or lesions.  Non diaphoretic  Vascular: 2+ pulses symmetric in all extremities    Vitals:    09/23/20 1326   BP: 104/70   Pulse: 64   Resp: 15   SpO2: 98%   Weight: 201 lb 3.2 oz (91.3 kg)   Height: 6' 1\" (1.854 m)       ECG: sinus rhythm    Cardiology Labs:    Lab Results   Component Value Date/Time    Cholesterol, total 82 (L) 08/20/2020 02:06 PM    HDL Cholesterol 33 (L) 08/20/2020 02:06 PM    LDL, calculated 38 08/20/2020 02:06 PM    LDL, calculated 48.2 07/14/2019 04:49 AM    LDL, calculated 38 05/28/2019 08:12 AM    VLDL, calculated 11 08/20/2020 02:06 PM    CHOL/HDL Ratio 2.5 07/14/2019 04:49 AM       Lab Results   Component Value Date/Time    Hemoglobin A1c 7.2 (H) 12/03/2019 10:56 AM    Hemoglobin A1c (POC) 13.3 08/26/2019 08:10 AM       Lab Results   Component Value Date/Time    Sodium 137 08/20/2020 02:06 PM    Potassium 4.8 08/20/2020 02:06 PM    Chloride 101 08/20/2020 02:06 PM    CO2 24 08/20/2020 02:06 PM    Glucose 229 (H) 08/20/2020 02:06 PM    BUN 18 08/20/2020 02:06 PM    Creatinine 1.19 08/20/2020 02:06 PM    BUN/Creatinine ratio 15 08/20/2020 02:06 PM    GFR est AA 74 08/20/2020 02:06 PM    GFR est non-AA 64 08/20/2020 02:06 PM    Calcium 9.2 08/20/2020 02:06 PM    Anion gap 6 07/22/2019 04:28 AM    Bilirubin, total 0.4 08/20/2020 02:06 PM    ALT (SGPT) 32 08/20/2020 02:06 PM    Alk. phosphatase 86 08/20/2020 02:06 PM    Protein, total 6.7 08/20/2020 02:06 PM    Albumin 4.2 08/20/2020 02:06 PM    Globulin 3.9 07/22/2019 04:28 AM    A-G Ratio 1.7 08/20/2020 02:06 PM          Assessment:       ICD-10-CM ICD-9-CM    1. Coronary artery disease involving native coronary artery of native heart without angina pectoris  I25.10 414.01 AMB POC EKG ROUTINE W/ 12 LEADS, INTER & REP   2. Benign hypertension with chronic kidney disease, stage II  I12.9 403.10 AMB POC EKG ROUTINE W/ 12 LEADS, INTER & REP    N18.2 585.2    3. Hypercholesterolemia  E78.00 272.0 AMB POC EKG ROUTINE W/ 12 LEADS, INTER & REP        Plan:     1. Coronary artery disease involving native coronary artery of native heart without angina pectoris  S/p NATE to OM1 and distal RCA on 8/28/2020. Has 50% RPDA stenosis, not significant by FFR (0.91)  Symptoms improved. Echo done 8/2020 with preserved ejection fraction 55-60%  Continue DAPT with Brilinta & ASA for 6 months, uninterrupted  Continue BB, CCB and statin. 2. Benign hypertension with chronic kidney disease, stage II  BP controlled. Continue anti-hypertensive therapy and low sodium diet    3.  Hypercholesterolemia  LDL 38 in 8/2020  Continue statin therapy and low fat, low cholesterol diet  Lipids managed by PCP    F/u with Dr. Shiraz Cedillo in 6 months    Ruiz Selby, NP

## 2020-09-23 ENCOUNTER — OFFICE VISIT (OUTPATIENT)
Dept: CARDIOLOGY CLINIC | Age: 65
End: 2020-09-23
Payer: COMMERCIAL

## 2020-09-23 VITALS
OXYGEN SATURATION: 98 % | WEIGHT: 201.2 LBS | SYSTOLIC BLOOD PRESSURE: 104 MMHG | HEIGHT: 73 IN | DIASTOLIC BLOOD PRESSURE: 70 MMHG | HEART RATE: 64 BPM | RESPIRATION RATE: 15 BRPM | BODY MASS INDEX: 26.66 KG/M2

## 2020-09-23 DIAGNOSIS — I25.10 CORONARY ARTERY DISEASE INVOLVING NATIVE CORONARY ARTERY OF NATIVE HEART WITHOUT ANGINA PECTORIS: Primary | ICD-10-CM

## 2020-09-23 DIAGNOSIS — I12.9 BENIGN HYPERTENSION WITH CHRONIC KIDNEY DISEASE, STAGE II: ICD-10-CM

## 2020-09-23 DIAGNOSIS — E78.00 HYPERCHOLESTEROLEMIA: ICD-10-CM

## 2020-09-23 DIAGNOSIS — N18.2 BENIGN HYPERTENSION WITH CHRONIC KIDNEY DISEASE, STAGE II: ICD-10-CM

## 2020-09-23 PROCEDURE — 99214 OFFICE O/P EST MOD 30 MIN: CPT | Performed by: NURSE PRACTITIONER

## 2020-09-23 PROCEDURE — 93000 ELECTROCARDIOGRAM COMPLETE: CPT | Performed by: NURSE PRACTITIONER

## 2020-09-23 NOTE — PROGRESS NOTES
1. Have you been to the ER, urgent care clinic since your last visit? Hospitalized since your last visit? No.    2. Have you seen or consulted any other health care providers outside of the [address] [place] [address] since your last visit? Include any pap smears or colon screening.    No.      Chief Complaint   Patient presents with   [facility] Follow Up     f/u from cardiac cath- pt denies any cardiac symptoms

## 2020-11-10 DIAGNOSIS — E11.9 TYPE 2 DIABETES MELLITUS WITHOUT COMPLICATION, WITH LONG-TERM CURRENT USE OF INSULIN (HCC): ICD-10-CM

## 2020-11-10 DIAGNOSIS — N18.2 BENIGN HYPERTENSION WITH CHRONIC KIDNEY DISEASE, STAGE II: ICD-10-CM

## 2020-11-10 DIAGNOSIS — Z79.4 TYPE 2 DIABETES MELLITUS WITHOUT COMPLICATION, WITH LONG-TERM CURRENT USE OF INSULIN (HCC): ICD-10-CM

## 2020-11-10 DIAGNOSIS — E11.9 TYPE 2 DIABETES MELLITUS WITHOUT COMPLICATION, WITHOUT LONG-TERM CURRENT USE OF INSULIN (HCC): ICD-10-CM

## 2020-11-10 DIAGNOSIS — E78.00 HYPERCHOLESTEROLEMIA: ICD-10-CM

## 2020-11-10 DIAGNOSIS — I12.9 BENIGN HYPERTENSION WITH CHRONIC KIDNEY DISEASE, STAGE II: ICD-10-CM

## 2020-11-10 NOTE — TELEPHONE ENCOUNTER
Pt recently turned 72 and is now on Medicare. Needs to update rx from [facility] to [address] [place] [address]. Pharmacy has been updated in the pt's chart. Pt's wife called to request new rx for   Requested Prescriptions     Pending Prescriptions Disp Refills    amLODIPine (NORVASC) 2.5 mg tablet 90 Tab 0    atorvastatin (LIPITOR) 40 mg tablet 90 Tab 3    insulin glargine (LANTUS,BASAGLAR) 100 unit/mL (3 mL) inpn 5 Adjustable Dose Pre-filled Pen Syringe 1     Sig: Inject 20 units under the skin daily    Insulin Needles, Disposable, 31 gauge x 5/16\" ndle 50 Pen Needle 5     Sig: by SubCUTAneous route daily. by SubCUTAneous route daily. DX N23.9    OneTouch Delica Plus Lancet 33 gauge misc 200 Lancet 3     Sig: USE TO CHECK GLUCOSE TWICE DAILY DX E11.9    metFORMIN (GLUCOPHAGE) 500 mg tablet 270 Tab 1     Sig: Take 3 tablets by mouth daily with dinner. Indications: Resume on Sunday 8/30/2020    metoprolol succinate (TOPROL-XL) 25 mg XL tablet 90 Tab 3    ticagrelor (Brilinta) 90 mg tablet 180 Tab 2     Sig: Take 1 Tab by mouth two (2) times a day. Be sent to the [facility] pharmacy asap, pt was due to have refills completed on 11/5/20.

## 2020-11-10 NOTE — TELEPHONE ENCOUNTER
PCP: Lisbeth Valles MD     Last appt: Visit date not found   Future Appointments   Date Time Provider Gosia Rogers   11/23/2020  9:30 AM Shivam Sheets MD [facility] BS AMB   3/30/2021 10:00 AM John Monahan MD [facility] AMB        Requested Prescriptions     Pending Prescriptions Disp Refills    amLODIPine (NORVASC) 2.5 mg tablet 90 Tab 0    atorvastatin (LIPITOR) 40 mg tablet 90 Tab 3    insulin glargine (LANTUS,BASAGLAR) 100 unit/mL (3 mL) inpn 5 Adjustable Dose Pre-filled Pen Syringe 1     Sig: Inject 20 units under the skin daily    Insulin Needles, Disposable, 31 gauge x 5/16\" ndle 50 Pen Needle 5     Sig: by SubCUTAneous route daily. by SubCUTAneous route daily. DX Z19.9    OneTouch Delica Plus Lancet 33 gauge misc 200 Lancet 3     Sig: USE TO CHECK GLUCOSE TWICE DAILY DX E11.9    metFORMIN (GLUCOPHAGE) 500 mg tablet 270 Tab 1     Sig: Take 3 tablets by mouth daily with dinner. Indications: Resume on Sunday 8/30/2020    metoprolol succinate (TOPROL-XL) 25 mg XL tablet 90 Tab 3    ticagrelor (Brilinta) 90 mg tablet 180 Tab 2     Sig: Take 1 Tab by mouth two (2) times a day.

## 2020-11-11 RX ORDER — METFORMIN HYDROCHLORIDE 500 MG/1
TABLET ORAL
Qty: 270 TAB | Refills: 1 | Status: SHIPPED | OUTPATIENT
Start: 2020-11-11 | End: 2020-11-23 | Stop reason: SDUPTHER

## 2020-11-11 RX ORDER — ATORVASTATIN CALCIUM 40 MG/1
40 TABLET, FILM COATED ORAL DAILY
Qty: 90 TAB | Refills: 1 | Status: SHIPPED | OUTPATIENT
Start: 2020-11-11 | End: 2020-11-23 | Stop reason: SDUPTHER

## 2020-11-11 RX ORDER — AMLODIPINE BESYLATE 2.5 MG/1
2.5 TABLET ORAL DAILY
Qty: 90 TAB | Refills: 1 | Status: SHIPPED | OUTPATIENT
Start: 2020-11-11 | End: 2020-11-23 | Stop reason: SDUPTHER

## 2020-11-11 RX ORDER — METOPROLOL SUCCINATE 25 MG/1
25 TABLET, EXTENDED RELEASE ORAL DAILY
Qty: 90 TAB | Refills: 1 | Status: SHIPPED | OUTPATIENT
Start: 2020-11-11 | End: 2020-11-23 | Stop reason: SDUPTHER

## 2020-11-11 RX ORDER — INSULIN GLARGINE 100 [IU]/ML
INJECTION, SOLUTION SUBCUTANEOUS
Qty: 5 ADJUSTABLE DOSE PRE-FILLED PEN SYRINGE | Refills: 1 | Status: SHIPPED | OUTPATIENT
Start: 2020-11-11 | End: 2020-11-23 | Stop reason: SDUPTHER

## 2020-11-11 RX ORDER — PEN NEEDLE, DIABETIC 30 GX3/16"
NEEDLE, DISPOSABLE MISCELLANEOUS DAILY
Qty: 50 PEN NEEDLE | Refills: 5 | Status: SHIPPED | OUTPATIENT
Start: 2020-11-11 | End: 2020-11-23 | Stop reason: SDUPTHER

## 2020-11-11 RX ORDER — LANCETS 33 GAUGE
EACH MISCELLANEOUS
Qty: 200 LANCET | Refills: 3 | Status: SHIPPED | OUTPATIENT
Start: 2020-11-11 | End: 2020-11-23 | Stop reason: SDUPTHER

## 2020-11-23 ENCOUNTER — OFFICE VISIT (OUTPATIENT)
Dept: INTERNAL MEDICINE CLINIC | Age: 65
End: 2020-11-23
Payer: MEDICARE

## 2020-11-23 VITALS
WEIGHT: 202 LBS | TEMPERATURE: 98 F | DIASTOLIC BLOOD PRESSURE: 76 MMHG | BODY MASS INDEX: 26.77 KG/M2 | OXYGEN SATURATION: 96 % | HEIGHT: 73 IN | SYSTOLIC BLOOD PRESSURE: 125 MMHG | HEART RATE: 66 BPM | RESPIRATION RATE: 18 BRPM

## 2020-11-23 DIAGNOSIS — Z00.00 WELCOME TO MEDICARE PREVENTIVE VISIT: Primary | ICD-10-CM

## 2020-11-23 DIAGNOSIS — I12.9 BENIGN HYPERTENSION WITH CHRONIC KIDNEY DISEASE, STAGE II: ICD-10-CM

## 2020-11-23 DIAGNOSIS — I25.10 CORONARY ARTERY DISEASE INVOLVING NATIVE CORONARY ARTERY OF NATIVE HEART WITHOUT ANGINA PECTORIS: ICD-10-CM

## 2020-11-23 DIAGNOSIS — Z79.4 TYPE 2 DIABETES MELLITUS WITHOUT COMPLICATION, WITH LONG-TERM CURRENT USE OF INSULIN (HCC): ICD-10-CM

## 2020-11-23 DIAGNOSIS — Z13.6 SCREENING FOR CARDIOVASCULAR CONDITION: ICD-10-CM

## 2020-11-23 DIAGNOSIS — Z87.891 PERSONAL HISTORY OF TOBACCO USE, PRESENTING HAZARDS TO HEALTH: ICD-10-CM

## 2020-11-23 DIAGNOSIS — Z71.89 ADVANCE CARE PLANNING: ICD-10-CM

## 2020-11-23 DIAGNOSIS — N18.2 BENIGN HYPERTENSION WITH CHRONIC KIDNEY DISEASE, STAGE II: ICD-10-CM

## 2020-11-23 DIAGNOSIS — Z13.31 SCREENING FOR DEPRESSION: ICD-10-CM

## 2020-11-23 DIAGNOSIS — E11.9 TYPE 2 DIABETES MELLITUS WITHOUT COMPLICATION, WITH LONG-TERM CURRENT USE OF INSULIN (HCC): ICD-10-CM

## 2020-11-23 DIAGNOSIS — E78.00 HYPERCHOLESTEROLEMIA: ICD-10-CM

## 2020-11-23 LAB — HBA1C MFR BLD HPLC: 8.7 %

## 2020-11-23 PROCEDURE — G8536 NO DOC ELDER MAL SCRN: HCPCS | Performed by: INTERNAL MEDICINE

## 2020-11-23 PROCEDURE — G8427 DOCREV CUR MEDS BY ELIG CLIN: HCPCS | Performed by: INTERNAL MEDICINE

## 2020-11-23 PROCEDURE — G8510 SCR DEP NEG, NO PLAN REQD: HCPCS | Performed by: INTERNAL MEDICINE

## 2020-11-23 PROCEDURE — G8752 SYS BP LESS 140: HCPCS | Performed by: INTERNAL MEDICINE

## 2020-11-23 PROCEDURE — G0402 INITIAL PREVENTIVE EXAM: HCPCS | Performed by: INTERNAL MEDICINE

## 2020-11-23 PROCEDURE — 3017F COLORECTAL CA SCREEN DOC REV: CPT | Performed by: INTERNAL MEDICINE

## 2020-11-23 PROCEDURE — 1101F PT FALLS ASSESS-DOCD LE1/YR: CPT | Performed by: INTERNAL MEDICINE

## 2020-11-23 PROCEDURE — 99215 OFFICE O/P EST HI 40 MIN: CPT | Performed by: INTERNAL MEDICINE

## 2020-11-23 PROCEDURE — G8754 DIAS BP LESS 90: HCPCS | Performed by: INTERNAL MEDICINE

## 2020-11-23 PROCEDURE — 83036 HEMOGLOBIN GLYCOSYLATED A1C: CPT | Performed by: INTERNAL MEDICINE

## 2020-11-23 PROCEDURE — 3052F HG A1C>EQUAL 8.0%<EQUAL 9.0%: CPT | Performed by: INTERNAL MEDICINE

## 2020-11-23 PROCEDURE — G8419 CALC BMI OUT NRM PARAM NOF/U: HCPCS | Performed by: INTERNAL MEDICINE

## 2020-11-23 PROCEDURE — 2022F DILAT RTA XM EVC RTNOPTHY: CPT | Performed by: INTERNAL MEDICINE

## 2020-11-23 RX ORDER — PEN NEEDLE, DIABETIC 30 GX3/16"
NEEDLE, DISPOSABLE MISCELLANEOUS DAILY
Qty: 50 PEN NEEDLE | Refills: 5 | Status: SHIPPED | OUTPATIENT
Start: 2020-11-23 | End: 2021-03-01

## 2020-11-23 RX ORDER — LANCETS 33 GAUGE
EACH MISCELLANEOUS
Qty: 200 LANCET | Refills: 3 | Status: SHIPPED | OUTPATIENT
Start: 2020-11-23 | End: 2021-03-01

## 2020-11-23 RX ORDER — AMLODIPINE BESYLATE 2.5 MG/1
2.5 TABLET ORAL DAILY
Qty: 90 TAB | Refills: 1 | Status: SHIPPED | OUTPATIENT
Start: 2020-11-23 | End: 2021-04-21 | Stop reason: SDUPTHER

## 2020-11-23 RX ORDER — METOPROLOL SUCCINATE 25 MG/1
25 TABLET, EXTENDED RELEASE ORAL DAILY
Qty: 90 TAB | Refills: 1 | Status: SHIPPED | OUTPATIENT
Start: 2020-11-23 | End: 2021-04-20 | Stop reason: SDUPTHER

## 2020-11-23 RX ORDER — INSULIN GLARGINE 100 [IU]/ML
INJECTION, SOLUTION SUBCUTANEOUS
Qty: 5 ADJUSTABLE DOSE PRE-FILLED PEN SYRINGE | Refills: 1 | Status: SHIPPED | OUTPATIENT
Start: 2020-11-23 | End: 2020-11-23

## 2020-11-23 RX ORDER — ATORVASTATIN CALCIUM 40 MG/1
40 TABLET, FILM COATED ORAL DAILY
Qty: 90 TAB | Refills: 1 | Status: SHIPPED | OUTPATIENT
Start: 2020-11-23 | End: 2021-04-20 | Stop reason: SDUPTHER

## 2020-11-23 RX ORDER — INSULIN GLARGINE 100 [IU]/ML
INJECTION, SOLUTION SUBCUTANEOUS
Qty: 5 ADJUSTABLE DOSE PRE-FILLED PEN SYRINGE | Refills: 1 | Status: SHIPPED | OUTPATIENT
Start: 2020-11-23 | End: 2021-03-23

## 2020-11-23 RX ORDER — METFORMIN HYDROCHLORIDE 500 MG/1
TABLET ORAL
Qty: 270 TAB | Refills: 1 | Status: SHIPPED | OUTPATIENT
Start: 2020-11-23 | End: 2020-11-30

## 2020-11-23 NOTE — PROGRESS NOTES
This is a \"Welcome to [employer]"  Initial Preventive Physical Examination (IPPE) providing Personalized Prevention Plan Services (Performed in the first 12 months of enrollment)    I have reviewed the patient's medical history in detail and updated the computerized patient record. History     Presents for Welcome to Medicare Visit and to establish care. His former PCP was Dr. Lola Jean, now retired, last saw by virtual visit on 6/11/20. He has type 2 DM, HTN, hyperlipidemia, and CAD with hx of MI and stent. No complaints today. Had cardiac cath on 8/28/20 after abnormal stress test.  Had NATE to OM and distal RCA. Denies HA's, CP, SOB, dizziness, heart palpitations, or leg swelling. Started on Medicare on 11/1/20. Now using [address]; needs all med refills sent there. He is seen for diabetes. Since last visit he reports no polyuria or polydipsia, no hypoglycemia, no significant changes. Home glucose monitoring: is performed regularly, -115. He reports medication compliance: compliant all of the time. Medication side effects: none. Diabetic diet compliance: compliant most of the time. Lab review: A1c is 8.7% today (11/23/20). Eye exam: UTD. Soc Hx  . Has 2 children and 2 grandchildren. Retired from working with [employer]. Former smoker; quit in 2014. Denies alcohol or recreational drug use. No formal exercise but stays active. ROS  A complete review of systems was performed and is negative except for those mentioned in the HPI.     Past Medical History:   Diagnosis Date    CAD (coronary artery disease) April 2014    NSTEMI, PCI/NATE RCA    Hypercholesterolemia     Hypertension       Past Surgical History:   Procedure Laterality Date    CARDIAC SURG PROCEDURE UNLIST      Stent RCA 4/2014    HX CHOLECYSTECTOMY  07/17/2019    Laparoscopic cholecystectomy with intraoperative cholangiogram    HX ORTHOPAEDIC      left shoulder, torn tendon    HX ORTHOPAEDIC      right knee X 4    AL COLONOSCOPY FLX DX W/COLLJ SPEC WHEN PFRMD  12/19/2007    Dr Gerda Gonzalez 1 polyp repeat 12/2012     Current Outpatient Medications   Medication Sig Dispense Refill    amLODIPine (NORVASC) 2.5 mg tablet Take 1 Tab by mouth daily. Indications: high blood pressure 90 Tab 1    atorvastatin (LIPITOR) 40 mg tablet Take 1 Tab by mouth daily. Indications: high cholesterol 90 Tab 1    Insulin Needles, Disposable, 31 gauge x 5/16\" ndle by SubCUTAneous route daily. by SubCUTAneous route daily. DX E11.9 50 Pen Needle 5    OneTouch Delica Plus Lancet 33 gauge misc USE TO CHECK GLUCOSE TWICE DAILY DX E11.9 200 Lancet 3    metFORMIN (GLUCOPHAGE) 500 mg tablet Take 3 tablets by mouth daily with dinner. Indications: type 2 diabetes mellitus 270 Tab 1    metoprolol succinate (TOPROL-XL) 25 mg XL tablet Take 1 Tab by mouth daily. Indications: high blood pressure and heart 90 Tab 1    ticagrelor (Brilinta) 90 mg tablet Take 1 Tab by mouth two (2) times a day. 180 Tab 1    insulin glargine (LANTUS,BASAGLAR) 100 unit/mL (3 mL) inpn Inject 25 units under the skin every morning 5 Adjustable Dose Pre-filled Pen Syringe 1    nitroglycerin (NITROSTAT) 0.4 mg SL tablet Take 1 tablet under tongue every 5 minutes up to 3 doses prn chest pain. 1 Bottle 3    ONETOUCH VERIO SYSTEM misc Daily  0    acetaminophen (TYLENOL) 325 mg tablet Take 2 Tabs by mouth every six (6) hours as needed for Pain.  aspirin delayed-release 81 mg tablet Take 1 Tab by mouth daily.  80 Tab 3     No Known Allergies    Family History   Problem Relation Age of Onset    Heart Disease Mother         CAD    Hypertension Mother     Elevated Lipids Mother     Cancer Father         lung    Mental Retardation Brother     Seizures Brother     Diabetes Brother     Hypertension Brother     Elevated Lipids Brother      Social History     Tobacco Use    Smoking status: Former Smoker     Packs/day: 0.25     Years: 35.00     Pack years: 8.75     Types: Cigarettes     Last attempt to quit: 2014     Years since quittin.0    Smokeless tobacco: Never Used    Tobacco comment: Never smoked over a pack per day   Substance Use Topics    Alcohol use: No     Alcohol/week: 0.0 standard drinks     Depression Risk Screen     3 most recent PHQ Screens 2020   Little interest or pleasure in doing things Not at all   Feeling down, depressed, irritable, or hopeless Not at all   Total Score PHQ 2 0       Alcohol Risk Screen   Do you average more than 1 drink per night or more than 7 drinks a week: No    In the past three months have you have had more than 4 drinks containing alcohol on one occasion: No        Functional Ability and Level of Safety   Diet: The patient is prescribed and follows a special diet. Hearing: Hearing is good. Vision Screening:  Vision is good. No exam data present     Activities of Daily Living: The home contains: no safety equipment. Patient does total self care     Ambulation: with no difficulty     Exercise level: moderately active     Fall Risk Screen:  No flowsheet data found. Abuse Screen:  Patient is not abused       Screening EKG   EKG order placed: No     Visit Vitals  /76 (BP 1 Location: Left arm, BP Patient Position: Sitting)   Pulse 66   Temp 98 °F (36.7 °C) (Oral)   Resp 18   Ht 6' 1\" (1.854 m)   Wt 202 lb (91.6 kg)   SpO2 96%   BMI 26.65 kg/m²       General: Well-developed and well-nourished, no distress. HEENT:  Head normocephalic/atraumatic, no scleral icterus  Lungs:  Clear to auscultation bilaterally. Good air movement. Heart:  Regular rate and rhythm, normal S1 and S2, no murmur, gallop, or rub  Extremities: No clubbing, cyanosis, or edema. Neurological: Alert and oriented. Psychiatric: Normal mood and affect.  Behavior is normal.   Diabetic foot exam:     Left Foot:   Visual Exam: normal , yellow dystrophic great toenail   Pulse DP: 1+ (weak)   Filament test: normal sensation    Vibratory sensation: normal      Right Foot:   Visual Exam: normal ,  yellow dystrophic great toenail   Pulse DP: 1+ (weak)   Filament test: normal sensation    Vibratory sensation: normal      Results for orders placed or performed in visit on 11/23/20   AMB POC HEMOGLOBIN A1C   Result Value Ref Range    Hemoglobin A1c (POC) 8.7 %       End of Life Planning   Advanced care planning directives were discussed with the patient and /or family/caregiver. Assessment/Plan   Education and counseling provided:  Are appropriate based on today's review and evaluation    Diagnoses and all orders for this visit:    1. Welcome to Medicare preventive visit    2. Type 2 diabetes mellitus without complication, with long-term current use of insulin (HCC)  Not controlled. A1c 8.7% today. Counseled on diabetic diet. Instructed to check BS after dinner sometimes. -     AMB POC HEMOGLOBIN A1C  -      DIABETES FOOT EXAM  -     MICROALBUMIN, UR, RAND W/ MICROALB/CREAT RATIO  -     Refill Insulin Needles, Disposable, 31 gauge x 5/16\" ndle; by SubCUTAneous route daily. by SubCUTAneous route daily. DX E11.9  -     Refill OneTouch Delica Plus Lancet 33 gauge misc; USE TO CHECK GLUCOSE TWICE DAILY DX E11.9  -     Refill metFORMIN (GLUCOPHAGE) 500 mg tablet; Take 3 tablets by mouth daily with dinner. Indications: type 2 diabetes mellitus  -     Refill insulin glargine (LANTUS,BASAGLAR) 100 unit/mL (3 mL) inpn; Inject 25 units under the skin every morning    3. Benign hypertension with chronic kidney disease, stage II  -     amLODIPine (NORVASC) 2.5 mg tablet; Take 1 Tab by mouth daily. Indications: high blood pressure  -     metoprolol succinate (TOPROL-XL) 25 mg XL tablet; Take 1 Tab by mouth daily. Indications: high blood pressure and heart    4. Hypercholesterolemia  Controlled. -     Refill atorvastatin (LIPITOR) 40 mg tablet; Take 1 Tab by mouth daily. Indications: high cholesterol    5.  Coronary artery disease involving native coronary artery of native heart without angina pectoris  -     Refill ticagrelor (Brilinta) 90 mg tablet; Take 1 Tab by mouth two (2) times a day. 6. Screening for cardiovascular condition  -     US EXAM SCREENING AAA; Future    7. Personal history of tobacco use, presenting hazards to health  -     [facility] [address] [place] [address] AAA; Future    8. Screening for depression  -     DEPRESSION SCREEN ANNUAL    9. Advance care planning     Greater than 40 mins direct face-to-face time spent with patient. Greater than 50% of time spent on counseling and coordination of care. Follow-up and Dispositions    · Return in about 3 months (around 2/23/2021), or if symptoms worsen or fail to improve, for DM, HTN.          Health Maintenance Due     Health Maintenance Due   Topic Date Due    Foot Exam Q1  07/29/2020    MICROALBUMIN Q1  07/29/2020    AAA Screening 73-69 YO Male Smoking Patients  11/10/2020    A1C test (Diabetic or Prediabetic)  12/03/2020       Patient Care Team   Patient Care Team:  Gualberto Dubois MD as PCP - General (Internal Medicine)  Juan Noble MD as Physician (Cardiology)  Barbara Lindsay DO (Ophthalmology)

## 2020-11-23 NOTE — PATIENT INSTRUCTIONS
Medicare Wellness Visit, Male The best way to live healthy is to have a lifestyle where you eat a well-balanced diet, exercise regularly, limit alcohol use, and quit all forms of tobacco/nicotine, if applicable. Regular preventive services are another way to keep healthy. Preventive services (vaccines, screening tests, monitoring & exams) can help personalize your care plan, which helps you manage your own care. Screening tests can find health problems at the earliest stages, when they are easiest to treat. [facility] follows the current, evidence-based guidelines published by the [facility] ([facility]) when recommending preventive services for our patients. Because we follow these guidelines, sometimes recommendations change over time as research supports it. (For example, a prostate screening blood test is no longer routinely recommended for men with no symptoms). Of course, you and your doctor may decide to screen more often for some diseases, based on your risk and co-morbidities (chronic disease you are already diagnosed with). Preventive services for you include: - Medicare offers their members a free annual wellness visit, which is time for you and your primary care provider to discuss and plan for your preventive service needs. Take advantage of this benefit every year! 
-All adults over age 72 should receive the recommended pneumonia vaccines. Current USPSTF guidelines recommend a series of two vaccines for the best pneumonia protection.  
-All adults should have a flu vaccine yearly and tetanus vaccine every 10 years. 
-All adults age 48 and older should receive the shingles vaccines (series of two vaccines).       
-All adults age 38-68 who are overweight should have a diabetes screening test once every three years.  
-Other screening tests & preventive services for persons with diabetes include: an eye exam to screen for diabetic retinopathy, a kidney function test, a foot exam, and stricter control over your cholesterol.  
-Cardiovascular screening for adults with routine risk involves an electrocardiogram (ECG) at intervals determined by the provider.  
-Colorectal cancer screening should be done for adults age 54-65 with no increased risk factors for colorectal cancer. There are a number of acceptable methods of screening for this type of cancer. Each test has its own benefits and drawbacks. Discuss with your provider what is most appropriate for you during your annual wellness visit. The different tests include: colonoscopy (considered the best screening method), a fecal occult blood test, a fecal DNA test, and sigmoidoscopy. 
-All adults born between [facility] should be screened once for Hepatitis C. 
-An Abdominal Aortic Aneurysm (AAA) Screening is recommended for men age 73-68 who has ever smoked in their lifetime. Here is a list of your current Health Maintenance items (your personalized list of preventive services) with a due date: 
Health Maintenance Due Topic Date Due  
 Diabetic Foot Care  07/29/2020  Albumin Urine Test  07/29/2020  AAA Screening  11/10/2020  Hemoglobin A1C    12/03/2020 Medicare Wellness Visit, Male The best way to live healthy is to have a lifestyle where you eat a well-balanced diet, exercise regularly, limit alcohol use, and quit all forms of tobacco/nicotine, if applicable. Regular preventive services are another way to keep healthy. Preventive services (vaccines, screening tests, monitoring & exams) can help personalize your care plan, which helps you manage your own care. Screening tests can find health problems at the earliest stages, when they are easiest to treat.   
[facility] follows the current, evidence-based guidelines published by the [employer] (USPSTF) when recommending preventive services for our patients. Because we follow these guidelines, sometimes recommendations change over time as research supports it. (For example, a prostate screening blood test is no longer routinely recommended for men with no symptoms). Of course, you and your doctor may decide to screen more often for some diseases, based on your risk and co-morbidities (chronic disease you are already diagnosed with). Preventive services for you include: - Medicare offers their members a free annual wellness visit, which is time for you and your primary care provider to discuss and plan for your preventive service needs. Take advantage of this benefit every year! 
-All adults over age 72 should receive the recommended pneumonia vaccines. Current USPSTF guidelines recommend a series of two vaccines for the best pneumonia protection.  
-All adults should have a flu vaccine yearly and tetanus vaccine every 10 years. 
-All adults age 48 and older should receive the shingles vaccines (series of two vaccines). -All adults age 38-68 who are overweight should have a diabetes screening test once every three years.  
-Other screening tests & preventive services for persons with diabetes include: an eye exam to screen for diabetic retinopathy, a kidney function test, a foot exam, and stricter control over your cholesterol.  
-Cardiovascular screening for adults with routine risk involves an electrocardiogram (ECG) at intervals determined by the provider.  
-Colorectal cancer screening should be done for adults age 54-65 with no increased risk factors for colorectal cancer. There are a number of acceptable methods of screening for this type of cancer. Each test has its own benefits and drawbacks. Discuss with your provider what is most appropriate for you during your annual wellness visit.  The different tests include: colonoscopy (considered the best screening method), a fecal occult blood test, a fecal DNA test, and sigmoidoscopy. 
-All adults born between [facility] should be screened once for Hepatitis C. 
-An Abdominal Aortic Aneurysm (AAA) Screening is recommended for men age 73-68 who has ever smoked in their lifetime. Here is a list of your current Health Maintenance items (your personalized list of preventive services) with a due date: 
Health Maintenance Due Topic Date Due  
 Diabetic Foot Care  07/29/2020  Albumin Urine Test  07/29/2020  AAA Screening  11/10/2020  Hemoglobin A1C    12/03/2020

## 2020-11-23 NOTE — ACP (ADVANCE CARE PLANNING)
Advance Care Planning     Advance Care Planning (ACP) Physician/NP/PA Conversation      Date of Conversation: 11/23/2020  Conducted with: Patient with Decision Making Capacity    Healthcare Decision Maker:     Click here to complete [address] including selection of the Healthcare Decision Maker Relationship (ie \"Primary\")  Primary: Leena Funez, wife, [phone] (mobile)      Care Preferences:    Hospitalization: \"If your health worsens and it becomes clear that your chance of recovery is unlikely, what would be your preference regarding hospitalization? \"  The patient would prefer hospitalization. Ventilation: \"If you were unable to breathe on your own and your chance of recovery was unlikely, what would be your preference about the use of a ventilator (breathing machine) if it was available to you? \"   The patient would desire the use of a ventilator. Resuscitation: \"In the event your heart stopped as a result of an underlying serious health condition, would you want attempts to be made to restart your heart, or would you prefer a natural death? \"   Yes, attempt to resuscitate.     Additional topics discussed: treatment goals    Conversation Outcomes / Follow-Up Plan:   ACP in process - completing/providing documents   Reviewed DNR/DNI and patient elects Full Code (Attempt Resuscitation)     Length of Voluntary ACP Conversation in minutes:  <16 minutes (Non-Billable)    Hever Washington MD

## 2020-11-23 NOTE — PROGRESS NOTES
Kirti Pace is a 72 y.o. male  Chief Complaint   Patient presents with    Follow Up Chronic Condition     Health Maintenance Due   Topic Date Due    Foot Exam Q1  07/29/2020    MICROALBUMIN Q1  07/29/2020    AAA Screening 73-67 YO Male Smoking Patients  11/10/2020    A1C test (Diabetic or Prediabetic)  12/03/2020     Visit Vitals  /76 (BP 1 Location: Left arm, BP Patient Position: Sitting)   Pulse 66   Temp 98 °F (36.7 °C) (Oral)   Resp 18   Ht 6' 1\" (1.854 m)   Wt 202 lb (91.6 kg)   SpO2 96%   BMI 26.65 kg/m²     1. Have you been to the ER, urgent care clinic since your last visit? Hospitalized since your last visit? No     2. Have you seen or consulted any other health care providers outside of the [address] [place] [address] since your last visit? Include any pap smears or colon screening.  No

## 2020-11-24 DIAGNOSIS — E11.9 TYPE 2 DIABETES MELLITUS WITHOUT COMPLICATION, WITH LONG-TERM CURRENT USE OF INSULIN (HCC): Primary | ICD-10-CM

## 2020-11-24 DIAGNOSIS — Z79.4 TYPE 2 DIABETES MELLITUS WITHOUT COMPLICATION, WITH LONG-TERM CURRENT USE OF INSULIN (HCC): Primary | ICD-10-CM

## 2020-11-24 LAB
ALBUMIN/CREAT UR: 14 MG/G CREAT (ref 0–29)
CREAT UR-MCNC: 307.3 MG/DL
MICROALBUMIN UR-MCNC: 42.2 UG/ML

## 2020-11-24 RX ORDER — LANCETS
EACH MISCELLANEOUS
Qty: 200 EACH | Refills: 3 | Status: SHIPPED | OUTPATIENT
Start: 2020-11-24 | End: 2021-03-01

## 2020-11-24 RX ORDER — BLOOD SUGAR DIAGNOSTIC
STRIP MISCELLANEOUS
Qty: 200 STRIP | Refills: 3 | Status: SHIPPED | OUTPATIENT
Start: 2020-11-24 | End: 2021-03-01

## 2020-11-24 RX ORDER — BLOOD-GLUCOSE METER
EACH MISCELLANEOUS
Qty: 1 EACH | Refills: 0 | Status: SHIPPED | OUTPATIENT
Start: 2020-11-24 | End: 2021-03-01

## 2020-11-24 NOTE — TELEPHONE ENCOUNTER
Per [employer] fax:    \"Pt above has requested we contact you to have prescriptions filled for a 90-day supply.  Pls fax new rx for Accu-chek Haydee Plus meter, Accu-check Haydee Plus test strips, Accu-check Softclix lancets to [phone]\"

## 2020-11-24 NOTE — TELEPHONE ENCOUNTER
REFILL     PCP: Shivam Sheets MD     Last appt: 11/23/2020   Future Appointments   Date Time Provider Gosia Rogers   11[facility]30/2020  [facility]   3/1/2021  9:10 AM Shivam Sheets MD [facility] BS AMB   3/30/2021 10:00 AM John Monahan MD [facility] BS AMB        Requested Prescriptions     Pending Prescriptions Disp Refills    Blood-Glucose Meter (Accu-Chek Haydee Plus Meter) misc 1 Each 0     Sig: Use to check blood sugar twice a day    glucose blood VI test strips (Accu-Chek Haydee Plus test strp) strip 200 Strip 3     Sig: Use to test blood sugar twice a day    lancets (Accu-Chek Softclix Lancets) misc 200 Each 3     Sig: Use to check blood sugar twice a day

## 2020-11-25 DIAGNOSIS — E11.9 TYPE 2 DIABETES MELLITUS WITHOUT COMPLICATION, WITH LONG-TERM CURRENT USE OF INSULIN (HCC): ICD-10-CM

## 2020-11-25 DIAGNOSIS — Z79.4 TYPE 2 DIABETES MELLITUS WITHOUT COMPLICATION, WITH LONG-TERM CURRENT USE OF INSULIN (HCC): ICD-10-CM

## 2020-11-25 RX ORDER — METFORMIN HYDROCHLORIDE 500 MG/1
TABLET ORAL
Qty: 270 TAB | Refills: 1 | Status: CANCELLED | OUTPATIENT
Start: 2020-11-25

## 2020-11-25 NOTE — TELEPHONE ENCOUNTER
Disp  Refills  Start  End     metFORMIN (GLUCOPHAGE) 500 mg tablet  270 Tab  1  11/23/2020      Sig: Take 3 tablets by mouth daily with dinner.  Indications: type 2 diabetes mellitus     Sent to pharmacy as: metFORMIN 500 mg tablet (GLUCOPHAGE)     E-Prescribing Status: Receipt confirmed by pharmacy (11/23/2020 10:08 AM EST)       Prescription was just sent as above.

## 2020-11-25 NOTE — TELEPHONE ENCOUNTER
[address] [place] [address]  on file requests refill   Requested Prescriptions     Pending Prescriptions Disp Refills    metFORMIN (GLUCOPHAGE) 500 mg tablet 270 Tab 1     Sig: Take 3 tablets by mouth daily with dinner.   Indications: type 2 diabetes mellitus

## 2020-11-28 NOTE — PROGRESS NOTES
Letter sent: Your urine microalbumin, or protein, test returned normal.  This means there is no early sign of diabetes affecting your kidneys.

## 2020-11-30 ENCOUNTER — HOSPITAL ENCOUNTER (OUTPATIENT)
Dept: ULTRASOUND IMAGING | Age: 65
Discharge: HOME | End: 2020-11-30
Attending: INTERNAL MEDICINE
Payer: MEDICARE

## 2020-11-30 DIAGNOSIS — E11.9 TYPE 2 DIABETES MELLITUS WITHOUT COMPLICATION, WITH LONG-TERM CURRENT USE OF INSULIN (HCC): ICD-10-CM

## 2020-11-30 DIAGNOSIS — Z13.6 SCREENING FOR CARDIOVASCULAR CONDITION: ICD-10-CM

## 2020-11-30 DIAGNOSIS — Z79.4 TYPE 2 DIABETES MELLITUS WITHOUT COMPLICATION, WITH LONG-TERM CURRENT USE OF INSULIN (HCC): ICD-10-CM

## 2020-11-30 DIAGNOSIS — Z87.891 PERSONAL HISTORY OF TOBACCO USE, PRESENTING HAZARDS TO HEALTH: ICD-10-CM

## 2020-11-30 PROCEDURE — 76706 US ABDL AORTA SCREEN AAA: CPT

## 2020-11-30 RX ORDER — BLOOD SUGAR DIAGNOSTIC
STRIP MISCELLANEOUS
Qty: 200 STRIP | Refills: 3 | Status: CANCELLED | OUTPATIENT
Start: 2020-11-30

## 2020-11-30 RX ORDER — BLOOD-GLUCOSE METER
EACH MISCELLANEOUS
Qty: 1 EACH | Refills: 0 | Status: CANCELLED | OUTPATIENT
Start: 2020-11-30

## 2020-11-30 RX ORDER — METFORMIN HYDROCHLORIDE 500 MG/1
1500 TABLET, EXTENDED RELEASE ORAL
Qty: 270 TAB | Refills: 3 | Status: SHIPPED | OUTPATIENT
Start: 2020-11-30 | End: 2020-12-02

## 2020-11-30 NOTE — TELEPHONE ENCOUNTER
[facility] mail order pharmacy sent a fax requesting a new rx for   Requested Prescriptions     Pending Prescriptions Disp Refills    Blood-Glucose Meter (Accu-Chek Haydee Plus Meter) misc 1 Each 0     Sig: Use to check blood sugar twice a day    glucose blood VI test strips (Accu-Chek Haydee Plus test strp) strip 200 Strip 3     Sig: Use to test blood sugar twice a day     And accu-chek softclix lancets be faxed to [phone]. They are requesting a 90-day supply of all rx.

## 2020-11-30 NOTE — TELEPHONE ENCOUNTER
Pt's wife, Sandra Alexandre, called to discuss the pt's metformin rx. Caller stated that we sent in an rx for Metformin IR and should be Metformin ER, needs to have updated as soon as possible b/c patient is almost out of medication. Caller stated [facility] attempted to reach the office about this change with no response, do not see notes to that affect. Please return pt call at [phone].

## 2020-11-30 NOTE — TELEPHONE ENCOUNTER
Disp  Refills  Start  End     Blood-Glucose Meter (Accu-Chek Haydee Plus Meter) misc  1 Each  0  11/24/2020      Sig: Use to check blood sugar twice a day     Sent to pharmacy as: blood-glucose meter (Accu-Chek Haydee Plus Meter)     E-Prescribing Status: Receipt confirmed by pharmacy (11/24/2020  3:41 PM EST)        Disp  Refills  Start  End     glucose blood VI test strips (Accu-Chek Haydee Plus test strp) strip  200 Strip  3  11/24/2020      Sig: Use to test blood sugar twice a day     Sent to pharmacy as: Accu-Chek Haydee Plus test strips (glucose blood VI test strips)     E-Prescribing Status: Receipt confirmed by pharmacy (11/24/2020  3:41 PM EST)       lancets (Accu-Chek Softclix Lancets) misc  200 Each  3  11/24/2020      Sig: Use to check blood sugar twice a day     Sent to pharmacy as: lancets (Accu-Chek Softclix Lancets)     E-Prescribing Status: Receipt confirmed by pharmacy (11/24/2020  3:41 PM EST)       All the requested prescriptions were sent to [facility] on 11/24/2020.

## 2020-11-30 NOTE — TELEPHONE ENCOUNTER
Verified patients name and date of birth. Spoke to [employer] (on [employer]). Patient needs to have metformin  mg 3 with dinner sent to [address], not regular metformin.

## 2020-12-01 DIAGNOSIS — E11.9 TYPE 2 DIABETES MELLITUS WITHOUT COMPLICATION, WITH LONG-TERM CURRENT USE OF INSULIN (HCC): ICD-10-CM

## 2020-12-01 DIAGNOSIS — Z79.4 TYPE 2 DIABETES MELLITUS WITHOUT COMPLICATION, WITH LONG-TERM CURRENT USE OF INSULIN (HCC): ICD-10-CM

## 2020-12-01 NOTE — TELEPHONE ENCOUNTER
Verified patients name and date of birth. [address] to cancel metformin Er prescription and provider will send rx for regular metformin. See note below.

## 2020-12-01 NOTE — TELEPHONE ENCOUNTER
Ofe Vigil @ [facility] has called because there is a problem with the pt's prescription. Pt has been pouring his metformin prescription into an old bottle. The old bottle has metformin ER label on it. When pt recently called for a refill, he read off the old label. [facility] was able to determine that he was taking regular metformin based on the pills he had. [facility] V[facility] would like for Dr. Pito Marti to confirm which form he should be taking. Please call him back at [phone] ext [phone]. If he is on the phone please leave a message.

## 2020-12-02 RX ORDER — METFORMIN HYDROCHLORIDE 500 MG/1
TABLET ORAL
Qty: 270 TAB | Refills: 1 | Status: SHIPPED | OUTPATIENT
Start: 2020-12-02 | End: 2021-04-25

## 2020-12-02 NOTE — TELEPHONE ENCOUNTER
Morenita Thompson @ [facility] called back to see if there had been any movement on this patient's refill prescription being clarified. Please call back to let him know which to fill. Pt is calling him asking about status.     [phone]  Ext [phone]

## 2020-12-04 NOTE — PROGRESS NOTES
Letter sent: The ultrasound of your aorta, the large blood vessel that runs from your heart into your abdomen, returned normal.  No abdominal aortic aneurysm (AAA) was seen.

## 2021-03-01 ENCOUNTER — OFFICE VISIT (OUTPATIENT)
Dept: INTERNAL MEDICINE CLINIC | Age: 66
End: 2021-03-01
Payer: MEDICARE

## 2021-03-01 VITALS
WEIGHT: 205 LBS | DIASTOLIC BLOOD PRESSURE: 66 MMHG | TEMPERATURE: 98 F | HEIGHT: 73 IN | OXYGEN SATURATION: 95 % | BODY MASS INDEX: 27.17 KG/M2 | RESPIRATION RATE: 14 BRPM | SYSTOLIC BLOOD PRESSURE: 106 MMHG | HEART RATE: 67 BPM

## 2021-03-01 DIAGNOSIS — E78.00 HYPERCHOLESTEROLEMIA: ICD-10-CM

## 2021-03-01 DIAGNOSIS — Z79.4 TYPE 2 DIABETES MELLITUS WITHOUT COMPLICATION, WITH LONG-TERM CURRENT USE OF INSULIN (HCC): Primary | ICD-10-CM

## 2021-03-01 DIAGNOSIS — E66.3 OVERWEIGHT (BMI 25.0-29.9): ICD-10-CM

## 2021-03-01 DIAGNOSIS — E11.9 TYPE 2 DIABETES MELLITUS WITHOUT COMPLICATION, WITH LONG-TERM CURRENT USE OF INSULIN (HCC): Primary | ICD-10-CM

## 2021-03-01 DIAGNOSIS — I25.10 CORONARY ARTERY DISEASE INVOLVING NATIVE CORONARY ARTERY OF NATIVE HEART WITHOUT ANGINA PECTORIS: ICD-10-CM

## 2021-03-01 DIAGNOSIS — I12.9 BENIGN HYPERTENSION WITH CHRONIC KIDNEY DISEASE, STAGE II: ICD-10-CM

## 2021-03-01 DIAGNOSIS — N18.2 BENIGN HYPERTENSION WITH CHRONIC KIDNEY DISEASE, STAGE II: ICD-10-CM

## 2021-03-01 LAB — HBA1C MFR BLD HPLC: 10.8 %

## 2021-03-01 PROCEDURE — 2022F DILAT RTA XM EVC RTNOPTHY: CPT | Performed by: INTERNAL MEDICINE

## 2021-03-01 PROCEDURE — 3017F COLORECTAL CA SCREEN DOC REV: CPT | Performed by: INTERNAL MEDICINE

## 2021-03-01 PROCEDURE — 99214 OFFICE O/P EST MOD 30 MIN: CPT | Performed by: INTERNAL MEDICINE

## 2021-03-01 PROCEDURE — G8752 SYS BP LESS 140: HCPCS | Performed by: INTERNAL MEDICINE

## 2021-03-01 PROCEDURE — 1101F PT FALLS ASSESS-DOCD LE1/YR: CPT | Performed by: INTERNAL MEDICINE

## 2021-03-01 PROCEDURE — G8754 DIAS BP LESS 90: HCPCS | Performed by: INTERNAL MEDICINE

## 2021-03-01 PROCEDURE — G8427 DOCREV CUR MEDS BY ELIG CLIN: HCPCS | Performed by: INTERNAL MEDICINE

## 2021-03-01 PROCEDURE — G8536 NO DOC ELDER MAL SCRN: HCPCS | Performed by: INTERNAL MEDICINE

## 2021-03-01 PROCEDURE — G8510 SCR DEP NEG, NO PLAN REQD: HCPCS | Performed by: INTERNAL MEDICINE

## 2021-03-01 PROCEDURE — 83036 HEMOGLOBIN GLYCOSYLATED A1C: CPT | Performed by: INTERNAL MEDICINE

## 2021-03-01 PROCEDURE — 3046F HEMOGLOBIN A1C LEVEL >9.0%: CPT | Performed by: INTERNAL MEDICINE

## 2021-03-01 PROCEDURE — G8419 CALC BMI OUT NRM PARAM NOF/U: HCPCS | Performed by: INTERNAL MEDICINE

## 2021-03-01 NOTE — PATIENT INSTRUCTIONS
Patient Instructions Your HbA1c today was high at 10.8%, meaning your average blood sugar over the past 3 months was 263. Your goal A1c is less than 7.0%. Check your blood sugars twice a day over the next 3 weeks. Check blood sugar every morning before breakfast.  Some days also check after lunch or after dinner or at bedtime. Bring your blood sugar record book with you to your next clinic visit.

## 2021-03-01 NOTE — PROGRESS NOTES
Identified pt with two pt identifiers. Reviewed record in preparation for visit and have obtained necessary documentation. All patient medications has been reviewed. Chief Complaint   Patient presents with    Diabetes    Hypertension     Additional information about chief complaint:    Visit Vitals  /66 (BP 1 Location: Left upper arm, BP Patient Position: Sitting, BP Cuff Size: Large adult)   Pulse 67   Temp 98 °F (36.7 °C) (Oral)   Resp 14   Ht 6' 1\" (1.854 m)   Wt 205 lb (93 kg)   SpO2 95%   BMI 27.05 kg/m²       Health Maintenance Due   Topic    COVID-19 Vaccine (1 of 2)       1. Have you been to the ER, urgent care clinic since your last visit? Hospitalized since your last visit? No     2. Have you seen or consulted any other health care providers outside of the [address] [place] [address] since your last visit? Include any pap smears or colon screening.   No   bdg

## 2021-03-01 NOTE — PROGRESS NOTES
CC:   Chief Complaint   Patient presents with    Diabetes    Hypertension       HISTORY OF PRESENT ILLNESS  Mikhail Rajput is a 72 y.o. male. Presents for 3 month follow up evaluation. He has type 2 DM, HTN, hyperlipidemia, and CAD with hx of MI and stent (NATE to OM and distal RCA) in 8/20. He is seen for diabetes.  Since last visit he reports no polyuria or polydipsia, no hypoglycemia.  Takes Lantus insulin 25 units nightly and metformin 500 mg 3 tablets with dinner. Home glucose monitoring: is performed regularly (once daily), BS (before bedtime): 110-120's. He reports medication compliance: compliant all of the time.  Medication side effects: none.  Diabetic diet compliance: compliant most of the time.  Lab review: A1c is 10.8% today (3/1/21), was 8.7% on 11/23/20.  Eye exam: UTD.      Typical Meals:  Breakfast: 2 or 3 eggs, sausage, biscuit  Lunch: Peanut butter and jelly sandwich or tuna fish sandwich  Dinner: Salad, fish, chicken (or rib eye steak once a month), vegetables. The patient has hypertension, hyperlipidemia and coronary artery disease. Denies HA's, CP, SOB, dizziness, heart palpitations, or leg swelling. Home BP monitoring: not done. He reports taking medications as instructed, no medication side effects noted. Diet and Lifestyle: generally follows a low fat low cholesterol diet, generally follows a low sodium diet, no formal exercise but active during the day (walks to and from his barn several times a day).       Patient Active Problem List   Diagnosis Code    History of colonoscopy Z98.890    Low back pain M54.5    Hypercholesterolemia E78.00    Osteoarthritis of both knees M17.0    CKD (chronic kidney disease) stage 2, GFR 60-89 ml/min N18.2    Benign hypertension with chronic kidney disease, stage II I12.9, N18.2    Coronary artery disease involving native coronary artery of native heart without angina pectoris I25.10    Type 2 diabetes mellitus without complication, with long-term current use of insulin (HCC) E11.9, Z79.4     Past Medical History:   Diagnosis Date    CAD (coronary artery disease) April 2014    NSTEMI, PCI/NATE RCA    Hypercholesterolemia     Hypertension      No Known Allergies    Current Outpatient Medications   Medication Sig Dispense Refill    metFORMIN (GLUCOPHAGE) 500 mg tablet Take 3 tablets by mouth daily with dinner. Indications: type 2 diabetes mellitus 270 Tab 1    Blood-Glucose Meter (Accu-Chek Haydee Plus Meter) misc Use to check blood sugar twice a day 1 Each 0    glucose blood VI test strips (Accu-Chek Haydee Plus test strp) strip Use to test blood sugar twice a day 200 Strip 3    lancets (Accu-Chek Softclix Lancets) misc Use to check blood sugar twice a day 200 Each 3    amLODIPine (NORVASC) 2.5 mg tablet Take 1 Tab by mouth daily. Indications: high blood pressure 90 Tab 1    atorvastatin (LIPITOR) 40 mg tablet Take 1 Tab by mouth daily. Indications: high cholesterol 90 Tab 1    Insulin Needles, Disposable, 31 gauge x 5/16\" ndle by SubCUTAneous route daily. by SubCUTAneous route daily. DX E11.9 50 Pen Needle 5    OneTouch Delica Plus Lancet 33 gauge misc USE TO CHECK GLUCOSE TWICE DAILY DX E11.9 200 Lancet 3    metoprolol succinate (TOPROL-XL) 25 mg XL tablet Take 1 Tab by mouth daily. Indications: high blood pressure and heart 90 Tab 1    ticagrelor (Brilinta) 90 mg tablet Take 1 Tab by mouth two (2) times a day. 180 Tab 1    insulin glargine (LANTUS,BASAGLAR) 100 unit/mL (3 mL) inpn Inject 25 units under the skin every morning 5 Adjustable Dose Pre-filled Pen Syringe 1    nitroglycerin (NITROSTAT) 0.4 mg SL tablet Take 1 tablet under tongue every 5 minutes up to 3 doses prn chest pain. 1 Bottle 3    ONETOUCH VERIO SYSTEM misc Daily  0    acetaminophen (TYLENOL) 325 mg tablet Take 2 Tabs by mouth every six (6) hours as needed for Pain.  aspirin delayed-release 81 mg tablet Take 1 Tab by mouth daily.  90 Tab 3         PHYSICAL EXAM  Visit Vitals  /66 (BP 1 Location: Left upper arm, BP Patient Position: Sitting, BP Cuff Size: Large adult)   Pulse 67   Temp 98 °F (36.7 °C) (Oral)   Resp 14   Ht 6' 1\" (1.854 m)   Wt 205 lb (93 kg)   SpO2 95%   BMI 27.05 kg/m²       General: Well-developed and well-nourished, no distress. HEENT:  Head normocephalic/atraumatic, no scleral icterus  Lungs:  Clear to ausculation bilaterally. Good air movement. Heart:  Regular rate and rhythm, normal S1 and S2, no murmur, gallop, or rub  Abdomen: Soft, non-distended, normal bowel sounds, no tenderness, no guarding, masses, rebound tenderness, or HSM. Extremities: No clubbing, cyanosis, or edema. Neurological: Alert and oriented. Psychiatric: Normal mood and affect. Behavior is normal.     Results for orders placed or performed in visit on 03/01/21   AMB POC HEMOGLOBIN A1C   Result Value Ref Range    Hemoglobin A1c (POC) 10.8 %         ASSESSMENT AND PLAN    ICD-10-CM ICD-9-CM    1. Type 2 diabetes mellitus without complication, with long-term current use of insulin (HCC)  E11.9 250.00 AMB POC HEMOGLOBIN A1C    Z79.4 V58.67    2. Benign hypertension with chronic kidney disease, stage II  I12.9 403.10     N18.2 585.2    3. Hypercholesterolemia  E78.00 272.0    4. Coronary artery disease involving native coronary artery of native heart without angina pectoris  I25.10 414.01    5. Overweight (BMI 25.0-29. 9)  E66.3 278.02      Diagnoses and all orders for this visit:    1. Type 2 diabetes mellitus without complication, with long-term current use of insulin (HCC)  Uncontrolled. A1c 10.8% today. Denies eating sweets or high carb foods regularly. Counseled him on cutting down on peanut butter and jelly and juices. We need to determine what time of day his BG increases because he may need short-acting insulin to over meals.   For now, continue Lantus 25 units every evening and metformin.  -     AMB POC HEMOGLOBIN A1C  Patient Instructions  Your HbA1c today was high at 10.8%, meaning your average blood sugar over the past 3 months was 263. Your goal A1c is less than 7.0%. Check your blood sugars twice a day over the next 3 weeks. Check blood sugar every morning before breakfast.  Some days also check after lunch or after dinner or at bedtime. Bring your blood sugar record book with you to your next clinic visit. 2. Benign hypertension with chronic kidney disease, stage II  Well-controlled. Continue amlodipine and metoprolol. Unclear why patient is not on an ACE-I or ARB. Plan to start on ACE-I or ARB at next clinic visit. 3. Hypercholesterolemia  8/20/20: tot chol 82, LDL 38. Controlled. Continue     4. Coronary artery disease involving native coronary artery of native heart without angina pectoris  Asymptomatic. Continue Brilinta, metoprolol, and statin. 5. Overweight (BMI 25.0-29. 9)  Counseled on exercise and weight loss. Follow-up and Dispositions    · Return in about 3 weeks (around 3/22/2021), or if symptoms worsen or fail to improve, for Uncontrolled DM. I have discussed the diagnosis with the patient and the intended plan as seen in the above orders. Patient is in agreement. The patient has received an after-visit summary and questions were answered concerning future plans. I have discussed medication side effects and warnings with the patient as well.

## 2021-03-23 ENCOUNTER — OFFICE VISIT (OUTPATIENT)
Dept: INTERNAL MEDICINE CLINIC | Age: 66
End: 2021-03-23
Payer: MEDICARE

## 2021-03-23 VITALS
SYSTOLIC BLOOD PRESSURE: 101 MMHG | OXYGEN SATURATION: 96 % | HEIGHT: 73 IN | WEIGHT: 203 LBS | HEART RATE: 58 BPM | DIASTOLIC BLOOD PRESSURE: 62 MMHG | RESPIRATION RATE: 14 BRPM | BODY MASS INDEX: 26.9 KG/M2 | TEMPERATURE: 98.1 F

## 2021-03-23 DIAGNOSIS — E11.9 TYPE 2 DIABETES MELLITUS WITHOUT COMPLICATION, WITH LONG-TERM CURRENT USE OF INSULIN (HCC): Primary | ICD-10-CM

## 2021-03-23 DIAGNOSIS — Z79.4 TYPE 2 DIABETES MELLITUS WITHOUT COMPLICATION, WITH LONG-TERM CURRENT USE OF INSULIN (HCC): Primary | ICD-10-CM

## 2021-03-23 LAB — GLUCOSE POC: 134 MG/DL

## 2021-03-23 PROCEDURE — G8754 DIAS BP LESS 90: HCPCS | Performed by: INTERNAL MEDICINE

## 2021-03-23 PROCEDURE — G8419 CALC BMI OUT NRM PARAM NOF/U: HCPCS | Performed by: INTERNAL MEDICINE

## 2021-03-23 PROCEDURE — 82962 GLUCOSE BLOOD TEST: CPT | Performed by: INTERNAL MEDICINE

## 2021-03-23 PROCEDURE — 3046F HEMOGLOBIN A1C LEVEL >9.0%: CPT | Performed by: INTERNAL MEDICINE

## 2021-03-23 PROCEDURE — G8536 NO DOC ELDER MAL SCRN: HCPCS | Performed by: INTERNAL MEDICINE

## 2021-03-23 PROCEDURE — 99213 OFFICE O/P EST LOW 20 MIN: CPT | Performed by: INTERNAL MEDICINE

## 2021-03-23 PROCEDURE — G8427 DOCREV CUR MEDS BY ELIG CLIN: HCPCS | Performed by: INTERNAL MEDICINE

## 2021-03-23 PROCEDURE — G8752 SYS BP LESS 140: HCPCS | Performed by: INTERNAL MEDICINE

## 2021-03-23 PROCEDURE — 2022F DILAT RTA XM EVC RTNOPTHY: CPT | Performed by: INTERNAL MEDICINE

## 2021-03-23 PROCEDURE — G8510 SCR DEP NEG, NO PLAN REQD: HCPCS | Performed by: INTERNAL MEDICINE

## 2021-03-23 PROCEDURE — 3017F COLORECTAL CA SCREEN DOC REV: CPT | Performed by: INTERNAL MEDICINE

## 2021-03-23 PROCEDURE — 1101F PT FALLS ASSESS-DOCD LE1/YR: CPT | Performed by: INTERNAL MEDICINE

## 2021-03-23 RX ORDER — INSULIN GLARGINE 100 [IU]/ML
INJECTION, SOLUTION SUBCUTANEOUS
Qty: 5 ADJUSTABLE DOSE PRE-FILLED PEN SYRINGE | Refills: 1
Start: 2021-03-23 | End: 2021-06-23

## 2021-03-23 RX ORDER — INSULIN GLARGINE 100 [IU]/ML
INJECTION, SOLUTION SUBCUTANEOUS
Qty: 5 ADJUSTABLE DOSE PRE-FILLED PEN SYRINGE | Refills: 1
Start: 2021-03-23 | End: 2021-03-23

## 2021-03-23 NOTE — PROGRESS NOTES
Identified pt with two pt identifiers. Reviewed record in preparation for visit and have obtained necessary documentation. All patient medications has been reviewed. Chief Complaint   Patient presents with    Diabetes     Additional information about chief complaint:    Visit Vitals  /62 (BP 1 Location: Left upper arm, BP Patient Position: Sitting, BP Cuff Size: Large adult long)   Pulse (!) 58   Temp 98.1 °F (36.7 °C) (Oral)   Resp 14   Ht 6' 1\" (1.854 m)   Wt 203 lb (92.1 kg)   SpO2 96%   BMI 26.78 kg/m²       Health Maintenance Due   Topic    COVID-19 Vaccine (1)       1. Have you been to the ER, urgent care clinic since your last visit? Hospitalized since your last visit? No   2. Have you seen or consulted any other health care providers outside of the [address] [place] [address] since your last visit? Include any pap smears or colon screening.   NO   bdg

## 2021-03-23 NOTE — PATIENT INSTRUCTIONS
Learning About Meal Planning for Diabetes Why plan your meals? Meal planning can be a key part of managing diabetes. Planning meals and snacks with the right balance of carbohydrate, protein, and fat can help you keep your blood sugar at the target level you set with your doctor. You don't have to eat special foods. You can eat what your family eats, including sweets once in a while. But you do have to pay attention to how often you eat and how much you eat of certain foods. You may want to work with a dietitian or a certified diabetes educator. He or she can give you tips and meal ideas and can answer your questions about meal planning. This health professional can also help you reach a healthy weight if that is one of your goals. What plan is right for you? Your dietitian or diabetes educator may suggest that you start with the plate format or carbohydrate counting. The plate format The plate format is a simple way to help you manage how you eat. You plan meals by learning how much space each food should take on a plate. Using the plate format helps you spread carbohydrate throughout the day. It can make it easier to keep your blood sugar level within your target range. It also helps you see if you're eating healthy portion sizes. To use the plate format, you put non-starchy vegetables on half your plate. Add meat or meat substitutes on one-quarter of the plate. Put a grain or starchy vegetable (such as brown rice or a potato) on the final quarter of the plate. You can add a small piece of fruit and some low-fat or fat-free milk or yogurt, depending on your carbohydrate goal for each meal. 
Here are some tips for using the plate format: · Make sure that you are not using an oversized plate. A 9-inch plate is best. Many restaurants use larger plates. · Get used to using the plate format at home. Then you can use it when you eat out. · Write down your questions about using the plate format.  Talk to your doctor, a dietitian, or a diabetes educator about your concerns. Carbohydrate counting With carbohydrate counting, you plan meals based on the amount of carbohydrate in each food. Carbohydrate raises blood sugar higher and more quickly than any other nutrient. It is found in desserts, breads and cereals, and fruit. It's also found in starchy vegetables such as potatoes and corn, grains such as rice and pasta, and milk and yogurt. Spreading carbohydrate throughout the day helps keep your blood sugar levels within your target range. Your daily amount depends on several things, including your weight, how active you are, which diabetes medicines you take, and what your goals are for your blood sugar levels. A registered dietitian or diabetes educator can help you plan how much carbohydrate to include in each meal and snack. A guideline for your daily amount of carbohydrate is: · 45 to 60 grams at each meal. That's about the same as 3 to 4 carbohydrate servings. · 15 to 20 grams at each snack. That's about the same as 1 carbohydrate serving. The Nutrition Facts label on packaged foods tells you how much carbohydrate is in a serving of the food. First, look at the serving size on the food label. Is that the amount you eat in a serving? All of the nutrition information on a food label is based on that serving size. So if you eat more or less than that, you'll need to adjust the other numbers. Total carbohydrate is the next thing you need to look for on the label. If you count carbohydrate servings, one serving of carbohydrate is 15 grams. For foods that don't come with labels, such as fresh fruits and vegetables, you'll need a guide that lists carbohydrate in these foods. Ask your doctor, dietitian, or diabetes educator about books or other nutrition guides you can use.  
If you take insulin, you need to know how many grams of carbohydrate are in a meal. This lets you know how much rapid-acting insulin to take before you eat. If you use an insulin pump, you get a constant rate of insulin during the day. So the pump must be programmed at meals to give you extra insulin to cover the rise in blood sugar after meals. When you know how much carbohydrate you will eat, you can take the right amount of insulin. Or, if you always use the same amount of insulin, you need to make sure that you eat the same amount of carbohydrate at meals. If you need more help to understand carbohydrate counting and food labels, ask your doctor, dietitian, or diabetes educator. How do you get started with meal planning? Here are some tips to get started: 
· Plan your meals a week at a time. Don't forget to include snacks too. · Use cookbooks or online recipes to plan several main meals. Plan some quick meals for busy nights. You also can double some recipes that freeze well. Then you can save half for other busy nights when you don't have time to cook. · Make sure you have the ingredients you need for your recipes. If you're running low on basic items, put these items on your shopping list too. · List foods that you use to make breakfasts, lunches, and snacks. List plenty of fruits and vegetables. · Post this list on the refrigerator. Add to it as you think of more things you need. · Take the list to the store to do your weekly shopping. Follow-up care is a key part of your treatment and safety. Be sure to make and go to all appointments, and call your doctor if you are having problems. It's also a good idea to know your test results and keep a list of the medicines you take. Where can you learn more? Go to http://fela-franklin.info/ Harry Selby in the search box to learn more about \"Learning About Meal Planning for Diabetes. \" Current as of: December 20, 2019               Content Version: 12.6 © 7414-5716 [employer].   
Care instructions adapted under license by [employer] (which disclaims liability or warranty for this information). If you have questions about a medical condition or this instruction, always ask your healthcare professional. [address] any warranty or liability for your use of this information. Learning About Tests When You Have Diabetes Why do you need regular tests? Diabetes can lead to other health problems if it's not well controlled. You'll need tests to monitor how well your diabetes is controlled and to check for other things like high cholesterol or kidney problems. Having tests on a regular schedule can help your doctor find problems early, when it's easier to manage them. What tests do you need? These are the tests you may need and how often you should have them. A1c blood test.  
This test shows the average level of blood sugar over the past 2 to 3 months. It helps your doctor see whether blood sugar levels have been staying within your target range. How often: Every 3 to 6 months Goal: A blood sugar level in your target range Blood pressure test.  
This test measures the pressure of blood flow in the arteries. Controlling blood pressure can help prevent damage to nerves and blood vessels. How often: Every 3 to 6 months Goal: A blood pressure level in your target range Cholesterol test.  
This test measures the amount of a type of fat in the blood. It is common for people with diabetes to also have high cholesterol. Too much cholesterol in the blood can build up inside the blood vessels and raise the risk for heart attack and stroke. How often: At the time of your diabetes diagnosis, and as often as your doctor recommends after that Goal: A cholesterol level in your target range Albumin-creatinine ratio test.  
This test checks for kidney damage by looking for the protein albumin (say \"al-BYOO-deniz\") in the urine. Albumin is normally found in the blood.  Kidney damage can let small amounts of it (microalbumin) leak into the urine. How often: Once a year Goal: No protein in the urine Blood creatinine test/estimated glomerular filtration (eGFR). The blood creatinine (say \"qpve-CI-el-neen\") level shows how well your kidneys are working. Creatinine is a waste product that muscles release into the blood. Blood creatinine is used to estimate the glomerular filtration rate. A high level of creatinine and/or a low eGFR may mean your kidneys are not working as well as they should. How often: Once a year Goal: Normal level of creatinine in the blood. The eGFR goal is greater than 60 mL/min/1.73 m². Complete foot exam.  
The doctor checks for foot sores and whether any sensation has been lost. 
How often: Once a year Goal: Healthy feet with no foot ulcers or loss of feeling Dental exam and cleaning. The dentist checks for gum disease and tooth decay. People with high blood sugar are more likely to have these problems. How often: Every 6 months Goal: Healthy teeth and gums Complete eye exam.  
High blood sugar levels can damage the eyes. This exam is done by an ophthalmologist or optometrist. It includes a dilated eye exam. The exam shows whether there's damage to the back of the eye (diabetic retinopathy). How often: Once a year. If you don't have any signs of diabetic retinopathy, your doctor may recommend an exam every 2 years. Goal: No damage to the back of the eye Thyroid-stimulating hormone (TSH) blood test.  
This test checks for thyroid disease. Too little thyroid hormone can cause some medicines (like insulin) to stay in the body longer. This can cause low blood sugar. You may be tested if you have high cholesterol or are a woman over 48years old. How often: As part of your diabetes diagnosis, and as often as your doctor recommends after that Goal: Normal level of TSH in the blood Follow-up care is a key part of your treatment and safety.  Be sure to make and go to all appointments, and call your doctor if you are having problems. It's also a good idea to know your test results and keep a list of the medicines you take. Where can you learn more? Go to http://www.gray.com/ Enter [id number] in the search box to learn more about \"Learning About Tests When You Have Diabetes. \" Current as of: December 20, 2019               Content Version: 12.6 © 5209-3255 [email or web address]. Care instructions adapted under license by [employer] (which disclaims liability or warranty for this information). If you have questions about a medical condition or this instruction, always ask your healthcare professional. [address] any warranty or liability for your use of this information.

## 2021-03-23 NOTE — PROGRESS NOTES
CC:   Chief Complaint   Patient presents with    Diabetes       HISTORY OF PRESENT ILLNESS  Jennifer Friedman is a 72 y.o. male. Presents for 1 month follow up evaluation of DM. Denies polydipsia, polyuria, or hypoglycemia. His wife has been helping him eat healthier. He eats peanut butter and jelly sandwiches less frequently. Takes Lantus insulin 25 units in evening and metformin 500 mg 3 tabs with dinner. Last A1c 10.8% on 3/1/21. Brought in home BS values:  FBS: 111-190, after breakfast: 121-230, after dinner: 154-338, bedtime: 132-210. COVID-19 vaccine: had 1st vaccine, scheduled next week for 2nd vaccine      Lab Results   Component Value Date/Time    Hemoglobin A1c 7.2 (H) 12/03/2019 10:56 AM    Hemoglobin A1c (POC) 10.8 03/01/2021 09:34 AM        Patient Active Problem List   Diagnosis Code    Hypercholesterolemia E78.00    Osteoarthritis of both knees M17.0    CKD (chronic kidney disease) stage 2, GFR 60-89 ml/min N18.2    Benign hypertension with chronic kidney disease, stage II I12.9, N18.2    Coronary artery disease involving native coronary artery of native heart without angina pectoris I25.10    Type 2 diabetes mellitus without complication, with long-term current use of insulin ([facility]) E11.9, Z79.4     Past Medical History:   Diagnosis Date    CAD (coronary artery disease) April 2014    NSTEMI, PCI/NATE RCA    Hypercholesterolemia     Hypertension      No Known Allergies    Current Outpatient Medications   Medication Sig Dispense Refill    metFORMIN (GLUCOPHAGE) 500 mg tablet Take 3 tablets by mouth daily with dinner. Indications: type 2 diabetes mellitus 270 Tab 1    amLODIPine (NORVASC) 2.5 mg tablet Take 1 Tab by mouth daily. Indications: high blood pressure 90 Tab 1    atorvastatin (LIPITOR) 40 mg tablet Take 1 Tab by mouth daily. Indications: high cholesterol 90 Tab 1    metoprolol succinate (TOPROL-XL) 25 mg XL tablet Take 1 Tab by mouth daily.  Indications: high blood pressure and heart 90 Tab 1    ticagrelor (Brilinta) 90 mg tablet Take 1 Tab by mouth two (2) times a day. 180 Tab 1    insulin glargine (LANTUS,BASAGLAR) 100 unit/mL (3 mL) inpn Inject 25 units under the skin every morning 5 Adjustable Dose Pre-filled Pen Syringe 1    nitroglycerin (NITROSTAT) 0.4 mg SL tablet Take 1 tablet under tongue every 5 minutes up to 3 doses prn chest pain. 1 Bottle 3    acetaminophen (TYLENOL) 325 mg tablet Take 2 Tabs by mouth every six (6) hours as needed for Pain.  aspirin delayed-release 81 mg tablet Take 1 Tab by mouth daily. 90 Tab 3         PHYSICAL EXAM  Visit Vitals  /62 (BP 1 Location: Left upper arm, BP Patient Position: Sitting, BP Cuff Size: Large adult long)   Pulse (!) 58   Temp 98.1 °F (36.7 °C) (Oral)   Resp 14   Ht 6' 1\" (1.854 m)   Wt 203 lb (92.1 kg)   SpO2 96%   BMI 26.78 kg/m²       General: Well-developed and well-nourished, no distress. HEENT:  Head normocephalic/atraumatic, no scleral icterus   Neurological: Alert and oriented. Psychiatric: Normal mood and affect. Behavior is normal.         ASSESSMENT AND PLAN    ICD-10-CM ICD-9-CM    1. Type 2 diabetes mellitus without complication, with long-term current use of insulin (HCC)  E11.9 250.00 AMB POC GLUCOSE BLOOD, BY GLUCOSE MONITORING DEVICE    Z79.4 V58.67 insulin glargine (LANTUS,BASAGLAR) 100 unit/mL (3 mL) inpn     Diagnoses and all orders for this visit:    1. Type 2 diabetes mellitus without complication, with long-term current use of insulin (HCC)  Not controlled. A1c 10.8% on 3/1/21. Improving BS numbers. Increase Lantus insulin from 25 to 30 units every evening.  -     AMB POC GLUCOSE BLOOD, BY GLUCOSE MONITORING DEVICE  -     Increase to: insulin glargine (LANTUS,BASAGLAR) 100 unit/mL (3 mL) inpn; Inject 30 units under the skin every evening. Follow-up and Dispositions    · Return in about 3 months (around 6/23/2021), or if symptoms worsen or fail to improve, for DM, HTN. I have discussed the diagnosis with the patient and the intended plan as seen in the above orders. Patient is in agreement. The patient has received an after-visit summary and questions were answered concerning future plans. I have discussed medication side effects and warnings with the patient as well.

## 2021-03-30 ENCOUNTER — OFFICE VISIT (OUTPATIENT)
Dept: CARDIOLOGY CLINIC | Age: 66
End: 2021-03-30
Payer: MEDICARE

## 2021-03-30 VITALS
SYSTOLIC BLOOD PRESSURE: 120 MMHG | HEIGHT: 73 IN | RESPIRATION RATE: 16 BRPM | WEIGHT: 202.5 LBS | DIASTOLIC BLOOD PRESSURE: 70 MMHG | OXYGEN SATURATION: 97 % | BODY MASS INDEX: 26.84 KG/M2 | HEART RATE: 70 BPM

## 2021-03-30 DIAGNOSIS — I25.10 CORONARY ARTERY DISEASE DUE TO LIPID RICH PLAQUE: Primary | ICD-10-CM

## 2021-03-30 DIAGNOSIS — Z95.5 S/P DRUG ELUTING CORONARY STENT PLACEMENT: ICD-10-CM

## 2021-03-30 DIAGNOSIS — I10 ESSENTIAL HYPERTENSION: ICD-10-CM

## 2021-03-30 DIAGNOSIS — E78.2 MIXED HYPERLIPIDEMIA: ICD-10-CM

## 2021-03-30 DIAGNOSIS — I25.83 CORONARY ARTERY DISEASE DUE TO LIPID RICH PLAQUE: Primary | ICD-10-CM

## 2021-03-30 PROCEDURE — 93000 ELECTROCARDIOGRAM COMPLETE: CPT | Performed by: INTERNAL MEDICINE

## 2021-03-30 PROCEDURE — 3017F COLORECTAL CA SCREEN DOC REV: CPT | Performed by: INTERNAL MEDICINE

## 2021-03-30 PROCEDURE — 99214 OFFICE O/P EST MOD 30 MIN: CPT | Performed by: INTERNAL MEDICINE

## 2021-03-30 PROCEDURE — G8419 CALC BMI OUT NRM PARAM NOF/U: HCPCS | Performed by: INTERNAL MEDICINE

## 2021-03-30 PROCEDURE — G8536 NO DOC ELDER MAL SCRN: HCPCS | Performed by: INTERNAL MEDICINE

## 2021-03-30 PROCEDURE — G8427 DOCREV CUR MEDS BY ELIG CLIN: HCPCS | Performed by: INTERNAL MEDICINE

## 2021-03-30 PROCEDURE — G8754 DIAS BP LESS 90: HCPCS | Performed by: INTERNAL MEDICINE

## 2021-03-30 PROCEDURE — 1101F PT FALLS ASSESS-DOCD LE1/YR: CPT | Performed by: INTERNAL MEDICINE

## 2021-03-30 PROCEDURE — G8752 SYS BP LESS 140: HCPCS | Performed by: INTERNAL MEDICINE

## 2021-03-30 PROCEDURE — G8510 SCR DEP NEG, NO PLAN REQD: HCPCS | Performed by: INTERNAL MEDICINE

## 2021-03-30 NOTE — PROGRESS NOTES
[address] [place] [address] [facility]  [phone]     Subjective:      Dony Gerber is a 72 y.o. male is here for routine f/u. He has a PMHx of CAD s/p NATE to OM1 and distal RCA, HTN, HLD and DM. Last seen by us in 9/2020. He continues to do well since his stent placement, able to perform physical labor without exertional symptoms. He has received his first dose of covid  Vaccine. The patient denies chest pain/ shortness of breath, orthopnea, PND, LE edema, palpitations, syncope, or presyncope.        Patient Active Problem List    Diagnosis Date Noted    Coronary artery disease involving native coronary artery of native heart without angina pectoris 08/29/2017    Type 2 diabetes mellitus without complication, with long-term current use of insulin ([facility] [address] 08/29/2017    CKD (chronic kidney disease) stage 2, GFR 60-89 ml/min 06/11/2015    Benign hypertension with chronic kidney disease, stage II 06/11/2015    Osteoarthritis of both knees 04/30/2014    Hypercholesterolemia 11/03/2010      Jeevan Carr MD  Past Medical History:   Diagnosis Date    CAD (coronary artery disease) April 2014    NSTEMI, PCI/NATE RCA    Hypercholesterolemia     Hypertension       Past Surgical History:   Procedure Laterality Date    HX CHOLECYSTECTOMY  07/17/2019    Laparoscopic cholecystectomy with intraoperative cholangiogram    HX ORTHOPAEDIC      left shoulder, torn tendon    HX ORTHOPAEDIC      right knee X 4    HI CARDIAC SURG PROCEDURE UNLIST      Stent RCA 4/2014    HI COLONOSCOPY FLX DX W/COLLJ SPEC WHEN PFRMD  12/19/2007    Dr Diana Dickerson 1 polyp repeat 12/2012     No Known Allergies   Family History   Problem Relation Age of Onset    Heart Disease Mother         CAD    Hypertension Mother     Elevated Lipids Mother     Cancer Father         lung    Mental Retardation Brother     Seizures Brother     Diabetes Brother     Hypertension Brother     Elevated Lipids Brother Social History     Socioeconomic History    Marital status:      Spouse name: Not on file    Number of children: Not on file    Years of education: Not on file    Highest education level: Not on file   Occupational History    Not on file   Social Needs    Financial resource strain: Not on file    Food insecurity     Worry: Not on file     Inability: Not on file    Transportation needs     Medical: Not on file     Non-medical: Not on file   Tobacco Use    Smoking status: Former Smoker     Packs/day: 0.25     Years: 35.00     Pack years: 8.75     Types: Cigarettes     Quit date: 2014     Years since quittin.3    Smokeless tobacco: Never Used    Tobacco comment: Never smoked over a pack per day   Substance and Sexual Activity    Alcohol use: No     Alcohol/week: 0.0 standard drinks    Drug use: No    Sexual activity: Not on file   Lifestyle    Physical activity     Days per week: Not on file     Minutes per session: Not on file    Stress: Not on file   Relationships    Social connections     Talks on phone: Not on file     Gets together: Not on file     Attends Islam service: Not on file     Active member of club or organization: Not on file     Attends meetings of clubs or organizations: Not on file     Relationship status: Not on file    Intimate partner violence     Fear of current or ex partner: Not on file     Emotionally abused: Not on file     Physically abused: Not on file     Forced sexual activity: Not on file   Other Topics Concern    Not on file   Social History Narrative    Not on file      Current Outpatient Medications   Medication Sig    insulin glargine (LANTUS,BASAGLAR) 100 unit/mL (3 mL) inpn Inject 30 units under the skin every evening    metFORMIN (GLUCOPHAGE) 500 mg tablet Take 3 tablets by mouth daily with dinner. Indications: type 2 diabetes mellitus    amLODIPine (NORVASC) 2.5 mg tablet Take 1 Tab by mouth daily.  Indications: high blood pressure  atorvastatin (LIPITOR) 40 mg tablet Take 1 Tab by mouth daily. Indications: high cholesterol    metoprolol succinate (TOPROL-XL) 25 mg XL tablet Take 1 Tab by mouth daily. Indications: high blood pressure and heart    ticagrelor (Brilinta) 90 mg tablet Take 1 Tab by mouth two (2) times a day.  nitroglycerin (NITROSTAT) 0.4 mg SL tablet Take 1 tablet under tongue every 5 minutes up to 3 doses prn chest pain.  aspirin delayed-release 81 mg tablet Take 1 Tab by mouth daily.  acetaminophen (TYLENOL) 325 mg tablet Take 2 Tabs by mouth every six (6) hours as needed for Pain. No current facility-administered medications for this visit. Review of Symptoms:  11 systems reviewed, negative other than as stated in the HPI    Physical ExamPhysical Exam:    There were no vitals filed for this visit. There is no height or weight on file to calculate BMI. General PE  Gen:  NAD  Mental Status - Alert. General Appearance - Not in acute distress. HEENT:  PERRL, no carotid bruits or JVD  Chest and Lung Exam   Inspection: Accessory muscles - No use of accessory muscles in breathing. Auscultation:   Breath sounds: - Normal.   Cardiovascular   Inspection: Jugular vein - Bilateral - Inspection Normal.   Palpation/Percussion:   Apical Impulse: - Normal.   Auscultation: Rhythm - Regular. Heart Sounds - S1 WNL and S2 WNL. No S3 or S4. Murmurs & Other Heart Sounds: Auscultation of the heart reveals - No Murmurs. Peripheral Vascular   Upper Extremity: Inspection - Bilateral - No Cyanotic nailbeds or Digital clubbing. Lower Extremity:   Palpation: Edema - Bilateral - No edema. Abdomen:   Soft, non-tender, bowel sounds are active.   Neuro: A&O times 3, CN and motor grossly WNL    Labs:   Lab Results   Component Value Date/Time    Cholesterol, total 82 (L) 08/20/2020 02:06 PM    Cholesterol, total 109 07/14/2019 04:49 AM    Cholesterol, total 101 05/28/2019 08:12 AM    Cholesterol, total 96 (L) 05/29/2018 12:19 PM    Cholesterol, total 103 08/22/2017 08:18 AM    HDL Cholesterol 33 (L) 08/20/2020 02:06 PM    HDL Cholesterol 44 07/14/2019 04:49 AM    HDL Cholesterol 28 (L) 05/28/2019 08:12 AM    HDL Cholesterol 30 (L) 05/29/2018 12:19 PM    HDL Cholesterol 32 (L) 08/22/2017 08:18 AM    LDL, calculated 38 08/20/2020 02:06 PM    LDL, calculated 48.2 07/14/2019 04:49 AM    LDL, calculated 38 05/28/2019 08:12 AM    LDL, calculated 29 05/29/2018 12:19 PM    LDL, calculated 50 08/22/2017 08:18 AM    Triglyceride 56 08/20/2020 02:06 PM    Triglyceride 84 07/14/2019 04:49 AM    Triglyceride 98 07/13/2019 07:24 PM    Triglyceride 174 (H) 05/28/2019 08:12 AM    Triglyceride 187 (H) 05/29/2018 12:19 PM    CHOL/HDL Ratio 2.5 07/14/2019 04:49 AM    CHOL/HDL Ratio 4.5 10/29/2010 08:57 AM    CHOL/HDL Ratio 4.7 09/15/2009 10:00 AM     Lab Results   Component Value Date/Time    CK 74 07/13/2019 11:35 PM     Lab Results   Component Value Date/Time    Sodium 137 08/20/2020 02:06 PM    Potassium 4.8 08/20/2020 02:06 PM    Chloride 101 08/20/2020 02:06 PM    CO2 24 08/20/2020 02:06 PM    Anion gap 6 07/22/2019 04:28 AM    Glucose 229 (H) 08/20/2020 02:06 PM    BUN 18 08/20/2020 02:06 PM    Creatinine 1.19 08/20/2020 02:06 PM    BUN/Creatinine ratio 15 08/20/2020 02:06 PM    GFR est AA 74 08/20/2020 02:06 PM    GFR est non-AA 64 08/20/2020 02:06 PM    Calcium 9.2 08/20/2020 02:06 PM    Bilirubin, total 0.4 08/20/2020 02:06 PM    Alk. phosphatase 86 08/20/2020 02:06 PM    Protein, total 6.7 08/20/2020 02:06 PM    Albumin 4.2 08/20/2020 02:06 PM    Globulin 3.9 07/22/2019 04:28 AM    A-G Ratio 1.7 08/20/2020 02:06 PM    ALT (SGPT) 32 08/20/2020 02:06 PM       EKG:  SR     Assessment:     Assessment:      1. Coronary artery disease due to lipid rich plaque    2. S/P drug eluting coronary stent placement    3. Essential hypertension    4.  Mixed hyperlipidemia        Orders Placed This Encounter    AMB POC EKG ROUTINE W/ 12 LEADS, INTER & REP Order Specific Question:   Reason for Exam:     Answer:   routine        Plan:     1. Coronary artery disease involving native coronary artery of native heart without angina pectoris  S/p NATE to OM1 and distal RCA on 8/28/2020. Has 50% RPDA stenosis, not significant by FFR (0.91)  S/p NATE to RCA in 4/2014  Echo done 8/2020 with preserved ejection fraction 55-60%  Has been on DAPT x  6 months, will dc brilinta and continue ASA lifelong  Continue BB, CCB and statin.     2. HTN  BP controlled with Amlodipine and Toprol XL  Continue low sodium diet     3. HLD  8/2020 LDL 38 On atorva 40 mg daily     Continue current care and f/u in 6 months. Serjio Horan NP       Patient seen and examined by me with nurse practitioner. I personally performed all components of the history, physical, and medical decision making and agree with the assessment and plan as noted. BP controlled. Last FLP noted. At target. Discontinue brilinta. Continue other meds. Diet and exericse d/w patient. Getting COVID vaccination.      Nasreen Thomas MD

## 2021-03-30 NOTE — PROGRESS NOTES
Chief Complaint   Patient presents with    Follow-up    Coronary Artery Disease    Hypertension    Cholesterol Problem     1. Have you been to the ER, urgent care clinic since your last visit? No Hospitalized since your last visit? NO    2. Have you seen or consulted any other health care providers outside of the [address] [place] [address] since your last visit? No  Include any pap smears or colon screening.  NO

## 2021-04-20 DIAGNOSIS — E78.00 HYPERCHOLESTEROLEMIA: ICD-10-CM

## 2021-04-20 DIAGNOSIS — N18.2 BENIGN HYPERTENSION WITH CHRONIC KIDNEY DISEASE, STAGE II: ICD-10-CM

## 2021-04-20 DIAGNOSIS — I12.9 BENIGN HYPERTENSION WITH CHRONIC KIDNEY DISEASE, STAGE II: ICD-10-CM

## 2021-04-20 RX ORDER — ATORVASTATIN CALCIUM 40 MG/1
40 TABLET, FILM COATED ORAL DAILY
Qty: 90 TAB | Refills: 3 | Status: SHIPPED | OUTPATIENT
Start: 2021-04-20 | End: 2022-02-01

## 2021-04-20 RX ORDER — METOPROLOL SUCCINATE 25 MG/1
25 TABLET, EXTENDED RELEASE ORAL DAILY
Qty: 90 TAB | Refills: 3 | Status: SHIPPED | OUTPATIENT
Start: 2021-04-20 | End: 2022-02-01

## 2021-04-20 NOTE — TELEPHONE ENCOUNTER
PCP: Nika Kruse MD     Last appt: 3/23/2021   Future Appointments   Date Time Provider Gosia Rogers   6/23/2021 11:30 AM Nika Kruse MD [facility]   4/4/2022 11:00 AM Lisbeth Monahan MD [facility]        Requested Prescriptions     Pending Prescriptions Disp Refills    atorvastatin (LIPITOR) 40 mg tablet 90 Tab 3     Sig: Take 1 Tab by mouth daily.  Indications: high cholesterol

## 2021-04-20 NOTE — TELEPHONE ENCOUNTER
Requested Prescriptions     Pending Prescriptions Disp Refills    atorvastatin (LIPITOR) 40 mg tablet 90 Tab 1     Sig: Take 1 Tab by mouth daily.  Indications: high cholesterol     They are also requesting a refill of brilinta 90 mg tab

## 2021-04-21 DIAGNOSIS — I25.10 CORONARY ARTERY DISEASE INVOLVING NATIVE CORONARY ARTERY OF NATIVE HEART WITHOUT ANGINA PECTORIS: ICD-10-CM

## 2021-04-21 DIAGNOSIS — N18.2 BENIGN HYPERTENSION WITH CHRONIC KIDNEY DISEASE, STAGE II: ICD-10-CM

## 2021-04-21 DIAGNOSIS — I12.9 BENIGN HYPERTENSION WITH CHRONIC KIDNEY DISEASE, STAGE II: ICD-10-CM

## 2021-04-21 RX ORDER — AMLODIPINE BESYLATE 2.5 MG/1
2.5 TABLET ORAL DAILY
Qty: 90 TAB | Refills: 3 | Status: SHIPPED | OUTPATIENT
Start: 2021-04-21 | End: 2022-02-01

## 2021-04-21 NOTE — TELEPHONE ENCOUNTER
Requested Prescriptions     Pending Prescriptions Disp Refills    ticagrelor (Brilinta) 90 mg tablet 180 Tab 1     Sig: Take 1 Tab by mouth two (2) times a day.

## 2021-04-21 NOTE — TELEPHONE ENCOUNTER
PCP: Kassandra Lyons MD     Last appt: 3/23/2021   Future Appointments   Date Time Provider Gosia Rogers   6/23/2021 11:30 AM Kassandra Lyons MD [facility]   4/4/2022 11:00 AM Alida Monahan MD [facility]        Requested Prescriptions     Pending Prescriptions Disp Refills    amLODIPine (NORVASC) 2.5 mg tablet 90 Tab 1     Sig: Take 1 Tab by mouth daily.  Indications: high blood pressure

## 2021-04-21 NOTE — TELEPHONE ENCOUNTER
Requested Prescriptions     Pending Prescriptions Disp Refills    amLODIPine (NORVASC) 2.5 mg tablet 90 Tab 1     Sig: Take 1 Tab by mouth daily.  Indications: high blood pressure

## 2021-05-03 ENCOUNTER — TELEPHONE (OUTPATIENT)
Dept: INTERNAL MEDICINE CLINIC | Age: 66
End: 2021-05-03

## 2021-05-03 NOTE — TELEPHONE ENCOUNTER
Pt wife called and stated that the cardiologist took the pt off of the brilinta  On 3/30/21 so it needs to be marked in the chart so that Sandra Vizcaino will not keep sending them refills

## 2021-05-03 NOTE — TELEPHONE ENCOUNTER
4/21/21 1:51 PM  Note     ticagrelor (Brilinta) has been discontinued, [employer] notified. Wife was notified that we had contacted them on 4/21/2021.

## 2021-06-23 ENCOUNTER — OFFICE VISIT (OUTPATIENT)
Dept: INTERNAL MEDICINE CLINIC | Age: 66
End: 2021-06-23
Payer: MEDICARE

## 2021-06-23 VITALS
DIASTOLIC BLOOD PRESSURE: 67 MMHG | HEIGHT: 73 IN | OXYGEN SATURATION: 95 % | SYSTOLIC BLOOD PRESSURE: 106 MMHG | BODY MASS INDEX: 26.64 KG/M2 | RESPIRATION RATE: 16 BRPM | TEMPERATURE: 98.3 F | WEIGHT: 201 LBS | HEART RATE: 63 BPM

## 2021-06-23 DIAGNOSIS — E78.00 HYPERCHOLESTEROLEMIA: ICD-10-CM

## 2021-06-23 DIAGNOSIS — Z79.4 TYPE 2 DIABETES MELLITUS WITHOUT COMPLICATION, WITH LONG-TERM CURRENT USE OF INSULIN (HCC): Primary | ICD-10-CM

## 2021-06-23 DIAGNOSIS — N18.2 BENIGN HYPERTENSION WITH CHRONIC KIDNEY DISEASE, STAGE II: ICD-10-CM

## 2021-06-23 DIAGNOSIS — I12.9 BENIGN HYPERTENSION WITH CHRONIC KIDNEY DISEASE, STAGE II: ICD-10-CM

## 2021-06-23 DIAGNOSIS — E11.9 TYPE 2 DIABETES MELLITUS WITHOUT COMPLICATION, WITH LONG-TERM CURRENT USE OF INSULIN (HCC): Primary | ICD-10-CM

## 2021-06-23 LAB — HBA1C MFR BLD HPLC: 9 %

## 2021-06-23 PROCEDURE — 3017F COLORECTAL CA SCREEN DOC REV: CPT | Performed by: INTERNAL MEDICINE

## 2021-06-23 PROCEDURE — 83036 HEMOGLOBIN GLYCOSYLATED A1C: CPT | Performed by: INTERNAL MEDICINE

## 2021-06-23 PROCEDURE — G8419 CALC BMI OUT NRM PARAM NOF/U: HCPCS | Performed by: INTERNAL MEDICINE

## 2021-06-23 PROCEDURE — 1101F PT FALLS ASSESS-DOCD LE1/YR: CPT | Performed by: INTERNAL MEDICINE

## 2021-06-23 PROCEDURE — 3052F HG A1C>EQUAL 8.0%<EQUAL 9.0%: CPT | Performed by: INTERNAL MEDICINE

## 2021-06-23 PROCEDURE — G8536 NO DOC ELDER MAL SCRN: HCPCS | Performed by: INTERNAL MEDICINE

## 2021-06-23 PROCEDURE — G8427 DOCREV CUR MEDS BY ELIG CLIN: HCPCS | Performed by: INTERNAL MEDICINE

## 2021-06-23 PROCEDURE — G8752 SYS BP LESS 140: HCPCS | Performed by: INTERNAL MEDICINE

## 2021-06-23 PROCEDURE — G8510 SCR DEP NEG, NO PLAN REQD: HCPCS | Performed by: INTERNAL MEDICINE

## 2021-06-23 PROCEDURE — G8754 DIAS BP LESS 90: HCPCS | Performed by: INTERNAL MEDICINE

## 2021-06-23 PROCEDURE — 99214 OFFICE O/P EST MOD 30 MIN: CPT | Performed by: INTERNAL MEDICINE

## 2021-06-23 PROCEDURE — 2022F DILAT RTA XM EVC RTNOPTHY: CPT | Performed by: INTERNAL MEDICINE

## 2021-06-23 RX ORDER — INSULIN GLARGINE 100 [IU]/ML
INJECTION, SOLUTION SUBCUTANEOUS
Qty: 5 ADJUSTABLE DOSE PRE-FILLED PEN SYRINGE | Refills: 1
Start: 2021-06-23 | End: 2021-09-23 | Stop reason: ALTCHOICE

## 2021-06-23 NOTE — PATIENT INSTRUCTIONS

## 2021-06-23 NOTE — PROGRESS NOTES
CC:   Chief Complaint   Patient presents with    Diabetes    Hypertension       HISTORY OF PRESENT ILLNESS  Michelle Waite is a 72 y.o. male. Presents for 3 month follow up evaluation. Did not bring in home BS log but recalls FBS  and before dinner: 145-160. Denies seeing any BS's above 250. Had hypoglycemic symptoms twice over the past 3 months, both around 7-8 pm after working outdoors. BS 58 on one occasion at 78 the second time. Denies polydipsia, polyuria, or numbness, tingling or burning at feet. Eats 2 peanut butter and jelly sandwiches a day for lunch. The patient has hypertension and hyperlipidemia. Denies HA's, CP, SOB, dizziness, heart palpitations, or leg swelling. Home BP monitoring: not done. He reports taking medications as instructed, no medication side effects noted. Diet and Lifestyle: generally follows a low fat low cholesterol diet, generally follows a low sodium diet, no formal exercise but active during the day. ROS  A complete review of systems was performed and is negative except for those mentioned in the HPI.     Patient Active Problem List   Diagnosis Code    Hypercholesterolemia E78.00    Osteoarthritis of both knees M17.0    CKD (chronic kidney disease) stage 2, GFR 60-89 ml/min N18.2    Benign hypertension with chronic kidney disease, stage II I12.9, N18.2    Coronary artery disease involving native coronary artery of native heart without angina pectoris I25.10    Type 2 diabetes mellitus without complication, with long-term current use of insulin ([facility]) E11.9, Z79.4    S/P drug eluting coronary stent placement Z95.5     Past Medical History:   Diagnosis Date    CAD (coronary artery disease) April 2014    NSTEMI, PCI/NATE RCA    Hypercholesterolemia     Hypertension      No Known Allergies    Current Outpatient Medications   Medication Sig Dispense Refill    metFORMIN (GLUCOPHAGE) 500 mg tablet TAKE 3 TABLETS BY MOUTH DAILY WITH DINNER FOR TYPE 2 DIABETES MELLITUS 270 Tab 3    amLODIPine (NORVASC) 2.5 mg tablet Take 1 Tab by mouth daily. Indications: high blood pressure 90 Tab 3    metoprolol succinate (TOPROL-XL) 25 mg XL tablet Take 1 Tab by mouth daily. Indications: high blood pressure and heart 90 Tab 3    atorvastatin (LIPITOR) 40 mg tablet Take 1 Tab by mouth daily. Indications: high cholesterol 90 Tab 3    insulin glargine (LANTUS,BASAGLAR) 100 unit/mL (3 mL) inpn Inject 30 units under the skin every evening 5 Adjustable Dose Pre-filled Pen Syringe 1    nitroglycerin (NITROSTAT) 0.4 mg SL tablet Take 1 tablet under tongue every 5 minutes up to 3 doses prn chest pain. 1 Bottle 3    acetaminophen (TYLENOL) 325 mg tablet Take 2 Tabs by mouth every six (6) hours as needed for Pain.  aspirin delayed-release 81 mg tablet Take 1 Tab by mouth daily. 90 Tab 3         PHYSICAL EXAM  Visit Vitals  /67 (BP 1 Location: Left upper arm, BP Patient Position: Sitting, BP Cuff Size: Large adult)   Pulse 63   Temp 98.3 °F (36.8 °C) (Oral)   Resp 16   Ht 6' 1\" (1.854 m)   Wt 201 lb (91.2 kg)   SpO2 95%   BMI 26.52 kg/m²       General: Well-developed and well-nourished, no distress. HEENT:  Head normocephalic/atraumatic, no scleral icterus  Lungs:  Clear to ausculation bilaterally. Good air movement. Heart:  Regular rate and rhythm, normal S1 and S2, no murmur, gallop, or rub  Extremities: No clubbing, cyanosis, or edema. Neurological: Alert and oriented. Psychiatric: Normal mood and affect. Behavior is normal.     Results for orders placed or performed in visit on 06/23/21   AMB POC HEMOGLOBIN A1C   Result Value Ref Range    Hemoglobin A1c (POC) 9.0 %         ASSESSMENT AND PLAN    ICD-10-CM ICD-9-CM    1. Type 2 diabetes mellitus without complication, with long-term current use of insulin (HCC)  E11.9 250.00 AMB POC HEMOGLOBIN A1C    Z79.4 V58.67 insulin glargine (LANTUS,BASAGLAR) 100 unit/mL (3 mL) inpn   2.  Benign hypertension with chronic kidney disease, stage II  I12.9 403.10     N18.2 585.2    3. Hypercholesterolemia  E78.00 272.0      Diagnoses and all orders for this visit:    1. Type 2 diabetes mellitus without complication, with long-term current use of insulin ([facility])  A1c 9.0%. Not controlled but improving. Advised him to stop eating peanut butter and jelly sandwiches due to high sugar content; eat tuna fish or turkey sandwiches. Increase Lantus insulin from 30 to 34 units nightly. -     AMB POC HEMOGLOBIN A1C  -     insulin glargine (LANTUS,BASAGLAR) 100 unit/mL (3 mL) inpn; Inject 34 units under the skin every evening    2. Benign hypertension with chronic kidney disease, stage II  Controlled. Plan to change low-dose amlodipine to an ACE or ARB at next clinic visit for renal protection. 3. Hypercholesterolemia  On 8/20/20: tot chol 82, LDL 38. Controlled on atorvastatin 40 mg daily. Will check labs at next clinic visit (CMP, CBC, FLP, urine microalbumin). Follow-up and Dispositions    · Return in about 3 months (around 9/23/2021), or if symptoms worsen or fail to improve, for DM, HTN, chol; fasting labs on same day. I have discussed the diagnosis with the patient and the intended plan as seen in the above orders. Patient is in agreement. The patient has received an after-visit summary and questions were answered concerning future plans. I have discussed medication side effects and warnings with the patient as well.

## 2021-06-23 NOTE — PROGRESS NOTES
Identified pt with two pt identifiers. Reviewed record in preparation for visit and have obtained necessary documentation. All patient medications has been reviewed. Chief Complaint   Patient presents with    Diabetes    Hypertension     Additional information about chief complaint:    Visit Vitals  /67 (BP 1 Location: Left upper arm, BP Patient Position: Sitting, BP Cuff Size: Large adult)   Pulse 63   Temp 98.3 °F (36.8 °C) (Oral)   Resp 16   Ht 6' 1\" (1.854 m)   Wt 201 lb (91.2 kg)   SpO2 95%   BMI 26.52 kg/m²       Health Maintenance Due   Topic    A1C test (Diabetic or Prediabetic)        1. Have you been to the ER, urgent care clinic since your last visit? Hospitalized since your last visit? No     2. Have you seen or consulted any other health care providers outside of the [address] [place] [address] since your last visit? Include any pap smears or colon screening.   NO   bdg

## 2021-09-23 ENCOUNTER — OFFICE VISIT (OUTPATIENT)
Dept: INTERNAL MEDICINE CLINIC | Age: 66
End: 2021-09-23
Payer: MEDICARE

## 2021-09-23 VITALS
WEIGHT: 204 LBS | OXYGEN SATURATION: 95 % | TEMPERATURE: 98.2 F | BODY MASS INDEX: 27.04 KG/M2 | HEART RATE: 55 BPM | SYSTOLIC BLOOD PRESSURE: 124 MMHG | DIASTOLIC BLOOD PRESSURE: 72 MMHG | HEIGHT: 73 IN | RESPIRATION RATE: 16 BRPM

## 2021-09-23 DIAGNOSIS — E78.00 HYPERCHOLESTEROLEMIA: ICD-10-CM

## 2021-09-23 DIAGNOSIS — Z79.4 TYPE 2 DIABETES MELLITUS WITHOUT COMPLICATION, WITH LONG-TERM CURRENT USE OF INSULIN (HCC): Primary | ICD-10-CM

## 2021-09-23 DIAGNOSIS — Z79.4 TYPE 2 DIABETES MELLITUS WITHOUT COMPLICATION, WITH LONG-TERM CURRENT USE OF INSULIN (HCC): ICD-10-CM

## 2021-09-23 DIAGNOSIS — E11.9 TYPE 2 DIABETES MELLITUS WITHOUT COMPLICATION, WITH LONG-TERM CURRENT USE OF INSULIN (HCC): ICD-10-CM

## 2021-09-23 DIAGNOSIS — N18.2 BENIGN HYPERTENSION WITH CHRONIC KIDNEY DISEASE, STAGE II: ICD-10-CM

## 2021-09-23 DIAGNOSIS — E11.9 TYPE 2 DIABETES MELLITUS WITHOUT COMPLICATION, WITH LONG-TERM CURRENT USE OF INSULIN (HCC): Primary | ICD-10-CM

## 2021-09-23 DIAGNOSIS — I12.9 BENIGN HYPERTENSION WITH CHRONIC KIDNEY DISEASE, STAGE II: ICD-10-CM

## 2021-09-23 LAB — HBA1C MFR BLD HPLC: 9.9 %

## 2021-09-23 PROCEDURE — G8536 NO DOC ELDER MAL SCRN: HCPCS | Performed by: INTERNAL MEDICINE

## 2021-09-23 PROCEDURE — 83036 HEMOGLOBIN GLYCOSYLATED A1C: CPT | Performed by: INTERNAL MEDICINE

## 2021-09-23 PROCEDURE — 1101F PT FALLS ASSESS-DOCD LE1/YR: CPT | Performed by: INTERNAL MEDICINE

## 2021-09-23 PROCEDURE — G8752 SYS BP LESS 140: HCPCS | Performed by: INTERNAL MEDICINE

## 2021-09-23 PROCEDURE — G8754 DIAS BP LESS 90: HCPCS | Performed by: INTERNAL MEDICINE

## 2021-09-23 PROCEDURE — 2022F DILAT RTA XM EVC RTNOPTHY: CPT | Performed by: INTERNAL MEDICINE

## 2021-09-23 PROCEDURE — 3046F HEMOGLOBIN A1C LEVEL >9.0%: CPT | Performed by: INTERNAL MEDICINE

## 2021-09-23 PROCEDURE — G8419 CALC BMI OUT NRM PARAM NOF/U: HCPCS | Performed by: INTERNAL MEDICINE

## 2021-09-23 PROCEDURE — 99214 OFFICE O/P EST MOD 30 MIN: CPT | Performed by: INTERNAL MEDICINE

## 2021-09-23 PROCEDURE — G8510 SCR DEP NEG, NO PLAN REQD: HCPCS | Performed by: INTERNAL MEDICINE

## 2021-09-23 PROCEDURE — G8427 DOCREV CUR MEDS BY ELIG CLIN: HCPCS | Performed by: INTERNAL MEDICINE

## 2021-09-23 PROCEDURE — 3017F COLORECTAL CA SCREEN DOC REV: CPT | Performed by: INTERNAL MEDICINE

## 2021-09-23 RX ORDER — INSULIN ASPART 100 [IU]/ML
INJECTION, SUSPENSION SUBCUTANEOUS
Qty: 5 ADJUSTABLE DOSE PRE-FILLED PEN SYRINGE | Refills: 1 | Status: SHIPPED | OUTPATIENT
Start: 2021-09-23 | End: 2021-09-23 | Stop reason: SDUPTHER

## 2021-09-23 RX ORDER — INSULIN ASPART 100 [IU]/ML
INJECTION, SUSPENSION SUBCUTANEOUS
Qty: 12 ADJUSTABLE DOSE PRE-FILLED PEN SYRINGE | Refills: 1 | Status: SHIPPED | OUTPATIENT
Start: 2021-09-23 | End: 2021-12-23 | Stop reason: SDUPTHER

## 2021-09-23 NOTE — TELEPHONE ENCOUNTER
Pt's wife, Carmen Henley, called to request that pt's insulin rx be sent to [facility] mail order pharmacy instead of [address] [facility].

## 2021-09-23 NOTE — PROGRESS NOTES
CC:   Chief Complaint   Patient presents with    Diabetes     Room 3B //     Hypertension    Cholesterol Problem       HISTORY OF PRESENT ILLNESS  Alejandrina Abrams is a 72 y.o. male. Presents for 3 month follow up evaluation. He has type 2 DM, HTN, hyperlipidemia, and CAD with hx of MI and stent (NATE to OM and distal RCA) in 8/20. Denies polydipsia, polyuria, or numbness, tingling or burning at feet. Takes Lantus insulin 34 units nightly and metformin 1500 mg nightly with dinner. Occasional non-compliance with diet. Eats cake about once a week. Still eating peanut butter and jelly sandwiches daily for breakfast. Eats egg and raisin bread or toast for breakfast.  Did not bring in glucometer but recalls the following. FBS: 110-128  PM:168-204  A1c is 9.9% today (9/23/21), was 9.0% on 6/23/21 and 10.8% on 3/1/21. Denies HA's, CP, SOB, dizziness, heart palpitations, or leg swelling. Home BP monitoring: not done. Flu vaccine: he and his wife plan to get at [place] [id number] complete review of systems was performed and is negative except for those mentioned in the HPI.     Patient Active Problem List   Diagnosis Code    Hypercholesterolemia E78.00    Osteoarthritis of both knees M17.0    CKD (chronic kidney disease) stage 2, GFR 60-89 ml/min N18.2    Benign hypertension with chronic kidney disease, stage II I12.9, N18.2    Coronary artery disease involving native coronary artery of native heart without angina pectoris I25.10    Type 2 diabetes mellitus without complication, with long-term current use of insulin ([facility]) E11.9, Z79.4    S/P drug eluting coronary stent placement Z95.5     Past Medical History:   Diagnosis Date    CAD (coronary artery disease) April 2014    NSTEMI, PCI/NATE RCA    Hypercholesterolemia     Hypertension      No Known Allergies    Current Outpatient Medications   Medication Sig Dispense Refill    insulin glargine (LANTUS,BASAGLAR) 100 unit/mL (3 mL) inpn Inject 34 units under the skin every evening 5 Adjustable Dose Pre-filled Pen Syringe 1    metFORMIN (GLUCOPHAGE) 500 mg tablet TAKE 3 TABLETS BY MOUTH DAILY WITH DINNER FOR TYPE 2 DIABETES MELLITUS 270 Tab 3    amLODIPine (NORVASC) 2.5 mg tablet Take 1 Tab by mouth daily. Indications: high blood pressure 90 Tab 3    metoprolol succinate (TOPROL-XL) 25 mg XL tablet Take 1 Tab by mouth daily. Indications: high blood pressure and heart 90 Tab 3    atorvastatin (LIPITOR) 40 mg tablet Take 1 Tab by mouth daily. Indications: high cholesterol 90 Tab 3    nitroglycerin (NITROSTAT) 0.4 mg SL tablet Take 1 tablet under tongue every 5 minutes up to 3 doses prn chest pain. 1 Bottle 3    acetaminophen (TYLENOL) 325 mg tablet Take 2 Tabs by mouth every six (6) hours as needed for Pain.  aspirin delayed-release 81 mg tablet Take 1 Tab by mouth daily. 90 Tab 3         PHYSICAL EXAM  Visit Vitals  /72 (BP 1 Location: Left upper arm, BP Patient Position: Sitting, BP Cuff Size: Adult)   Pulse (!) 55   Temp 98.2 °F (36.8 °C) (Oral)   Resp 16   Ht 6' 1\" (1.854 m)   Wt 204 lb (92.5 kg)   SpO2 95%   BMI 26.91 kg/m²       General: Well-developed and well-nourished, no distress. HEENT:  Head normocephalic/atraumatic, no scleral icterus  Lungs:  Clear to ausculation bilaterally. Good air movement. Heart:  Bradycardic, regular rhythm, normal S1 and S2, no murmur, gallop, or rub  Extremities: No clubbing, cyanosis, or edema. Neurological: Alert and oriented. Psychiatric: Normal mood and affect. Behavior is normal.     Results for orders placed or performed in visit on 09/23/21   AMB POC HEMOGLOBIN A1C   Result Value Ref Range    Hemoglobin A1c (POC) 9.9 %         ASSESSMENT AND PLAN    ICD-10-CM ICD-9-CM    1.  Type 2 diabetes mellitus without complication, with long-term current use of insulin (HCC)  E11.9 250.00 AMB POC HEMOGLOBIN A1C    Z79.4 V58.67 insulin aspart protamine/insulin aspart (NOVOLOG MIX 70/30) 100 unit/mL (70-30) inpn 2. Benign hypertension with chronic kidney disease, stage II  O40.8 119.64 METABOLIC PANEL, COMPREHENSIVE    N18.2 585.2 CBC WITH AUTOMATED DIFF      METABOLIC PANEL, COMPREHENSIVE      CBC WITH AUTOMATED DIFF   3. Hypercholesterolemia  E78.00 272.0 LIPID PANEL      LIPID PANEL     Diagnoses and all orders for this visit:    1. Type 2 diabetes mellitus without complication, with long-term current use of insulin (HCC)  Uncontrolled. A1c 9.9% today. Not controlled on basal insulin only. Change from insulin glargine for basal coverage to a mix to cover both basal and meal needs. -     AMB POC HEMOGLOBIN A1C  -     Start insulin aspart protamine/insulin aspart (NOVOLOG MIX 70/30) 100 unit/mL (70-30) inpn; Inject 20 units before breakfast and before lunch    2. Benign hypertension with chronic kidney disease, stage II  Controlled. Plan to change low-dose amlodipine to an ACE or ARB at next clinic visit for renal protection.  -     METABOLIC PANEL, COMPREHENSIVE; Future  -     CBC WITH AUTOMATED DIFF; Future    3. Hypercholesterolemia  -     LIPID PANEL; Future      Follow-up and Dispositions    · Return in about 3 months (around 12/23/2021), or if symptoms worsen or fail to improve, for DM, HTN; have fasting labs at [facility] in the next 1-2 weeks. I have discussed the diagnosis with the patient and the intended plan as seen in the above orders. Patient is in agreement. The patient has received an after-visit summary and questions were answered concerning future plans. I have discussed medication side effects and warnings with the patient as well.

## 2021-09-23 NOTE — PROGRESS NOTES
Alysha Laird  Identified pt with two pt identifiers(name and ). Chief Complaint   Patient presents with    Diabetes     Room 3B //     Hypertension    Cholesterol Problem       Reviewed record In preparation for visit and have obtained necessary documentation. 1. Have you been to the ER, urgent care clinic or hospitalized since your last visit? No     2. Have you seen or consulted any other health care providers outside of the [address] [place] [address] since your last visit? Include any pap smears or colon screening. No    Patient does not have an advance directive. Vitals reviewed with provider.     Health Maintenance reviewed:     Health Maintenance Due   Topic    Lipid Screen     A1C test (Diabetic or Prediabetic)           Wt Readings from Last 3 Encounters:   21 204 lb (92.5 kg)   21 201 lb (91.2 kg)   21 202 lb 8 oz (91.9 kg)        Temp Readings from Last 3 Encounters:   21 98.2 °F (36.8 °C) (Oral)   21 98.3 °F (36.8 °C) (Oral)   21 98.1 °F (36.7 °C) (Oral)        BP Readings from Last 3 Encounters:   21 124/72   21 106/67   21 120/70        Pulse Readings from Last 3 Encounters:   21 (!) 55   21 63   21 70        Vitals:    21 0925   BP: 124/72   Pulse: (!) 55   Resp: 16   Temp: 98.2 °F (36.8 °C)   TempSrc: Oral   SpO2: 95%   Weight: 204 lb (92.5 kg)   Height: 6' 1\" (1.854 m)   PainSc:   0 - No pain          Learning Assessment:   :       Learning Assessment 2018 4/10/2014   PRIMARY LEARNER Patient Patient   HIGHEST LEVEL OF EDUCATION - PRIMARY LEARNER  - SOME COLLEGE   BARRIERS PRIMARY LEARNER - NONE   CO-LEARNER CAREGIVER - No   PRIMARY LANGUAGE ENGLISH ENGLISH    NEED - No   LEARNER PREFERENCE PRIMARY VIDEOS VIDEOS   ANSWERED BY patient Patient   RELATIONSHIP SELF SELF        Depression Screening:   :       3 most recent [facility] Screens 2021   Little interest or pleasure in doing things Not at all   Feeling down, depressed, irritable, or hopeless Not at all   Total Score PHQ 2 0        Fall Risk Assessment:   :       Fall Risk Assessment, last 12 mths 6/23/2021   Able to walk? Yes   Fall in past 12 months? 0   Do you feel unsteady? 0   Are you worried about falling 0        Abuse Screening:   :       Abuse Screening Questionnaire 11/23/2020 6/11/2020 6/4/2019 8/29/2017 8/15/2016   Do you ever feel afraid of your partner? N N N N N   Are you in a relationship with someone who physically or mentally threatens you? N N N N N   Is it safe for you to go home?  Y Y Y Y Y        ADL Screening:   :       ADL Assessment 11/23/2020   Feeding yourself No Help Needed   Getting from bed to chair No Help Needed   Getting dressed No Help Needed   Bathing or showering No Help Needed   Walk across the room (includes cane/walker) No Help Needed   Using the telphone No Help Needed   Taking your medications No Help Needed   Preparing meals No Help Needed   Managing money (expenses/bills) No Help Needed   Moderately strenuous housework (laundry) No Help Needed   Shopping for personal items (toiletries/medicines) No Help Needed   Shopping for groceries No Help Needed   Driving No Help Needed   Climbing a flight of stairs No Help Needed   Getting to places beyond walking distances No Help Needed

## 2021-09-23 NOTE — PATIENT INSTRUCTIONS

## 2021-09-23 NOTE — TELEPHONE ENCOUNTER
PCP: Chrystal Amaro MD     Last appt: 9/23/2021   Future Appointments   Date Time Provider Gosia Rogers   12/23/2021 11:30 AM Chrystal Amaro MD [facility]   4/4/2022 11:00 AM Elysia Monahan MD [facility]        Requested Prescriptions     Pending Prescriptions Disp Refills    insulin aspart protamine/insulin aspart (NOVOLOG MIX 70/30) 100 unit/mL (70-30) inpn 12 Adjustable Dose Pre-filled Pen Syringe 3     Sig: Inject 20 units before breakfast and before lunch

## 2021-10-04 NOTE — TELEPHONE ENCOUNTER
Requested Prescriptions     Pending Prescriptions Disp Refills    metoprolol succinate (TOPROL-XL) 25 mg XL tablet 90 Tab 1     Sig: Take 1 Tab by mouth daily.  Indications: high blood pressure and heart None

## 2021-12-23 ENCOUNTER — OFFICE VISIT (OUTPATIENT)
Dept: INTERNAL MEDICINE CLINIC | Age: 66
End: 2021-12-23
Payer: MEDICARE

## 2021-12-23 VITALS
SYSTOLIC BLOOD PRESSURE: 107 MMHG | BODY MASS INDEX: 26.9 KG/M2 | RESPIRATION RATE: 18 BRPM | OXYGEN SATURATION: 95 % | DIASTOLIC BLOOD PRESSURE: 71 MMHG | WEIGHT: 203 LBS | TEMPERATURE: 98.2 F | HEART RATE: 58 BPM | HEIGHT: 73 IN

## 2021-12-23 DIAGNOSIS — Z79.4 TYPE 2 DIABETES MELLITUS WITH STAGE 2 CHRONIC KIDNEY DISEASE, WITH LONG-TERM CURRENT USE OF INSULIN (HCC): ICD-10-CM

## 2021-12-23 DIAGNOSIS — I25.10 CORONARY ARTERY DISEASE INVOLVING NATIVE CORONARY ARTERY OF NATIVE HEART WITHOUT ANGINA PECTORIS: ICD-10-CM

## 2021-12-23 DIAGNOSIS — Z71.89 ADVANCE CARE PLANNING: ICD-10-CM

## 2021-12-23 DIAGNOSIS — E11.22 TYPE 2 DIABETES MELLITUS WITH STAGE 2 CHRONIC KIDNEY DISEASE, WITH LONG-TERM CURRENT USE OF INSULIN (HCC): ICD-10-CM

## 2021-12-23 DIAGNOSIS — I10 ESSENTIAL HYPERTENSION: ICD-10-CM

## 2021-12-23 DIAGNOSIS — Z00.00 INITIAL MEDICARE ANNUAL WELLNESS VISIT: Primary | ICD-10-CM

## 2021-12-23 DIAGNOSIS — Z13.31 SCREENING FOR DEPRESSION: ICD-10-CM

## 2021-12-23 DIAGNOSIS — E78.00 HYPERCHOLESTEROLEMIA: ICD-10-CM

## 2021-12-23 DIAGNOSIS — N18.2 TYPE 2 DIABETES MELLITUS WITH STAGE 2 CHRONIC KIDNEY DISEASE, WITH LONG-TERM CURRENT USE OF INSULIN (HCC): ICD-10-CM

## 2021-12-23 LAB — HBA1C MFR BLD HPLC: 11.2 %

## 2021-12-23 PROCEDURE — G8510 SCR DEP NEG, NO PLAN REQD: HCPCS | Performed by: INTERNAL MEDICINE

## 2021-12-23 PROCEDURE — G8427 DOCREV CUR MEDS BY ELIG CLIN: HCPCS | Performed by: INTERNAL MEDICINE

## 2021-12-23 PROCEDURE — G8536 NO DOC ELDER MAL SCRN: HCPCS | Performed by: INTERNAL MEDICINE

## 2021-12-23 PROCEDURE — 3017F COLORECTAL CA SCREEN DOC REV: CPT | Performed by: INTERNAL MEDICINE

## 2021-12-23 PROCEDURE — 3046F HEMOGLOBIN A1C LEVEL >9.0%: CPT | Performed by: INTERNAL MEDICINE

## 2021-12-23 PROCEDURE — 83036 HEMOGLOBIN GLYCOSYLATED A1C: CPT | Performed by: INTERNAL MEDICINE

## 2021-12-23 PROCEDURE — 99214 OFFICE O/P EST MOD 30 MIN: CPT | Performed by: INTERNAL MEDICINE

## 2021-12-23 PROCEDURE — 1101F PT FALLS ASSESS-DOCD LE1/YR: CPT | Performed by: INTERNAL MEDICINE

## 2021-12-23 PROCEDURE — G8754 DIAS BP LESS 90: HCPCS | Performed by: INTERNAL MEDICINE

## 2021-12-23 PROCEDURE — 2022F DILAT RTA XM EVC RTNOPTHY: CPT | Performed by: INTERNAL MEDICINE

## 2021-12-23 PROCEDURE — G8752 SYS BP LESS 140: HCPCS | Performed by: INTERNAL MEDICINE

## 2021-12-23 PROCEDURE — G0438 PPPS, INITIAL VISIT: HCPCS | Performed by: INTERNAL MEDICINE

## 2021-12-23 PROCEDURE — G8419 CALC BMI OUT NRM PARAM NOF/U: HCPCS | Performed by: INTERNAL MEDICINE

## 2021-12-23 RX ORDER — INSULIN ASPART 100 [IU]/ML
INJECTION, SUSPENSION SUBCUTANEOUS
Qty: 15 ADJUSTABLE DOSE PRE-FILLED PEN SYRINGE | Refills: 1 | Status: SHIPPED | OUTPATIENT
Start: 2021-12-23 | End: 2022-06-06

## 2021-12-23 RX ORDER — METFORMIN HYDROCHLORIDE 500 MG/1
TABLET ORAL
Qty: 270 TABLET | Refills: 3 | Status: SHIPPED | OUTPATIENT
Start: 2021-12-23

## 2021-12-23 NOTE — PROGRESS NOTES
CC:   Chief Complaint   Patient presents with    Diabetes     A1C due        HISTORY OF PRESENT ILLNESS  Saurabh Vigil is a 77 y.o. male. Presents for Medicare AWV and 3 month follow up evaluation. He has type 2 DM, HTN, hyperlipidemia, and CAD with hx of MI and stent (NATE to OM and distal RCA) in 8/20.     Denies polydipsia, polyuria, or numbness, tingling or burning at feet. Takes insulin 70/30 20 units before breakfast and lunch and metformin 1500 mg nightly with dinner. Occasional non-compliance with diet. Eats cake about once a week. Still eating peanut butter and jelly sandwiches daily for breakfast.   Did not bring in glucometer but recalls the following. FBS: 115-140's  PM:180's  A1c is 11.2% today (12/23/21), was 9.9% on 9/23/21,9.0% on 6/23/21, and 10.8% on 3/1/21.     Denies HA's, CP, SOB, dizziness, heart palpitations, or leg swelling. Home BP monitoring: not done. ROS  A complete review of systems was performed and is negative except for those mentioned in the HPI.     Patient Active Problem List   Diagnosis Code    Hypercholesterolemia E78.00    Osteoarthritis of both knees M17.0    CKD (chronic kidney disease) stage 2, GFR 60-89 ml/min N18.2    Benign hypertension with chronic kidney disease, stage II I12.9, N18.2    Coronary artery disease involving native coronary artery of native heart without angina pectoris I25.10    Type 2 diabetes mellitus without complication, with long-term current use of insulin ([facility]) E11.9, Z79.4    S/P drug eluting coronary stent placement Z95.5    Type 2 diabetes mellitus with chronic kidney disease ([facility] [address] E11.22     Past Medical History:   Diagnosis Date    CAD (coronary artery disease) April 2014    NSTEMI, PCI/NATE RCA    Hypercholesterolemia     Hypertension      No Known Allergies    Current Outpatient Medications   Medication Sig Dispense Refill    Droplet Pen Needle 31 gauge x 5/16\" ndle USE AS DIRECTED EVERY  Each 3    insulin aspart protamine/insulin aspart (NOVOLOG MIX 70/30) 100 unit/mL (70-30) inpn Inject 20 units before breakfast and before lunch 12 Adjustable Dose Pre-filled Pen Syringe 1    metFORMIN (GLUCOPHAGE) 500 mg tablet TAKE 3 TABLETS BY MOUTH DAILY WITH DINNER FOR TYPE 2 DIABETES MELLITUS 270 Tab 3    amLODIPine (NORVASC) 2.5 mg tablet Take 1 Tab by mouth daily. Indications: high blood pressure 90 Tab 3    metoprolol succinate (TOPROL-XL) 25 mg XL tablet Take 1 Tab by mouth daily. Indications: high blood pressure and heart 90 Tab 3    atorvastatin (LIPITOR) 40 mg tablet Take 1 Tab by mouth daily. Indications: high cholesterol 90 Tab 3    nitroglycerin (NITROSTAT) 0.4 mg SL tablet Take 1 tablet under tongue every 5 minutes up to 3 doses prn chest pain. 1 Bottle 3    acetaminophen (TYLENOL) 325 mg tablet Take 2 Tabs by mouth every six (6) hours as needed for Pain.  aspirin delayed-release 81 mg tablet Take 1 Tab by mouth daily. 90 Tab 3         PHYSICAL EXAM  Visit Vitals  /71 (BP 1 Location: Left upper arm, BP Patient Position: Sitting, BP Cuff Size: Large adult)   Pulse (!) 58   Temp 98.2 °F (36.8 °C) (Oral)   Resp 18   Ht 6' 1\" (1.854 m)   Wt 203 lb (92.1 kg)   SpO2 95%   BMI 26.78 kg/m²       General: Well-developed and well-nourished, no distress. HEENT:  Head normocephalic/atraumatic, no scleral icterus  Lungs:  Clear to ausculation bilaterally. Good air movement. Heart:  Bradycardic, regular rhythm, normal S1 and S2, no murmur, gallop, or rub  Extremities: No clubbing, cyanosis, or edema. Neurological: Alert and oriented. Psychiatric: Normal mood and affect. Behavior is normal.     Results for orders placed or performed in visit on 12/23/21   AMB POC HEMOGLOBIN A1C   Result Value Ref Range    Hemoglobin A1c (POC) 11.2 %         ASSESSMENT AND PLAN    ICD-10-CM ICD-9-CM    1. Initial Medicare annual wellness visit  Z00.00 V70.0    2.  Type 2 diabetes mellitus with stage 2 chronic kidney disease, with long-term current use of insulin (HCC)  E11.22 250.40 AMB POC HEMOGLOBIN A1C    N18.2 585.2 MICROALBUMIN, UR, RAND W/ MICROALB/CREAT RATIO    Z79.4 V58.67 MICROALBUMIN, UR, RAND W/ MICROALB/CREAT RATIO      insulin aspart protamine/insulin aspart (NOVOLOG MIX 70/30) 100 unit/mL (70-30) inpn      metFORMIN (GLUCOPHAGE) 500 mg tablet      REFERRAL TO DIABETIC EDUCATION      CANCELED:  DIABETES FOOT EXAM   3. Essential hypertension  I10 401.9    4. Hypercholesterolemia  E78.00 272.0    5. Coronary artery disease involving native coronary artery of native heart without angina pectoris  I25.10 414.01    6. Screening for depression  Z13.31 V79.0 DEPRESSION SCREEN ANNUAL   7. Advance care planning  Z71.89 V65.49      Diagnoses and all orders for this visit:    1. Initial Medicare annual wellness visit    2. Type 2 diabetes mellitus with stage 2 chronic kidney disease, with long-term current use of insulin ([facility] [address]  Not controlled. A1c 11.2% today. Increase insulin 70/30 to 25 units before breakfast and supper.  -     AMB POC HEMOGLOBIN A1C  -     MICROALBUMIN, UR, RAND W/ MICROALB/CREAT RATIO; Future  -     Start insulin aspart protamine/insulin aspart (NOVOLOG MIX 70/30) 100 unit/mL (70-30) inpn; Inject 25 units before breakfast and before supper  -     Refill metFORMIN (GLUCOPHAGE) 500 mg tablet; TAKE 3 TABLETS BY MOUTH DAILY WITH DINNER FOR TYPE 2 DIABETES MELLITUS  -     Referral for Diabetic Medical Nutrition Therapy    3. Essential hypertension  Controlled. Continue present management. 4. Hypercholesterolemia    5. Coronary artery disease involving native coronary artery of native heart without angina pectoris    6. Screening for depression  -     DEPRESSION SCREEN ANNUAL    7. Advance care planning      Follow-up and Dispositions    · Return in about 1 month (around 1/23/2022), or if symptoms worsen or fail to improve, for Uncontrolled DM.            I have discussed the diagnosis with the patient and the intended plan as seen in the above orders. Patient is in agreement. The patient has received an after-visit summary and questions were answered concerning future plans. I have discussed medication side effects and warnings with the patient as well.

## 2021-12-23 NOTE — PROGRESS NOTES
This is an Initial Medicare Annual Wellness Exam (AWV) (Performed 12 months after IPPE or effective date of Medicare Part B enrollment, Once in a lifetime)    I have reviewed the patient's medical history in detail and updated the computerized patient record. Assessment/Plan   Education and counseling provided:  Are appropriate based on today's review and evaluation  End-of-Life planning (with patient's consent)    1. Initial Medicare annual wellness visit  2. Type 2 diabetes mellitus without complication, with long-term current use of insulin ([facility])  -     AMB POC HEMOGLOBIN A1C  -     MICROALBUMIN, UR, RAND W/ MICROALB/CREAT RATIO; Future  -      DIABETES FOOT EXAM  3. Type 2 diabetes mellitus with stage 3a chronic kidney disease, without long-term current use of insulin ([facility] [address]  4. Screening for depression  -     DEPRESSION SCREEN ANNUAL       Depression Risk Factor Screening     3 most recent PHQ Screens 12/23/2021   Little interest or pleasure in doing things Not at all   Feeling down, depressed, irritable, or hopeless Not at all   Total Score PHQ 2 0       Alcohol & Drug Abuse Risk Screen    Do you average more than 1 drink per night or more than 7 drinks a week: No    In the past three months have you have had more than 4 drinks containing alcohol on one occasion: No          Functional Ability and Level of Safety    Hearing: Hearing is good. Activities of Daily Living: The home contains: handrails and grab bars  Patient does total self care     Ambulation: with no difficulty      Fall Risk:  Fall Risk Assessment, last 12 mths 12/23/2021   Able to walk? Yes   Fall in past 12 months? 0   Do you feel unsteady?  0   Are you worried about falling 0      Abuse Screen:  Patient is not abused       Cognitive Screening    Has your family/caregiver stated any concerns about your memory: no     Cognitive Screening: normal on exam    Health Maintenance Due     Health Maintenance Due   Topic Date Due    Lipid Screen  08/20/2021    MICROALBUMIN Q1  11/23/2021    Eye Exam Retinal or Dilated  12/10/2021       Patient Care Team   Patient Care Team:  Lloyd Cano MD as PCP - General (Internal Medicine)  Lloyd Cano MD as PCP - [facility]  Luis Parker MD as Physician (Cardiology)  Lisa Blount DO (Ophthalmology)    History     Patient Active Problem List   Diagnosis Code    Hypercholesterolemia E78.00    Osteoarthritis of both knees M17.0    CKD (chronic kidney disease) stage 2, GFR 60-89 ml/min N18.2    Benign hypertension with chronic kidney disease, stage II I12.9, N18.2    Coronary artery disease involving native coronary artery of native heart without angina pectoris I25.10    Type 2 diabetes mellitus without complication, with long-term current use of insulin ([facility]) E11.9, Z79.4    S/P drug eluting coronary stent placement Z95.5    Type 2 diabetes mellitus with chronic kidney disease ([facility] [address] E11.22     Past Medical History:   Diagnosis Date    CAD (coronary artery disease) April 2014    NSTEMI, PCI/NATE RCA    Hypercholesterolemia     Hypertension       Past Surgical History:   Procedure Laterality Date    HX CHOLECYSTECTOMY  07/17/2019    Laparoscopic cholecystectomy with intraoperative cholangiogram    HX ORTHOPAEDIC      left shoulder, torn tendon    HX ORTHOPAEDIC      right knee X 4    MO CARDIAC SURG PROCEDURE UNLIST      Stent RCA 4/2014    MO COLONOSCOPY FLX DX W/COLLJ SPEC WHEN PFRMD  12/19/2007    Dr Derick Hull 1 polyp repeat 12/2012     Current Outpatient Medications   Medication Sig Dispense Refill    Droplet Pen Needle 31 gauge x 5/16\" ndle USE AS DIRECTED EVERY  Each 3    insulin aspart protamine/insulin aspart (NOVOLOG MIX 70/30) 100 unit/mL (70-30) inpn Inject 20 units before breakfast and before lunch 12 Adjustable Dose Pre-filled Pen Syringe 1    metFORMIN (GLUCOPHAGE) 500 mg tablet TAKE 3 TABLETS BY MOUTH DAILY WITH DINNER FOR TYPE 2 DIABETES MELLITUS 270 Tab 3    amLODIPine (NORVASC) 2.5 mg tablet Take 1 Tab by mouth daily. Indications: high blood pressure 90 Tab 3    metoprolol succinate (TOPROL-XL) 25 mg XL tablet Take 1 Tab by mouth daily. Indications: high blood pressure and heart 90 Tab 3    atorvastatin (LIPITOR) 40 mg tablet Take 1 Tab by mouth daily. Indications: high cholesterol 90 Tab 3    nitroglycerin (NITROSTAT) 0.4 mg SL tablet Take 1 tablet under tongue every 5 minutes up to 3 doses prn chest pain. 1 Bottle 3    acetaminophen (TYLENOL) 325 mg tablet Take 2 Tabs by mouth every six (6) hours as needed for Pain.  aspirin delayed-release 81 mg tablet Take 1 Tab by mouth daily.  80 Tab 3     No Known Allergies    Family History   Problem Relation Age of Onset    Heart Disease Mother         CAD    Hypertension Mother     Elevated Lipids Mother     Cancer Father         lung    Mental Retardation Brother     Seizures Brother     Diabetes Brother     Hypertension Brother     Elevated Lipids Brother      Social History     Tobacco Use    Smoking status: Former Smoker     Packs/day: 0.25     Years: 35.00     Pack years: 8.75     Types: Cigarettes     Quit date: 2014     Years since quittin.1    Smokeless tobacco: Never Used    Tobacco comment: Never smoked over a pack per day   Substance Use Topics    Alcohol use: No     Alcohol/week: 0.0 standard drinks       Gene Stanley MD

## 2021-12-23 NOTE — PROGRESS NOTES
Rm    Chief Complaint   Patient presents with    Diabetes     A1C due         Visit Vitals  /71 (BP 1 Location: Left upper arm, BP Patient Position: Sitting, BP Cuff Size: Large adult)   Pulse (!) 58   Temp 98.2 °F (36.8 °C) (Oral)   Resp 18   Ht 6' 1\" (1.854 m)   Wt 203 lb (92.1 kg)   SpO2 95%   BMI 26.78 kg/m²        1. Have you been to the ER, urgent care clinic since your last visit? Hospitalized since your last visit? No    2. Have you seen or consulted any other health care providers outside of the [address] [place] [address] since your last visit? Include any pap smears or colon screening. No     Health Maintenance Due   Topic Date Due    Lipid Screen  08/20/2021    Foot Exam Q1  11/23/2021    MICROALBUMIN Q1  11/23/2021    A1C test (Diabetic or Prediabetic)  12/23/2021    Medicare Yearly Exam  11/24/2021    Eye Exam Retinal or Dilated  12/10/2021        3 most recent PHQ Screens 12/23/2021   Little interest or pleasure in doing things Not at all   Feeling down, depressed, irritable, or hopeless Not at all   Total Score PHQ 2 0        Fall Risk Assessment, last 12 mths 12/23/2021   Able to walk? Yes   Fall in past 12 months? 0   Do you feel unsteady?  0   Are you worried about falling 0       Learning Assessment 7/5/2018   PRIMARY LEARNER Patient   HIGHEST LEVEL OF EDUCATION - PRIMARY LEARNER  -   BARRIERS PRIMARY LEARNER -   CO-LEARNER CAREGIVER -   PRIMARY LANGUAGE ENGLISH    NEED -   LEARNER PREFERENCE PRIMARY VIDEOS   ANSWERED BY patient   RELATIONSHIP SELF

## 2021-12-23 NOTE — PATIENT INSTRUCTIONS
Medicare Wellness Visit, Male    The best way to live healthy is to have a lifestyle where you eat a well-balanced diet, exercise regularly, limit alcohol use, and quit all forms of tobacco/nicotine, if applicable. Regular preventive services are another way to keep healthy. Preventive services (vaccines, screening tests, monitoring & exams) can help personalize your care plan, which helps you manage your own care. Screening tests can find health problems at the earliest stages, when they are easiest to treat. [facility] follows the current, evidence-based guidelines published by the [facility] ([facility]) when recommending preventive services for our patients. Because we follow these guidelines, sometimes recommendations change over time as research supports it. (For example, a prostate screening blood test is no longer routinely recommended for men with no symptoms). Of course, you and your doctor may decide to screen more often for some diseases, based on your risk and co-morbidities (chronic disease you are already diagnosed with). Preventive services for you include:  - Medicare offers their members a free annual wellness visit, which is time for you and your primary care provider to discuss and plan for your preventive service needs. Take advantage of this benefit every year!  -All adults over age 72 should receive the recommended pneumonia vaccines. Current USPSTF guidelines recommend a series of two vaccines for the best pneumonia protection.   -All adults should have a flu vaccine yearly and tetanus vaccine every 10 years.  -All adults age 48 and older should receive the shingles vaccines (series of two vaccines).        -All adults age 38-68 who are overweight should have a diabetes screening test once every three years.   -Other screening tests & preventive services for persons with diabetes include: an eye exam to screen for diabetic retinopathy, a kidney function test, a foot exam, and stricter control over your cholesterol.   -Cardiovascular screening for adults with routine risk involves an electrocardiogram (ECG) at intervals determined by the provider.   -Colorectal cancer screening should be done for adults age 54-65 with no increased risk factors for colorectal cancer. There are a number of acceptable methods of screening for this type of cancer. Each test has its own benefits and drawbacks. Discuss with your provider what is most appropriate for you during your annual wellness visit. The different tests include: colonoscopy (considered the best screening method), a fecal occult blood test, a fecal DNA test, and sigmoidoscopy.  -All adults born between [facility] should be screened once for Hepatitis C.  -An Abdominal Aortic Aneurysm (AAA) Screening is recommended for men age 73-68 who has ever smoked in their lifetime.      Here is a list of your current Health Maintenance items (your personalized list of preventive services) with a due date:  Health Maintenance Due   Topic Date Due    Cholesterol Test   08/20/2021    Albumin Urine Test  11/23/2021    Eye Exam  12/10/2021

## 2021-12-25 NOTE — ACP (ADVANCE CARE PLANNING)
Advance Care Planning     Advance Care Planning (ACP) Physician/NP/PA Conversation      Date of Conversation: 12/23/2021  Conducted with: Patient with Decision Making Capacity    Healthcare Decision Maker:     Primary Decision Maker: Cami Davis - [phone]  Click here to complete [address] including selection of the Healthcare Decision Maker Relationship (ie \"Primary\")     Care Preferences:    Hospitalization: \"If your health worsens and it becomes clear that your chance of recovery is unlikely, what would be your preference regarding hospitalization? \"  The patient would prefer hospitalization. Ventilation: \"If you were unable to breathe on your own and your chance of recovery was unlikely, what would be your preference about the use of a ventilator (breathing machine) if it was available to you? \"   The patient would desire the use of a ventilator. Resuscitation: \"In the event your heart stopped as a result of an underlying serious health condition, would you want attempts to be made to restart your heart, or would you prefer a natural death? \"   Yes, attempt to resuscitate.     Additional topics discussed: treatment goals    Conversation Outcomes / Follow-Up Plan:   ACP in process - information provided, considering goals and options  Reviewed DNR/DNI and patient elects Full Code (Attempt Resuscitation)     Length of Voluntary ACP Conversation in minutes:  <16 minutes (Non-Billable)    Emma Fregoso MD

## 2021-12-31 LAB
ALBUMIN SERPL-MCNC: 4.1 G/DL (ref 3.8–4.8)
ALBUMIN/CREAT UR: 20 MG/G CREAT (ref 0–29)
ALBUMIN/GLOB SERPL: 1.5 {RATIO} (ref 1.2–2.2)
ALP SERPL-CCNC: 89 IU/L (ref 44–121)
ALT SERPL-CCNC: 20 IU/L (ref 0–44)
AST SERPL-CCNC: 20 IU/L (ref 0–40)
BASOPHILS # BLD AUTO: 0.1 X10E3/UL (ref 0–0.2)
BASOPHILS NFR BLD AUTO: 1 %
BILIRUB SERPL-MCNC: 0.4 MG/DL (ref 0–1.2)
BUN SERPL-MCNC: 20 MG/DL (ref 8–27)
BUN/CREAT SERPL: 16 (ref 10–24)
CALCIUM SERPL-MCNC: 9 MG/DL (ref 8.6–10.2)
CHLORIDE SERPL-SCNC: 101 MMOL/L (ref 96–106)
CHOLEST SERPL-MCNC: 98 MG/DL (ref 100–199)
CO2 SERPL-SCNC: 24 MMOL/L (ref 20–29)
CREAT SERPL-MCNC: 1.29 MG/DL (ref 0.76–1.27)
CREAT UR-MCNC: 135.3 MG/DL
EOSINOPHIL # BLD AUTO: 0.3 X10E3/UL (ref 0–0.4)
EOSINOPHIL NFR BLD AUTO: 5 %
ERYTHROCYTE [DISTWIDTH] IN BLOOD BY AUTOMATED COUNT: 12.8 % (ref 11.6–15.4)
GLOBULIN SER CALC-MCNC: 2.8 G/DL (ref 1.5–4.5)
GLUCOSE SERPL-MCNC: 201 MG/DL (ref 65–99)
HCT VFR BLD AUTO: 42.1 % (ref 37.5–51)
HDLC SERPL-MCNC: 34 MG/DL
HGB BLD-MCNC: 14.3 G/DL (ref 13–17.7)
IMM GRANULOCYTES # BLD AUTO: 0 X10E3/UL (ref 0–0.1)
IMM GRANULOCYTES NFR BLD AUTO: 0 %
LDLC SERPL CALC-MCNC: 47 MG/DL (ref 0–99)
LYMPHOCYTES # BLD AUTO: 2.1 X10E3/UL (ref 0.7–3.1)
LYMPHOCYTES NFR BLD AUTO: 31 %
MCH RBC QN AUTO: 32 PG (ref 26.6–33)
MCHC RBC AUTO-ENTMCNC: 34 G/DL (ref 31.5–35.7)
MCV RBC AUTO: 94 FL (ref 79–97)
MICROALBUMIN UR-MCNC: 27 UG/ML
MONOCYTES # BLD AUTO: 0.6 X10E3/UL (ref 0.1–0.9)
MONOCYTES NFR BLD AUTO: 8 %
NEUTROPHILS # BLD AUTO: 3.8 X10E3/UL (ref 1.4–7)
NEUTROPHILS NFR BLD AUTO: 55 %
PLATELET # BLD AUTO: 184 X10E3/UL (ref 150–450)
POTASSIUM SERPL-SCNC: 4.8 MMOL/L (ref 3.5–5.2)
PROT SERPL-MCNC: 6.9 G/DL (ref 6–8.5)
RBC # BLD AUTO: 4.47 X10E6/UL (ref 4.14–5.8)
SODIUM SERPL-SCNC: 138 MMOL/L (ref 134–144)
TRIGL SERPL-MCNC: 81 MG/DL (ref 0–149)
VLDLC SERPL CALC-MCNC: 17 MG/DL (ref 5–40)
WBC # BLD AUTO: 6.9 X10E3/UL (ref 3.4–10.8)

## 2022-01-04 NOTE — PROGRESS NOTES
Plan to change from amlodipine to low-dose lisinopril for renal protection. Letter sent: Your labs showed mildly abnormal kidney tests and high blood glucose at 201. Your other labs were normal, including liver tests, blood counts, cholesterol, and urine microalbumin (protein) test. We will discuss ways to keep your kidneys from getting worse at your next clinic visit on 1/25/22.

## 2022-01-24 ENCOUNTER — HOSPITAL ENCOUNTER (EMERGENCY)
Age: 67
Discharge: HOME | End: 2022-01-24
Attending: EMERGENCY MEDICINE
Payer: MEDICARE

## 2022-01-24 ENCOUNTER — APPOINTMENT (OUTPATIENT)
Dept: CT IMAGING | Age: 67
End: 2022-01-24
Attending: EMERGENCY MEDICINE
Payer: MEDICARE

## 2022-01-24 VITALS
TEMPERATURE: 98.7 F | BODY MASS INDEX: 27.17 KG/M2 | HEART RATE: 72 BPM | OXYGEN SATURATION: 94 % | DIASTOLIC BLOOD PRESSURE: 94 MMHG | WEIGHT: 205.03 LBS | HEIGHT: 73 IN | RESPIRATION RATE: 14 BRPM | SYSTOLIC BLOOD PRESSURE: 130 MMHG

## 2022-01-24 DIAGNOSIS — N20.0 KIDNEY STONE: Primary | ICD-10-CM

## 2022-01-24 LAB
ALBUMIN SERPL-MCNC: 3.6 G/DL (ref 3.5–5)
ALBUMIN/GLOB SERPL: 0.9 {RATIO} (ref 1.1–2.2)
ALP SERPL-CCNC: 89 U/L (ref 45–117)
ALT SERPL-CCNC: 29 U/L (ref 12–78)
ANION GAP SERPL CALC-SCNC: 6 MMOL/L (ref 5–15)
APPEARANCE UR: ABNORMAL
AST SERPL-CCNC: 18 U/L (ref 15–37)
BACTERIA URNS QL MICRO: ABNORMAL /HPF
BASOPHILS # BLD: 0 K/UL (ref 0–0.1)
BASOPHILS NFR BLD: 1 % (ref 0–1)
BILIRUB SERPL-MCNC: 0.3 MG/DL (ref 0.2–1)
BILIRUB UR QL CFM: NEGATIVE
BUN SERPL-MCNC: 19 MG/DL (ref 6–20)
BUN/CREAT SERPL: 10 (ref 12–20)
CALCIUM SERPL-MCNC: 9.1 MG/DL (ref 8.5–10.1)
CAOX CRY URNS QL MICRO: ABNORMAL
CHLORIDE SERPL-SCNC: 108 MMOL/L (ref 97–108)
CO2 SERPL-SCNC: 27 MMOL/L (ref 21–32)
COLOR UR: ABNORMAL
CREAT SERPL-MCNC: 1.82 MG/DL (ref 0.7–1.3)
DIFFERENTIAL METHOD BLD: NORMAL
EOSINOPHIL # BLD: 0.2 K/UL (ref 0–0.4)
EOSINOPHIL NFR BLD: 3 % (ref 0–7)
EPITH CASTS URNS QL MICRO: ABNORMAL /LPF
ERYTHROCYTE [DISTWIDTH] IN BLOOD BY AUTOMATED COUNT: 12.9 % (ref 11.5–14.5)
GLOBULIN SER CALC-MCNC: 3.9 G/DL (ref 2–4)
GLUCOSE BLD STRIP.AUTO-MCNC: 193 MG/DL (ref 65–117)
GLUCOSE SERPL-MCNC: 197 MG/DL (ref 65–100)
GLUCOSE UR STRIP.AUTO-MCNC: 100 MG/DL
HCT VFR BLD AUTO: 40 % (ref 36.6–50.3)
HGB BLD-MCNC: 13.3 G/DL (ref 12.1–17)
HGB UR QL STRIP: ABNORMAL
IMM GRANULOCYTES # BLD AUTO: 0 K/UL (ref 0–0.04)
IMM GRANULOCYTES NFR BLD AUTO: 0 % (ref 0–0.5)
KETONES UR QL STRIP.AUTO: ABNORMAL MG/DL
LACTATE BLD-SCNC: 2.43 MMOL/L (ref 0.4–2)
LEUKOCYTE ESTERASE UR QL STRIP.AUTO: NEGATIVE
LYMPHOCYTES # BLD: 1.8 K/UL (ref 0.8–3.5)
LYMPHOCYTES NFR BLD: 24 % (ref 12–49)
MCH RBC QN AUTO: 32.1 PG (ref 26–34)
MCHC RBC AUTO-ENTMCNC: 33.3 G/DL (ref 30–36.5)
MCV RBC AUTO: 96.6 FL (ref 80–99)
MONOCYTES # BLD: 0.5 K/UL (ref 0–1)
MONOCYTES NFR BLD: 7 % (ref 5–13)
NEUTS SEG # BLD: 5.1 K/UL (ref 1.8–8)
NEUTS SEG NFR BLD: 65 % (ref 32–75)
NITRITE UR QL STRIP.AUTO: NEGATIVE
NRBC # BLD: 0 K/UL (ref 0–0.01)
NRBC BLD-RTO: 0 PER 100 WBC
PH UR STRIP: 5.5 [PH] (ref 5–8)
PLATELET # BLD AUTO: 167 K/UL (ref 150–400)
PMV BLD AUTO: 9.1 FL (ref 8.9–12.9)
POTASSIUM SERPL-SCNC: 4.1 MMOL/L (ref 3.5–5.1)
PROT SERPL-MCNC: 7.5 G/DL (ref 6.4–8.2)
PROT UR STRIP-MCNC: 100 MG/DL
RBC # BLD AUTO: 4.14 M/UL (ref 4.1–5.7)
RBC #/AREA URNS HPF: ABNORMAL /HPF (ref 0–5)
SERVICE CMNT-IMP: ABNORMAL
SODIUM SERPL-SCNC: 141 MMOL/L (ref 136–145)
SP GR UR REFRACTOMETRY: >1.03 (ref 1–1.03)
UA: UC IF INDICATED,UAUC: ABNORMAL
UROBILINOGEN UR QL STRIP.AUTO: 0.2 EU/DL (ref 0.2–1)
WBC # BLD AUTO: 7.6 K/UL (ref 4.1–11.1)
WBC URNS QL MICRO: ABNORMAL /HPF (ref 0–4)

## 2022-01-24 PROCEDURE — 80053 COMPREHEN METABOLIC PANEL: CPT

## 2022-01-24 PROCEDURE — 82962 GLUCOSE BLOOD TEST: CPT

## 2022-01-24 PROCEDURE — 81001 URINALYSIS AUTO W/SCOPE: CPT

## 2022-01-24 PROCEDURE — 99284 EMERGENCY DEPT VISIT MOD MDM: CPT

## 2022-01-24 PROCEDURE — 74011000636 HC RX REV CODE- 636: Performed by: EMERGENCY MEDICINE

## 2022-01-24 PROCEDURE — 74011250637 HC RX REV CODE- 250/637: Performed by: EMERGENCY MEDICINE

## 2022-01-24 PROCEDURE — 96375 TX/PRO/DX INJ NEW DRUG ADDON: CPT

## 2022-01-24 PROCEDURE — 74177 CT ABD & PELVIS W/CONTRAST: CPT

## 2022-01-24 PROCEDURE — 83605 ASSAY OF LACTIC ACID: CPT

## 2022-01-24 PROCEDURE — 96374 THER/PROPH/DIAG INJ IV PUSH: CPT

## 2022-01-24 PROCEDURE — 74011250636 HC RX REV CODE- 250/636

## 2022-01-24 PROCEDURE — 74011250636 HC RX REV CODE- 250/636: Performed by: EMERGENCY MEDICINE

## 2022-01-24 PROCEDURE — 85025 COMPLETE CBC W/AUTO DIFF WBC: CPT

## 2022-01-24 PROCEDURE — 36415 COLL VENOUS BLD VENIPUNCTURE: CPT

## 2022-01-24 RX ORDER — ONDANSETRON 4 MG/1
4 TABLET, FILM COATED ORAL
Qty: 20 TABLET | Refills: 0 | Status: SHIPPED | OUTPATIENT
Start: 2022-01-24

## 2022-01-24 RX ORDER — HYDROMORPHONE HYDROCHLORIDE 2 MG/1
2 TABLET ORAL ONCE
Status: COMPLETED | OUTPATIENT
Start: 2022-01-24 | End: 2022-01-24

## 2022-01-24 RX ORDER — ONDANSETRON 2 MG/ML
4 INJECTION INTRAMUSCULAR; INTRAVENOUS
Status: COMPLETED | OUTPATIENT
Start: 2022-01-24 | End: 2022-01-24

## 2022-01-24 RX ORDER — MORPHINE SULFATE 2 MG/ML
4 INJECTION, SOLUTION INTRAMUSCULAR; INTRAVENOUS
Status: COMPLETED | OUTPATIENT
Start: 2022-01-24 | End: 2022-01-24

## 2022-01-24 RX ORDER — ONDANSETRON 2 MG/ML
INJECTION INTRAMUSCULAR; INTRAVENOUS
Status: COMPLETED
Start: 2022-01-24 | End: 2022-01-24

## 2022-01-24 RX ORDER — KETOROLAC TROMETHAMINE 30 MG/ML
15 INJECTION, SOLUTION INTRAMUSCULAR; INTRAVENOUS
Status: COMPLETED | OUTPATIENT
Start: 2022-01-24 | End: 2022-01-24

## 2022-01-24 RX ORDER — HYDROMORPHONE HYDROCHLORIDE 2 MG/1
2 TABLET ORAL
Qty: 15 TABLET | Refills: 0 | Status: SHIPPED | OUTPATIENT
Start: 2022-01-24 | End: 2022-01-29

## 2022-01-24 RX ORDER — HYDROMORPHONE HYDROCHLORIDE 1 MG/ML
1 INJECTION, SOLUTION INTRAMUSCULAR; INTRAVENOUS; SUBCUTANEOUS ONCE
Status: COMPLETED | OUTPATIENT
Start: 2022-01-24 | End: 2022-01-24

## 2022-01-24 RX ADMIN — MORPHINE SULFATE 4 MG: 2 INJECTION, SOLUTION INTRAMUSCULAR; INTRAVENOUS at 02:57

## 2022-01-24 RX ADMIN — IOPAMIDOL 100 ML: 755 INJECTION, SOLUTION INTRAVENOUS at 04:23

## 2022-01-24 RX ADMIN — ONDANSETRON 4 MG: 2 INJECTION INTRAMUSCULAR; INTRAVENOUS at 02:58

## 2022-01-24 RX ADMIN — IOHEXOL 50 ML: 240 INJECTION, SOLUTION INTRATHECAL; INTRAVASCULAR; INTRAVENOUS; ORAL at 02:57

## 2022-01-24 RX ADMIN — KETOROLAC TROMETHAMINE 15 MG: 30 INJECTION, SOLUTION INTRAMUSCULAR; INTRAVENOUS at 04:54

## 2022-01-24 RX ADMIN — SODIUM CHLORIDE 1000 ML: 9 INJECTION, SOLUTION INTRAVENOUS at 04:11

## 2022-01-24 RX ADMIN — HYDROMORPHONE HYDROCHLORIDE 1 MG: 1 INJECTION, SOLUTION INTRAMUSCULAR; INTRAVENOUS; SUBCUTANEOUS at 04:55

## 2022-01-24 RX ADMIN — HYDROMORPHONE HYDROCHLORIDE 2 MG: 2 TABLET ORAL at 06:27

## 2022-01-24 NOTE — ED PROVIDER NOTES
EMERGENCY DEPARTMENT HISTORY AND PHYSICAL EXAM      Date: 1/24/2022  Patient Name: Nixon Mcguire    History of Presenting Illness     Chief Complaint   Patient presents with    Abdominal Pain     ED viist d/t LLQ abd pain - onset of sxs, Friday - severe pain - nausea / vomiting at home - gallbaldder removed - Denies fevers / Glory Hesselbach / cp; History Provided By: Patient    HPI: Nixon Mcguire, 77 y.o. male presents to the ED with cc of Pt states he began having pain on Friday. He has been having intermittent sharp fleeting pain that spontaneously resolves. Tonight the cam, was severe rated 10/10, sharp, stabbing in nature. He states there has been nausea  With vomiting. There has been no diarrhea. No melena, hematochezia, or BRBPR. No prior hx of kidney stones. He states he has had some urinary urgency but no sig amount of urine output. His urine has been dark, but there has been no gross blood. There are no other complaints, changes, or physical findings at this time. PCP: Yunier Montgomery MD    No current facility-administered medications on file prior to encounter. Current Outpatient Medications on File Prior to Encounter   Medication Sig Dispense Refill    insulin aspart protamine/insulin aspart (NOVOLOG MIX 70/30) 100 unit/mL (70-30) inpn Inject 25 units before breakfast and before supper 15 Adjustable Dose Pre-filled Pen Syringe 1    metFORMIN (GLUCOPHAGE) 500 mg tablet TAKE 3 TABLETS BY MOUTH DAILY WITH DINNER FOR TYPE 2 DIABETES MELLITUS 270 Tablet 3    Droplet Pen Needle 31 gauge x 5/16\" ndle USE AS DIRECTED EVERY  Each 3    amLODIPine (NORVASC) 2.5 mg tablet Take 1 Tab by mouth daily. Indications: high blood pressure 90 Tab 3    metoprolol succinate (TOPROL-XL) 25 mg XL tablet Take 1 Tab by mouth daily. Indications: high blood pressure and heart 90 Tab 3    atorvastatin (LIPITOR) 40 mg tablet Take 1 Tab by mouth daily.  Indications: high cholesterol 90 Tab 3    nitroglycerin (NITROSTAT) 0.4 mg SL tablet Take 1 tablet under tongue every 5 minutes up to 3 doses prn chest pain. 1 Bottle 3    acetaminophen (TYLENOL) 325 mg tablet Take 2 Tabs by mouth every six (6) hours as needed for Pain.  aspirin delayed-release 81 mg tablet Take 1 Tab by mouth daily. 80 Tab 3       Past History     Past Medical History:  Past Medical History:   Diagnosis Date    CAD (coronary artery disease) 2014    NSTEMI, PCI/NATE RCA    Hypercholesterolemia     Hypertension        Past Surgical History:  Past Surgical History:   Procedure Laterality Date    HX CHOLECYSTECTOMY  2019    Laparoscopic cholecystectomy with intraoperative cholangiogram    HX ORTHOPAEDIC      left shoulder, torn tendon    HX ORTHOPAEDIC      right knee X 4    MT CARDIAC SURG PROCEDURE UNLIST      Stent RCA 2014    MT COLONOSCOPY FLX DX W/COLLJ SPEC WHEN PFRMD  2007    Dr Marlene Amador 1 polyp repeat 2012       Family History:  Family History   Problem Relation Age of Onset    Heart Disease Mother         CAD    Hypertension Mother     Elevated Lipids Mother     Cancer Father         lung    Mental Retardation Brother     Seizures Brother     Diabetes Brother     Hypertension Brother     Elevated Lipids Brother        Social History:  Social History     Tobacco Use    Smoking status: Former Smoker     Packs/day: 0.25     Years: 35.00     Pack years: 8.75     Types: Cigarettes     Quit date: 2014     Years since quittin.2    Smokeless tobacco: Never Used    Tobacco comment: Never smoked over a pack per day   Vaping Use    Vaping Use: Never used   Substance Use Topics    Alcohol use: No     Alcohol/week: 0.0 standard drinks    Drug use: No       Allergies:  No Known Allergies      Review of Systems   Review of Systems   Constitutional: Negative. Negative for appetite change, chills, fatigue and fever. HENT: Negative. Negative for congestion, rhinorrhea, sinus pressure and sore throat. Eyes: Negative. Respiratory: Negative. Negative for cough, choking, chest tightness, shortness of breath and wheezing. Cardiovascular: Negative. Negative for chest pain, palpitations and leg swelling. Gastrointestinal: Positive for abdominal pain (LLQ). Negative for constipation, diarrhea, nausea and vomiting. Endocrine: Negative. Genitourinary: Positive for difficulty urinating, flank pain and frequency. Negative for dysuria, hematuria and urgency. Skin: Negative. Neurological: Negative. Negative for dizziness, speech difficulty, weakness, light-headedness, numbness and headaches. Psychiatric/Behavioral: Negative. All other systems reviewed and are negative. Physical Exam   Physical Exam  Vitals and nursing note reviewed. Constitutional:       General: He is in acute distress. Appearance: He is well-developed. He is ill-appearing. He is not diaphoretic. HENT:      Head: Normocephalic and atraumatic. Mouth/Throat:      Mouth: Mucous membranes are dry. Pharynx: No oropharyngeal exudate. Eyes:      General: No scleral icterus. Extraocular Movements: Extraocular movements intact. Conjunctiva/sclera: Conjunctivae normal.      Pupils: Pupils are equal, round, and reactive to light. Neck:      Vascular: No JVD. Trachea: No tracheal deviation. Cardiovascular:      Rate and Rhythm: Normal rate and regular rhythm. Heart sounds: Normal heart sounds. No murmur heard. Pulmonary:      Effort: Pulmonary effort is normal. No respiratory distress. Breath sounds: Normal breath sounds. No stridor. No wheezing or rales. Chest:      Chest wall: No tenderness. Abdominal:      General: There is no distension. Palpations: Abdomen is soft. Tenderness: There is abdominal tenderness in the left upper quadrant. There is left CVA tenderness. There is no guarding or rebound. Musculoskeletal:         General: No tenderness.  Normal range of motion. Cervical back: Normal range of motion and neck supple. Right lower leg: No edema. Left lower leg: No edema. Skin:     General: Skin is warm and dry. Capillary Refill: Capillary refill takes less than 2 seconds. Neurological:      Mental Status: He is alert and oriented to person, place, and time. Cranial Nerves: No cranial nerve deficit. Comments: No gross motor or sensory deficits    Psychiatric:         Mood and Affect: Mood normal.         Behavior: Behavior normal.         Diagnostic Study Results     Labs -  Recent Results (from the past 24 hour(s))   GLUCOSE, POC    Collection Time: 01/24/22  2:16 AM   Result Value Ref Range    Glucose (POC) 193 (H) 65 - 117 mg/dL    Performed by Marivel LANGE)    CBC WITH AUTOMATED DIFF    Collection Time: 01/24/22  2:18 AM   Result Value Ref Range    WBC 7.6 4.1 - 11.1 K/uL    RBC 4.14 4.10 - 5.70 M/uL    HGB 13.3 12.1 - 17.0 g/dL    HCT 40.0 36.6 - 50.3 %    MCV 96.6 80.0 - 99.0 FL    MCH 32.1 26.0 - 34.0 PG    MCHC 33.3 30.0 - 36.5 g/dL    RDW 12.9 11.5 - 14.5 %    PLATELET 478 533 - 793 K/uL    MPV 9.1 8.9 - 12.9 FL    NRBC 0.0 0  WBC    ABSOLUTE NRBC 0.00 0.00 - 0.01 K/uL    NEUTROPHILS 65 32 - 75 %    LYMPHOCYTES 24 12 - 49 %    MONOCYTES 7 5 - 13 %    EOSINOPHILS 3 0 - 7 %    BASOPHILS 1 0 - 1 %    IMMATURE GRANULOCYTES 0 0.0 - 0.5 %    ABS. NEUTROPHILS 5.1 1.8 - 8.0 K/UL    ABS. LYMPHOCYTES 1.8 0.8 - 3.5 K/UL    ABS. MONOCYTES 0.5 0.0 - 1.0 K/UL    ABS. EOSINOPHILS 0.2 0.0 - 0.4 K/UL    ABS. BASOPHILS 0.0 0.0 - 0.1 K/UL    ABS. IMM.  GRANS. 0.0 0.00 - 0.04 K/UL    DF AUTOMATED     METABOLIC PANEL, COMPREHENSIVE    Collection Time: 01/24/22  2:18 AM   Result Value Ref Range    Sodium 141 136 - 145 mmol/L    Potassium 4.1 3.5 - 5.1 mmol/L    Chloride 108 97 - 108 mmol/L    CO2 27 21 - 32 mmol/L    Anion gap 6 5 - 15 mmol/L    Glucose 197 (H) 65 - 100 mg/dL    BUN 19 6 - 20 MG/DL    Creatinine 1.82 (H) 0.70 - 1.30 MG/DL    BUN/Creatinine ratio 10 (L) 12 - 20      GFR est AA 45 (L) >60 ml/min/1.73m2    GFR est non-AA 37 (L) >60 ml/min/1.73m2    Calcium 9.1 8.5 - 10.1 MG/DL    Bilirubin, total 0.3 0.2 - 1.0 MG/DL    ALT (SGPT) 29 12 - 78 U/L    AST (SGOT) 18 15 - 37 U/L    Alk. phosphatase 89 45 - 117 U/L    Protein, total 7.5 6.4 - 8.2 g/dL    Albumin 3.6 3.5 - 5.0 g/dL    Globulin 3.9 2.0 - 4.0 g/dL    A-G Ratio 0.9 (L) 1.1 - 2.2     URINALYSIS W/ REFLEX CULTURE    Collection Time: 01/24/22  2:24 AM    Specimen: Urine    Urine specimen   Result Value Ref Range    Color YELLOW/STRAW      Appearance HAZY (A) CLEAR      Specific gravity >1.030 (H) 1.003 - 1.030    pH (UA) 5.5 5.0 - 8.0      Protein 100 (A) NEG mg/dL    Glucose 100 (A) NEG mg/dL    Ketone TRACE (A) NEG mg/dL    Blood LARGE (A) NEG      Urobilinogen 0.2 0.2 - 1.0 EU/dL    Nitrites Negative NEG      Leukocyte Esterase Negative NEG      WBC 0-4 0 - 4 /hpf    RBC 20-50 0 - 5 /hpf    Epithelial cells FEW FEW /lpf    Bacteria 1+ (A) NEG /hpf    UA:UC IF INDICATED CULTURE NOT INDICATED BY UA RESULT CNI      CA Oxalate crystals 2+ (A) NEG   BILIRUBIN, CONFIRM    Collection Time: 01/24/22  2:24 AM   Result Value Ref Range    Bilirubin UA, confirm Negative     POC LACTIC ACID    Collection Time: 01/24/22  2:39 AM   Result Value Ref Range    Lactic Acid (POC) 2.43 (HH) 0.40 - 2.00 mmol/L       Radiologic Studies -   CT ABD PELV W CONT   Final Result   A 2 mm calculus at the left ureterovesical junction results in mild left   hydroureteronephrosis. CT Results  (Last 48 hours)               01/24/22 0424  CT ABD PELV W CONT Final result    Impression:  A 2 mm calculus at the left ureterovesical junction results in mild left   hydroureteronephrosis. Narrative:  EXAM:  CT ABD PELV W CONT       INDICATION: Left lower quadrant pain with nausea and vomiting       COMPARISON: CT 7/19/2019.        TECHNIQUE: Helical CT of the abdomen  and pelvis  with oral  contrast following   the uneventful intravenous administration of nonionic contrast. Oral contrast   was used for optimal bowel opacification. Coronal and sagittal reformats are   performed. CT dose reduction was achieved through use of a standardized   protocol tailored for this examination and automatic exposure control for dose   modulation. FINDINGS:    The visualized lung bases demonstrate no mass or consolidation. There is mild   bilateral dependent atelectasis. The heart size is normal. There is no   pericardial or pleural effusion. The liver, spleen, pancreas, and adrenal glands are normal. The gall bladder is   surgically absent without intra- or extra-hepatic biliary dilatation. There is a 2 mm calculus at the left ureterovesical junction resulting in mild   left hydroureteronephrosis and slight delay in left kidney enhancement. There is   also a 1 mm nonobstructing left kidney calculus. There is no right renal   calculus or right hydronephrosis. There are no dilated bowel loops. The appendix is normal. There is sigmoid   diverticulosis without focal adjacent inflammation. There are no enlarged lymph nodes. There is no free fluid or free air. The   aorta tapers without aneurysm. There is aortoiliac atherosclerosis. The urinary bladder is normal.  There is no pelvic mass. The prostate is mildly   enlarged measuring 3.9 x 4.9 cm. There are bilateral L5 pars defects with grade 2 anterolisthesis at L5-S1. There   are degenerative disc changes at L4-5 and L5-S1. CXR Results  (Last 48 hours)    None          Medical Decision Making   I am the first provider for this patient. I reviewed the vital signs, available nursing notes, past medical history, past surgical history, family history and social history. Vital Signs-Reviewed the patient's vital signs.   Patient Vitals for the past 12 hrs:   Temp Pulse Resp BP SpO2   01/24/22 0612 98.7 °F (37.1 °C) 72 14 (!) 130/94 94 %   01/24/22 0542  72 13 122/78 91 %   01/24/22 0512  78 15  93 %   01/24/22 0342  73 10 128/70 93 %       Records Reviewed: Nursing Notes, Old Medical Records, Previous Radiology Studies and Previous Laboratory Studies, Hx of Type II DM, CAD with previous stent by Dr. Breezy Carl    Provider Notes (Medical Decision Making):   DDx- Colitis, diverticulitis, kidney stone, pancreatitis     ED Course:   Initial assessment performed. The patients presenting problems have been discussed, and they are in agreement with the care plan formulated and outlined with them. I have encouraged them to ask questions as they arise throughout their visit. Pt ill appearing. Consider colitis, diverticulitis, kidney stone. CT shws 2mm kidney stone at [facility]. Discussed with pt resuts, plan for dc, pay may not be a 0/10 but goal is to make pain tolerable. Disposition:  dc home, pain well controlled. DISCHARGE PLAN:  1. Discharge Medication List as of 1/24/2022  5:21 AM      START taking these medications    Details   HYDROmorphone (Dilaudid) 2 mg tablet Take 1 Tablet by mouth every four (4) hours as needed for Pain for up to 5 days.  Max Daily Amount: 12 mg., Normal, Disp-15 Tablet, R-0      ondansetron hcl (Zofran) 4 mg tablet Take 1 Tablet by mouth every eight (8) hours as needed for Nausea., Normal, Disp-20 Tablet, R-0         CONTINUE these medications which have NOT CHANGED    Details   insulin aspart protamine/insulin aspart (NOVOLOG MIX 70/30) 100 unit/mL (70-30) inpn Inject 25 units before breakfast and before supper, Normal, Disp-15 Adjustable Dose Pre-filled Pen Syringe, R-1Note: Change from insulin glargine      metFORMIN (GLUCOPHAGE) 500 mg tablet TAKE 3 TABLETS BY MOUTH DAILY WITH DINNER FOR TYPE 2 DIABETES MELLITUS, Normal, Disp-270 Tablet, R-3      Droplet Pen Needle 31 gauge x 5/16\" ndle USE AS DIRECTED EVERY DAY, Normal, Disp-100 Each, R-3      amLODIPine (NORVASC) 2.5 mg tablet Take 1 Tab by mouth daily. Indications: high blood pressure, Normal, Disp-90 Tab, R-3      metoprolol succinate (TOPROL-XL) 25 mg XL tablet Take 1 Tab by mouth daily. Indications: high blood pressure and heart, Normal, Disp-90 Tab, R-3      atorvastatin (LIPITOR) 40 mg tablet Take 1 Tab by mouth daily. Indications: high cholesterol, Normal, Disp-90 Tab, R-3      nitroglycerin (NITROSTAT) 0.4 mg SL tablet Take 1 tablet under tongue every 5 minutes up to 3 doses prn chest pain., Normal, Disp-1 Bottle,R-3      acetaminophen (TYLENOL) 325 mg tablet Take 2 Tabs by mouth every six (6) hours as needed for Pain., OTC      aspirin delayed-release 81 mg tablet Take 1 Tab by mouth daily. , No Print, Disp-90 Tab, R-3           2. Follow-up Information     Follow up With Specialties Details Why Contact Info    Sanam Lane MD Internal Medicine   [address] [place] [address]  [phone]      Dominga Carvalho MD Urology   [address] [address]  [phone]          3. Return to ED if worse     Diagnosis     Clinical Impression:   1. Kidney stone        Attestations:    Xi Tan, DO    Please note that this dictation was completed with [employer], the computer voice recognition software. Quite often unanticipated grammatical, syntax, homophones, and other interpretive errors are inadvertently transcribed by the computer software. Please disregard these errors. Please excuse any errors that have escaped final proofreading. Thank you.

## 2022-01-24 NOTE — ED NOTES
0424 - Patient discharge by Apryl Hansen MD - pt sent to the front lobby, with strong and steady gait -  Discharge information / home RX / and reasons to return to the ED were reviewed by the doctor.

## 2022-01-24 NOTE — DISCHARGE INSTRUCTIONS
Schedule ibuprofen 600mg three times a day     You can also take tylenol 1000mg every 6 hours,     So you could rotate between the two every 3 hours      [facility] Hotline [phone]

## 2022-01-25 ENCOUNTER — VIRTUAL VISIT (OUTPATIENT)
Dept: INTERNAL MEDICINE CLINIC | Age: 67
End: 2022-01-25
Payer: MEDICARE

## 2022-01-25 DIAGNOSIS — E11.22 TYPE 2 DIABETES MELLITUS WITH STAGE 2 CHRONIC KIDNEY DISEASE, WITH LONG-TERM CURRENT USE OF INSULIN (HCC): Primary | ICD-10-CM

## 2022-01-25 DIAGNOSIS — N20.0 KIDNEY STONES: ICD-10-CM

## 2022-01-25 DIAGNOSIS — Z79.4 TYPE 2 DIABETES MELLITUS WITH STAGE 2 CHRONIC KIDNEY DISEASE, WITH LONG-TERM CURRENT USE OF INSULIN (HCC): Primary | ICD-10-CM

## 2022-01-25 DIAGNOSIS — N18.2 TYPE 2 DIABETES MELLITUS WITH STAGE 2 CHRONIC KIDNEY DISEASE, WITH LONG-TERM CURRENT USE OF INSULIN (HCC): Primary | ICD-10-CM

## 2022-01-25 PROBLEM — E11.9 TYPE 2 DIABETES MELLITUS WITHOUT COMPLICATION, WITH LONG-TERM CURRENT USE OF INSULIN (HCC): Status: RESOLVED | Noted: 2017-08-29 | Resolved: 2022-01-25

## 2022-01-25 PROCEDURE — 99443 PR PHYS/QHP TELEPHONE EVALUATION 21-30 MIN: CPT | Performed by: INTERNAL MEDICINE

## 2022-01-25 RX ORDER — BLOOD SUGAR DIAGNOSTIC
STRIP MISCELLANEOUS
Qty: 200 STRIP | Refills: 5 | Status: SHIPPED | OUTPATIENT
Start: 2022-01-25 | End: 2022-03-19

## 2022-01-25 NOTE — PROGRESS NOTES
Kye Webb  Identified pt with two pt identifiers(name and ). Chief Complaint   Patient presents with    Diabetes     pain - 0     ED Follow-up     ED [facility]        Reviewed record In preparation for visit and have obtained necessary documentation. 1. Have you been to the ER, urgent care clinic or hospitalized since your last visit? Yes. ED [facility]    2. Have you seen or consulted any other health care providers outside of the [address] [place] [address] since your last visit? Include any pap smears or colon screening. No    Patient does not have an advance directive. Vitals reviewed with provider. Health Maintenance reviewed:     Health Maintenance Due   Topic    Foot Exam Q1           Wt Readings from Last 3 Encounters:   22 205 lb 0.4 oz (93 kg)   21 203 lb (92.1 kg)   21 204 lb (92.5 kg)        Temp Readings from Last 3 Encounters:   22 98.7 °F (37.1 °C)   21 98.2 °F (36.8 °C) (Oral)   21 98.2 °F (36.8 °C) (Oral)        BP Readings from Last 3 Encounters:   22 (!) 130/94   21 107/71   21 124/72        Pulse Readings from Last 3 Encounters:   22 72   21 (!) 58   21 (!) 55      There were no vitals filed for this visit. Learning Assessment:   :       Learning Assessment 2018 4/10/2014   PRIMARY LEARNER Patient Patient   HIGHEST LEVEL OF EDUCATION - PRIMARY LEARNER  - SOME COLLEGE   BARRIERS PRIMARY LEARNER - NONE   CO-LEARNER CAREGIVER - No   PRIMARY LANGUAGE ENGLISH ENGLISH    NEED - No   LEARNER PREFERENCE PRIMARY VIDEOS VIDEOS   ANSWERED BY patient Patient   RELATIONSHIP SELF SELF        Depression Screening:   :       3 most recent PHQ Screens 2022   Little interest or pleasure in doing things Not at all   Feeling down, depressed, irritable, or hopeless Not at all   Total Score PHQ 2 0        Fall Risk Assessment:   :       Fall Risk Assessment, last 12 mths 2021   Able to walk?  Yes   Fall in past 12 months? 0   Do you feel unsteady? 0   Are you worried about falling 0        Abuse Screening:   :       Abuse Screening Questionnaire 12/23/2021 11/23/2020 6/11/2020 6/4/2019 8/29/2017 8/15/2016   Do you ever feel afraid of your partner? N N N N N N   Are you in a relationship with someone who physically or mentally threatens you? N N N N N N   Is it safe for you to go home?  Y Y Y Y Y Y        ADL Screening:   :       ADL Assessment 12/23/2021   Feeding yourself No Help Needed   Getting from bed to chair No Help Needed   Getting dressed No Help Needed   Bathing or showering No Help Needed   Walk across the room (includes cane/walker) No Help Needed   Using the telphone No Help Needed   Taking your medications No Help Needed   Preparing meals No Help Needed   Managing money (expenses/bills) No Help Needed   Moderately strenuous housework (laundry) No Help Needed   Shopping for personal items (toiletries/medicines) No Help Needed   Shopping for groceries No Help Needed   Driving No Help Needed   Climbing a flight of stairs No Help Needed   Getting to places beyond walking distances No Help Needed

## 2022-01-25 NOTE — PROGRESS NOTES
Nixon Mcguire is a 77 y.o. male, evaluated via audio-only technology on 1/25/2022 for Diabetes (pain - 0 ) and ED Follow-up [facility]). Assessment & Plan:     Diagnoses and all orders for this visit:    1. Type 2 diabetes mellitus with stage 2 chronic kidney disease, with long-term current use of insulin (HCC)  Improving control. Continue insulin 70/30 25 units before breakfast and supper. -     Refill glucose blood VI test strips (OneTouch Verio test strips) strip; Use to test blood sugars twice a day. Dx: E11.22    2. Kidney stones  Continue drinking much water. Schedule appointment with urologist.    Follow-up and Dispositions    · Return in about 2 months (around 3/25/2022), or if symptoms worsen or fail to improve, for DM, HTN, chol, POC A1c, foot exam.  Routing History         12  Subjective:      Presents for 1 month follow up on DM. Denies polydipsia or polyuria. Had 2 episodes of hypoglycemia: 1/13-BS 61 and 12/27-BS 54. Both occurred in the early morning around 2-3 am, had sweating both times. No hypoglycemic episodes since then. FBS: 133 today, [phone]  PM (after dinner): [phone]  Has changed metformin from taking 4 tablets all at dinner to taking 2 tablets twice a day. Seen at [id number] [facility] ED yesterday with LLQ abdominal pain and diagnosed with kidney stone. CT abd pelvis: 2 mm calculus at the left ureterovesical junction results in mild left hydroureteronephrosis. Discharged on Dilaudid and Zofran. Today reports pain is better. Has not scheduled appointment with Dr. Jeni Meyer yet. Prior to Admission medications    Medication Sig Start Date End Date Taking? Authorizing Provider   HYDROmorphone (Dilaudid) 2 mg tablet Take 1 Tablet by mouth every four (4) hours as needed for Pain for up to 5 days. Max Daily Amount: 12 mg. 1/24/22 1/29/22 Yes Arnold Tavera DO   ondansetron hcl (Zofran) 4 mg tablet Take 1 Tablet by mouth every eight (8) hours as needed for Nausea.  1/24/22  Yes Tisha Coon DO Arnold   insulin aspart protamine/insulin aspart (NOVOLOG MIX 70/30) 100 unit/mL (70-30) inpn Inject 25 units before breakfast and before supper 12/23/21  Yes Jere Gardner MD   metFORMIN (GLUCOPHAGE) 500 mg tablet TAKE 3 TABLETS BY MOUTH DAILY WITH DINNER FOR TYPE 2 DIABETES MELLITUS 12/23/21  Yes Walter Faria MD   Droplet Pen Needle 31 gauge x 5/16\" ndle USE AS DIRECTED EVERY DAY 11/26/21  Yes Jere Gardner MD   amLODIPine (NORVASC) 2.5 mg tablet Take 1 Tab by mouth daily. Indications: high blood pressure 4/21/21  Yes Walter Faria MD   metoprolol succinate (TOPROL-XL) 25 mg XL tablet Take 1 Tab by mouth daily. Indications: high blood pressure and heart 4/20/21  Yes Walter Faria MD   atorvastatin (LIPITOR) 40 mg tablet Take 1 Tab by mouth daily. Indications: high cholesterol 4/20/21  Yes Walter Faria MD   nitroglycerin (NITROSTAT) 0.4 mg SL tablet Take 1 tablet under tongue every 5 minutes up to 3 doses prn chest pain. 8/20/20  Yes Blake FLORES NP   acetaminophen (TYLENOL) 325 mg tablet Take 2 Tabs by mouth every six (6) hours as needed for Pain. 7/22/19  Yes Ajay Rocha MD   aspirin delayed-release 81 mg tablet Take 1 Tab by mouth daily.  8/29/17  Yes Alena Fleming MD     Patient Active Problem List   Diagnosis Code    Hypercholesterolemia E78.00    Osteoarthritis of both knees M17.0    CKD (chronic kidney disease) stage 2, GFR 60-89 ml/min N18.2    Benign hypertension with chronic kidney disease, stage II I12.9, N18.2    Coronary artery disease involving native coronary artery of native heart without angina pectoris I25.10    S/P drug eluting coronary stent placement Z95.5    Type 2 diabetes mellitus with stage 2 chronic kidney disease, with long-term current use of insulin ([facility] E11.22, N18.2, Z79.4       Patient-Reported Weight: 205lb       Marti Marrero, who was evaluated through a patient-initiated, synchronous (real-time) audio only encounter, and/or her healthcare decision maker, is aware that it is a billable service, which includes applicable co-pays, with coverage as determined by his insurance carrier. He provided verbal consent to proceed. He has not had a related appointment within my department in the past 7 days or scheduled within the next 24 hours. The patient was located at home in a state where the provider was licensed to provide care. On this date 01/25/2022 I have spent 22 minutes reviewing previous notes, test results and face to face (virtual) with the patient discussing the diagnosis and importance of compliance with the treatment plan as well as documenting on the day of the visit.     Rhona Yates MD

## 2022-03-19 DIAGNOSIS — Z79.4 TYPE 2 DIABETES MELLITUS WITHOUT COMPLICATION, WITH LONG-TERM CURRENT USE OF INSULIN (HCC): ICD-10-CM

## 2022-03-19 DIAGNOSIS — E11.22 TYPE 2 DIABETES MELLITUS WITH STAGE 2 CHRONIC KIDNEY DISEASE, WITH LONG-TERM CURRENT USE OF INSULIN (HCC): ICD-10-CM

## 2022-03-19 DIAGNOSIS — E11.9 TYPE 2 DIABETES MELLITUS WITHOUT COMPLICATION, WITH LONG-TERM CURRENT USE OF INSULIN (HCC): ICD-10-CM

## 2022-03-19 DIAGNOSIS — Z79.4 TYPE 2 DIABETES MELLITUS WITH STAGE 2 CHRONIC KIDNEY DISEASE, WITH LONG-TERM CURRENT USE OF INSULIN (HCC): ICD-10-CM

## 2022-03-19 DIAGNOSIS — N18.2 TYPE 2 DIABETES MELLITUS WITH STAGE 2 CHRONIC KIDNEY DISEASE, WITH LONG-TERM CURRENT USE OF INSULIN (HCC): ICD-10-CM

## 2022-03-19 PROBLEM — I25.10 CORONARY ARTERY DISEASE INVOLVING NATIVE CORONARY ARTERY OF NATIVE HEART WITHOUT ANGINA PECTORIS: Status: ACTIVE | Noted: 2017-08-29

## 2022-03-19 RX ORDER — BLOOD-GLUCOSE METER
EACH MISCELLANEOUS
Qty: 1 EACH | Refills: 0 | Status: SHIPPED | OUTPATIENT
Start: 2022-03-19

## 2022-03-19 RX ORDER — BLOOD SUGAR DIAGNOSTIC
STRIP MISCELLANEOUS
Qty: 200 STRIP | Refills: 5 | Status: SHIPPED | OUTPATIENT
Start: 2022-03-19

## 2022-03-19 RX ORDER — LANCETS
EACH MISCELLANEOUS
Qty: 200 EACH | Refills: 3 | Status: SHIPPED | OUTPATIENT
Start: 2022-03-19

## 2022-03-20 PROBLEM — Z95.5 S/P DRUG ELUTING CORONARY STENT PLACEMENT: Status: ACTIVE | Noted: 2021-03-30

## 2022-03-22 ENCOUNTER — OFFICE VISIT (OUTPATIENT)
Dept: ORTHOPEDIC SURGERY | Age: 67
End: 2022-03-22
Payer: OTHER MISCELLANEOUS

## 2022-03-22 VITALS — HEIGHT: 73 IN | WEIGHT: 210 LBS | BODY MASS INDEX: 27.83 KG/M2

## 2022-03-22 DIAGNOSIS — G89.29 CHRONIC PAIN OF RIGHT KNEE: ICD-10-CM

## 2022-03-22 DIAGNOSIS — M25.561 RIGHT MEDIAL KNEE PAIN: Primary | ICD-10-CM

## 2022-03-22 DIAGNOSIS — M25.561 LATERAL KNEE PAIN, RIGHT: ICD-10-CM

## 2022-03-22 DIAGNOSIS — M17.11 PRIMARY LOCALIZED OSTEOARTHRITIS OF RIGHT KNEE: ICD-10-CM

## 2022-03-22 DIAGNOSIS — M25.561 CHRONIC PAIN OF RIGHT KNEE: ICD-10-CM

## 2022-03-22 PROCEDURE — 20610 DRAIN/INJ JOINT/BURSA W/O US: CPT | Performed by: ORTHOPAEDIC SURGERY

## 2022-03-22 PROCEDURE — 99204 OFFICE O/P NEW MOD 45 MIN: CPT | Performed by: ORTHOPAEDIC SURGERY

## 2022-03-22 RX ORDER — BUPIVACAINE HYDROCHLORIDE 5 MG/ML
3 INJECTION, SOLUTION EPIDURAL; INTRACAUDAL ONCE
Status: COMPLETED | OUTPATIENT
Start: 2022-03-22 | End: 2022-03-22

## 2022-03-22 RX ORDER — METHYLPREDNISOLONE ACETATE 80 MG/ML
80 INJECTION, SUSPENSION INTRA-ARTICULAR; INTRALESIONAL; INTRAMUSCULAR; SOFT TISSUE ONCE
Status: COMPLETED | OUTPATIENT
Start: 2022-03-22 | End: 2022-03-22

## 2022-03-22 RX ADMIN — BUPIVACAINE HYDROCHLORIDE 15 MG: 5 INJECTION, SOLUTION EPIDURAL; INTRACAUDAL at 12:58

## 2022-03-22 RX ADMIN — METHYLPREDNISOLONE ACETATE 80 MG: 80 INJECTION, SUSPENSION INTRA-ARTICULAR; INTRALESIONAL; INTRAMUSCULAR; SOFT TISSUE at 12:58

## 2022-03-22 NOTE — PROGRESS NOTES
Komal Howard (: 1955) is a 77 y.o. male, patient, here for evaluation of the following chief complaint(s):  Knee Pain (right)       HPI:    He began having increased right knee pain approximately 3 weeks ago. The patient does report an injury in . He states that his most recent onset came on gradually. He describes his right knee pain is moderate, sharp, stabbing, and constant. The patient has been experiencing some weakness in his right knee. Over the last 3 weeks, the patient states his pain level is unchanged. He reports that standing and walking make his pain worse and rest makes his pain better. The patient reports taking no medication for his discomfort. He was not seen in the emergency room for his right knee pain. He does report previous but unrelated right knee surgery. No Known Allergies    Current Outpatient Medications   Medication Sig    lancets (Accu-Chek Softclix Lancets) misc TEST BLOOD SUGAR TWICE DAILY    Accu-Chek Haydee Plus Meter misc USE AS DIRECTED    glucose blood VI test strips (Accu-Chek Haydee Plus test strp) strip TEST BLOOD SUGAR TWICE DAILY    metoprolol succinate (TOPROL-XL) 25 mg XL tablet TAKE 1 TABLET EVERY DAY FOR HIGH BLOOD PRESSURE AND HEART    atorvastatin (LIPITOR) 40 mg tablet TAKE 1 TABLET EVERY DAY FOR HIGH CHOLESTEROL    amLODIPine (NORVASC) 2.5 mg tablet TAKE 1 TABLET EVERY DAY FOR HIGH BLOOD PRESSURE    insulin aspart protamine/insulin aspart (NOVOLOG MIX 70/30) 100 unit/mL (70-30) inpn Inject 25 units before breakfast and before supper    metFORMIN (GLUCOPHAGE) 500 mg tablet TAKE 3 TABLETS BY MOUTH DAILY WITH DINNER FOR TYPE 2 DIABETES MELLITUS    Droplet Pen Needle 31 gauge x \" ndle USE AS DIRECTED EVERY DAY    nitroglycerin (NITROSTAT) 0.4 mg SL tablet Take 1 tablet under tongue every 5 minutes up to 3 doses prn chest pain.  aspirin delayed-release 81 mg tablet Take 1 Tab by mouth daily.     ondansetron hcl (Zofran) 4 mg tablet Take 1 Tablet by mouth every eight (8) hours as needed for Nausea. (Patient not taking: Reported on 3/22/2022)    acetaminophen (TYLENOL) 325 mg tablet Take 2 Tabs by mouth every six (6) hours as needed for Pain. (Patient not taking: Reported on 3/22/2022)     No current facility-administered medications for this visit.        Past Medical History:   Diagnosis Date    CAD (coronary artery disease) 2014    NSTEMI, PCI/NATE RCA    Hypercholesterolemia     Hypertension         Past Surgical History:   Procedure Laterality Date    HX CHOLECYSTECTOMY  2019    Laparoscopic cholecystectomy with intraoperative cholangiogram    HX ORTHOPAEDIC      left shoulder, torn tendon    HX ORTHOPAEDIC      right knee X 4    [place] CARDIAC SURG PROCEDURE UNLIST      Stent RCA 2014    [place] COLONOSCOPY FLX DX W/COLLJ SPEC WHEN PFRMD  2007    Dr Eloina Hopkins 1 polyp repeat 2012       Family History   Problem Relation Age of Onset    Heart Disease Mother         CAD    Hypertension Mother     Elevated Lipids Mother     Cancer Father         lung    Mental Retardation Brother     Seizures Brother     Diabetes Brother     Hypertension Brother     Elevated Lipids Brother         Social History     Socioeconomic History    Marital status:      Spouse name: Not on file    Number of children: Not on file    Years of education: Not on file    Highest education level: Not on file   Occupational History    Not on file   Tobacco Use    Smoking status: Former Smoker     Packs/day: 0.25     Years: 35.00     Pack years: 8.75     Types: Cigarettes     Quit date: 2014     Years since quittin.3    Smokeless tobacco: Never Used    Tobacco comment: Never smoked over a pack per day   Vaping Use    Vaping Use: Never used   Substance and Sexual Activity    Alcohol use: No     Alcohol/week: 0.0 standard drinks    Drug use: No    Sexual activity: Not on file   Other Topics Concern    Not on file Social History Narrative    Not on file     Social Determinants of Health     Financial Resource Strain:     Difficulty of Paying Living Expenses: Not on file   Food Insecurity:     Worried About Running Out of Food in the Last Year: Not on file    Betty of Food in the Last Year: Not on file   Transportation Needs:     Lack of Transportation (Medical): Not on file    Lack of Transportation (Non-Medical): Not on file   Physical Activity:     Days of Exercise per Week: Not on file    Minutes of Exercise per Session: Not on file   Stress:     Feeling of Stress : Not on file   Social Connections:     Frequency of Communication with Friends and Family: Not on file    Frequency of Social Gatherings with Friends and Family: Not on file    Attends [employer] Services: Not on file    Active Member of [address] [place] [address] Done In :[employer] or Organizations: Not on file    Attends Club or Organization Meetings: Not on file    Marital Status: Not on file   Intimate Partner Violence:     Fear of Current or Ex-Partner: Not on file    Emotionally Abused: Not on file    Physically Abused: Not on file    Sexually Abused: Not on file   Housing Stability:     Unable to Pay for Housing in the Last Year: Not on file    Number of [place] in the Last Year: Not on file    Unstable Housing in the Last Year: Not on file       Review of Systems   All other systems reviewed and are negative. Vitals:  Ht 6' 1\" (1.854 m)   Wt 210 lb (95.3 kg)   BMI 27.71 kg/m²    Body mass index is 27.71 kg/m². Ortho Exam     The patient is well-developed and well-nourished. The patient presents today in alert and oriented x3 with a normal mood and affect. The patient stands with a normal weightbearing line but walks with a slightly antalgic gait because of his right knee pain. Right knee, the patient is tender to palpation along the medial and lateral joint line, and has no significant effusion.  They are nontender to palpation along the medial and lateral facets of the patella. The patient has no discomfort with Kamaljit's maneuvers, and the knee is stable. They have limited range of motion. They have 5/5 strength, and are neurovascularly intact distally. There is no erythema, warmth or skin lesions present. ASSESSMENT/PLAN:      1. Right medial knee pain  -     XR KNEE RT MIN 4 V; Future  2. Chronic pain of right knee  3. Lateral knee pain, right  4. Primary localized osteoarthritis of right knee  -     bupivacaine (PF) (MARCAINE) 0.5 % (5 mg/mL) injection 15 mg; 15 mg (3 mL), Intra artICUlar, ONCE, 1 dose, On Tue 3/22/22 at 1300  -     methylPREDNISolone acetate (DEPO-MEDROL) 80 mg/mL injection 80 mg; 80 mg, Intra artICUlar, ONCE, 1 dose, On Tue 3/22/22 at 1300     XR Results (most recent):  Results from Appointment encounter on 03/22/22    XR KNEE RT MIN 4 V    Narrative  Right knee 4 view x-rays show no evidence of a fracture or dislocation. Evidence of end-stage bone-on-bone lateral compartment osteoarthritis. Below is the assessment and plan developed based on review of pertinent history, physical exam, labs, studies, and medications. We discussed the patient's ongoing right knee pain and his signs, symptoms, physical exam, description of his pain, and x-rays are consistent with end-stage lateral compartment osteoarthritis. The possible treatment options were discussed with the patient and we elected to inject his right knee with cortisone today to try and alleviate some of his discomfort. The risks and benefits of the injection were discussed in detail with the patient and under sterile prep the patient's right knee was injected with 1 cc of 80 mg/cc of methylprednisolone and 3 ccs of 0.5% Sensorcaine. He tolerated the injection well. I did encourage him to continue to ice and elevate when possible, modify his activity level based on his right knee pain, and use anti-inflammatory medication when necessary.   The patient will also work on range of motion, strengthening, and stretching exercises with an at-home exercise program as pain tolerates. He is to avoid any deep knee bend activities against resistance, squatting, kneeling, stairs, lunging, and high impact loading activities. If he continues to have persistence of his right knee pain he will likely follow-up with one of our total joint specialist.  I will see him back on an as-needed basis for his right knee pain. Return if symptoms worsen or fail to improve, for Follow-up with one of the total joint specialists. An electronic signature was used to authenticate this note.   -- Shani Castillo MD

## 2022-03-31 ENCOUNTER — OFFICE VISIT (OUTPATIENT)
Dept: ORTHOPEDIC SURGERY | Age: 67
End: 2022-03-31

## 2022-03-31 VITALS — WEIGHT: 210 LBS | HEIGHT: 73 IN | BODY MASS INDEX: 27.83 KG/M2

## 2022-03-31 DIAGNOSIS — M17.11 ARTHRITIS OF KNEE, RIGHT: Primary | ICD-10-CM

## 2022-03-31 PROCEDURE — 99214 OFFICE O/P EST MOD 30 MIN: CPT | Performed by: ORTHOPAEDIC SURGERY

## 2022-03-31 NOTE — PROGRESS NOTES
Merle Dickerson (: 1955) is a 77 y.o. male patient, here for evaluation of the following chief complaint(s):  Knee Pain (right knee pain)       ASSESSMENT/PLAN:  Below is the assessment and plan developed based on review of pertinent history, physical exam, labs, studies, and medications. 55-year-old male comes in today for evaluation of right knee pain. He was referred by one of my partners. He has end-stage lateral joint space narrowing with osteophyte formation present. The injection he received last week almost completely eliminated his pain. We discussed continued conservative treatment for now considering he is doing so well. I told him he could certainly consider another injection in the future or if this does not work for an extended. We discussed the possibility of total knee. He will let us know      1. Arthritis of knee, right      Encounter Diagnosis   Name Primary?  Arthritis of knee, right Yes        No follow-ups on file. SUBJECTIVE/OBJECTIVE:  Merle Dickerson (: 1955) is a 77 y.o. male who presents today for the following:  Chief Complaint   Patient presents with    Knee Pain     right knee pain       55-year-old male comes in today complaining of right knee pain. This is been going on for more than 6 months. He had several surgeries in the 1970s on the right knee. He had a recent injection by one of my partners. He said that the injection took away more than 80% of his pain. Before this he was having moderate to severe pain and now he rates it as mild to minimal.    IMAGING:  XR Results (most recent):  Results from Appointment encounter on 22    XR KNEE RT MIN 4 V    Narrative  Right knee 4 view x-rays show no evidence of a fracture or dislocation. Evidence of end-stage bone-on-bone lateral compartment osteoarthritis.        No Known Allergies    Current Outpatient Medications   Medication Sig    lancets (Accu-Chek Softclix Lancets) misc TEST BLOOD SUGAR TWICE DAILY    Accu-Chek Haydee Plus Meter misc USE AS DIRECTED    glucose blood VI test strips (Accu-Chek Haydee Plus test strp) strip TEST BLOOD SUGAR TWICE DAILY    metoprolol succinate (TOPROL-XL) 25 mg XL tablet TAKE 1 TABLET EVERY DAY FOR HIGH BLOOD PRESSURE AND HEART    atorvastatin (LIPITOR) 40 mg tablet TAKE 1 TABLET EVERY DAY FOR HIGH CHOLESTEROL    amLODIPine (NORVASC) 2.5 mg tablet TAKE 1 TABLET EVERY DAY FOR HIGH BLOOD PRESSURE    ondansetron hcl (Zofran) 4 mg tablet Take 1 Tablet by mouth every eight (8) hours as needed for Nausea.  insulin aspart protamine/insulin aspart (NOVOLOG MIX 70/30) 100 unit/mL (70-30) inpn Inject 25 units before breakfast and before supper    metFORMIN (GLUCOPHAGE) 500 mg tablet TAKE 3 TABLETS BY MOUTH DAILY WITH DINNER FOR TYPE 2 DIABETES MELLITUS    Droplet Pen Needle 31 gauge x 5/16\" ndle USE AS DIRECTED EVERY DAY    nitroglycerin (NITROSTAT) 0.4 mg SL tablet Take 1 tablet under tongue every 5 minutes up to 3 doses prn chest pain.  acetaminophen (TYLENOL) 325 mg tablet Take 650 mg by mouth every six (6) hours as needed for Pain.  aspirin delayed-release 81 mg tablet Take 1 Tab by mouth daily. No current facility-administered medications for this visit.        Past Medical History:   Diagnosis Date    CAD (coronary artery disease) April 2014    NSTEMI, PCI/NATE RCA    Hypercholesterolemia     Hypertension         Past Surgical History:   Procedure Laterality Date    HX CHOLECYSTECTOMY  07/17/2019    Laparoscopic cholecystectomy with intraoperative cholangiogram    HX ORTHOPAEDIC      left shoulder, torn tendon    HX ORTHOPAEDIC      right knee X 4    [place] CARDIAC SURG PROCEDURE UNLIST      Stent RCA 4/2014    [place] COLONOSCOPY FLX DX W/COLLJ SPEC WHEN PFRMD  12/19/2007    Dr Scott Garza 1 polyp repeat 12/2012       Family History   Problem Relation Age of Onset    Heart Disease Mother         CAD    Hypertension Mother     Elevated Lipids Mother    [facility] Cancer Father         lung    Mental Retardation Brother     Seizures Brother     Diabetes Brother     Hypertension Brother     Elevated Lipids Brother         Social History     Tobacco Use    Smoking status: Former Smoker     Packs/day: 0.25     Years: 35.00     Pack years: 8.75     Types: Cigarettes     Quit date: 2014     Years since quittin.3    Smokeless tobacco: Never Used    Tobacco comment: Never smoked over a pack per day   Substance Use Topics    Alcohol use: No     Alcohol/week: 0.0 standard drinks        All systems reviewed x 12 and were negative with the exception of None      Pain Assessment  3/22/2022   Location of Pain Knee   Location Modifiers Right   Quality of Pain Sharp   Frequency of Pain Constant          Vitals:  Ht 6' 1\" (1.854 m)   Wt 210 lb (95.3 kg)   BMI 27.71 kg/m²    Body mass index is 27.71 kg/m². Physical Exam    General: NAD, well developed, well nourished, alert and oriented x 3. Cardiac: Extremities well perfused    Respiratory: Nonlabored breathing    LLE: Normal gait and station. Negative stinchfield. No effusion noted. No previous incisions noted. ROM 0-120 degrees. Grossly stable to varus/valgus stress and anterior/posterior drawer tests. Negative McMurrays. Motor grossly intact. RLE: Mild antalgic gait. Mild effusion noted. No previous incisions noted. ROM 0-120 degrees. Grossly stable to varus/valgus stress and anterior/posterior drawer tests. Lateral joint line tenderness. Motor grossly intact. Vascular: Palpable pedal pulses, equal bilaterally. Skin: Warm well perfused, cap refill < 2 sec. An electronic signature was used to authenticate this note.   -- Marilee Block MD

## 2022-06-06 DIAGNOSIS — E11.22 TYPE 2 DIABETES MELLITUS WITH STAGE 2 CHRONIC KIDNEY DISEASE, WITH LONG-TERM CURRENT USE OF INSULIN (HCC): ICD-10-CM

## 2022-06-06 DIAGNOSIS — N18.2 TYPE 2 DIABETES MELLITUS WITH STAGE 2 CHRONIC KIDNEY DISEASE, WITH LONG-TERM CURRENT USE OF INSULIN (HCC): ICD-10-CM

## 2022-06-06 DIAGNOSIS — Z79.4 TYPE 2 DIABETES MELLITUS WITH STAGE 2 CHRONIC KIDNEY DISEASE, WITH LONG-TERM CURRENT USE OF INSULIN (HCC): ICD-10-CM

## 2022-06-06 RX ORDER — INSULIN ASPART 100 [IU]/ML
INJECTION, SUSPENSION SUBCUTANEOUS
Qty: 45 ML | Refills: 1 | Status: SHIPPED | OUTPATIENT
Start: 2022-06-06

## 2022-06-06 NOTE — TELEPHONE ENCOUNTER
· Follow up appointment with Dr. Alpesh Shepard needed for DM, HTN, chol, POC A1c, foot exam (20 mins)

## 2022-06-15 NOTE — TELEPHONE ENCOUNTER
Candelaria Russo MD; [facility]  Caller: Unspecified (1 week ago)  Call patient left message to call back to schedule follow up

## 2022-08-19 DIAGNOSIS — E11.9 TYPE 2 DIABETES MELLITUS WITHOUT COMPLICATION, WITH LONG-TERM CURRENT USE OF INSULIN (HCC): ICD-10-CM

## 2022-08-19 DIAGNOSIS — Z79.4 TYPE 2 DIABETES MELLITUS WITHOUT COMPLICATION, WITH LONG-TERM CURRENT USE OF INSULIN (HCC): ICD-10-CM

## 2022-08-19 RX ORDER — PEN NEEDLE, DIABETIC 31 GX5/16"
NEEDLE, DISPOSABLE MISCELLANEOUS
Qty: 100 EACH | Refills: 3 | Status: SHIPPED | OUTPATIENT
Start: 2022-08-19
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
## 2022-11-26 DIAGNOSIS — N18.2 TYPE 2 DIABETES MELLITUS WITH STAGE 2 CHRONIC KIDNEY DISEASE, WITH LONG-TERM CURRENT USE OF INSULIN (HCC): ICD-10-CM

## 2022-11-26 DIAGNOSIS — N18.2 BENIGN HYPERTENSION WITH CHRONIC KIDNEY DISEASE, STAGE II: ICD-10-CM

## 2022-11-26 DIAGNOSIS — E78.00 HYPERCHOLESTEROLEMIA: ICD-10-CM

## 2022-11-26 DIAGNOSIS — Z79.4 TYPE 2 DIABETES MELLITUS WITH STAGE 2 CHRONIC KIDNEY DISEASE, WITH LONG-TERM CURRENT USE OF INSULIN (HCC): ICD-10-CM

## 2022-11-26 DIAGNOSIS — I12.9 BENIGN HYPERTENSION WITH CHRONIC KIDNEY DISEASE, STAGE II: ICD-10-CM

## 2022-11-26 DIAGNOSIS — E11.22 TYPE 2 DIABETES MELLITUS WITH STAGE 2 CHRONIC KIDNEY DISEASE, WITH LONG-TERM CURRENT USE OF INSULIN (HCC): ICD-10-CM

## 2022-11-26 RX ORDER — ATORVASTATIN CALCIUM 40 MG/1
TABLET, FILM COATED ORAL
Qty: 90 TABLET | Refills: 0 | Status: SHIPPED | OUTPATIENT
Start: 2022-11-26

## 2022-11-26 RX ORDER — METFORMIN HYDROCHLORIDE 500 MG/1
TABLET ORAL
Qty: 270 TABLET | Refills: 0 | Status: SHIPPED | OUTPATIENT
Start: 2022-11-26

## 2022-11-26 RX ORDER — AMLODIPINE BESYLATE 2.5 MG/1
TABLET ORAL
Qty: 90 TABLET | Refills: 0 | Status: SHIPPED | OUTPATIENT
Start: 2022-11-26

## 2022-11-26 RX ORDER — METOPROLOL SUCCINATE 25 MG/1
TABLET, EXTENDED RELEASE ORAL
Qty: 90 TABLET | Refills: 0 | Status: SHIPPED | OUTPATIENT
Start: 2022-11-26

## 2023-01-16 DIAGNOSIS — E11.9 TYPE 2 DIABETES MELLITUS WITHOUT COMPLICATION, WITH LONG-TERM CURRENT USE OF INSULIN (HCC): ICD-10-CM

## 2023-01-16 DIAGNOSIS — Z79.4 TYPE 2 DIABETES MELLITUS WITHOUT COMPLICATION, WITH LONG-TERM CURRENT USE OF INSULIN (HCC): ICD-10-CM

## 2023-01-16 RX ORDER — LANCETS
EACH MISCELLANEOUS
Qty: 200 EACH | Refills: 3 | Status: SHIPPED | OUTPATIENT
Start: 2023-01-16

## 2023-01-26 NOTE — PROGRESS NOTES
01/26/23 1102   External Urinary Catheter   Placement Date/Time: 01/25/23 1600     Placement Replaced   Suction 40 mmgHg continuous   Urine Assessment   Urinary Incontinence Present   Urine Color Yellow/straw   Unmeasured Output   Urine Occurrence 2   Stool Occurrence 0   Emesis Occurrence 0     Purwick in placed - was not working, pt was incontinent of urine. Incontinence care completed. New purwick in place. Hospitalist Progress Note    NAME: Marti Pillai   :  1955   MRN:  [id number]       Assessment / Plan:  Acute Pancreatitis, improving  -suspected secondary to gallstones -- planned for cholecystectomy this afternoon. Discussed with RN, recommended that surgeon speak with patient's wife as she was asking me questions at length yesterday regarding the surgery.   -Cont aggressive IVF, pain control; NPO today for surgery  -LFTs and lipase trending down  -GI and surgical consultations appreciated  MRCP : Acute pancreatitis. No evidence of pancreatic necrosis or pseudocyst  formation. No evidence of gallstones, biliary dilatation, or filling defects in  the common bile duct. -CT AP : There isinflammation adjacent to the pancreatic head extending anterior to Gerota's fascia. DM2, uncontrolled with hyperglycemia, improved  -Holding metformin  -DM with recent steroids use  -BS elevated on admission -- now improved significantly and meeting goals    HTN, CAD:  -Continue Aspirin and Metoprolol  -Had held Lipitor with elevated LFT's -- can now resume  -denies CP    CKD 3  -Cr at baseline, 1.3  -renally dosing of meds  -monitor bmp   -avoid nephrotoxins     Prophylaxis:  Pepcid BID  Heparin SC    Disposition pending surgery, diet postoperatively     Subjective:     Chief Complaint / Reason for Physician Visit: follow up acute pancreatitis  Improved pain overnight. Planned for cholecystectomy and cholangiogram today. Patient feels ready to proceed. No dyspnea, nausea, vomiting. Review of Systems:  Symptom Y/N Comments  Symptom Y/N Comments   Fever/Chills n   Chest Pain n    Poor Appetite y   Edema n    Cough    Abdominal Pain y Improving   Sputum    Joint Pain     SOB/HAMILTON    Pruritis/Rash     Nausea/vomit n   Tolerating PT/OT     Diarrhea n   Tolerating Diet npo    Constipation n   Other       Could NOT obtain due to:      Objective:     VITALS:   Last 24hrs VS reviewed since prior progress note. Most recent are:  Patient Vitals for the past 24 hrs:   Temp Pulse Resp BP SpO2   07/17/19 1043 97.9 °F (36.6 °C) 85 18 (!) 161/102 95 %   07/17/19 1003  83  (!) 166/92    07/17/19 0916  92  156/89    07/17/19 0706 98.2 °F (36.8 °C) 77 18 161/86 99 %   07/17/19 0401 98.5 °F (36.9 °C) 84 18 (!) 152/91 97 %   07/17/19 0018 98.2 °F (36.8 °C) 80 20 (!) 163/94 98 %   07/16/19 2238 98.8 °F (37.1 °C) 86 20 151/89 97 %   07/16/19 2000     98 %   07/16/19 1929 98.4 °F (36.9 °C) 85 20 (!) 161/99 97 %   07/16/19 1746  78  (!) 150/94    07/16/19 1522 98.6 °F (37 °C) 98 20 167/86 96 %       Intake/Output Summary (Last 24 hours) at 7/17/2019 1110  Last data filed at 7/17/2019 1043  Gross per 24 hour   Intake 1700 ml   Output 1625 ml   Net 75 ml        PHYSICAL EXAM:  General: WD, WN. Alert, cooperative, nontoxic  EENT:  EOMI. Anicteric sclerae. MMM  Resp:  CTA bilaterally, no wheezing or rales. No accessory muscle use  CV:  Regular  rhythm,  No edema  GI:  Soft, Non distended, minimal epigastric tenderness  Neurologic:  Alert and oriented X 3, normal speech,   Psych:   Fair insight. Not anxious nor agitated  Skin:  No rashes. No jaundice    Reviewed most current lab test results and cultures  YES  Reviewed most current radiology test results   YES  Review and summation of old records today    NO  Reviewed patient's current orders and MAR    YES  PMH/SH reviewed - no change compared to H&P  ________________________________________________________________________  Care Plan discussed with:    Comments   Patient x    Family      RN x    Care Manager     Consultant                       x Multidiciplinary team rounds were held today with , nursing, pharmacist and clinical coordinator. Patient's plan of care was discussed; medications were reviewed and discharge planning was addressed.      ________________________________________________________________________  Total NON critical care TIME:  25 Minutes    Total CRITICAL CARE TIME Spent:   Minutes non procedure based      Comments   >50% of visit spent in counseling and coordination of care x    ________________________________________________________________________  Jaison Causey MD     Procedures: see electronic medical records for all procedures/Xrays and details which were not copied into this note but were reviewed prior to creation of Plan. LABS:  I reviewed today's most current labs and imaging studies.   Pertinent labs include:  Recent Labs     07/17/19  0358 07/16/19  0410 07/15/19  0346   WBC 6.4 8.0 11.4*   HGB 11.5* 13.7 14.3   HCT 34.3* 41.2 41.5   * 132* 139*     Recent Labs     07/17/19  0358 07/16/19  0410 07/15/19  0346 07/14/19  2006    143 139 139   K 3.6 3.8 3.9 4.0    108 109* 109*   CO2 27 28 24 24   * 122* 203* 232*   BUN 18 17 20 19   CREA 1.11 1.17 1.28 1.32*   CA 7.7* 7.9* 7.8* 8.0*   MG  --   --   --  2.0   ALB 2.4* 2.6* 2.9* 3.2*   TBILI 0.6 0.6 0.7 0.7   SGOT 30 48* 81* 113*   * 202* 293* 364*       Signed: Jaison Causey MD

## 2023-01-31 PROBLEM — K22.70 BARRETT'S ESOPHAGUS WITHOUT DYSPLASIA: Status: ACTIVE | Noted: 2023-01-31

## 2023-02-01 ENCOUNTER — APPOINTMENT (OUTPATIENT)
Dept: GENERAL RADIOLOGY | Age: 68
DRG: 536 | End: 2023-02-01
Attending: EMERGENCY MEDICINE
Payer: MEDICARE

## 2023-02-01 ENCOUNTER — APPOINTMENT (OUTPATIENT)
Dept: CT IMAGING | Age: 68
DRG: 536 | End: 2023-02-01
Attending: EMERGENCY MEDICINE
Payer: MEDICARE

## 2023-02-01 ENCOUNTER — HOSPITAL ENCOUNTER (INPATIENT)
Age: 68
LOS: 6 days | Discharge: NURSING, REHAB OR LONG-TERM CARE | DRG: 536 | End: 2023-02-07
Attending: EMERGENCY MEDICINE | Admitting: STUDENT IN AN ORGANIZED HEALTH CARE EDUCATION/TRAINING PROGRAM
Payer: MEDICARE

## 2023-02-01 DIAGNOSIS — R73.9 HYPERGLYCEMIA: ICD-10-CM

## 2023-02-01 DIAGNOSIS — N17.9 AKI (ACUTE KIDNEY INJURY) (HCC): ICD-10-CM

## 2023-02-01 DIAGNOSIS — S32.9XXA CLOSED NONDISPLACED FRACTURE OF PELVIS, UNSPECIFIED PART OF PELVIS, INITIAL ENCOUNTER (HCC): Primary | ICD-10-CM

## 2023-02-01 LAB
ALBUMIN SERPL-MCNC: 3.8 G/DL (ref 3.5–5)
ALBUMIN/GLOB SERPL: 1.1 (ref 1.1–2.2)
ALP SERPL-CCNC: 88 U/L (ref 45–117)
ALT SERPL-CCNC: 40 U/L (ref 12–78)
ANION GAP SERPL CALC-SCNC: 5 MMOL/L (ref 5–15)
APPEARANCE UR: CLEAR
AST SERPL-CCNC: 27 U/L (ref 15–37)
BACTERIA URNS QL MICRO: NEGATIVE /HPF
BASOPHILS # BLD: 0 K/UL (ref 0–0.1)
BASOPHILS NFR BLD: 0 % (ref 0–1)
BILIRUB SERPL-MCNC: 0.6 MG/DL (ref 0.2–1)
BILIRUB UR QL: NEGATIVE
BUN SERPL-MCNC: 23 MG/DL (ref 6–20)
BUN/CREAT SERPL: 15 (ref 12–20)
CALCIUM SERPL-MCNC: 8.7 MG/DL (ref 8.5–10.1)
CHLORIDE SERPL-SCNC: 102 MMOL/L (ref 97–108)
CK SERPL-CCNC: 495 U/L (ref 39–308)
CO2 SERPL-SCNC: 25 MMOL/L (ref 21–32)
COLOR UR: ABNORMAL
CREAT SERPL-MCNC: 1.57 MG/DL (ref 0.7–1.3)
DIFFERENTIAL METHOD BLD: ABNORMAL
EOSINOPHIL # BLD: 0 K/UL (ref 0–0.4)
EOSINOPHIL NFR BLD: 0 % (ref 0–7)
EPITH CASTS URNS QL MICRO: ABNORMAL /LPF
ERYTHROCYTE [DISTWIDTH] IN BLOOD BY AUTOMATED COUNT: 12.7 % (ref 11.5–14.5)
GLOBULIN SER CALC-MCNC: 3.4 G/DL (ref 2–4)
GLUCOSE BLD STRIP.AUTO-MCNC: 474 MG/DL (ref 65–117)
GLUCOSE BLD STRIP.AUTO-MCNC: 499 MG/DL (ref 65–117)
GLUCOSE SERPL-MCNC: 597 MG/DL (ref 65–100)
GLUCOSE UR STRIP.AUTO-MCNC: >1000 MG/DL
HCT VFR BLD AUTO: 37.7 % (ref 36.6–50.3)
HGB BLD-MCNC: 13 G/DL (ref 12.1–17)
HGB UR QL STRIP: ABNORMAL
IMM GRANULOCYTES # BLD AUTO: 0.1 K/UL (ref 0–0.04)
IMM GRANULOCYTES NFR BLD AUTO: 1 % (ref 0–0.5)
INR PPP: 1 (ref 0.9–1.1)
KETONES UR QL STRIP.AUTO: ABNORMAL MG/DL
LEUKOCYTE ESTERASE UR QL STRIP.AUTO: NEGATIVE
LYMPHOCYTES # BLD: 0.7 K/UL (ref 0.8–3.5)
LYMPHOCYTES NFR BLD: 6 % (ref 12–49)
MCH RBC QN AUTO: 32.6 PG (ref 26–34)
MCHC RBC AUTO-ENTMCNC: 34.5 G/DL (ref 30–36.5)
MCV RBC AUTO: 94.5 FL (ref 80–99)
MONOCYTES # BLD: 0.6 K/UL (ref 0–1)
MONOCYTES NFR BLD: 5 % (ref 5–13)
NEUTS SEG # BLD: 9.7 K/UL (ref 1.8–8)
NEUTS SEG NFR BLD: 88 % (ref 32–75)
NITRITE UR QL STRIP.AUTO: NEGATIVE
NRBC # BLD: 0 K/UL (ref 0–0.01)
NRBC BLD-RTO: 0 PER 100 WBC
PH UR STRIP: 6 (ref 5–8)
PLATELET # BLD AUTO: 126 K/UL (ref 150–400)
PMV BLD AUTO: 9.2 FL (ref 8.9–12.9)
POTASSIUM SERPL-SCNC: 5.5 MMOL/L (ref 3.5–5.1)
PROT SERPL-MCNC: 7.2 G/DL (ref 6.4–8.2)
PROT UR STRIP-MCNC: ABNORMAL MG/DL
PROTHROMBIN TIME: 10.6 SEC (ref 9–11.1)
RBC # BLD AUTO: 3.99 M/UL (ref 4.1–5.7)
RBC #/AREA URNS HPF: ABNORMAL /HPF (ref 0–5)
RBC MORPH BLD: ABNORMAL
SERVICE CMNT-IMP: ABNORMAL
SERVICE CMNT-IMP: ABNORMAL
SODIUM SERPL-SCNC: 132 MMOL/L (ref 136–145)
SP GR UR REFRACTOMETRY: <1.005 (ref 1–1.03)
UA: UC IF INDICATED,UAUC: ABNORMAL
UROBILINOGEN UR QL STRIP.AUTO: 0.2 EU/DL (ref 0.2–1)
WBC # BLD AUTO: 11.1 K/UL (ref 4.1–11.1)
WBC URNS QL MICRO: ABNORMAL /HPF (ref 0–4)

## 2023-02-01 PROCEDURE — 81001 URINALYSIS AUTO W/SCOPE: CPT

## 2023-02-01 PROCEDURE — 74011250636 HC RX REV CODE- 250/636: Performed by: STUDENT IN AN ORGANIZED HEALTH CARE EDUCATION/TRAINING PROGRAM

## 2023-02-01 PROCEDURE — 96361 HYDRATE IV INFUSION ADD-ON: CPT

## 2023-02-01 PROCEDURE — 93005 ELECTROCARDIOGRAM TRACING: CPT

## 2023-02-01 PROCEDURE — 74011250637 HC RX REV CODE- 250/637: Performed by: STUDENT IN AN ORGANIZED HEALTH CARE EDUCATION/TRAINING PROGRAM

## 2023-02-01 PROCEDURE — 74011636637 HC RX REV CODE- 636/637: Performed by: STUDENT IN AN ORGANIZED HEALTH CARE EDUCATION/TRAINING PROGRAM

## 2023-02-01 PROCEDURE — 72190 X-RAY EXAM OF PELVIS: CPT

## 2023-02-01 PROCEDURE — 96375 TX/PRO/DX INJ NEW DRUG ADDON: CPT

## 2023-02-01 PROCEDURE — 99285 EMERGENCY DEPT VISIT HI MDM: CPT

## 2023-02-01 PROCEDURE — 72192 CT PELVIS W/O DYE: CPT

## 2023-02-01 PROCEDURE — 96376 TX/PRO/DX INJ SAME DRUG ADON: CPT

## 2023-02-01 PROCEDURE — 74011000250 HC RX REV CODE- 250: Performed by: STUDENT IN AN ORGANIZED HEALTH CARE EDUCATION/TRAINING PROGRAM

## 2023-02-01 PROCEDURE — 85025 COMPLETE CBC W/AUTO DIFF WBC: CPT

## 2023-02-01 PROCEDURE — 71045 X-RAY EXAM CHEST 1 VIEW: CPT

## 2023-02-01 PROCEDURE — 73502 X-RAY EXAM HIP UNI 2-3 VIEWS: CPT

## 2023-02-01 PROCEDURE — 65270000046 HC RM TELEMETRY

## 2023-02-01 PROCEDURE — 82962 GLUCOSE BLOOD TEST: CPT

## 2023-02-01 PROCEDURE — 36415 COLL VENOUS BLD VENIPUNCTURE: CPT

## 2023-02-01 PROCEDURE — 74011250636 HC RX REV CODE- 250/636: Performed by: EMERGENCY MEDICINE

## 2023-02-01 PROCEDURE — 65270000029 HC RM PRIVATE

## 2023-02-01 PROCEDURE — 82550 ASSAY OF CK (CPK): CPT

## 2023-02-01 PROCEDURE — 85610 PROTHROMBIN TIME: CPT

## 2023-02-01 PROCEDURE — 80053 COMPREHEN METABOLIC PANEL: CPT

## 2023-02-01 PROCEDURE — 96374 THER/PROPH/DIAG INJ IV PUSH: CPT

## 2023-02-01 RX ORDER — POLYETHYLENE GLYCOL 3350 17 G/17G
17 POWDER, FOR SOLUTION ORAL DAILY PRN
Status: DISCONTINUED | OUTPATIENT
Start: 2023-02-01 | End: 2023-02-07 | Stop reason: HOSPADM

## 2023-02-01 RX ORDER — DEXTROSE MONOHYDRATE 100 MG/ML
0-250 INJECTION, SOLUTION INTRAVENOUS AS NEEDED
Status: DISCONTINUED | OUTPATIENT
Start: 2023-02-01 | End: 2023-02-07 | Stop reason: HOSPADM

## 2023-02-01 RX ORDER — ONDANSETRON 2 MG/ML
4 INJECTION INTRAMUSCULAR; INTRAVENOUS
Status: COMPLETED | OUTPATIENT
Start: 2023-02-01 | End: 2023-02-01

## 2023-02-01 RX ORDER — ACETAMINOPHEN 325 MG/1
650 TABLET ORAL
Status: DISCONTINUED | OUTPATIENT
Start: 2023-02-01 | End: 2023-02-03

## 2023-02-01 RX ORDER — ASPIRIN 81 MG/1
81 TABLET ORAL DAILY
Status: DISCONTINUED | OUTPATIENT
Start: 2023-02-02 | End: 2023-02-07 | Stop reason: HOSPADM

## 2023-02-01 RX ORDER — INSULIN LISPRO 100 [IU]/ML
INJECTION, SOLUTION INTRAVENOUS; SUBCUTANEOUS
Status: DISCONTINUED | OUTPATIENT
Start: 2023-02-01 | End: 2023-02-02

## 2023-02-01 RX ORDER — SODIUM CHLORIDE 9 MG/ML
75 INJECTION, SOLUTION INTRAVENOUS CONTINUOUS
Status: DISPENSED | OUTPATIENT
Start: 2023-02-01 | End: 2023-02-02

## 2023-02-01 RX ORDER — MORPHINE SULFATE 2 MG/ML
4 INJECTION, SOLUTION INTRAMUSCULAR; INTRAVENOUS
Status: COMPLETED | OUTPATIENT
Start: 2023-02-01 | End: 2023-02-01

## 2023-02-01 RX ORDER — ATORVASTATIN CALCIUM 40 MG/1
40 TABLET, FILM COATED ORAL
Status: DISCONTINUED | OUTPATIENT
Start: 2023-02-01 | End: 2023-02-07 | Stop reason: HOSPADM

## 2023-02-01 RX ORDER — ACETAMINOPHEN 650 MG/1
650 SUPPOSITORY RECTAL
Status: DISCONTINUED | OUTPATIENT
Start: 2023-02-01 | End: 2023-02-03

## 2023-02-01 RX ORDER — INSULIN LISPRO 100 [IU]/ML
10 INJECTION, SOLUTION INTRAVENOUS; SUBCUTANEOUS ONCE
Status: COMPLETED | OUTPATIENT
Start: 2023-02-01 | End: 2023-02-01

## 2023-02-01 RX ORDER — IBUPROFEN 200 MG
4 TABLET ORAL AS NEEDED
Status: DISCONTINUED | OUTPATIENT
Start: 2023-02-01 | End: 2023-02-02 | Stop reason: SDUPTHER

## 2023-02-01 RX ORDER — SODIUM CHLORIDE 0.9 % (FLUSH) 0.9 %
5-40 SYRINGE (ML) INJECTION AS NEEDED
Status: DISCONTINUED | OUTPATIENT
Start: 2023-02-01 | End: 2023-02-07 | Stop reason: HOSPADM

## 2023-02-01 RX ORDER — ONDANSETRON 4 MG/1
4 TABLET, ORALLY DISINTEGRATING ORAL
Status: DISCONTINUED | OUTPATIENT
Start: 2023-02-01 | End: 2023-02-07 | Stop reason: HOSPADM

## 2023-02-01 RX ORDER — SODIUM CHLORIDE 0.9 % (FLUSH) 0.9 %
5-40 SYRINGE (ML) INJECTION EVERY 8 HOURS
Status: DISCONTINUED | OUTPATIENT
Start: 2023-02-01 | End: 2023-02-07 | Stop reason: HOSPADM

## 2023-02-01 RX ORDER — ONDANSETRON 2 MG/ML
4 INJECTION INTRAMUSCULAR; INTRAVENOUS
Status: DISCONTINUED | OUTPATIENT
Start: 2023-02-01 | End: 2023-02-07 | Stop reason: HOSPADM

## 2023-02-01 RX ORDER — OXYCODONE HYDROCHLORIDE 5 MG/1
5 TABLET ORAL
Status: DISCONTINUED | OUTPATIENT
Start: 2023-02-01 | End: 2023-02-03

## 2023-02-01 RX ORDER — METOPROLOL SUCCINATE 25 MG/1
25 TABLET, EXTENDED RELEASE ORAL DAILY
Status: DISCONTINUED | OUTPATIENT
Start: 2023-02-02 | End: 2023-02-07 | Stop reason: HOSPADM

## 2023-02-01 RX ORDER — INSULIN GLARGINE 100 [IU]/ML
10 INJECTION, SOLUTION SUBCUTANEOUS
Status: DISCONTINUED | OUTPATIENT
Start: 2023-02-01 | End: 2023-02-02

## 2023-02-01 RX ADMIN — MORPHINE SULFATE 4 MG: 2 INJECTION, SOLUTION INTRAMUSCULAR; INTRAVENOUS at 20:15

## 2023-02-01 RX ADMIN — SODIUM CHLORIDE 75 ML/HR: 9 INJECTION, SOLUTION INTRAVENOUS at 23:43

## 2023-02-01 RX ADMIN — ONDANSETRON 4 MG: 2 INJECTION INTRAMUSCULAR; INTRAVENOUS at 17:34

## 2023-02-01 RX ADMIN — OXYCODONE 5 MG: 5 TABLET ORAL at 23:42

## 2023-02-01 RX ADMIN — ATORVASTATIN CALCIUM 40 MG: 40 TABLET, FILM COATED ORAL at 23:56

## 2023-02-01 RX ADMIN — INSULIN GLARGINE 10 UNITS: 100 INJECTION, SOLUTION SUBCUTANEOUS at 23:56

## 2023-02-01 RX ADMIN — Medication 10 UNITS: at 23:56

## 2023-02-01 RX ADMIN — MORPHINE SULFATE 4 MG: 2 INJECTION, SOLUTION INTRAMUSCULAR; INTRAVENOUS at 18:24

## 2023-02-01 RX ADMIN — SODIUM CHLORIDE, PRESERVATIVE FREE 10 ML: 5 INJECTION INTRAVENOUS at 23:46

## 2023-02-01 RX ADMIN — SODIUM CHLORIDE 1000 ML: 9 INJECTION, SOLUTION INTRAVENOUS at 18:23

## 2023-02-01 RX ADMIN — MORPHINE SULFATE 4 MG: 2 INJECTION, SOLUTION INTRAMUSCULAR; INTRAVENOUS at 17:34

## 2023-02-01 NOTE — ED TRIAGE NOTES
Pt reports he was holding a ladder when another ladder and approx 20 jameel of hay fell on him approx 1 hr ago. Pt reports falling on left side and reports severe left hip pain. Pt unable to bear weight.

## 2023-02-01 NOTE — Clinical Note
Status[de-identified] INPATIENT [101]   Type of Bed: Telemetry [19]   Cardiac Monitoring Required?: Yes   Inpatient Hospitalization Certified Necessary for the Following Reasons: 9.  Other (further clarification in H&P documentation)   Admitting Diagnosis: Pelvic fracture [facility]) [[id number]]   Admitting Physician: Shereen Miller [[id number]]   Attending Physician: Shereen Miller [[id number]]   Estimated Length of Stay: 3-4 Midnights   Discharge Plan[de-identified] [facility]

## 2023-02-01 NOTE — ED PROVIDER NOTES
[facility] EMERGENCY DEPT  EMERGENCY DEPARTMENT ENCOUNTER       Pt Name: Sam Anderson  MRN: [id number]  [place] 1955  Date of evaluation: 2023  Provider: Venita Son DO   PCP: Ryland Gray MD  Note Started: 5:25 PM 23     CHIEF COMPLAINT       Chief Complaint   Patient presents with    Hip Pain        HISTORY OF PRESENT ILLNESS: 1 or more elements      History From: Patient, History limited by: none     Sam Anderson is a 79 y.o. male presenting the emergency department complaining of left hip pain       Please See MDM for Additional Details of the HPI/PMH  Nursing Notes were all reviewed and agreed with or any disagreements were addressed in the HPI. REVIEW OF SYSTEMS        Positives and Pertinent negatives as per HPI.     PAST HISTORY     Past Medical History:  Past Medical History:   Diagnosis Date    CAD (coronary artery disease) 2014    NSTEMI, PCI/NATE RCA    Hypercholesterolemia     Hypertension        Past Surgical History:  Past Surgical History:   Procedure Laterality Date    HX CHOLECYSTECTOMY  2019    Laparoscopic cholecystectomy with intraoperative cholangiogram    HX ORTHOPAEDIC      left shoulder, torn tendon    HX ORTHOPAEDIC      right knee X 4    NC CARDIAC SURG PROCEDURE UNLIST      Stent RCA 2014    NC COLONOSCOPY FLX DX W/COLLJ SPEC WHEN PFRMD  2007    Dr oYrk Prior 1 polyp repeat 2012       Family History:  Family History   Problem Relation Age of Onset    Heart Disease Mother         CAD    Hypertension Mother     Elevated Lipids Mother     Cancer Father         lung    Mental Retardation Brother     Seizures Brother     Diabetes Brother     Hypertension Brother     Elevated Lipids Brother        Social History:  Social History     Tobacco Use    Smoking status: Former     Packs/day: 0.25     Years: 35.00     Pack years: 8.75     Types: Cigarettes     Quit date: 2014     Years since quittin.2    Smokeless tobacco: Never    Tobacco comments: Never smoked over a pack per day   Vaping Use    Vaping Use: Never used   Substance Use Topics    Alcohol use: No     Alcohol/week: 0.0 standard drinks    Drug use: No       Allergies:  No Known Allergies    CURRENT MEDICATIONS      Previous Medications    ACCU-CHEK [place] PLUS METER MISC    USE AS DIRECTED    ACETAMINOPHEN (TYLENOL) 325 MG TABLET    Take 650 mg by mouth every six (6) hours as needed for Pain. AMLODIPINE (NORVASC) 2.5 MG TABLET    TAKE 1 TABLET EVERY DAY FOR HIGH BLOOD PRESSURE    ASPIRIN DELAYED-RELEASE 81 MG TABLET    Take 1 Tab by mouth daily. ATORVASTATIN (LIPITOR) 40 MG TABLET    TAKE 1 TABLET EVERY DAY FOR HIGH CHOLESTEROL    DROPLET PEN NEEDLE 31 GAUGE X 5/16\" NDLE    USE TO INJECT EVERY DAY AS DIRECTED    GLUCOSE BLOOD VI TEST STRIPS (ACCU-CHEK [place]AD PLUS TEST STRP) STRIP    TEST BLOOD SUGAR TWICE DAILY    INSULIN ASPART PROTAMINE/INSULIN ASPART (NOVOLOG MIX 70-30FLEXPEN U-100) 100 UNIT/ML (70-30) INPN    INJECT 25 UNITS SUBCUTANEOUSLY BEFORE BREAKFAST AND SUPPER    LANCETS (ACCU-CHEK SOFTCLIX LANCETS) MISC    TEST BLOOD SUGAR TWICE DAILY    METFORMIN (GLUCOPHAGE) 500 MG TABLET    TAKE 3 TABLETS EVERY DAY WITH DINNER FOR TYPE 2 DIABETES    METOPROLOL SUCCINATE (TOPROL-XL) 25 MG XL TABLET    TAKE 1 TABLET EVERY DAY FOR HIGH BLOOD PRESSURE AND HEART    NITROGLYCERIN (NITROSTAT) 0.4 MG SL TABLET    Take 1 tablet under tongue every 5 minutes up to 3 doses prn chest pain. ONDANSETRON HCL (ZOFRAN) 4 MG TABLET    Take 1 Tablet by mouth every eight (8) hours as needed for Nausea. SCREENINGS               No data recorded         PHYSICAL EXAM      ED Triage Vitals [02/01/23 1659]   ED Encounter Vitals Group      /71      Pulse (Heart Rate) 63      Resp Rate 14      Temp       Temp src       O2 Sat (%) 98 %      Weight 190 lb      Height 6'        Physical Exam  Constitutional:       Appearance: Normal appearance. HENT:      Head: Normocephalic and atraumatic.    Eyes: Pupils: Pupils are equal, round, and reactive to light. Cardiovascular:      Rate and Rhythm: Normal rate and regular rhythm. Pulmonary:      Effort: Pulmonary effort is normal. No respiratory distress. Breath sounds: Normal breath sounds. No stridor. Abdominal:      General: Abdomen is flat. Bowel sounds are normal.   Musculoskeletal:      Cervical back: Normal range of motion. No tenderness. Comments: Tenderness palpation over the left hip and pelvis. Pain with leg roll. P palpable pulses in the DP and PT, normal sensation to light touch distally. Neurological:      Mental Status: He is alert. DIAGNOSTIC RESULTS   LABS:     Recent Results (from the past 12 hour(s))   CBC WITH AUTOMATED DIFF    Collection Time: 02/01/23  6:22 PM   Result Value Ref Range    WBC 11.1 4.1 - 11.1 K/uL    RBC 3.99 (L) 4.10 - 5.70 M/uL    HGB 13.0 12.1 - 17.0 g/dL    HCT 37.7 36.6 - 50.3 %    MCV 94.5 80.0 - 99.0 FL    MCH 32.6 26.0 - 34.0 PG    MCHC 34.5 30.0 - 36.5 g/dL    RDW 12.7 11.5 - 14.5 %    PLATELET 027 (L) 130 - 400 K/uL    MPV 9.2 8.9 - 12.9 FL    NRBC 0.0 0  WBC    ABSOLUTE NRBC 0.00 0.00 - 0.01 K/uL    NEUTROPHILS 88 (H) 32 - 75 %    LYMPHOCYTES 6 (L) 12 - 49 %    MONOCYTES 5 5 - 13 %    EOSINOPHILS 0 0 - 7 %    BASOPHILS 0 0 - 1 %    IMMATURE GRANULOCYTES 1 (H) 0.0 - 0.5 %    ABS. NEUTROPHILS 9.7 (H) 1.8 - 8.0 K/UL    ABS. LYMPHOCYTES 0.7 (L) 0.8 - 3.5 K/UL    ABS. MONOCYTES 0.6 0.0 - 1.0 K/UL    ABS. EOSINOPHILS 0.0 0.0 - 0.4 K/UL    ABS. BASOPHILS 0.0 0.0 - 0.1 K/UL    ABS. IMM.  GRANS. 0.1 (H) 0.00 - 0.04 K/UL    DF SMEAR SCANNED      RBC COMMENTS NORMOCYTIC, NORMOCHROMIC     PROTHROMBIN TIME + INR    Collection Time: 02/01/23  6:22 PM   Result Value Ref Range    INR 1.0 0.9 - 1.1      Prothrombin time 10.6 9.0 - 11.1 sec   URINALYSIS W/ REFLEX CULTURE    Collection Time: 02/01/23  6:22 PM    Specimen: Urine   Result Value Ref Range    Color YELLOW/STRAW      Appearance CLEAR CLEAR Specific gravity <1.005 1.003 - 1.030    pH (UA) 6.0 5.0 - 8.0      Protein TRACE (A) NEG mg/dL    Glucose >1,000 (A) NEG mg/dL    Ketone TRACE (A) NEG mg/dL    Bilirubin Negative NEG      Blood SMALL (A) NEG      Urobilinogen 0.2 0.2 - 1.0 EU/dL    Nitrites Negative NEG      Leukocyte Esterase Negative NEG      WBC 0-4 0 - 4 /hpf    RBC 5-10 0 - 5 /hpf    Epithelial cells FEW FEW /lpf    Bacteria Negative NEG /hpf    UA:UC IF INDICATED CULTURE NOT INDICATED BY UA RESULT CNI     METABOLIC PANEL, COMPREHENSIVE    Collection Time: 02/01/23  6:22 PM   Result Value Ref Range    Sodium 132 (L) 136 - 145 mmol/L    Potassium 5.5 (H) 3.5 - 5.1 mmol/L    Chloride 102 97 - 108 mmol/L    CO2 25 21 - 32 mmol/L    Anion gap 5 5 - 15 mmol/L    Glucose 597 (H) 65 - 100 mg/dL    BUN 23 (H) 6 - 20 MG/DL    Creatinine 1.57 (H) 0.70 - 1.30 MG/DL    BUN/Creatinine ratio 15 12 - 20      eGFR 48 (L) >60 ml/min/1.73m2    Calcium 8.7 8.5 - 10.1 MG/DL    Bilirubin, total 0.6 0.2 - 1.0 MG/DL    ALT (SGPT) 40 12 - 78 U/L    AST (SGOT) 27 15 - 37 U/L    Alk.  phosphatase 88 45 - 117 U/L    Protein, total 7.2 6.4 - 8.2 g/dL    Albumin 3.8 3.5 - 5.0 g/dL    Globulin 3.4 2.0 - 4.0 g/dL    A-G Ratio 1.1 1.1 - 2.2     CK    Collection Time: 02/01/23  6:22 PM   Result Value Ref Range     (H) 39 - 308 U/L   EKG, 12 LEAD, INITIAL    Collection Time: 02/01/23  8:01 PM   Result Value Ref Range    Ventricular Rate 79 BPM    Atrial Rate 79 BPM    P-R Interval 156 ms    QRS Duration 102 ms    Q-T Interval 380 ms    QTC Calculation (Bezet) 435 ms    Calculated P Axis 53 degrees    Calculated R Axis -13 degrees    Calculated T Axis 42 degrees    Diagnosis       Normal sinus rhythm  Normal ECG  When compared with ECG of 28-AUG-2020 14:46,  No significant change was found     GLUCOSE, POC    Collection Time: 02/01/23 10:19 PM   Result Value Ref Range    Glucose (POC) 499 (H) 65 - 117 mg/dL    Performed by Marcelino Hunt PCT    GLUCOSE, POC    Collection Time: 02/01/23 11:26 PM   Result Value Ref Range    Glucose (POC) 474 (H) 65 - 117 mg/dL    Performed by Audie Ramos PCT         EKG: If performed, independent interpretation documented below in the MDM section     RADIOLOGY:  Non-plain film images such as CT, Ultrasound and MRI are read by the radiologist. Plain radiographic images are visualized and preliminarily interpreted by the ED Provider with the findings documented in the MDM section. Interpretation per the Radiologist below, if available at the time of this note:     XR CHEST SNGL V    Result Date: 2/1/2023  INDICATION:  Pre op Exam: Portable chest 1756. Comparison: 7/13/2019. Findings: Cardiomediastinal silhouette is within normal limits. Pulmonary vasculature is not engorged. There are no focal parenchymal opacities, effusions, or pneumothorax. There is crowding of lung markings at the lung bases. 1. No acute disease      XR PELV 3 V    Result Date: 2/1/2023  EXAM:  XR PELV 3 V INDICATION:   fracture COMPARISON: CT earlier today. FINDINGS: Multiple views of the pelvis demonstrate nondisplaced left pubic rami fractures extending into the anterior column of the left acetabulum. There is no evidence of proximal femur fracture. Left pubic rami fractures extending into the anterior column of the left acetabulum. XR HIP LT W OR WO PELV 2-3 VWS    Result Date: 2/1/2023  EXAM: XR HIP LT W OR WO PELV 2-3 VWS INDICATION: unable to bear weight, left hip pain/trauma. COMPARISON: None. FINDINGS: AP view of the pelvis and a frogleg lateral view of the left hip demonstrate fractures of the left superior pubic ramus at its junction with the acetabulum. Left parasymphyseal pubic bone fracture and fracture of the left inferior pubic ramus. .     1. Fractures of left superior and inferior pubic rami and parasymphyseal pubic bone.     CT PELV WO CONT    Result Date: 2/1/2023  EXAM: CT PELV WO CONT INDICATION: Left pelvic pain and left pubic rami fractures COMPARISON: Left hip Views earlier this evening. CT pelvis on 1/24/2022. TECHNIQUE: Helical CT of the bony pelvis with coronal and sagittal reformats. Images reviewed in soft tissue and bone windows. CT dose reduction was achieved through the use of a standardized protocol tailored for this examination and automatic exposure control for dose modulation. CONTRAST: None. FINDINGS: Bones: Osteopenia is mild. Nondisplaced fractures of the left inferior pubic ramus extending to the left pubic body. Mildly displaced left superior pubic ramus fracture extends to the anterior margin of the anterior column of the left acetabulum. No articular surface depression or offset. No femoral fracture. No other pelvic fracture. Grade 2 anterolisthesis of L5 on S1 is due to disc disease, facet arthrosis, and bilateral L5 spondylolysis. Joint fluid: Small left hip lipohemarthrosis. Articulations: Mild bilateral hip osteoarthritis. No sacroiliitis. Tendons: No full-thickness tendon tear. Muscles: Enlargement of the left obturator internus muscle is likely due to hemorrhage. Soft tissue mass: Left pelvic soft tissue hemorrhage is extraperitoneal. Colonic diverticulosis is partially imaged. Vascular calcifications are arterial.     1. Nondisplaced fractures of the left pubic rami extend intra-articularly into the anterior column of the left acetabulum. No articular surface depression or offset. Small left lipohemarthrosis. 2. No femur fracture.        PROCEDURES   Unless otherwise noted below, none  Procedures     CRITICAL CARE TIME       EMERGENCY DEPARTMENT COURSE and DIFFERENTIAL DIAGNOSIS/MDM   Vitals:    Vitals:    02/01/23 1659 02/01/23 2340   BP: 109/71 119/69   Pulse: 63 81   Resp: 14 15   Temp:  98.9 °F (37.2 °C)   SpO2: 98% 95%   Weight: 86.2 kg (190 lb)    Height: 6' (1.829 m)         Patient was given the following medications:  Medications   oxyCODONE IR (ROXICODONE) tablet 5 mg (5 mg Oral Given 2/1/23 2342)   insulin lispro (HUMALOG) injection (0 Units SubCUTAneous Held 2/1/23 2200)   glucose chewable tablet 16 g (has no administration in time range)   glucagon (GLUCAGEN) injection 1 mg (has no administration in time range)   dextrose 10% infusion 0-250 mL (has no administration in time range)   insulin glargine (LANTUS) injection 10 Units (10 Units SubCUTAneous Given 2/1/23 2356)   aspirin delayed-release tablet 81 mg (has no administration in time range)   atorvastatin (LIPITOR) tablet 40 mg (40 mg Oral Given 2/1/23 2356)   metoprolol succinate (TOPROL-XL) XL tablet 25 mg (has no administration in time range)   sodium chloride (NS) flush 5-40 mL (10 mL IntraVENous Given 2/1/23 2346)   sodium chloride (NS) flush 5-40 mL (has no administration in time range)   acetaminophen (TYLENOL) tablet 650 mg (has no administration in time range)     Or   acetaminophen (TYLENOL) suppository 650 mg (has no administration in time range)   polyethylene glycol (MIRALAX) packet 17 g (has no administration in time range)   ondansetron (ZOFRAN ODT) tablet 4 mg (has no administration in time range)     Or   ondansetron (ZOFRAN) injection 4 mg (has no administration in time range)   0.9% sodium chloride infusion (75 mL/hr IntraVENous New Bag 2/1/23 2343)   morphine injection 4 mg (4 mg IntraVENous Given 2/1/23 2015)   ondansetron (ZOFRAN) injection 4 mg (4 mg IntraVENous Given 2/1/23 1734)   sodium chloride 0.9 % bolus infusion 1,000 mL (1,000 mL IntraVENous New Bag 2/1/23 1823)   insulin lispro (HUMALOG) injection 10 Units (10 Units SubCUTAneous Given 2/1/23 2356)       Medical Decision Making  78-year-old male, not on anticoagulation presenting the emergency department with a traumatic injury, ladder fell on him as well as some jameel of hay. He has left-sided hip pain. Concern for hip fracture versus pelvic fracture. Will obtain imaging, will obtain preop labs, chest x-ray, EKG just in case he does have hip fracture. Will provide analgesia.   Head and neck cleared clinically. Patient is neurovascularly intact distally. Disposition pending imaging    Amount and/or Complexity of Data Reviewed  Labs: ordered. Radiology: ordered and independent interpretation performed. Details: I personally reviewed imaging of the left hip and pelvis, no obvious hip fracture, but does have pelvic fracture. ECG/medicine tests: ordered. Discussion of management or test interpretation with external provider(s): Discussed with Dr. Nae Lawson, orthopedist, Patient is nonoperative, fracture fragment does extend to the acetabulum, but not to the articular surface, no surgery needed    Risk  Prescription drug management. Decision regarding hospitalization. FINAL IMPRESSION     1. Closed nondisplaced fracture of pelvis, unspecified part of pelvis, initial encounter ([facility] [address]    2. Hyperglycemia    3. MONET (acute kidney injury) [facility])          DISPOSITION/PLAN   Arnold Urrutia's  results have been reviewed with him. He has been counseled regarding his diagnosis, treatment, and plan. He verbally conveys understanding and agreement of the signs, symptoms, diagnosis, treatment and prognosis and additionally agrees to follow up as discussed. He also agrees with the care-plan and conveys that all of his questions have been answered. I have also provided discharge instructions for him that include: educational information regarding their diagnosis and treatment, and list of reasons why they would want to return to the ED prior to their follow-up appointment, should his condition change. CLINICAL IMPRESSION    Admit Note: Pt is being admitted by [facility]. The results of their tests and reason(s) for their admission have been discussed with pt and/or available family. They convey agreement and understanding for the need to be admitted and for the admission diagnosis.      PATIENT REFERRED TO:  Follow-up Information    None           DISCHARGE MEDICATIONS:  Current Discharge Medication List            DISCONTINUED MEDICATIONS:  Current Discharge Medication List          I am the Primary Clinician of Record. Kassie Jaeger DO (electronically signed)    (Please note that parts of this dictation were completed with voice recognition software. Quite often unanticipated grammatical, syntax, homophones, and other interpretive errors are inadvertently transcribed by the computer software. Please disregards these errors.  Please excuse any errors that have escaped final proofreading.)

## 2023-02-02 LAB
ANION GAP SERPL CALC-SCNC: 5 MMOL/L (ref 5–15)
ATRIAL RATE: 79 BPM
BASOPHILS # BLD: 0 K/UL (ref 0–0.1)
BASOPHILS NFR BLD: 1 % (ref 0–1)
BUN SERPL-MCNC: 21 MG/DL (ref 6–20)
BUN/CREAT SERPL: 15 (ref 12–20)
CALCIUM SERPL-MCNC: 8.1 MG/DL (ref 8.5–10.1)
CALCULATED P AXIS, ECG09: 53 DEGREES
CALCULATED R AXIS, ECG10: -13 DEGREES
CALCULATED T AXIS, ECG11: 42 DEGREES
CHLORIDE SERPL-SCNC: 103 MMOL/L (ref 97–108)
CO2 SERPL-SCNC: 25 MMOL/L (ref 21–32)
CREAT SERPL-MCNC: 1.37 MG/DL (ref 0.7–1.3)
DIAGNOSIS, 93000: NORMAL
DIFFERENTIAL METHOD BLD: ABNORMAL
EOSINOPHIL # BLD: 0 K/UL (ref 0–0.4)
EOSINOPHIL NFR BLD: 0 % (ref 0–7)
ERYTHROCYTE [DISTWIDTH] IN BLOOD BY AUTOMATED COUNT: 12.7 % (ref 11.5–14.5)
GLUCOSE BLD STRIP.AUTO-MCNC: 230 MG/DL (ref 65–117)
GLUCOSE BLD STRIP.AUTO-MCNC: 278 MG/DL (ref 65–117)
GLUCOSE BLD STRIP.AUTO-MCNC: 292 MG/DL (ref 65–117)
GLUCOSE BLD STRIP.AUTO-MCNC: 356 MG/DL (ref 65–117)
GLUCOSE SERPL-MCNC: 345 MG/DL (ref 65–100)
HCT VFR BLD AUTO: 33.5 % (ref 36.6–50.3)
HGB BLD-MCNC: 11.7 G/DL (ref 12.1–17)
IMM GRANULOCYTES # BLD AUTO: 0 K/UL (ref 0–0.04)
IMM GRANULOCYTES NFR BLD AUTO: 1 % (ref 0–0.5)
LYMPHOCYTES # BLD: 1.5 K/UL (ref 0.8–3.5)
LYMPHOCYTES NFR BLD: 23 % (ref 12–49)
MAGNESIUM SERPL-MCNC: 2 MG/DL (ref 1.6–2.4)
MCH RBC QN AUTO: 32.4 PG (ref 26–34)
MCHC RBC AUTO-ENTMCNC: 34.9 G/DL (ref 30–36.5)
MCV RBC AUTO: 92.8 FL (ref 80–99)
MONOCYTES # BLD: 0.5 K/UL (ref 0–1)
MONOCYTES NFR BLD: 7 % (ref 5–13)
NEUTS SEG # BLD: 4.6 K/UL (ref 1.8–8)
NEUTS SEG NFR BLD: 68 % (ref 32–75)
NRBC # BLD: 0 K/UL (ref 0–0.01)
NRBC BLD-RTO: 0 PER 100 WBC
P-R INTERVAL, ECG05: 156 MS
PLATELET # BLD AUTO: 111 K/UL (ref 150–400)
PMV BLD AUTO: 9.5 FL (ref 8.9–12.9)
POTASSIUM SERPL-SCNC: 3.9 MMOL/L (ref 3.5–5.1)
Q-T INTERVAL, ECG07: 380 MS
QRS DURATION, ECG06: 102 MS
QTC CALCULATION (BEZET), ECG08: 435 MS
RBC # BLD AUTO: 3.61 M/UL (ref 4.1–5.7)
SERVICE CMNT-IMP: ABNORMAL
SODIUM SERPL-SCNC: 133 MMOL/L (ref 136–145)
VENTRICULAR RATE, ECG03: 79 BPM
WBC # BLD AUTO: 6.7 K/UL (ref 4.1–11.1)

## 2023-02-02 PROCEDURE — 74011000250 HC RX REV CODE- 250: Performed by: STUDENT IN AN ORGANIZED HEALTH CARE EDUCATION/TRAINING PROGRAM

## 2023-02-02 PROCEDURE — 74011250637 HC RX REV CODE- 250/637: Performed by: PHYSICIAN ASSISTANT

## 2023-02-02 PROCEDURE — 74011636637 HC RX REV CODE- 636/637: Performed by: INTERNAL MEDICINE

## 2023-02-02 PROCEDURE — 36415 COLL VENOUS BLD VENIPUNCTURE: CPT

## 2023-02-02 PROCEDURE — 85025 COMPLETE CBC W/AUTO DIFF WBC: CPT

## 2023-02-02 PROCEDURE — 83735 ASSAY OF MAGNESIUM: CPT

## 2023-02-02 PROCEDURE — 74011250637 HC RX REV CODE- 250/637: Performed by: STUDENT IN AN ORGANIZED HEALTH CARE EDUCATION/TRAINING PROGRAM

## 2023-02-02 PROCEDURE — 65270000046 HC RM TELEMETRY

## 2023-02-02 PROCEDURE — 80048 BASIC METABOLIC PNL TOTAL CA: CPT

## 2023-02-02 PROCEDURE — 74011250637 HC RX REV CODE- 250/637: Performed by: INTERNAL MEDICINE

## 2023-02-02 PROCEDURE — 74011250636 HC RX REV CODE- 250/636: Performed by: INTERNAL MEDICINE

## 2023-02-02 PROCEDURE — 82962 GLUCOSE BLOOD TEST: CPT

## 2023-02-02 RX ORDER — IBUPROFEN 200 MG
4 TABLET ORAL AS NEEDED
Status: DISCONTINUED | OUTPATIENT
Start: 2023-02-02 | End: 2023-02-07 | Stop reason: HOSPADM

## 2023-02-02 RX ORDER — INSULIN GLARGINE 100 [IU]/ML
20 INJECTION, SOLUTION SUBCUTANEOUS 2 TIMES DAILY
Status: DISCONTINUED | OUTPATIENT
Start: 2023-02-02 | End: 2023-02-07 | Stop reason: HOSPADM

## 2023-02-02 RX ORDER — INSULIN LISPRO 100 [IU]/ML
10 INJECTION, SOLUTION INTRAVENOUS; SUBCUTANEOUS ONCE
Status: COMPLETED | OUTPATIENT
Start: 2023-02-02 | End: 2023-02-02

## 2023-02-02 RX ORDER — METFORMIN HYDROCHLORIDE 850 MG/1
850 TABLET ORAL 2 TIMES DAILY WITH MEALS
Status: DISCONTINUED | OUTPATIENT
Start: 2023-02-02 | End: 2023-02-07 | Stop reason: HOSPADM

## 2023-02-02 RX ORDER — TRAMADOL HYDROCHLORIDE 50 MG/1
50 TABLET ORAL
Status: DISCONTINUED | OUTPATIENT
Start: 2023-02-02 | End: 2023-02-03

## 2023-02-02 RX ORDER — SODIUM CHLORIDE 9 MG/ML
100 INJECTION, SOLUTION INTRAVENOUS CONTINUOUS
Status: DISCONTINUED | OUTPATIENT
Start: 2023-02-02 | End: 2023-02-03

## 2023-02-02 RX ORDER — DEXTROSE MONOHYDRATE 100 MG/ML
0-250 INJECTION, SOLUTION INTRAVENOUS AS NEEDED
Status: DISCONTINUED | OUTPATIENT
Start: 2023-02-02 | End: 2023-02-02 | Stop reason: SDUPTHER

## 2023-02-02 RX ORDER — INSULIN GLARGINE 100 [IU]/ML
20 INJECTION, SOLUTION SUBCUTANEOUS
Status: DISCONTINUED | OUTPATIENT
Start: 2023-02-02 | End: 2023-02-02

## 2023-02-02 RX ORDER — LANOLIN ALCOHOL/MO/W.PET/CERES
3 CREAM (GRAM) TOPICAL
Status: DISCONTINUED | OUTPATIENT
Start: 2023-02-02 | End: 2023-02-07 | Stop reason: HOSPADM

## 2023-02-02 RX ORDER — INSULIN LISPRO 100 [IU]/ML
INJECTION, SOLUTION INTRAVENOUS; SUBCUTANEOUS
Status: DISCONTINUED | OUTPATIENT
Start: 2023-02-02 | End: 2023-02-07 | Stop reason: HOSPADM

## 2023-02-02 RX ADMIN — SODIUM CHLORIDE 100 ML/HR: 9 INJECTION, SOLUTION INTRAVENOUS at 15:00

## 2023-02-02 RX ADMIN — OXYCODONE 5 MG: 5 TABLET ORAL at 17:22

## 2023-02-02 RX ADMIN — METOPROLOL SUCCINATE 25 MG: 50 TABLET, EXTENDED RELEASE ORAL at 08:13

## 2023-02-02 RX ADMIN — ASPIRIN 81 MG: 81 TABLET, COATED ORAL at 08:13

## 2023-02-02 RX ADMIN — TRAMADOL HYDROCHLORIDE 50 MG: 50 TABLET ORAL at 03:20

## 2023-02-02 RX ADMIN — OXYCODONE 5 MG: 5 TABLET ORAL at 12:16

## 2023-02-02 RX ADMIN — SODIUM CHLORIDE, PRESERVATIVE FREE 10 ML: 5 INJECTION INTRAVENOUS at 14:41

## 2023-02-02 RX ADMIN — TRAMADOL HYDROCHLORIDE 50 MG: 50 TABLET ORAL at 21:05

## 2023-02-02 RX ADMIN — Medication 20 UNITS: at 11:55

## 2023-02-02 RX ADMIN — METFORMIN HYDROCHLORIDE 850 MG: 850 TABLET ORAL at 12:16

## 2023-02-02 RX ADMIN — SODIUM CHLORIDE, PRESERVATIVE FREE 10 ML: 5 INJECTION INTRAVENOUS at 05:56

## 2023-02-02 RX ADMIN — Medication 7 UNITS: at 20:25

## 2023-02-02 RX ADMIN — Medication 10 UNITS: at 12:16

## 2023-02-02 RX ADMIN — OXYCODONE 5 MG: 5 TABLET ORAL at 08:13

## 2023-02-02 RX ADMIN — Medication 7 UNITS: at 17:21

## 2023-02-02 RX ADMIN — OXYCODONE 5 MG: 5 TABLET ORAL at 21:02

## 2023-02-02 RX ADMIN — ATORVASTATIN CALCIUM 40 MG: 40 TABLET, FILM COATED ORAL at 21:02

## 2023-02-02 RX ADMIN — SODIUM CHLORIDE, PRESERVATIVE FREE 10 ML: 5 INJECTION INTRAVENOUS at 23:09

## 2023-02-02 RX ADMIN — METFORMIN HYDROCHLORIDE 850 MG: 850 TABLET ORAL at 17:21

## 2023-02-02 RX ADMIN — Medication 20 UNITS: at 20:25

## 2023-02-02 NOTE — PROGRESS NOTES
Physical Therapy    Received PT eval order. Pt here s/p having ladder and hay bails fall on him resulting in L sup/inf pubic rami fxs and with them extending into the acetabulum. Per MD notes, ortho had recommended NWB but at one other place TTWB. No official ortho note at this time. Pt still has an active bedrest order. Will need activity level to be updated to proceed with eval. Spoke with RN. Will follow.     Genna Sapp, PT

## 2023-02-02 NOTE — PROGRESS NOTES
Physical Therapy    Checked back again. Pt still with active bedrest order. Will await change in activity order prior to eval. RN aware.     Pablo Ramirez, PT

## 2023-02-02 NOTE — H&P
This note is compiled to communicate with the medical care team. It may contain sensitive and protected information. It is not intended to serve as a source of communication with patients/families/friends; that communication occurs at the bedside or via alternative direct communications means (secure messaging, letters, video/telephone, etc.). Hospitalist Admission Note    NAME: Clarita Garay   :  1955   MRN:  [id number]     Date/Time:  2023 12:36 AM    Patient PCP: Frandy Bell MD  ______________________________________________________________________  Given the patient's current clinical presentation, I have a high level of concern for decompensation if discharged from the emergency department. Complex decision making was performed, which includes reviewing the patient's available past medical records, laboratory results, and x-ray films. My assessment of this patient's clinical condition and my plan of care is as follows. Subjective:   CHIEF COMPLAINT: Pelvic fracture    HISTORY OF PRESENT ILLNESS:     Katia Rojas is a 79 y.o.  male with pertinent past medical history as listed below who presents after a ladder and multiple hay bells fell on him resulting in severe left hip pain and inability to bear weight. In the ED, patient afebrile and hemodynamically stable saturating mid 90s on room air. ECG demonstrates normal sinus rhythm. Radiographs demonstrate nondisplaced fractures of left pubic rami extending intra-articularly into the anterior column of left acetabulum with small left lipohemarthrosis. CBC notable for thrombocytopenia of 126, UA not reflexed to culture, CK4 195, sodium 132, potassium 5.5, glucose 597, BUN 23, creatinine 1.57 (1.2-1.8 on most recent priors). Orthopedics consulted by ED provider and feel this is a nonoperative fracture recommending nonweightbearing at this time.     We were asked to admit for work up and evaluation of the above problems. Past Medical History:   Diagnosis Date    CAD (coronary artery disease) 2014    NSTEMI, PCI/NATE RCA    Hypercholesterolemia     Hypertension         Past Surgical History:   Procedure Laterality Date    HX CHOLECYSTECTOMY  2019    Laparoscopic cholecystectomy with intraoperative cholangiogram    HX ORTHOPAEDIC      left shoulder, torn tendon    HX ORTHOPAEDIC      right knee X 4    MO CARDIAC SURG PROCEDURE UNLIST      Stent RCA 2014    MO COLONOSCOPY FLX DX W/COLLJ SPEC WHEN PFRMD  2007    Dr Hollis Wallace 1 polyp repeat 2012       Social History     Tobacco Use    Smoking status: Former     Packs/day: 0.25     Years: 35.00     Pack years: 8.75     Types: Cigarettes     Quit date: 2014     Years since quittin.2    Smokeless tobacco: Never    Tobacco comments:     Never smoked over a pack per day   Substance Use Topics    Alcohol use: No     Alcohol/week: 0.0 standard drinks        Family History   Problem Relation Age of Onset    Heart Disease Mother         CAD    Hypertension Mother     Elevated Lipids Mother     Cancer Father         lung    Mental Retardation Brother     Seizures Brother     Diabetes Brother     Hypertension Brother     Elevated Lipids Brother        No Known Allergies     Prior to Admission medications    Medication Sig Start Date End Date Taking?  Authorizing Provider   lancets (Accu-Chek Softclix Lancets) misc TEST BLOOD SUGAR TWICE DAILY 23  Yes Lyn Abraham MD   metFORMIN (GLUCOPHAGE) 500 mg tablet TAKE 3 TABLETS EVERY DAY WITH DINNER FOR TYPE 2 DIABETES 22  Yes Lyn Abraham MD   amLODIPine (NORVASC) 2.5 mg tablet TAKE 1 TABLET EVERY DAY FOR HIGH BLOOD PRESSURE 22  Yes Lyn Abraham MD   metoprolol succinate (TOPROL-XL) 25 mg XL tablet TAKE 1 TABLET EVERY DAY FOR HIGH BLOOD PRESSURE AND HEART 22  Yes Lyn Abraham MD   atorvastatin (LIPITOR) 40 mg tablet TAKE 1 TABLET EVERY DAY FOR HIGH CHOLESTEROL 22  Yes Giana MD [facility]   Droplet Pen Needle 31 gauge x 5/16\" ndle USE TO INJECT EVERY DAY AS DIRECTED 8/19/22  Yes Jose Enrique David MD   insulin aspart protamine/insulin aspart (NovoLOG Mix 70-30FlexPen U-100) 100 unit/mL (70-30) inpn INJECT 25 UNITS SUBCUTANEOUSLY BEFORE BREAKFAST AND SUPPER 6/6/22  Yes Lila Faria MD   Accu-Chek Haydee Plus Meter misc USE AS DIRECTED 3/19/22  Yes Lila Faria MD   glucose blood VI test strips (Accu-Chek Haydee Plus test strp) strip TEST BLOOD SUGAR TWICE DAILY 3/19/22  Yes Lila Faria MD   aspirin delayed-release 81 mg tablet Take 1 Tab by mouth daily. 8/29/17  Yes Colin Castorena MD   ondansetron hcl (Zofran) 4 mg tablet Take 1 Tablet by mouth every eight (8) hours as needed for Nausea. 1/24/22   Inessa Telles DO   nitroglycerin (NITROSTAT) 0.4 mg SL tablet Take 1 tablet under tongue every 5 minutes up to 3 doses prn chest pain. 8/20/20   Brittney Keating NP   acetaminophen (TYLENOL) 325 mg tablet Take 650 mg by mouth every six (6) hours as needed for Pain. 7/22/19   Vale Pace MD       REVIEW OF SYSTEMS:       Total of 12 systems reviewed with pertinent positives and negatives noted in HPI      Objective:   VITALS:    Visit Vitals  /69 (BP 1 Location: Left upper arm, BP Patient Position: Semi fowlers)   Pulse 81   Temp 98.9 °F (37.2 °C)   Resp 15   Ht 6' (1.829 m)   Wt 86.2 kg (190 lb)   SpO2 95%   BMI 25.77 kg/m²       PHYSICAL EXAM:    General:   Alert, cooperative, no distress, elderly  male        HEENT:  Atraumatic, anicteric sclerae, pink conjunctivae, mucosa moist, dentition fair     Neck:  Supple, symmetrical, trachea midline, no abnormalities on palpation     Lungs:   CTAB. Symmetric expansion. Good aeration. Normal respiratory effort. Chest wall:  No tenderness. No Accessory muscle use. Cardiovascular:   RRR, No murmur/rub/gallop. No JVD. Radial/AT/DP pulse 2+     GI/:   Soft, non-tender. Not distended.   Bowel sounds normal. No HSM Extremities No edema. No cyanosis. Capillary refill normal, RLE neurovascularly intact     Skin: No significant lesions noted. Not Jaundiced. No rashes      Neurologic: PERRL. EOMI. No facial asymmetry. No aphasia or slurred speech. Moves all extremities. Sensation to light touch grossly intact BUE/BLE. No overt focal deficits appreciated     Psych:  Alert and oriented X 4. Normal affect. Good insight     Other:   N/A         LAB DATA REVIEWED:    Recent Results (from the past 24 hour(s))   CBC WITH AUTOMATED DIFF    Collection Time: 02/01/23  6:22 PM   Result Value Ref Range    WBC 11.1 4.1 - 11.1 K/uL    RBC 3.99 (L) 4.10 - 5.70 M/uL    HGB 13.0 12.1 - 17.0 g/dL    HCT 37.7 36.6 - 50.3 %    MCV 94.5 80.0 - 99.0 FL    MCH 32.6 26.0 - 34.0 PG    MCHC 34.5 30.0 - 36.5 g/dL    RDW 12.7 11.5 - 14.5 %    PLATELET 463 (L) 723 - 400 K/uL    MPV 9.2 8.9 - 12.9 FL    NRBC 0.0 0  WBC    ABSOLUTE NRBC 0.00 0.00 - 0.01 K/uL    NEUTROPHILS 88 (H) 32 - 75 %    LYMPHOCYTES 6 (L) 12 - 49 %    MONOCYTES 5 5 - 13 %    EOSINOPHILS 0 0 - 7 %    BASOPHILS 0 0 - 1 %    IMMATURE GRANULOCYTES 1 (H) 0.0 - 0.5 %    ABS. NEUTROPHILS 9.7 (H) 1.8 - 8.0 K/UL    ABS. LYMPHOCYTES 0.7 (L) 0.8 - 3.5 K/UL    ABS. MONOCYTES 0.6 0.0 - 1.0 K/UL    ABS. EOSINOPHILS 0.0 0.0 - 0.4 K/UL    ABS. BASOPHILS 0.0 0.0 - 0.1 K/UL    ABS. IMM.  GRANS. 0.1 (H) 0.00 - 0.04 K/UL    DF SMEAR SCANNED      RBC COMMENTS NORMOCYTIC, NORMOCHROMIC     PROTHROMBIN TIME + INR    Collection Time: 02/01/23  6:22 PM   Result Value Ref Range    INR 1.0 0.9 - 1.1      Prothrombin time 10.6 9.0 - 11.1 sec   URINALYSIS W/ REFLEX CULTURE    Collection Time: 02/01/23  6:22 PM    Specimen: Urine   Result Value Ref Range    Color YELLOW/STRAW      Appearance CLEAR CLEAR      Specific gravity <1.005 1.003 - 1.030    pH (UA) 6.0 5.0 - 8.0      Protein TRACE (A) NEG mg/dL    Glucose >1,000 (A) NEG mg/dL    Ketone TRACE (A) NEG mg/dL    Bilirubin Negative NEG      Blood SMALL (A) NEG      Urobilinogen 0.2 0.2 - 1.0 EU/dL    Nitrites Negative NEG      Leukocyte Esterase Negative NEG      WBC 0-4 0 - 4 /hpf    RBC 5-10 0 - 5 /hpf    Epithelial cells FEW FEW /lpf    Bacteria Negative NEG /hpf    UA:UC IF INDICATED CULTURE NOT INDICATED BY UA RESULT CNI     METABOLIC PANEL, COMPREHENSIVE    Collection Time: 02/01/23  6:22 PM   Result Value Ref Range    Sodium 132 (L) 136 - 145 mmol/L    Potassium 5.5 (H) 3.5 - 5.1 mmol/L    Chloride 102 97 - 108 mmol/L    CO2 25 21 - 32 mmol/L    Anion gap 5 5 - 15 mmol/L    Glucose 597 (H) 65 - 100 mg/dL    BUN 23 (H) 6 - 20 MG/DL    Creatinine 1.57 (H) 0.70 - 1.30 MG/DL    BUN/Creatinine ratio 15 12 - 20      eGFR 48 (L) >60 ml/min/1.73m2    Calcium 8.7 8.5 - 10.1 MG/DL    Bilirubin, total 0.6 0.2 - 1.0 MG/DL    ALT (SGPT) 40 12 - 78 U/L    AST (SGOT) 27 15 - 37 U/L    Alk. phosphatase 88 45 - 117 U/L    Protein, total 7.2 6.4 - 8.2 g/dL    Albumin 3.8 3.5 - 5.0 g/dL    Globulin 3.4 2.0 - 4.0 g/dL    A-G Ratio 1.1 1.1 - 2.2     CK    Collection Time: 02/01/23  6:22 PM   Result Value Ref Range     (H) 39 - 308 U/L   EKG, 12 LEAD, INITIAL    Collection Time: 02/01/23  8:01 PM   Result Value Ref Range    Ventricular Rate 79 BPM    Atrial Rate 79 BPM    P-R Interval 156 ms    QRS Duration 102 ms    Q-T Interval 380 ms    QTC Calculation (Bezet) 435 ms    Calculated P Axis 53 degrees    Calculated R Axis -13 degrees    Calculated T Axis 42 degrees    Diagnosis       Normal sinus rhythm  Normal ECG  When compared with ECG of 28-AUG-2020 14:46,  No significant change was found     GLUCOSE, POC    Collection Time: 02/01/23 10:19 PM   Result Value Ref Range    Glucose (POC) 499 (H) 65 - 117 mg/dL    Performed by Katrina BARTHOLOMEW    GLUCOSE, POC    Collection Time: 02/01/23 11:26 PM   Result Value Ref Range    Glucose (POC) 474 (H) 65 - 117 mg/dL    Performed by Katrina Necessary PCT        IMAGING:  CT pelvis:  1.  Nondisplaced fractures of the left pubic rami extend intra-articularly into  the anterior column of the left acetabulum. No articular surface depression or  offset. Small left lipohemarthrosis. 2. No femur fracture.     EKG: Normal sinus rhythm, rate 79, , QTc 435  ______________________________________________________________________    Assessment / Plan:  Left pubic rami fractures secondary to trauma  History CAD  Essential hypertension  Hyperlipidemia  Non-insulin-dependent diabetes mellitus  Borderline hyperkalemia    PLAN:    Admit to medicine  Orthopedics consulted, greatly appreciate their expertise  -No operative intervention planned at this time  -Toe-touch transfer/nonweightbearing  Oxycodone 5 mg every 4 hours as needed pain  Hold DVT chemoprophylaxis in setting of hematoma  Trend CBC  Maintenance fluids 75 cc/h x 13 hours  10 units lispro now both to lower potassium and blood sugar  Trend BMP  10 units glargine nightly  Sliding scale corrective coverage insulin  Continue PTA: Aspirin, atorvastatin, metoprolol    Code Status: Full    DVT Prophylaxis: SCDs  GI Prophylaxis: Not indicated  [address] discussed with:    Comments   Patient x    Family      RN     Care Manager                    Consultant:      _______________________________________________________________________  Baseline Level of Function: Independent    Expected  Disposition:   Home with Family    HH/PT/OT/RN    SNF/LTC x   TOMI    ________________________________________________________________________  TOTAL TIME:  77 Minutes   MEDICAL COMPLEXITY: high  TOBACCO CESSATION COUNSELING: NO        Comments    x Reviewed previous records   >50% of visit spent in counseling and coordination of care x Discussion with patient and/or family and questions answered       ________________________________________________________________________  Signed: Iveth Parks DO  2/2/2023 12:36 AM    Please note that this documentation was completed in part with Dragon dictation software. Occasionally unanticipated grammatical, syntax, homophones, and other interpretive errors are inadvertently transcribed by the computer software. Please excuse and disregard any errors that have escaped final proofreading. If in doubt, please do not hesitate to reach out to myself or the assigned hospitalist via [facility] paging system for clarification.

## 2023-02-02 NOTE — PROGRESS NOTES
Attempted to see pt for OT services. Pt currently has active bedrest orders. Will defer OT services at this time and continue to follow. Noted that ortho has recommended NWB LLE.

## 2023-02-02 NOTE — ED NOTES
Bedside and Verbal shift change report given to Dano Phillips RN (oncoming nurse) by Ariana Ray RN (offgoing nurse). Report included the following information SBAR, Kardex, ED Summary, MAR, and Recent Results.

## 2023-02-02 NOTE — PROGRESS NOTES
Spiritual Care Assessment/Progress Note  [facility]      NAME: Tray Moss      MRN: [id number]  AGE: 79 y.o.  SEX: male  Jainism Affiliation: Buddhism   Language: English     2/2/2023     Total Time (in minutes): 11     Spiritual Assessment begun in [facility] EMERGENCY DEPT through conversation with:         [x]Patient        [] Family    [] Friend(s)        Reason for Consult: Request by staff     Spiritual beliefs: (Please include comment if needed)     [x] Identifies with a deya tradition:         [] Supported by a deya community:            [] Claims no spiritual orientation:           [] Seeking spiritual identity:                [] Adheres to an individual form of spirituality:           [] Not able to assess:                           Identified resources for coping:      [x] Prayer                               [] Music                  [] Guided Imagery     [] Family/friends                 [] Pet visits     [] Devotional reading                         [] Unknown     [] Other:                                                Interventions offered during this visit: (See comments for more details)    Patient Interventions: Initial visit, Affirmation of deya, Prayer (assurance of), Initial/Spiritual assessment, patient floor           Plan of Care:     [] Support spiritual and/or cultural needs    [] Support AMD and/or advance care planning process      [] Support grieving process   [] Coordinate Rites and/or Rituals    [] Coordination with community clergy   [] No spiritual needs identified at this time   [] Detailed Plan of Care below (See Comments)  [] Make referral to Music Therapy  [] Make referral to Pet Therapy     [] Make referral to Addiction services  [] Make referral to [facility]  [] Make referral to Spiritual Care Partner  [] No future visits requested        [x] Contact Spiritual Care for further referrals     Comments:   visited patient in the ER in response to  in basket request for pastoral support. Patient indicated he was coping well. He expressed no need for support when  inquired. He indicated that he is [place] and deya helps him cope. Assurance of prayer offered, patient advised of  availability upon request or staff referrals. He thanked  for the visit.       Visited by: Hazel estrella : [phone]

## 2023-02-02 NOTE — PROGRESS NOTES
Dr. Kathrene Severs notified about Hgb drop from 13.0 to 11.7 in less than 12 hours and also about his blood sugar level of 345. Report given to Christoph Cohen RN.

## 2023-02-02 NOTE — PROGRESS NOTES
ADMISSION SBAR NOTE    IP UNIT CALLED NOTE IS READY: Yes Spoke to [address]  IF there are questions Call Murtaza Delaney at phone # [phone]    SITUATION/BACKGROUND:    Patient is being transferred to [address], Room# [phone]    Patient's Chief Complaint was oshea jameel fell on patient and patient has sustained pelvic fractures. and is admitted for pelvic fractures. Non-weight bearing. CODE STATUS: Full Code    ISOLATION/PRECAUTIONS: Yes   ISOLATION TYPE: none    Called outstanding consults: Yes Spoke with Olivia NIETO twice during my shift    Are there still sign and held orders that need to be released? No     STAT labs collected:  not applicable  REPEAT LACTIC ACID DUE? Not applicable  TIME DUE: not applicable    All STAT orders are complete:  not applicable    The following personal items will be sent with the patient during transfer to the floor: All valuables:   ITEM: Dental Appliances: None  ITEM: Visual Aid: Magnifying glass  ITEM:    ITEM: Jewelry: None  ITEM: Clothing: Pants, Socks, Shirt       ASSESSMENT:    CIWA Assessment:  not applicable  Last Score: not applicable      NEURO:     NIH SCORE:    VAN SCREENING:      NEURO ASSESSMENT: Neuro  Neurologic State: Alert (02/02/23 5841)  Orientation Level: Oriented X4 (02/02/23 2009)    Is patient impulsive? No   Is patient oriented? Yes   Do they follow commands? Yes  Is the patient ambulatory? No  Device need non-weight bearing      FALL RISK? Yes   INTERVENTIONS: not weight bearing      RESPIRATORY: Is patient on Oxygen? No    OXYGEN: Oxygen Therapy  O2 Device: None (Room air) (02/02/23 1100)    CARDIAC: Is cardiac monitoring ordered? Yes Last Rhythm: normal sinus rhythm  Patient to transfer with tele box on? Yes  Is patient using a LIFE VEST? No     LINE ACCESS:       /GI: CONTINENT BOWEL/BLADDER? Yes  URINARY OUTPUT: voiding  CHRONIC OR ACUTE? Not applicable   If CHRONIC, is it 1days old, was it changed prior to specimen collection?   Not applicable  WAS UA WITH REFLEX SENT TO LAB? Not applicable  IF NO, COLLECT AND SEND PRIOR TO TRANSPORT TO INPATIENT AREA    INTEGUMENTARY:   IS THE PATIENT UNDRESSED? Yes  ARE THERE WOUNDS PRESENT? No   ARE THE WOUNDS DOCUMENTED? Not applicable    RESTRAINTS IN USE: No      IS DOCUMENTATION COMPLETE:  not applicable  Is there a current Order? Not applicable  When does it ?  Not applicable    Vital Signs  Level of Consciousness: Alert (0) (23)  Temp: 98.6 °F (37 °C) (23)  Temp Source: Oral (23)  Pulse (Heart Rate): 70 (23)  Heart Rate Source: Monitor (23)  Cardiac Rhythm: Sinus Rhythm (23)  Resp Rate: 14 (23)  BP: 116/63 (23)  MAP (Monitor): 80 (23)  MAP (Calculated): 81 (23)  BP 1 Location: Left upper arm (23)  BP 1 Method: Automatic (23)  BP Patient Position: Semi fowlers (23)  MEWS Score: 0 (23)  Pain 1  Pain Scale 1: Numeric (0 - 10) (23)  Pain Intensity 1: 5 (23)  Patient Stated Pain Goal: 0 (23)  Pain Reassessment 1: Patient resting w/respiratory rate greater than 10 (23)  Pain Onset 1: acute (23 0800)  Pain Location 1: Pelvis, Hip (23)  Pain Orientation 1: Left (23)  Pain Description 1: Constant (23)  Pain Intervention(s) 1: Medication (see MAR) (23 1113)      REVIEW:

## 2023-02-02 NOTE — PROGRESS NOTES
Pt continues to have active bedrest orders. Will need bedrest orders lifted prior to start of OT services.

## 2023-02-02 NOTE — PROGRESS NOTES
Hospitalist Progress Note    NAME: Neri Neri   :  1955   MRN:  [id number]       Assessment / Plan:  Left pubic rami fractures secondary to trauma  -Pain control with oxycodone. PT OT consulted. Will likely need rehab placement  -Orthopedics consulted    History CAD  Essential hypertension  Hyperlipidemia  -Continue aspirin, Lipitor, metoprolol    Non-insulin-dependent diabetes mellitus uncontrolled  -Blood sugars are uncontrolled  -Increase Lantus to 20 units twice daily. Continue insulin lispro sliding scale. Continue metformin.  -Continue diabetic diet    Borderline hyperkalemia  -Potassium is now normal    CKD stage III  -Creatinine is close to baseline of around 1.3    Mild rhabdomyolysis  -CK is 495. Start IV fluids. Check CK in a.m. Code Status: Full     DVT Prophylaxis: SCDs due to small lipohemarthrosis  GI Prophylaxis: Not indicated    Diet: Cardiac/diabetic    ERIKA: 2/3  Barriers: Placement, PT OT eval, Ortho eval       Subjective:     Chief Complaint / Reason for Physician Visit  Reports pelvic pain which is about 5 x 10 with weakness of both legs. Does not report any pain anywhere else  Does not report any weakness in the arms      Review of Systems:  Symptom Y/N Comments  Symptom Y/N Comments   Fever/Chills    Chest Pain     Poor Appetite    Edema     Cough    Abdominal Pain     Sputum    Joint Pain     SOB/HAMILTON    Pruritis/Rash     Nausea/vomit    Tolerating PT/OT     Diarrhea    Tolerating Diet     Constipation    Other       Could NOT obtain due to:      Objective:     VITALS:   Last 24hrs VS reviewed since prior progress note.  Most recent are:  Patient Vitals for the past 24 hrs:   Temp Pulse Resp BP SpO2   23 1100 98.6 °F (37 °C) 70 14 116/63 93 %   23 0800 98 °F (36.7 °C) 75 19 110/66 96 %   23 0700 -- 71 15 113/66 95 %   23 0549 -- 70 14 -- 95 %   23 0539 -- 71 11 -- 95 %   23 0529 -- 75 13 -- 92 %   23 0519 -- 75 17 -- 93 % Patient calls wondering if blood counts could be contributing to her on-going fatigue. Hgb 9.8-11.5 the past few weeks. Discussed with Dr. Field. He said it would be okay to increase iron to 1 tab BID, but would need to monitor for constipation. He thought her last hemoglobin was reasonable and shouldn't be contributing to her tiredness. He thinks more likely related to still recovering from viral illness. Patient will hold off increasing iron for now and will call back next week with an update.    02/02/23 0509 -- 73 18 -- 93 %   02/02/23 0459 98.8 °F (37.1 °C) 71 17 119/64 93 %   02/02/23 0449 -- 70 14 -- 94 %   02/02/23 0439 -- 72 14 -- 92 %   02/02/23 0429 -- 72 13 -- 93 %   02/02/23 0419 -- 72 13 -- 93 %   02/02/23 0409 -- 71 13 -- 93 %   02/02/23 0359 -- 76 15 116/65 94 %   02/02/23 0349 -- 75 13 -- 95 %   02/02/23 0339 -- 84 13 -- 96 %   02/02/23 0329 -- 74 12 -- 97 %   02/02/23 0319 -- 74 15 -- 94 %   02/02/23 0309 -- 77 16 -- 94 %   02/02/23 0259 -- 73 10 116/68 95 %   02/02/23 0249 -- 70 13 -- 98 %   02/02/23 0239 -- 74 16 -- 94 %   02/02/23 0229 -- 72 14 -- 97 %   02/02/23 0219 -- 71 14 -- 95 %   02/02/23 0209 -- 75 17 -- 94 %   02/02/23 0159 -- 75 13 112/68 95 %   02/02/23 0149 -- 75 12 -- 94 %   02/02/23 0139 -- 76 15 -- 94 %   02/02/23 0129 -- 74 13 -- 95 %   02/02/23 0119 -- 76 15 -- 95 %   02/02/23 0109 -- 81 15 -- 96 %   02/02/23 0059 -- 75 19 117/67 95 %   02/02/23 0049 -- 72 15 -- 95 %   02/02/23 0039 -- 76 15 -- 96 %   02/02/23 0029 -- 75 13 -- 95 %   02/02/23 0019 -- 77 13 -- 97 %   02/02/23 0009 -- 73 14 -- 95 %   02/01/23 2359 -- 87 16 126/67 97 %   02/01/23 2349 -- 86 13 115/81 94 %   02/01/23 2340 98.9 °F (37.2 °C) 81 15 119/69 95 %   02/01/23 2339 -- 81 11 -- --   02/01/23 2329 -- 76 15 119/69 --   02/01/23 2319 -- 78 14 111/76 --   02/01/23 2309 -- 80 13 -- --   02/01/23 2259 -- 82 16 113/71 --   02/01/23 2249 -- 83 12 117/64 --   02/01/23 2239 -- 84 15 -- --   02/01/23 1659 -- 63 14 109/71 98 %       Intake/Output Summary (Last 24 hours) at 2/2/2023 1448  Last data filed at 2/2/2023 0940  Gross per 24 hour   Intake 1746.25 ml   Output 350 ml   Net 1396.25 ml        I had a face to face encounter and independently examined this patient on 2/2/2023, as outlined below:  PHYSICAL EXAM:  General: cooperative, no acute distress    EENT:  EOMI. Anicteric sclerae. MMM  Resp:  CTA bilaterally, no wheezing or rales.   No accessory muscle use  CV:  Regular  rhythm,  No edema  GI:  Soft, Non distended, Non tender. +Bowel sounds  Neurologic:  Alert and awake, normal speech, cannot lift both legs against gravity  Psych:   Not anxious nor agitated  Skin:  No rashes. No jaundice    Reviewed most current lab test results and cultures  YES  Reviewed most current radiology test results   YES  Review and summation of old records today    NO  Reviewed patient's current orders and MAR    YES  PMH/SH reviewed - no change compared to H&P  ________________________________________________________________________  Care Plan discussed with:    Comments   Patient y    Family      RN y    Care Manager     Consultant                        Multidiciplinary team rounds were held today with , nursing, pharmacist and clinical coordinator. Patient's plan of care was discussed; medications were reviewed and discharge planning was addressed. ________________________________________________________________________  Total NON critical care TIME:  36    Minutes    Total CRITICAL CARE TIME Spent:   Minutes non procedure based      Comments   >50% of visit spent in counseling and coordination of care     ________________________________________________________________________  Deena Romeo MD     Procedures: see electronic medical records for all procedures/Xrays and details which were not copied into this note but were reviewed prior to creation of Plan. LABS:  I reviewed today's most current labs and imaging studies.   Pertinent labs include:  Recent Labs     02/02/23 0240 02/01/23 1822   WBC 6.7 11.1   HGB 11.7* 13.0   HCT 33.5* 37.7   * 126*     Recent Labs     02/02/23 0240 02/01/23 1822   * 132*   K 3.9 5.5*    102   CO2 25 25   * 597*   BUN 21* 23*   CREA 1.37* 1.57*   CA 8.1* 8.7   MG 2.0  --    ALB  --  3.8   TBILI  --  0.6   ALT  --  40   INR  --  1.0       Signed: Deena Romeo MD

## 2023-02-03 LAB
ANION GAP SERPL CALC-SCNC: 4 MMOL/L (ref 5–15)
BUN SERPL-MCNC: 18 MG/DL (ref 6–20)
BUN/CREAT SERPL: 16 (ref 12–20)
CALCIUM SERPL-MCNC: 8 MG/DL (ref 8.5–10.1)
CHLORIDE SERPL-SCNC: 107 MMOL/L (ref 97–108)
CK SERPL-CCNC: 293 U/L (ref 39–308)
CO2 SERPL-SCNC: 25 MMOL/L (ref 21–32)
CREAT SERPL-MCNC: 1.13 MG/DL (ref 0.7–1.3)
ERYTHROCYTE [DISTWIDTH] IN BLOOD BY AUTOMATED COUNT: 12.7 % (ref 11.5–14.5)
GLUCOSE BLD STRIP.AUTO-MCNC: 102 MG/DL (ref 65–117)
GLUCOSE BLD STRIP.AUTO-MCNC: 188 MG/DL (ref 65–117)
GLUCOSE BLD STRIP.AUTO-MCNC: 195 MG/DL (ref 65–117)
GLUCOSE BLD STRIP.AUTO-MCNC: 324 MG/DL (ref 65–117)
GLUCOSE SERPL-MCNC: 85 MG/DL (ref 65–100)
HCT VFR BLD AUTO: 29.8 % (ref 36.6–50.3)
HGB BLD-MCNC: 10.3 G/DL (ref 12.1–17)
MCH RBC QN AUTO: 32.1 PG (ref 26–34)
MCHC RBC AUTO-ENTMCNC: 34.6 G/DL (ref 30–36.5)
MCV RBC AUTO: 92.8 FL (ref 80–99)
NRBC # BLD: 0 K/UL (ref 0–0.01)
NRBC BLD-RTO: 0 PER 100 WBC
PLATELET # BLD AUTO: 108 K/UL (ref 150–400)
PMV BLD AUTO: 9.3 FL (ref 8.9–12.9)
POTASSIUM SERPL-SCNC: 3.2 MMOL/L (ref 3.5–5.1)
RBC # BLD AUTO: 3.21 M/UL (ref 4.1–5.7)
SARS-COV-2, COV2: NORMAL
SERVICE CMNT-IMP: ABNORMAL
SERVICE CMNT-IMP: NORMAL
SODIUM SERPL-SCNC: 136 MMOL/L (ref 136–145)
WBC # BLD AUTO: 7.6 K/UL (ref 4.1–11.1)

## 2023-02-03 PROCEDURE — 74011000250 HC RX REV CODE- 250: Performed by: STUDENT IN AN ORGANIZED HEALTH CARE EDUCATION/TRAINING PROGRAM

## 2023-02-03 PROCEDURE — 82550 ASSAY OF CK (CPK): CPT

## 2023-02-03 PROCEDURE — 97161 PT EVAL LOW COMPLEX 20 MIN: CPT

## 2023-02-03 PROCEDURE — 80048 BASIC METABOLIC PNL TOTAL CA: CPT

## 2023-02-03 PROCEDURE — 65270000046 HC RM TELEMETRY

## 2023-02-03 PROCEDURE — 74011250637 HC RX REV CODE- 250/637: Performed by: INTERNAL MEDICINE

## 2023-02-03 PROCEDURE — 97165 OT EVAL LOW COMPLEX 30 MIN: CPT | Performed by: OCCUPATIONAL THERAPIST

## 2023-02-03 PROCEDURE — 99222 1ST HOSP IP/OBS MODERATE 55: CPT | Performed by: ORTHOPAEDIC SURGERY

## 2023-02-03 PROCEDURE — 74011250637 HC RX REV CODE- 250/637: Performed by: PHYSICIAN ASSISTANT

## 2023-02-03 PROCEDURE — 74011250636 HC RX REV CODE- 250/636: Performed by: INTERNAL MEDICINE

## 2023-02-03 PROCEDURE — 97530 THERAPEUTIC ACTIVITIES: CPT

## 2023-02-03 PROCEDURE — 85027 COMPLETE CBC AUTOMATED: CPT

## 2023-02-03 PROCEDURE — 97535 SELF CARE MNGMENT TRAINING: CPT | Performed by: OCCUPATIONAL THERAPIST

## 2023-02-03 PROCEDURE — 36415 COLL VENOUS BLD VENIPUNCTURE: CPT

## 2023-02-03 PROCEDURE — 82962 GLUCOSE BLOOD TEST: CPT

## 2023-02-03 PROCEDURE — 94760 N-INVAS EAR/PLS OXIMETRY 1: CPT

## 2023-02-03 PROCEDURE — U0005 INFEC AGEN DETEC AMPLI PROBE: HCPCS

## 2023-02-03 PROCEDURE — 74011636637 HC RX REV CODE- 636/637: Performed by: INTERNAL MEDICINE

## 2023-02-03 PROCEDURE — 74011250637 HC RX REV CODE- 250/637: Performed by: STUDENT IN AN ORGANIZED HEALTH CARE EDUCATION/TRAINING PROGRAM

## 2023-02-03 RX ORDER — ACETAMINOPHEN 325 MG/1
650 TABLET ORAL EVERY 6 HOURS
Status: DISCONTINUED | OUTPATIENT
Start: 2023-02-03 | End: 2023-02-07 | Stop reason: HOSPADM

## 2023-02-03 RX ORDER — ENOXAPARIN SODIUM 100 MG/ML
40 INJECTION SUBCUTANEOUS DAILY
Status: DISCONTINUED | OUTPATIENT
Start: 2023-02-04 | End: 2023-02-07 | Stop reason: HOSPADM

## 2023-02-03 RX ORDER — OXYCODONE HYDROCHLORIDE 5 MG/1
5-10 TABLET ORAL
Status: DISCONTINUED | OUTPATIENT
Start: 2023-02-03 | End: 2023-02-07 | Stop reason: HOSPADM

## 2023-02-03 RX ADMIN — SODIUM CHLORIDE, PRESERVATIVE FREE 10 ML: 5 INJECTION INTRAVENOUS at 13:21

## 2023-02-03 RX ADMIN — ATORVASTATIN CALCIUM 40 MG: 40 TABLET, FILM COATED ORAL at 21:37

## 2023-02-03 RX ADMIN — Medication 10 UNITS: at 17:49

## 2023-02-03 RX ADMIN — Medication 20 UNITS: at 21:37

## 2023-02-03 RX ADMIN — OXYCODONE 10 MG: 5 TABLET ORAL at 23:48

## 2023-02-03 RX ADMIN — SODIUM CHLORIDE, PRESERVATIVE FREE 10 ML: 5 INJECTION INTRAVENOUS at 05:26

## 2023-02-03 RX ADMIN — OXYCODONE 5 MG: 5 TABLET ORAL at 01:11

## 2023-02-03 RX ADMIN — METFORMIN HYDROCHLORIDE 850 MG: 850 TABLET ORAL at 17:49

## 2023-02-03 RX ADMIN — SODIUM CHLORIDE, PRESERVATIVE FREE 10 ML: 5 INJECTION INTRAVENOUS at 21:38

## 2023-02-03 RX ADMIN — ASPIRIN 81 MG: 81 TABLET, COATED ORAL at 09:03

## 2023-02-03 RX ADMIN — ACETAMINOPHEN 325MG 650 MG: 325 TABLET ORAL at 17:49

## 2023-02-03 RX ADMIN — OXYCODONE 5 MG: 5 TABLET ORAL at 11:28

## 2023-02-03 RX ADMIN — METOPROLOL SUCCINATE 25 MG: 50 TABLET, EXTENDED RELEASE ORAL at 09:03

## 2023-02-03 RX ADMIN — OXYCODONE 5 MG: 5 TABLET ORAL at 07:46

## 2023-02-03 RX ADMIN — OXYCODONE 10 MG: 5 TABLET ORAL at 19:17

## 2023-02-03 RX ADMIN — SODIUM CHLORIDE 100 ML/HR: 9 INJECTION, SOLUTION INTRAVENOUS at 03:52

## 2023-02-03 RX ADMIN — METFORMIN HYDROCHLORIDE 850 MG: 850 TABLET ORAL at 07:46

## 2023-02-03 RX ADMIN — ACETAMINOPHEN 325MG 650 MG: 325 TABLET ORAL at 11:29

## 2023-02-03 RX ADMIN — TRAMADOL HYDROCHLORIDE 50 MG: 50 TABLET ORAL at 13:12

## 2023-02-03 RX ADMIN — OXYCODONE 5 MG: 5 TABLET ORAL at 15:28

## 2023-02-03 RX ADMIN — Medication 20 UNITS: at 09:03

## 2023-02-03 RX ADMIN — SODIUM CHLORIDE 100 ML/HR: 9 INJECTION, SOLUTION INTRAVENOUS at 13:14

## 2023-02-03 RX ADMIN — TRAMADOL HYDROCHLORIDE 50 MG: 50 TABLET ORAL at 07:46

## 2023-02-03 RX ADMIN — ACETAMINOPHEN 325MG 650 MG: 325 TABLET ORAL at 23:48

## 2023-02-03 NOTE — PROGRESS NOTES
Transition of Care Plan:     RUR:6% low risk   Disposition: IPR vs SNF  Referrals sent to [address] [place] [address]. WILL NEED INSURANCE AUTH  If SNF or IPR: Date FOC offered: 2/2/23   Date FOC received:2/2/23  Date authorization started with reference number:  Date authorization received and expires:  Accepting facility: pending referrals   Follow up appointments:N/a  DME needed:N/a   Transportation at [address] or means to access home:  Pt has access       IM Medicare Letter:  Is patient a [place] and connected with the [place]? If yes, was [employer] transfer form completed and VA notified? Caregiver Contact:  Discharge Caregiver contacted prior to discharge? Aquiles Tee (Spouse)   [phone]     2/3/23 2:40pm CM received call from spouse stating [facility] just called her. CM placed call to [facility] at [facility] as [facility] has no acceptance note at this time. [facility] shares she called spouse to see if pt thinks he can tolerate therapy, before accepting him. CM shares with [facility] that therapy notes rec SNF. CM awaiting response from [facility] as [facility] is pt first choice. 2/3/23 2pm CM met with pt and spouse at bedside. Preferences are [address]. Referrals sent via [address]. 2/3/23 1240pm CM spoke with pt about PT rec for SNF. Pt to speak with spouse about going to [facility] and will return call to CM in about 30min. 2/3/23 0945am CM to bedside to speak with pt. Pt on phone. CM awaiting PT/OT eval for recs. CM continues to follow for dispo planning.       [facility] RN,MSN  Care Manager  [phone]

## 2023-02-03 NOTE — PROGRESS NOTES
Orders received, chart reviewed and patient evaluated by physical therapy. Pending progression with skilled acute physical therapy, recommend:  Therapy up to 5 days/week in SNF setting    Recommend with nursing OOB to chair daily with 1 person assist and walker. Thank you for completing as able in order to maintain patient strength, endurance and independence. Full evaluation to follow.

## 2023-02-03 NOTE — PROGRESS NOTES
Problem: Mobility Impaired (Adult and Pediatric)  Goal: *Acute Goals and Plan of Care (Insert Text)  Description: FUNCTIONAL STATUS PRIOR TO ADMISSION: Patient was independent and active without use of DME. Reports a 22 yr old L elbow injury that makes it difficult to push with it. HOME SUPPORT PRIOR TO ADMISSION: The patient lived with wife but did not require assist.    Physical Therapy Goals  Initiated 2/3/2023  1. Patient will move from supine to sit and sit to supine , scoot up and down, and roll side to side in bed with minimal assistance/contact guard assist within 7 day(s). 2.  Patient will transfer from bed to chair and chair to bed with minimal assistance/contact guard assist using the least restrictive device within 7 day(s). 3.  Patient will perform sit to stand with minimal assistance/contact guard assist within 7 day(s). 4.  Patient will ambulate with moderate assistance  for 5 feet(hopping method) with the least restrictive device within 7 day(s). Outcome: Progressing Towards Goal   PHYSICAL THERAPY EVALUATION  Patient: Laura Lozada (29 y.o. male)  Date: 2/3/2023  Primary Diagnosis: Pelvic fracture ([address] [S32. 9XXA]       Precautions:   NWB, Fall (left LE)    ASSESSMENT  Based on the objective data described below, the patient presents with L pelvis/hip pain, NWB LLE, impaired mobility, decreased LLE ROM/strength, poor ability to hop, and decreased activity tolerance. Pt able to move to sitting eob, stand, and pivot on RLE with RW to the beside chair. He would benefit from SNF rehab as he needs at least 6wks of healing time for his pelvis. .Current Level of Function Impacting Discharge (mobility/balance):   Bed Mobility:  Rolling:  (not assessed)  Supine to Sit: Moderate assistance;Assist x1;Additional time  Scooting: Additional time;Assist x1;Minimum assistance; Moderate assistance  Transfers:  Sit to Stand: Assist x2; Moderate assistance; Additional time (bed elevated)  Stand to Sit: Moderate assistance;Assist x2 (assist to move left LE, instructed on hand placement)  Bed to Chair: Adaptive equipment; Additional time;Minimum assistance;Assist x2 (pivot on right heel only with max cues)    Functional Outcome Measure: The patient scored 50/100 on the Barthel Index outcome measure which is indicative of 50% impaired function/adls. Other factors to consider for discharge: NWB LLE     Patient will benefit from skilled therapy intervention to address the above noted impairments. PLAN :  Recommendations and Planned Interventions: bed mobility training, transfer training, gait training, therapeutic exercises, patient and family training/education, and therapeutic activities      Frequency/Duration: Patient will be followed by physical therapy:  4 times a week to address goals. Recommendation for discharge: (in order for the patient to meet his/her long term goals)  Therapy up to 5 days/week in SNF setting    This discharge recommendation:  A follow-up discussion with the attending provider and/or case management is planned    IF patient discharges home will need the following DME: to be determined (TBD)         SUBJECTIVE:   Patient stated I don't know if I can do this.     OBJECTIVE DATA SUMMARY:   HISTORY:    Past Medical History:   Diagnosis Date    CAD (coronary artery disease) April 2014    NSTEMI, PCI/NATE RCA    Hypercholesterolemia     Hypertension      Past Surgical History:   Procedure Laterality Date    HX CHOLECYSTECTOMY  07/17/2019    Laparoscopic cholecystectomy with intraoperative cholangiogram    HX ORTHOPAEDIC      left shoulder, torn tendon    HX ORTHOPAEDIC      right knee X 4    [place] CARDIAC SURG PROCEDURE UNLIST      Stent RCA 4/2014    [place] COLONOSCOPY FLX DX W/COLLJ SPEC WHEN PFRMD  12/19/2007    Dr Simin Dunn 1 polyp repeat 12/2012       Personal factors and/or comorbidities impacting plan of care: pain    Home Situation  Home Environment: Private residence  # Steps to Enter: 3  Rails to Enter: Yes  Hand Rails : Bilateral  One/Two Story Residence: One story  Living Alone: No  Support Systems: Spouse/Significant Other  Patient Expects to be Discharged to[de-identified] Rehab hospital/unit acute  Current DME Used/Available at Home: Crutches  Tub or Shower Type: Shower    EXAMINATION/PRESENTATION/DECISION MAKING:   Critical Behavior:  Neurologic State: Alert  Orientation Level: Oriented X4  Cognition: Follows commands, Appropriate safety awareness, Appropriate for age attention/concentration, Appropriate decision making  Safety/Judgement: Awareness of environment, Fall prevention, Good awareness of safety precautions, Insight into deficits  Hearing: Auditory  Auditory Impairment: None  Range Of Motion:  AROM: Generally decreased, functional      Strength:    Strength: Generally decreased, functional (reports decreased ability to push with left UE versus right)         Tone & Sensation:   Tone: Normal      Sensation: Intact       Coordination:  Coordination: Within functional limits       Functional Mobility:  Bed Mobility:  Rolling:  (not assessed)  Supine to Sit: Moderate assistance;Assist x1;Additional time     Scooting: Additional time;Assist x1;Minimum assistance; Moderate assistance  Transfers:  Sit to Stand: Assist x2; Moderate assistance; Additional time (bed elevated)  Stand to Sit: Moderate assistance;Assist x2 (assist to move left LE, instructed on hand placement)        Bed to Chair: Adaptive equipment; Additional time;Minimum assistance;Assist x2 (pivot on right heel only with max cues)              Balance:   Sitting: Impaired  Sitting - Static: Fair (occasional) (due to offloading for pain)  Sitting - Dynamic: Fair (occasional)  Standing: Impaired; Without support  Standing - Static: Constant support; Fair  Standing - Dynamic : Not tested  Ambulation/Gait Training:        Gait Description (WDL):  (pt unable due to LLE NWB and unable to hop)           Functional Measure:  Barthel Index:    Bathin  Bladder: 10  Bowels: 10  Groomin  Dressin  Feeding: 10  Mobility: 0  Stairs: 0  Toilet Use: 5  Transfer (Bed to Chair and Back): 5  Total: 50/100          Pain Ratin-8/10 with mobility    Activity Tolerance:   Fair, SpO2 stable on RA, and limited by pain    After treatment patient left in no apparent distress:   Sitting in chair and Call bell within reach    COMMUNICATION/EDUCATION:   The patients plan of care was discussed with: Occupational therapist and Registered nurse. Fall prevention education was provided and the patient/caregiver indicated understanding., Patient/family have participated as able in goal setting and plan of care. , and Patient/family agree to work toward stated goals and plan of care.     Thank you for this referral.  Alex Jaffe, PT   Time Calculation: 37 mins

## 2023-02-03 NOTE — PROGRESS NOTES
Transition of Care Plan:    RUR:9% low risk   Disposition: IPR vs SNF  Referrals sent to [address] [place] [address]. WILL NEED INSURANCE AUTH  If SNF or IPR: Date FOC offered: 2/2/23   Date FOC received:2/2/23  Date authorization started with reference number:  Date authorization received and expires:  Accepting facility: pending referrals   Follow up appointments:N/a  DME needed:N/a   Transportation at [address] or means to access home:  Pt has access       IM Medicare Letter:  Is patient a [place] and connected with the [place]? If yes, was [employer] transfer form completed and VA notified? Caregiver Contact:  Discharge Caregiver contacted prior to discharge? Oneil Nuno (Spouse)   [phone]   Care Conference needed?:  no                 Care Management Assessment    CM spoke with Pt and and wife to complete initial assessment. Pt was alert and oriented x4 during initial assessment. Pt is reported to live with his wife in a private residence. CM discussed recommendation for potential rehab at discharge. Pt was receptive requesting referral to be sent to [address] [place] [address]. CM requested a back-up choice for SNF. PT requested that CM contact his wife for SNF choices. CM spoke with Pt's wife, Pt's wife requested CM to send referral to [facility]. CM requested additional choices. CM offered to send SNF listing to Pt's wife. Wife was receptive: listing sent to: [email or web address]. Reason for Admission:     Pelvic fracture ([facility] [address] [S32. 9XXA]      Assessment:   []In person with pt   [x]Via p/c with pt   []With family member in person. Who/Relation:     [x]With family member via p/c. Who/Relation:  spouse Ernst  [phone]  []Chart Review    RUR: 9% low   Risk Level: [x]Low []Moderate []High  Value-based purchasing: [] Yes [] No    Advance Directive: Full Code. [x] No AD on file. [] AD on file. [] Current AD not on file. Copy requested.     [] Requests AD, and referral submitted to [facility]. Healthcare Decision Maker:   Primary Decision Maker: Den Go Spouse - [phone]        Assessment:    Age: 79 y.o. Sex: [x] Male []Female     Residency: [x]Private residence []Apartment []Assisted Living []LTC []Other:   Exterior Steps:  Interior Steps:    Lives With: [x]With spouse []Other family members []Underage children []Alone []Care provider []Other:    Prior functioning:  [x]Independent with ADLs and iADLS []Dependent with ADLs and iADLs []Partial dependence, Specify:     Cognition: [x]Intact []Some spheres some of the time. []Some spheres all of the time. []All spheres all of the time.      Prior transportation method: [x]Self []Spouse []Other family members []Medicaid transport []Other:     Prior DME required:  [x]None []RW []Cane []Crutches []Bedside commode []CPAP []Home O2 (Liter/Provider: ) []Nebulizer   []Shower Chair []Wheelchair []Hospital Bed []Marisela []Stair lift []Rollator []Other:    DME available: [x]None []RW []Cane []Crutches []Bedside commode []CPAP []Home O2 (Liter/Provider: ) []Nebulizer   []Shower Chair []Wheelchair []Hospital Bed []Marisela []Stair lift []Rollator []Other:    Rehab history: [x]None []Outpatient PT []Home Health (Provider/Date: ) []SNF (Provider/Date: ) []IPR (Provider/Date: ) []LTC (Provider/Date: ) []Hospice (Provider/Date: )  []Other:     Covid vaccination status:   [] Yes, Type:  [] Booster 1 [] Booster 2  [] No  [] N/A / Not asked    Insurer:   Insurance Information                  [employer] [address] [facility] Phone: [phone]    Subscriber: Aranza Smith Subscriber#: [id number]    Group#: [id number] Precert#: --            PCP: Camelia Swartz   Address: [address] [place] [address]   Phone number: [phone]   Current patient: [x]Yes []No   Approximate date of last visit: about a year ago    Access to virtual PCP visits: [x]Yes []No     Financial concerns/barriers: []Yes, explain: [x]No []Unknown/Not discussed    Pharmacy: [address] Mail delivery    DC Transport: likely BLS      Discharge Concerns: []Yes [x]No []Unknown   Describe:    Comments:           Transition of care plan:    []Unable to determine at this time. Awaiting clinical progress, and disposition recommendations. [] Home with family assistance as needed, and outpatient follow-up. [] Home with Outpatient PT and outpatient follow-up   Pt aware of OP appt? []Yes, Provider:   []Not scheduled   Transport provider:     [] Home with Home Health   - Provider:     [x]SNF/IPR   -[x]Preferences given:  IPR vs. SNF: [facility] and [facility]  referrals sent. [x]Listing provided and preferences requested: CM sent Pt's wife a SNF list to provide additional choices.    -Status: [x]Pending []Accepted:    -Auth required: [x]Yes []No    -Auth initiated date:   -3 midnight stay required: []Yes []No  Date satisfied:     [] LTC:     [] Home with Hospice   -Provider:     [] [facility] information provided. [] Other:       Care Management Interventions  PCP Verified by CM:  Yes  Mode of Transport at Discharge: BLS  Transition of Care Consult (CM Consult): Discharge Planning  Physical Therapy Consult: Yes  Occupational Therapy Consult: Yes  Support Systems: Spouse/Significant Other  Confirm Follow Up Transport: Family  The Patient and/or Patient Representative was Provided with a Choice of Provider and Agrees with the Discharge Plan?: Yes  Freedom of Choice List was Provided with Basic Dialogue that Supports the Patient's Individualized Plan of Care/Goals, Treatment Preferences and Shares the Quality Data Associated with the Providers?: Yes  Discharge Location  Patient Expects to be Discharged to[de-identified] Rehab hospital/unit [facility], [address] [facility]

## 2023-02-03 NOTE — PROGRESS NOTES
Problem: Self Care Deficits Care Plan (Adult)  Goal: *Acute Goals and Plan of Care (Insert Text)  Description: FUNCTIONAL STATUS PRIOR TO ADMISSION: Patient was independent and active without use of DME.     HOME SUPPORT: The patient lived with his wife but did not require assist.    Occupational Therapy Goals  Initiated 2/3/2023  1. Patient will perform grooming and upper body dressing with supervision/set-up seated edge of bed within 7 day(s). 2.  Patient will perform static standing maintaining NWB left LE >or = 1 minute with minimal assistance/contact guard assist and UE support on rolling walker within 7 day(s). 3.  Patient will perform toilet transfers to bedside commode with minimal assistance/contact guard assist using rolling walker and maintain NWB left LE within 7 day(s). 4.  Patient will perform chair push up x's 3 reps with CGA and min cues within 7 day(s). Outcome: Not Met     OCCUPATIONAL THERAPY EVALUATION  Patient: Laura Lozada (8 y.o. male)  Date: 2/3/2023  Primary Diagnosis: Pelvic fracture ([address] [S32. 9XXA]       Precautions:   NWB, Fall (left LE)    ASSESSMENT  Based on the objective data described below, the patient presents with decreased ability to perform all bed mobility, sit to stand and stand pivot transfer due to NWB left LE and pain with all movement. Patient reports hx of decreased strength left UE and increased ability to pull versus push, demonstrated ability to use to push to edge of bed and to stand. At this time he would benefit from rehab to increase ability to stand pivot transfer and perform basic ADLs. Current Level of Function Impacting Discharge (ADLs/self-care): see below    Functional Outcome Measure: The patient scored 50/100 on the Barthel Index outcome measure which is indicative of partial dependence.       Other factors to consider for discharge: 3 steps into house, reported thinking his sugar low on arrival as he was sweaty, RN gave OJ, however later checked sugar and elevated. Patient will benefit from skilled therapy intervention to address the above noted impairments. PLAN :  Recommendations and Planned Interventions: self care training, functional mobility training, therapeutic exercise, balance training, therapeutic activities, endurance activities, patient education, home safety training, and family training/education    Frequency/Duration: Patient will be followed by occupational therapy 4 times a week to address goals. Recommendation for discharge: (in order for the patient to meet his/her long term goals)  Rehab    This discharge recommendation:  Has not yet been discussed the attending provider and/or case management    IF patient discharges home will need the following DME: to be determined in rehab       SUBJECTIVE:   Patient stated I don't think I can do this.  re: sitting up    OBJECTIVE DATA SUMMARY:   HISTORY:   Past Medical History:   Diagnosis Date    CAD (coronary artery disease) April 2014    NSTEMI, PCI/NATE RCA    Hypercholesterolemia     Hypertension      Past Surgical History:   Procedure Laterality Date    HX CHOLECYSTECTOMY  07/17/2019    Laparoscopic cholecystectomy with intraoperative cholangiogram    HX ORTHOPAEDIC      left shoulder, torn tendon    HX ORTHOPAEDIC      right knee X 4    [place] CARDIAC SURG PROCEDURE UNLIST      Stent RCA 4/2014    [place] COLONOSCOPY FLX DX W/COLLJ SPEC WHEN PFRMD  12/19/2007    Dr Nan Bernard 1 polyp repeat 12/2012       Expanded or extensive additional review of patient history:     Home Situation  Home Environment: Private residence  # Steps to Enter: 3  Rails to Enter: Yes  Hand Rails : Bilateral  One/Two Story Residence: One story  Living Alone: No  Support Systems: Spouse/Significant Other  Patient Expects to be Discharged to[de-identified] Rehab hospital/unit acute  Current DME Used/Available at Home: Crutches  Tub or Shower Type: Shower    Hand dominance: right    EXAMINATION OF PERFORMANCE DEFICITS:  Cognitive/Behavioral Status:  Neurologic State: Alert  Orientation Level: Oriented X4  Cognition: Follows commands; Appropriate safety awareness; Appropriate for age attention/concentration; Appropriate decision making  Perception: Appears intact  Perseveration: No perseveration noted  Safety/Judgement: Awareness of environment; Fall prevention;Good awareness of safety precautions; Insight into deficits    Skin: intact, noted hay on back and in bed and washed off    Edema: none noted    Hearing: Auditory  Auditory Impairment: None    Vision/Perceptual:                                     Range of Motion:  AROM: Generally decreased, functional                         Strength:  Strength: Generally decreased, functional (reports decreased ability to push with left UE versus right)                Coordination:  Coordination: Within functional limits  Fine Motor Skills-Upper: Left Intact; Right Intact    Gross Motor Skills-Upper: Left Intact; Right Intact    Tone & Sensation:  Tone: Normal  Sensation: Intact                      Balance:  Sitting: Impaired  Sitting - Static: Fair (occasional) (due to offloading for pain)  Sitting - Dynamic: Fair (occasional)  Standing: Impaired; Without support  Standing - Static: Constant support; Fair  Standing - Dynamic : Not tested    Functional Mobility and Transfers for ADLs:  Bed Mobility:  Rolling:  (not assessed)  Supine to Sit: Moderate assistance;Assist x1;Additional time  Scooting: Additional time;Assist x1;Minimum assistance; Moderate assistance    Transfers:  Sit to Stand: Assist x2; Moderate assistance; Additional time (bed elevated)  Stand to Sit: Moderate assistance;Assist x2 (assist to move left LE, instructed on hand placement)  Bed to Chair: Adaptive equipment; Additional time;Minimum assistance;Assist x2 (pivot on right heel only with max cues)  Bathroom Mobility: Dependent/total assistance  Toilet Transfer : Assist x2 (pivot on right heel with rolling walker)    ADL Assessment:  Feeding: Independent    Oral Facial Hygiene/Grooming: Setup         Type of Bath: Chlorhexidine (CHG)    Upper Body Dressing: Setup    Lower Body Dressing: Total assistance    Toileting: Moderate assistance (using urinal)                  ADL Intervention and task modifications:     Educated on role of OT, benefit of movement, NWB status left LE and positioning in bed and seated, instructed on positioning for sit to and from stand, hand placement for transfers     Educated on optimal height of chair and benefit of hips above knees. Provided folded blankets to chair to facilitate hip > knees                    Cognitive Retraining  Safety/Judgement: Awareness of environment; Fall prevention;Good awareness of safety precautions; Insight into deficits      Therapeutic Exercise: Instructed to perform ankle pumps throughout day   Functional Measure:    Barthel Index:  Bathin  Bladder: 10  Bowels: 10  Groomin  Dressin  Feeding: 10  Mobility: 0  Stairs: 0  Toilet Use: 5  Transfer (Bed to Chair and Back): 5  Total: 50/100      The Barthel ADL Index: Guidelines  1. The index should be used as a record of what a patient does, not as a record of what a patient could do. 2. The main aim is to establish degree of independence from any help, physical or verbal, however minor and for whatever reason. 3. The need for supervision renders the patient not independent. 4. A patient's performance should be established using the best available evidence. Asking the patient, friends/relatives and nurses are the usual sources, but direct observation and common sense are also important. However direct testing is not needed. 5. Usually the patient's performance over the preceding 24-48 hours is important, but occasionally longer periods will be relevant. 6. Middle categories imply that the patient supplies over 50 per cent of the effort. 7. Use of aids to be independent is allowed.     Score Interpretation (from Sinoff 1997)    Independent   60-79 Minimally independent   40-59 Partially dependent   20-39 Very dependent   <20 Totally dependent     -Lei Thomas., Barthel, D.W. (1965). Functional evaluation: the Barthel Index. [address] ([address] (1997). The Barthel activities of daily living index: self-reporting versus actual performance in the old (> or = 75 years). Journal of [address] [place] [address] 45(7), 14 [facility], JSHAYYF, Valerie Latif., Janusz Guevara. (1999). Measuring the change in disability after inpatient rehabilitation; comparison of the responsiveness of the Barthel Index and Functional [place] Measure. Journal of Neurology, Neurosurgery, and Psychiatry, 66(4), 085-972. SHAUNNA Weiner, MIS Giordano, & Lorraine Sorto M.A. (2004) Assessment of post-stroke quality of life in cost-effectiveness studies: The usefulness of the Barthel Index and the EuroQoL-5D. Quality of Life Research, 15, 343-35         Occupational Therapy Evaluation Charge Determination   History Examination Decision-Making   LOW Complexity : Brief history review  MEDIUM Complexity : 3-5 performance deficits relating to physical, cognitive , or psychosocial skils that result in activity limitations and / or participation restrictions MEDIUM Complexity : Patient may present with comorbidities that affect occupational performnce.  Miniml to moderate modification of tasks or assistance (eg, physical or verbal ) with assesment(s) is necessary to enable patient to complete evaluation       Based on the above components, the patient evaluation is determined to be of the following complexity level: LOW   Pain Rating:  Not rated, medicated prior, left anterior hip and thight    Activity Tolerance:   Fair, requires frequent rest breaks, and diaphoretic    After treatment patient left in no apparent distress:    Sitting in chair and Call bell within reach    COMMUNICATION/EDUCATION:   The patients plan of care was discussed with: Physical therapist and Registered nurse. Home safety education was provided and the patient/caregiver indicated understanding. and Patient/family have participated as able in goal setting and plan of care. This patients plan of care is appropriate for delegation to [facility].     Thank you for this referral.  Richard Marie OTR/L  Time Calculation: 35 mins

## 2023-02-03 NOTE — PROGRESS NOTES
Hospitalist Progress Note    NAME: Gerson Mcdowell   :  1955   MRN:  [id number]       Assessment / Plan:    Left pubic rami fractures secondary to trauma POA  - Pile of hale jameel fell over pushed him over into a wooden pallet  - No head trauma, loss of consciousness, neck pain or back pain, chest pain, abdominal pain, diarrhea  - Severe pain in the left hip and thigh  - CT pelvis IMPRESSION  1. Nondisplaced fractures of the left pubic rami extend intra-articularly into  the anterior column of the left acetabulum. No articular surface depression or offset. Small left lipohemarthrosis. 2. No femur fracture.  -Emergency room cleared patient from a trauma standpoint, admitted to medicine. -ER MD spoke with Dr. Selina Mcgrath from orthopedics, reviewed scans   Nonoperative management  - Pain control with oxycodone.    - PT OT consulted. - Will need rehab placement    CAD POA  Essential hypertension POA  Hyperlipidemia  -Continue aspirin, Lipitor, metoprolol    diabetes mellitus type 2 uncontrolled on chronic insulin  - Blood sugars are uncontrolled  - Increase Lantus to 20 units twice daily. Continue insulin lispro sliding scale. - Continue metformin.  - Continue diabetic diet  - , 188, 102    Borderline hyperkalemia  -Potassium is now normal    CKD stage III  -Creatinine is close to baseline of around 1.3    Elevated CPK   -CK is 495 --> 293. Start IV fluids.       Code Status: Full     DVT Prophylaxis: SCDs due to small lipohemarthrosis  GI Prophylaxis: Not indicated    Diet: Cardiac/diabetic    Subjective:     Chief Complaint / Reason for Physician Visit  \" My left hip is still painful  No CP or abdominal pain or headache or neck/back pain   No SOB, N/V, diarrhea  Does not report any weakness in the arms    Review of Systems:  Symptom Y/N Comments  Symptom Y/N Comments   Fever/Chills n   Chest Pain n    Poor Appetite    Edema     Cough n   Abdominal Pain n    Sputum    Joint Pain     SOB/HAMILTON n Pruritis/Rash     Nausea/vomit n   Tolerating PT/OT     Diarrhea n   Tolerating Diet y    Constipation    Other       Could NOT obtain due to:      Objective:     VITALS:   Last 24hrs VS reviewed since prior progress note. Most recent are:  Patient Vitals for the past 24 hrs:   Temp Pulse Resp BP SpO2   02/03/23 1031 -- -- -- 124/67 --   02/03/23 0743 98.1 °F (36.7 °C) 79 16 122/71 91 %   02/03/23 0206 98.3 °F (36.8 °C) 78 18 119/62 96 %   02/02/23 1916 98.4 °F (36.9 °C) 75 19 123/68 95 %         Intake/Output Summary (Last 24 hours) at 2/3/2023 1254  Last data filed at 2/3/2023 1776  Gross per 24 hour   Intake --   Output 780 ml   Net -780 ml          I had a face to face encounter and independently examined this patient on 2/3/2023, as outlined below:  PHYSICAL EXAM:  General: cooperative, no acute distress    EENT:  Anicteric sclerae. MMM  Resp:  CTA bilaterally, no wheezing or rales. No accessory muscle use  CV:  Regular  rhythm,  No edema  GI:  Soft, Non distended, Non tender. +Bowel sounds  Neurologic:  Alert and awake, normal speech, cannot lift both legs against gravity  Psych:   Not anxious nor agitated  Skin:  No rashes. No jaundice    Reviewed most current lab test results and cultures  YES  Reviewed most current radiology test results   YES  Review and summation of old records today    NO  Reviewed patient's current orders and MAR    YES  PMH/ reviewed - no change compared to H&P  ________________________________________________________________________  Care Plan discussed with:    Comments   Patient y    Family      RN y    Care Manager     Consultant                        Multidiciplinary team rounds were held today with , nursing, pharmacist and clinical coordinator. Patient's plan of care was discussed; medications were reviewed and discharge planning was addressed.      ________________________________________________________________________  Total NON critical care TIME:  36 Minutes    Total CRITICAL CARE TIME Spent:   Minutes non procedure based      Comments   >50% of visit spent in counseling and coordination of care     ________________________________________________________________________  Fuad Desai MD     Procedures: see electronic medical records for all procedures/Xrays and details which were not copied into this note but were reviewed prior to creation of Plan. LABS:  I reviewed today's most current labs and imaging studies.   Pertinent labs include:  Recent Labs     02/03/23 0224 02/02/23 0240 02/01/23  1822   WBC 7.6 6.7 11.1   HGB 10.3* 11.7* 13.0   HCT 29.8* 33.5* 37.7   * 111* 126*       Recent Labs     02/03/23 0224 02/02/23 0240 02/01/23  1822    133* 132*   K 3.2* 3.9 5.5*    103 102   CO2 25 25 25   GLU 85 345* 597*   BUN 18 21* 23*   CREA 1.13 1.37* 1.57*   CA 8.0* 8.1* 8.7   MG  --  2.0  --    ALB  --   --  3.8   TBILI  --   --  0.6   ALT  --   --  40   INR  --   --  1.0         Signed: Fuad Desai MD

## 2023-02-03 NOTE — CONSULTS
Date of Consult: 2/3/2023  CC: Left hip pain    HPI:      This is a 79 y.o. male status post fall. The patient had the immediate onset of pain as well as inability to ambulate. Complains of left hip and groin pain. Does not complain of any open wounds or head trauma. No loss of consciousness. No other musculoskeletal complaints. The patient denies any other history of fevers, chills, nausea, vomiting, no other numbness, weakness, or tingling.     PMH:  Past Medical History:   Diagnosis Date    CAD (coronary artery disease) April 2014    NSTEMI, PCI/NATE RCA    Hypercholesterolemia     Hypertension        PSxHx:  Past Surgical History:   Procedure Laterality Date    HX CHOLECYSTECTOMY  07/17/2019    Laparoscopic cholecystectomy with intraoperative cholangiogram    HX ORTHOPAEDIC      left shoulder, torn tendon    HX ORTHOPAEDIC      right knee X 4    [place] CARDIAC SURG PROCEDURE UNLIST      Stent RCA 4/2014    [place] COLONOSCOPY FLX DX W/COLLJ SPEC WHEN PFRMD  12/19/2007    Dr Emanuel Casanova 1 polyp repeat 12/2012       Meds:    Current Facility-Administered Medications:     acetaminophen (TYLENOL) tablet 650 mg, 650 mg, Oral, Q6H, Conrado Butcher MD    oxyCODONE IR (ROXICODONE) tablet 5-10 mg, 5-10 mg, Oral, Q4H PRN, French Delgadillo MD, 5 mg at 02/03/23 1528    [START ON 2/4/2023] enoxaparin (LOVENOX) injection 40 mg, 40 mg, SubCUTAneous, DAILY, Conrado Butcher MD    metFORMIN (GLUCOPHAGE) tablet 850 mg, 850 mg, Oral, BID WITH MEALS, Amber Padilla MD, 850 mg at 02/03/23 0746    insulin lispro (HUMALOG) injection, , SubCUTAneous, AC&HS, Amber Padilla MD, 7 Units at 02/02/23 2025    glucose chewable tablet 16 g, 4 Tablet, Oral, PRN, Agustin Omalley MD    insulin glargine (LANTUS) injection 20 Units, 20 Units, SubCUTAneous, BID, Agustin Omalley MD, 20 Units at 02/03/23 0903    melatonin tablet 3 mg, 3 mg, Oral, QHS PRN, Luke Degroot MD    glucagon (GLUCAGEN) injection 1 mg, 1 mg, IntraMUSCular, PRN, Katt Albarranel, DO    dextrose 10% infusion 0-250 mL, 0-250 mL, IntraVENous, PRN, Katt Barthel, DO    aspirin delayed-release tablet 81 mg, 81 mg, Oral, DAILY, Katt Barthel, DO, 81 mg at 23 4863    atorvastatin (LIPITOR) tablet 40 mg, 40 mg, Oral, QHS, Katt Barthel, DO, 40 mg at 23 2102    metoprolol succinate (TOPROL-XL) XL tablet 25 mg, 25 mg, Oral, DAILY, Katt Barthel, DO, 25 mg at 23 6662    sodium chloride (NS) flush 5-40 mL, 5-40 mL, IntraVENous, Q8H, Katt Barthel, DO, 10 mL at 23 1321    sodium chloride (NS) flush 5-40 mL, 5-40 mL, IntraVENous, PRN, Katt Albarranel, DO    polyethylene glycol (MIRALAX) packet 17 g, 17 g, Oral, DAILY PRN, Katt Albarranel, DO    ondansetron (ZOFRAN ODT) tablet 4 mg, 4 mg, Oral, Q8H PRN **OR** ondansetron (ZOFRAN) injection 4 mg, 4 mg, IntraVENous, Q6H PRN, Marvella Barthel, DO    All:  No Known Allergies    Social Hx:  Social History     Socioeconomic History    Marital status:    Tobacco Use    Smoking status: Former     Packs/day: 0.25     Years: 35.00     Pack years: 8.75     Types: Cigarettes     Quit date: 2014     Years since quittin.2    Smokeless tobacco: Never    Tobacco comments:     Never smoked over a pack per day   Vaping Use    Vaping Use: Never used   Substance and Sexual Activity    Alcohol use: No     Alcohol/week: 0.0 standard drinks    Drug use: No       Family Hx:  Family History   Problem Relation Age of Onset    Heart Disease Mother         CAD    Hypertension Mother     Elevated Lipids Mother     Cancer Father         lung    Mental Retardation Brother     Seizures Brother     Diabetes Brother     Hypertension Brother     Elevated Lipids Brother        Review of Systems:       General: Denies headache, lethargy, fever, weight loss  Ears/Nose/Throat: Denies ear discharge, drainage, nosebleeds, hoarse voice, dental problems  Cardiovascular: Denies chest pain, shortness of breath  Lungs: Denies chest pain, breathing problems, wheezing, pneumonia  Stomach: Denies stomach pain, heartburn, constipation, irritable bowel  Skin: Denies rash, sores, open wounds  Musculoskeletal: left hip and groin pain  Genitourinary: Denies dysuria, hematuria, polyuria  Gastrointestinal: Denies constipation, obstipation, diarrhea  Neurological: Denies changes in sight, smell, hearing, taste, seizures. Denies loss of consciousness. Psychiatric: Denies depression, sleep pattern changes, anxiety, change in personality  Endocrine: Denies mood swings, heat or cold intolerance  Hematologic/Lymphatic: Denies anemia, purpura, petechia  Allergic/Immunologic: Denies swelling of throat, pain or swelling at lymph nodes      Physical Examination:    Visit Vitals  /72   Pulse 74   Temp 98.2 °F (36.8 °C)   Resp 18   Ht 6' (1.829 m)   Wt 190 lb (86.2 kg)   SpO2 96%   BMI 25.77 kg/m²        General: AOX3, no apparent distress  Psychiatric: mood and affect appropriate  Lungs: breathing is symmetric and unlabored bilaterally  Heart: regular rate and rhythm  Abdomen: no guarding  Head: normocephalic, atraumatic  Skin: No significant abnormalities, good turgor  Sensation intact to light touch: C5-T1 dermatomes  Muscular exam: 5/5 strength in all major muscle groups unless noted in specialty exam.    Extremities      Right upper extremity: Full active and passive range of motion without pain, deformity, no open wound, strength 5/5 in all major muscle groups. Left upper extremity:  Full active and passive range of motion without pain, deformity, no open wound, strength 5/5 in all major muscle groups. Right lower extremity: Full active and passive range of motion without pain, deformity, no open wound, strength 5/5 in all major muscle groups. Left lower extremity:  Focused exam of the left hip demonstrates pain with circumduction of the hip which refers to the groin.   This does reproduce the symptoms of the chief complaint. Leg is appropriate length. Sensation is intact to light touch in the L1-S1 distributions bilaterally. Strength is 5 out of 5 strength at tibialis anterior, EHL and, FHL. Capillary refill is less than 2 seconds in the toes. Head is normocephalic and atraumatic, heart has a regular rate and rhythm, and breathing is symmetric and unlabored bilaterally. Imaging:  X-rays are available of the pelvis and left hip which demonstrate a nondisplaced pubic ramus and acetabular fracture  Diffuse osteopenia is noted. No other osseous abnormalities are noted. Assessment:    Acetabular fracture, nondisplaced. Plan:    We did discuss the treatment options for this left acetabular fracture. As it is nondisplaced and in a nonweightbearing portion of the joint, nonoperative management is choice. We did discuss the risks and benefits of treatment of nonoperative management of fractures. This does include, but are not limited to: stiffness, later displacement of fracture, potential for prolonged immobilization and longer recovery times, possible stiffness of associated joints, skin and deep tissue irritation or breakdown. As with all fractures good clinical outcome is dependent on bone healing, and mobilization of the soft tissues in a regimented and reasonable manner.         Toe touch weight bearing  DVT prophylaxis  5 view pelvis in one week  Time in consult: 60 minutes

## 2023-02-03 NOTE — PROGRESS NOTES
Pt reported that he feels as if he was given too much Insulin this morning and that his glucose levels are low, says he is sweaty at start of PT/OT session. Only Insulin given this AM by this nurse was 20 units of Lantus. RN gave pt some orange juice. Sugar at the time was 188. BP WNL. Will hold noon Insulin.

## 2023-02-04 LAB
ALBUMIN SERPL-MCNC: 2.9 G/DL (ref 3.5–5)
ALBUMIN/GLOB SERPL: 0.9 (ref 1.1–2.2)
ALP SERPL-CCNC: 62 U/L (ref 45–117)
ALT SERPL-CCNC: 26 U/L (ref 12–78)
ANION GAP SERPL CALC-SCNC: 5 MMOL/L (ref 5–15)
AST SERPL-CCNC: 27 U/L (ref 15–37)
BASOPHILS # BLD: 0 K/UL (ref 0–0.1)
BASOPHILS NFR BLD: 0 % (ref 0–1)
BILIRUB SERPL-MCNC: 0.7 MG/DL (ref 0.2–1)
BUN SERPL-MCNC: 18 MG/DL (ref 6–20)
BUN/CREAT SERPL: 15 (ref 12–20)
CALCIUM SERPL-MCNC: 8.3 MG/DL (ref 8.5–10.1)
CHLORIDE SERPL-SCNC: 106 MMOL/L (ref 97–108)
CO2 SERPL-SCNC: 25 MMOL/L (ref 21–32)
CREAT SERPL-MCNC: 1.18 MG/DL (ref 0.7–1.3)
DIFFERENTIAL METHOD BLD: ABNORMAL
EOSINOPHIL # BLD: 0.2 K/UL (ref 0–0.4)
EOSINOPHIL NFR BLD: 3 % (ref 0–7)
ERYTHROCYTE [DISTWIDTH] IN BLOOD BY AUTOMATED COUNT: 12.8 % (ref 11.5–14.5)
GLOBULIN SER CALC-MCNC: 3.1 G/DL (ref 2–4)
GLUCOSE BLD STRIP.AUTO-MCNC: 119 MG/DL (ref 65–117)
GLUCOSE BLD STRIP.AUTO-MCNC: 175 MG/DL (ref 65–117)
GLUCOSE BLD STRIP.AUTO-MCNC: 177 MG/DL (ref 65–117)
GLUCOSE BLD STRIP.AUTO-MCNC: 193 MG/DL (ref 65–117)
GLUCOSE SERPL-MCNC: 93 MG/DL (ref 65–100)
HCT VFR BLD AUTO: 28.1 % (ref 36.6–50.3)
HGB BLD-MCNC: 9.6 G/DL (ref 12.1–17)
IMM GRANULOCYTES # BLD AUTO: 0 K/UL (ref 0–0.04)
IMM GRANULOCYTES NFR BLD AUTO: 1 % (ref 0–0.5)
LYMPHOCYTES # BLD: 1.3 K/UL (ref 0.8–3.5)
LYMPHOCYTES NFR BLD: 20 % (ref 12–49)
MCH RBC QN AUTO: 31.9 PG (ref 26–34)
MCHC RBC AUTO-ENTMCNC: 34.2 G/DL (ref 30–36.5)
MCV RBC AUTO: 93.4 FL (ref 80–99)
MONOCYTES # BLD: 0.4 K/UL (ref 0–1)
MONOCYTES NFR BLD: 7 % (ref 5–13)
NEUTS SEG # BLD: 4.6 K/UL (ref 1.8–8)
NEUTS SEG NFR BLD: 69 % (ref 32–75)
NRBC # BLD: 0 K/UL (ref 0–0.01)
NRBC BLD-RTO: 0 PER 100 WBC
PLATELET # BLD AUTO: 107 K/UL (ref 150–400)
PMV BLD AUTO: 9.2 FL (ref 8.9–12.9)
POTASSIUM SERPL-SCNC: 3.7 MMOL/L (ref 3.5–5.1)
PROT SERPL-MCNC: 6 G/DL (ref 6.4–8.2)
RBC # BLD AUTO: 3.01 M/UL (ref 4.1–5.7)
SARS-COV-2 RNA RESP QL NAA+PROBE: NOT DETECTED
SERVICE CMNT-IMP: ABNORMAL
SODIUM SERPL-SCNC: 136 MMOL/L (ref 136–145)
SOURCE, COVRS: NORMAL
WBC # BLD AUTO: 6.6 K/UL (ref 4.1–11.1)

## 2023-02-04 PROCEDURE — 74011250636 HC RX REV CODE- 250/636: Performed by: INTERNAL MEDICINE

## 2023-02-04 PROCEDURE — 65270000046 HC RM TELEMETRY

## 2023-02-04 PROCEDURE — 82962 GLUCOSE BLOOD TEST: CPT

## 2023-02-04 PROCEDURE — 74011250637 HC RX REV CODE- 250/637: Performed by: INTERNAL MEDICINE

## 2023-02-04 PROCEDURE — 80053 COMPREHEN METABOLIC PANEL: CPT

## 2023-02-04 PROCEDURE — 74011000250 HC RX REV CODE- 250: Performed by: STUDENT IN AN ORGANIZED HEALTH CARE EDUCATION/TRAINING PROGRAM

## 2023-02-04 PROCEDURE — 74011250637 HC RX REV CODE- 250/637: Performed by: STUDENT IN AN ORGANIZED HEALTH CARE EDUCATION/TRAINING PROGRAM

## 2023-02-04 PROCEDURE — 85025 COMPLETE CBC W/AUTO DIFF WBC: CPT

## 2023-02-04 PROCEDURE — 74011636637 HC RX REV CODE- 636/637: Performed by: INTERNAL MEDICINE

## 2023-02-04 PROCEDURE — 36415 COLL VENOUS BLD VENIPUNCTURE: CPT

## 2023-02-04 RX ORDER — POLYETHYLENE GLYCOL 3350 17 G/17G
17 POWDER, FOR SOLUTION ORAL DAILY
Qty: 30 EACH | Refills: 0 | Status: SHIPPED | OUTPATIENT
Start: 2023-02-04

## 2023-02-04 RX ORDER — OXYCODONE HYDROCHLORIDE 5 MG/1
5-10 TABLET ORAL
Qty: 40 TABLET | Refills: 0 | OUTPATIENT
Start: 2023-02-04 | End: 2023-02-07 | Stop reason: SDUPTHER

## 2023-02-04 RX ORDER — MORPHINE SULFATE 2 MG/ML
2 INJECTION, SOLUTION INTRAMUSCULAR; INTRAVENOUS
Status: DISCONTINUED | OUTPATIENT
Start: 2023-02-04 | End: 2023-02-07 | Stop reason: HOSPADM

## 2023-02-04 RX ADMIN — ASPIRIN 81 MG: 81 TABLET, COATED ORAL at 08:50

## 2023-02-04 RX ADMIN — METOPROLOL SUCCINATE 25 MG: 50 TABLET, EXTENDED RELEASE ORAL at 08:50

## 2023-02-04 RX ADMIN — Medication 20 UNITS: at 21:35

## 2023-02-04 RX ADMIN — METFORMIN HYDROCHLORIDE 850 MG: 850 TABLET ORAL at 17:05

## 2023-02-04 RX ADMIN — ACETAMINOPHEN 325MG 650 MG: 325 TABLET ORAL at 06:18

## 2023-02-04 RX ADMIN — Medication 3 UNITS: at 17:06

## 2023-02-04 RX ADMIN — OXYCODONE 10 MG: 5 TABLET ORAL at 08:50

## 2023-02-04 RX ADMIN — Medication 20 UNITS: at 08:50

## 2023-02-04 RX ADMIN — SODIUM CHLORIDE, PRESERVATIVE FREE 10 ML: 5 INJECTION INTRAVENOUS at 21:35

## 2023-02-04 RX ADMIN — ATORVASTATIN CALCIUM 40 MG: 40 TABLET, FILM COATED ORAL at 21:35

## 2023-02-04 RX ADMIN — ACETAMINOPHEN 325MG 650 MG: 325 TABLET ORAL at 17:05

## 2023-02-04 RX ADMIN — ENOXAPARIN SODIUM 40 MG: 100 INJECTION SUBCUTANEOUS at 08:51

## 2023-02-04 RX ADMIN — OXYCODONE 10 MG: 5 TABLET ORAL at 20:53

## 2023-02-04 RX ADMIN — METFORMIN HYDROCHLORIDE 850 MG: 850 TABLET ORAL at 08:50

## 2023-02-04 RX ADMIN — OXYCODONE 10 MG: 5 TABLET ORAL at 04:45

## 2023-02-04 RX ADMIN — OXYCODONE 10 MG: 5 TABLET ORAL at 15:57

## 2023-02-04 RX ADMIN — ACETAMINOPHEN 325MG 650 MG: 325 TABLET ORAL at 12:19

## 2023-02-04 RX ADMIN — Medication 3 UNITS: at 12:18

## 2023-02-04 RX ADMIN — SODIUM CHLORIDE, PRESERVATIVE FREE 10 ML: 5 INJECTION INTRAVENOUS at 06:18

## 2023-02-04 NOTE — PROGRESS NOTES
Ortho coverage: In good spirits    Pain reducing and having mild improvements in pain. Hurts when he suspends his more painful left leg at the hip. Unable to shower yet. Allows movement of the hip joint with ~50% rotation today. Up with PT briefly    Distally, no signif swelling. No BM    Doing well, anticipating need for brief stay in Rehab facility before his wife can support his care at home.

## 2023-02-05 LAB
GLUCOSE BLD STRIP.AUTO-MCNC: 106 MG/DL (ref 65–117)
GLUCOSE BLD STRIP.AUTO-MCNC: 125 MG/DL (ref 65–117)
GLUCOSE BLD STRIP.AUTO-MCNC: 182 MG/DL (ref 65–117)
GLUCOSE BLD STRIP.AUTO-MCNC: 195 MG/DL (ref 65–117)
SERVICE CMNT-IMP: ABNORMAL
SERVICE CMNT-IMP: NORMAL

## 2023-02-05 PROCEDURE — 74011250637 HC RX REV CODE- 250/637: Performed by: STUDENT IN AN ORGANIZED HEALTH CARE EDUCATION/TRAINING PROGRAM

## 2023-02-05 PROCEDURE — 94760 N-INVAS EAR/PLS OXIMETRY 1: CPT

## 2023-02-05 PROCEDURE — 74011250636 HC RX REV CODE- 250/636: Performed by: INTERNAL MEDICINE

## 2023-02-05 PROCEDURE — 74011250637 HC RX REV CODE- 250/637: Performed by: INTERNAL MEDICINE

## 2023-02-05 PROCEDURE — 74011636637 HC RX REV CODE- 636/637: Performed by: INTERNAL MEDICINE

## 2023-02-05 PROCEDURE — 82962 GLUCOSE BLOOD TEST: CPT

## 2023-02-05 PROCEDURE — 74011000250 HC RX REV CODE- 250: Performed by: STUDENT IN AN ORGANIZED HEALTH CARE EDUCATION/TRAINING PROGRAM

## 2023-02-05 PROCEDURE — 65270000046 HC RM TELEMETRY

## 2023-02-05 RX ADMIN — ACETAMINOPHEN 325MG 650 MG: 325 TABLET ORAL at 23:12

## 2023-02-05 RX ADMIN — SODIUM CHLORIDE, PRESERVATIVE FREE 10 ML: 5 INJECTION INTRAVENOUS at 05:07

## 2023-02-05 RX ADMIN — Medication 20 UNITS: at 09:14

## 2023-02-05 RX ADMIN — OXYCODONE 5 MG: 5 TABLET ORAL at 21:49

## 2023-02-05 RX ADMIN — OXYCODONE 10 MG: 5 TABLET ORAL at 15:02

## 2023-02-05 RX ADMIN — METFORMIN HYDROCHLORIDE 850 MG: 850 TABLET ORAL at 09:13

## 2023-02-05 RX ADMIN — Medication 20 UNITS: at 21:51

## 2023-02-05 RX ADMIN — ACETAMINOPHEN 325MG 650 MG: 325 TABLET ORAL at 01:04

## 2023-02-05 RX ADMIN — ASPIRIN 81 MG: 81 TABLET, COATED ORAL at 09:13

## 2023-02-05 RX ADMIN — Medication 3 UNITS: at 15:04

## 2023-02-05 RX ADMIN — OXYCODONE 10 MG: 5 TABLET ORAL at 01:04

## 2023-02-05 RX ADMIN — OXYCODONE 10 MG: 5 TABLET ORAL at 09:14

## 2023-02-05 RX ADMIN — SODIUM CHLORIDE, PRESERVATIVE FREE 10 ML: 5 INJECTION INTRAVENOUS at 22:08

## 2023-02-05 RX ADMIN — OXYCODONE 10 MG: 5 TABLET ORAL at 04:43

## 2023-02-05 RX ADMIN — METFORMIN HYDROCHLORIDE 850 MG: 850 TABLET ORAL at 17:03

## 2023-02-05 RX ADMIN — ACETAMINOPHEN 325MG 650 MG: 325 TABLET ORAL at 15:03

## 2023-02-05 RX ADMIN — ATORVASTATIN CALCIUM 40 MG: 40 TABLET, FILM COATED ORAL at 21:49

## 2023-02-05 RX ADMIN — ENOXAPARIN SODIUM 40 MG: 100 INJECTION SUBCUTANEOUS at 09:14

## 2023-02-05 RX ADMIN — ACETAMINOPHEN 325MG 650 MG: 325 TABLET ORAL at 05:42

## 2023-02-05 RX ADMIN — SODIUM CHLORIDE, PRESERVATIVE FREE 10 ML: 5 INJECTION INTRAVENOUS at 15:04

## 2023-02-05 RX ADMIN — METOPROLOL SUCCINATE 25 MG: 50 TABLET, EXTENDED RELEASE ORAL at 09:13

## 2023-02-05 NOTE — PROGRESS NOTES
Hospitalist Progress Note    NAME: Kaitlin Stanford   :  1955   MRN:  [id number]       Assessment / Plan:    Left pubic rami fractures secondary to trauma POA  - Pile of hale jameel fell over pushed him over into a wooden pallet  - No head trauma, loss of consciousness, neck pain or back pain, chest pain, abdominal pain, diarrhea  - Severe pain in the left hip and thigh  - CT pelvis IMPRESSION  1. Nondisplaced fractures of the left pubic rami extend intra-articularly into  the anterior column of the left acetabulum. No articular surface depression or offset. Small left lipohemarthrosis. 2. No femur fracture.  -Emergency room cleared patient from a trauma standpoint, admitted to medicine. -ER MD spoke with Dr. Yusuf Anaya from orthopedics, reviewed scans   Nonoperative management  - Orthopedics following  - Pain control with oxycodone.    - DVT prophylaxis  - PT OT consulted. - Will need rehab placement    CAD POA  Essential hypertension POA  Hyperlipidemia  -Continue aspirin, Lipitor, metoprolol    diabetes mellitus type 2 uncontrolled on chronic insulin  - Blood sugars are uncontrolled  - Increase Lantus to 20 units twice daily. Continue insulin lispro sliding scale. - Continue metformin.  - Continue diabetic diet  - , 195, 125    Borderline hyperkalemia  -Potassium is now normal    CKD stage III  -Creatinine is close to baseline of around 1.3    Elevated CPK   -CK is 495 --> 293. Start IV fluids.       Code Status: Full     DVT Prophylaxis: SCDs due to small lipohemarthrosis  GI Prophylaxis: Not indicated    Diet: Cardiac/diabetic    Subjective:     Chief Complaint / Reason for Physician Visit  \" My left hip okay until I try to stand up\"  No CP or abdominal pain or headache or neck/back pain   No SOB, N/V, diarrhea  Awaiting bed at rehab    Review of Systems:  Symptom Y/N Comments  Symptom Y/N Comments   Fever/Chills n   Chest Pain n    Poor Appetite    Edema     Cough n   Abdominal Pain n Sputum    Joint Pain     SOB/HAMILTON n   Pruritis/Rash     Nausea/vomit n   Tolerating PT/OT     Diarrhea n   Tolerating Diet y    Constipation    Other       Could NOT obtain due to:      Objective:     VITALS:   Last 24hrs VS reviewed since prior progress note. Most recent are:  Patient Vitals for the past 24 hrs:   Temp Pulse Resp BP SpO2   02/05/23 0825 97.9 °F (36.6 °C) 64 18 (!) 119/58 96 %   02/04/23 2110 -- -- -- -- 96 %   02/04/23 1945 98.2 °F (36.8 °C) 71 19 131/73 96 %         Intake/Output Summary (Last 24 hours) at 2/5/2023 1807  Last data filed at 2/5/2023 0105  Gross per 24 hour   Intake --   Output 700 ml   Net -700 ml          I had a face to face encounter and independently examined this patient on 2/5/2023, as outlined below:  PHYSICAL EXAM:  General: cooperative, no acute distress    EENT:  Anicteric sclerae. MMM  Resp:  CTA bilaterally, no wheezing or rales. No accessory muscle use  CV:  Regular  rhythm,  No edema  GI:  Soft, Non distended, Non tender. +Bowel sounds  Neurologic:  Alert and awake, normal speech, cannot lift both legs against gravity  Psych:   Not anxious nor agitated  Skin:  No rashes. No jaundice    Reviewed most current lab test results and cultures  YES  Reviewed most current radiology test results   YES  Review and summation of old records today    NO  Reviewed patient's current orders and MAR    YES  PMH/ reviewed - no change compared to H&P  ________________________________________________________________________  Care Plan discussed with:    Comments   Patient y    Family      RN y    Care Manager     Consultant                        Multidiciplinary team rounds were held today with , nursing, pharmacist and clinical coordinator. Patient's plan of care was discussed; medications were reviewed and discharge planning was addressed.      ________________________________________________________________________  Total NON critical care TIME:  36 Minutes    Total CRITICAL CARE TIME Spent:   Minutes non procedure based      Comments   >50% of visit spent in counseling and coordination of care     ________________________________________________________________________  Brody Wynn MD     Procedures: see electronic medical records for all procedures/Xrays and details which were not copied into this note but were reviewed prior to creation of Plan. LABS:  I reviewed today's most current labs and imaging studies.   Pertinent labs include:  Recent Labs     02/04/23 0158 02/03/23 0224   WBC 6.6 7.6   HGB 9.6* 10.3*   HCT 28.1* 29.8*   * 108*       Recent Labs     02/04/23 0158 02/03/23 0224    136   K 3.7 3.2*    107   CO2 25 25   GLU 93 85   BUN 18 18   CREA 1.18 1.13   CA 8.3* 8.0*   ALB 2.9*  --    TBILI 0.7  --    ALT 26  --          Signed: Brody Wynn MD

## 2023-02-05 NOTE — PROGRESS NOTES
Ortho coverage    Sitting up on bedside for lunch, able to move around more with less pain, still req'ing oxy, PT hasn't been in yet today. Bruising appearing scrotum and left medial thigh, good leg and hip control now with pain on sudden movement, less when slow    I discussed with pt v prophy and ankle swelling reduction (swelling beginning to appear today, mild), safety in walking, usual course of progression of activity. Had BM yesterday. Doing well. Likely out tomorrow.

## 2023-02-05 NOTE — PROGRESS NOTES
Bedside shift change report given to charlie (oncoming nurse) by Torey Sheth (offgoing nurse). Report included the following information SBAR, Kardex, Intake/Output, MAR, Accordion, and Recent Results.

## 2023-02-05 NOTE — PROGRESS NOTES
Hospitalist Progress Note    NAME: Kaitlin Stanford   :  1955   MRN:  [id number]       Assessment / Plan:    Left pubic rami fractures secondary to trauma POA  - Pile of hale jameel fell over pushed him over into a wooden pallet  - No head trauma, loss of consciousness, neck pain or back pain, chest pain, abdominal pain, diarrhea  - Severe pain in the left hip and thigh  - CT pelvis IMPRESSION  1. Nondisplaced fractures of the left pubic rami extend intra-articularly into  the anterior column of the left acetabulum. No articular surface depression or offset. Small left lipohemarthrosis. 2. No femur fracture.  -Emergency room cleared patient from a trauma standpoint, admitted to medicine. -ER MD spoke with Dr. Yusuf Anaya from orthopedics, reviewed scans   Nonoperative management  - Pain control with oxycodone.    - PT OT consulted. - Will need rehab placement    CAD POA  Essential hypertension POA  Hyperlipidemia  -Continue aspirin, Lipitor, metoprolol    diabetes mellitus type 2 uncontrolled on chronic insulin  - Blood sugars are uncontrolled  - Increase Lantus to 20 units twice daily. Continue insulin lispro sliding scale. - Continue metformin.  - Continue diabetic diet  - , 188, 102    Borderline hyperkalemia  -Potassium is now normal    CKD stage III  -Creatinine is close to baseline of around 1.3    Elevated CPK   -CK is 495 --> 293. Start IV fluids.       Code Status: Full     DVT Prophylaxis: SCDs due to small lipohemarthrosis  GI Prophylaxis: Not indicated    Diet: Cardiac/diabetic    Subjective:     Chief Complaint / Reason for Physician Visit  \" My left hip is still painful  No CP or abdominal pain or headache or neck/back pain   No SOB, N/V, diarrhea  Does not report any weakness in the arms    Review of Systems:  Symptom Y/N Comments  Symptom Y/N Comments   Fever/Chills n   Chest Pain n    Poor Appetite    Edema     Cough n   Abdominal Pain n    Sputum    Joint Pain     SOB/HAMILTON n Pruritis/Rash     Nausea/vomit n   Tolerating PT/OT     Diarrhea n   Tolerating Diet y    Constipation    Other       Could NOT obtain due to:      Objective:     VITALS:   Last 24hrs VS reviewed since prior progress note. Most recent are:  Patient Vitals for the past 24 hrs:   Temp Pulse Resp BP SpO2   02/04/23 2110 -- -- -- -- 96 %   02/04/23 1945 98.2 °F (36.8 °C) 71 19 131/73 96 %   02/04/23 0744 98.2 °F (36.8 °C) 80 13 116/63 95 %   02/04/23 0350 98.1 °F (36.7 °C) 69 16 128/61 96 %         Intake/Output Summary (Last 24 hours) at 2/4/2023 2327  Last data filed at 2/4/2023 1708  Gross per 24 hour   Intake 540 ml   Output 1950 ml   Net -1410 ml          I had a face to face encounter and independently examined this patient on 2/4/2023, as outlined below:  PHYSICAL EXAM:  General: cooperative, no acute distress    EENT:  Anicteric sclerae. MMM  Resp:  CTA bilaterally, no wheezing or rales. No accessory muscle use  CV:  Regular  rhythm,  No edema  GI:  Soft, Non distended, Non tender. +Bowel sounds  Neurologic:  Alert and awake, normal speech, cannot lift both legs against gravity  Psych:   Not anxious nor agitated  Skin:  No rashes. No jaundice    Reviewed most current lab test results and cultures  YES  Reviewed most current radiology test results   YES  Review and summation of old records today    NO  Reviewed patient's current orders and MAR    YES  PMH/ reviewed - no change compared to H&P  ________________________________________________________________________  Care Plan discussed with:    Comments   Patient y    Family      RN y    Care Manager     Consultant                        Multidiciplinary team rounds were held today with , nursing, pharmacist and clinical coordinator. Patient's plan of care was discussed; medications were reviewed and discharge planning was addressed.      ________________________________________________________________________  Total NON critical care TIME:  36 Minutes    Total CRITICAL CARE TIME Spent:   Minutes non procedure based      Comments   >50% of visit spent in counseling and coordination of care     ________________________________________________________________________  Sherwin Tang MD     Procedures: see electronic medical records for all procedures/Xrays and details which were not copied into this note but were reviewed prior to creation of Plan. LABS:  I reviewed today's most current labs and imaging studies.   Pertinent labs include:  Recent Labs     02/04/23 0158 02/03/23 0224 02/02/23 0240   WBC 6.6 7.6 6.7   HGB 9.6* 10.3* 11.7*   HCT 28.1* 29.8* 33.5*   * 108* 111*       Recent Labs     02/04/23 0158 02/03/23 0224 02/02/23  0240    136 133*   K 3.7 3.2* 3.9    107 103   CO2 25 25 25   GLU 93 85 345*   BUN 18 18 21*   CREA 1.18 1.13 1.37*   CA 8.3* 8.0* 8.1*   MG  --   --  2.0   ALB 2.9*  --   --    TBILI 0.7  --   --    ALT 26  --   --          Signed: Sherwin Tang MD

## 2023-02-06 LAB
ANION GAP SERPL CALC-SCNC: 6 MMOL/L (ref 5–15)
BASOPHILS # BLD: 0 K/UL (ref 0–0.1)
BASOPHILS NFR BLD: 1 % (ref 0–1)
BUN SERPL-MCNC: 22 MG/DL (ref 6–20)
BUN/CREAT SERPL: 19 (ref 12–20)
CALCIUM SERPL-MCNC: 8.9 MG/DL (ref 8.5–10.1)
CHLORIDE SERPL-SCNC: 104 MMOL/L (ref 97–108)
CO2 SERPL-SCNC: 27 MMOL/L (ref 21–32)
CREAT SERPL-MCNC: 1.18 MG/DL (ref 0.7–1.3)
DIFFERENTIAL METHOD BLD: ABNORMAL
EOSINOPHIL # BLD: 0.2 K/UL (ref 0–0.4)
EOSINOPHIL NFR BLD: 5 % (ref 0–7)
ERYTHROCYTE [DISTWIDTH] IN BLOOD BY AUTOMATED COUNT: 12.7 % (ref 11.5–14.5)
GLUCOSE BLD STRIP.AUTO-MCNC: 117 MG/DL (ref 65–117)
GLUCOSE BLD STRIP.AUTO-MCNC: 129 MG/DL (ref 65–117)
GLUCOSE BLD STRIP.AUTO-MCNC: 144 MG/DL (ref 65–117)
GLUCOSE BLD STRIP.AUTO-MCNC: 146 MG/DL (ref 65–117)
GLUCOSE SERPL-MCNC: 108 MG/DL (ref 65–100)
HCT VFR BLD AUTO: 29 % (ref 36.6–50.3)
HGB BLD-MCNC: 10.1 G/DL (ref 12.1–17)
IMM GRANULOCYTES # BLD AUTO: 0 K/UL (ref 0–0.04)
IMM GRANULOCYTES NFR BLD AUTO: 0 % (ref 0–0.5)
LYMPHOCYTES # BLD: 1.7 K/UL (ref 0.8–3.5)
LYMPHOCYTES NFR BLD: 35 % (ref 12–49)
MCH RBC QN AUTO: 32.6 PG (ref 26–34)
MCHC RBC AUTO-ENTMCNC: 34.8 G/DL (ref 30–36.5)
MCV RBC AUTO: 93.5 FL (ref 80–99)
MONOCYTES # BLD: 0.5 K/UL (ref 0–1)
MONOCYTES NFR BLD: 11 % (ref 5–13)
NEUTS SEG # BLD: 2.2 K/UL (ref 1.8–8)
NEUTS SEG NFR BLD: 48 % (ref 32–75)
NRBC # BLD: 0 K/UL (ref 0–0.01)
NRBC BLD-RTO: 0 PER 100 WBC
PLATELET # BLD AUTO: 124 K/UL (ref 150–400)
PMV BLD AUTO: 9.6 FL (ref 8.9–12.9)
POTASSIUM SERPL-SCNC: 4.1 MMOL/L (ref 3.5–5.1)
RBC # BLD AUTO: 3.1 M/UL (ref 4.1–5.7)
SERVICE CMNT-IMP: ABNORMAL
SERVICE CMNT-IMP: NORMAL
SODIUM SERPL-SCNC: 137 MMOL/L (ref 136–145)
WBC # BLD AUTO: 4.7 K/UL (ref 4.1–11.1)

## 2023-02-06 PROCEDURE — 74011000250 HC RX REV CODE- 250: Performed by: STUDENT IN AN ORGANIZED HEALTH CARE EDUCATION/TRAINING PROGRAM

## 2023-02-06 PROCEDURE — 97116 GAIT TRAINING THERAPY: CPT | Performed by: PHYSICAL THERAPIST

## 2023-02-06 PROCEDURE — 82962 GLUCOSE BLOOD TEST: CPT

## 2023-02-06 PROCEDURE — 74011250637 HC RX REV CODE- 250/637: Performed by: INTERNAL MEDICINE

## 2023-02-06 PROCEDURE — 74011250637 HC RX REV CODE- 250/637: Performed by: STUDENT IN AN ORGANIZED HEALTH CARE EDUCATION/TRAINING PROGRAM

## 2023-02-06 PROCEDURE — 94760 N-INVAS EAR/PLS OXIMETRY 1: CPT

## 2023-02-06 PROCEDURE — 74011250636 HC RX REV CODE- 250/636: Performed by: INTERNAL MEDICINE

## 2023-02-06 PROCEDURE — 36415 COLL VENOUS BLD VENIPUNCTURE: CPT

## 2023-02-06 PROCEDURE — 97535 SELF CARE MNGMENT TRAINING: CPT

## 2023-02-06 PROCEDURE — 74011636637 HC RX REV CODE- 636/637: Performed by: INTERNAL MEDICINE

## 2023-02-06 PROCEDURE — 65270000046 HC RM TELEMETRY

## 2023-02-06 PROCEDURE — 80048 BASIC METABOLIC PNL TOTAL CA: CPT

## 2023-02-06 PROCEDURE — 97530 THERAPEUTIC ACTIVITIES: CPT | Performed by: PHYSICAL THERAPIST

## 2023-02-06 PROCEDURE — 85025 COMPLETE CBC W/AUTO DIFF WBC: CPT

## 2023-02-06 RX ORDER — BALSAM PERU/CASTOR OIL
OINTMENT (GRAM) TOPICAL 2 TIMES DAILY
Status: DISCONTINUED | OUTPATIENT
Start: 2023-02-06 | End: 2023-02-07 | Stop reason: HOSPADM

## 2023-02-06 RX ADMIN — CASTOR OIL AND BALSAM, PERU: 788; 87 OINTMENT TOPICAL at 21:00

## 2023-02-06 RX ADMIN — SODIUM CHLORIDE, PRESERVATIVE FREE 10 ML: 5 INJECTION INTRAVENOUS at 22:31

## 2023-02-06 RX ADMIN — ENOXAPARIN SODIUM 40 MG: 100 INJECTION SUBCUTANEOUS at 08:55

## 2023-02-06 RX ADMIN — ASPIRIN 81 MG: 81 TABLET, COATED ORAL at 08:55

## 2023-02-06 RX ADMIN — METFORMIN HYDROCHLORIDE 850 MG: 850 TABLET ORAL at 16:53

## 2023-02-06 RX ADMIN — ACETAMINOPHEN 325MG 650 MG: 325 TABLET ORAL at 12:50

## 2023-02-06 RX ADMIN — OXYCODONE 5 MG: 5 TABLET ORAL at 12:55

## 2023-02-06 RX ADMIN — SODIUM CHLORIDE, PRESERVATIVE FREE 10 ML: 5 INJECTION INTRAVENOUS at 14:18

## 2023-02-06 RX ADMIN — Medication 3 UNITS: at 11:30

## 2023-02-06 RX ADMIN — METOPROLOL SUCCINATE 25 MG: 50 TABLET, EXTENDED RELEASE ORAL at 08:55

## 2023-02-06 RX ADMIN — OXYCODONE 10 MG: 5 TABLET ORAL at 04:29

## 2023-02-06 RX ADMIN — METFORMIN HYDROCHLORIDE 850 MG: 850 TABLET ORAL at 08:55

## 2023-02-06 RX ADMIN — Medication 20 UNITS: at 22:30

## 2023-02-06 RX ADMIN — SODIUM CHLORIDE, PRESERVATIVE FREE 10 ML: 5 INJECTION INTRAVENOUS at 05:51

## 2023-02-06 RX ADMIN — ACETAMINOPHEN 325MG 650 MG: 325 TABLET ORAL at 05:50

## 2023-02-06 RX ADMIN — ACETAMINOPHEN 325MG 650 MG: 325 TABLET ORAL at 16:56

## 2023-02-06 RX ADMIN — Medication 20 UNITS: at 08:56

## 2023-02-06 RX ADMIN — ATORVASTATIN CALCIUM 40 MG: 40 TABLET, FILM COATED ORAL at 22:31

## 2023-02-06 RX ADMIN — OXYCODONE 10 MG: 5 TABLET ORAL at 22:30

## 2023-02-06 NOTE — PROGRESS NOTES
Problem: Mobility Impaired (Adult and Pediatric)  Goal: *Acute Goals and Plan of Care (Insert Text)  Description: FUNCTIONAL STATUS PRIOR TO ADMISSION: Patient was independent and active without use of DME. Reports a 22 yr old L elbow injury that makes it difficult to push with it. HOME SUPPORT PRIOR TO ADMISSION: The patient lived with wife but did not require assist.    Physical Therapy Goals  Initiated 2/3/2023  1. Patient will move from supine to sit and sit to supine , scoot up and down, and roll side to side in bed with minimal assistance/contact guard assist within 7 day(s). 2.  Patient will transfer from bed to chair and chair to bed with minimal assistance/contact guard assist using the least restrictive device within 7 day(s). 3.  Patient will perform sit to stand with minimal assistance/contact guard assist within 7 day(s). 4.  Patient will ambulate with moderate assistance  for 5 feet(hopping method) with the least restrictive device within 7 day(s). Outcome: Progressing Towards Goal   PHYSICAL THERAPY TREATMENT  Patient: Chanel Sheth (45 y.o. male)  Date: 2/6/2023  Diagnosis: Pelvic fracture ([address] [S32. 9XXA] <principal problem not specified>      Precautions: TTWB (TTWB LLE)  Chart, physical therapy assessment, plan of care and goals were reviewed. ASSESSMENT: Per Dr. Hima Augustine noted on 2/3/23, patient is toe touch weight bearing on L. Patient educated on weight bearing status. Patient continues with skilled PT services and is progressing towards goals. Patient requiring additional time for all mobility tasks. He requires mod A to transfer to standing from elevated bed and max A from toilet. Recommend using BSC over toilet to elevate surface. Patient able to pivot R foot to advance laterally to R and used this technique to mobilize to/from bathroom (12 feet x 2). Encouraging patient to attempt stepping forward but he is unwilling/unable to perform.  He maintains TTWB status and essentially slides foot on floor. No LOB noted, but this technique increases risk of falling. .   Continue to recommend SNF following discharge to improve overall functional independence prior to returning to home. Current Level of Function Impacting Discharge (mobility/balance): max A to CGA             PLAN :  Patient continues to benefit from skilled intervention to address the above impairments. Continue treatment per established plan of care. to address goals. Recommendation for discharge: (in order for the patient to meet his/her long term goals)  Therapy up to 5 days/week in SNF setting    This discharge recommendation:  Has been made in collaboration with the attending provider and/or case management    IF patient discharges home will need the following DME: bedside commode, gait belt, transfer bench, rolling walker, and wheelchair       SUBJECTIVE:   Patient stated I can't step forward.     OBJECTIVE DATA SUMMARY:   Critical Behavior:  Neurologic State: Alert, Appropriate for age  Orientation Level: Oriented X4  Cognition: Follows commands, Appropriate for age attention/concentration  Safety/Judgement: Awareness of environment, Insight into deficits  Functional Mobility Training:  Bed Mobility:         Deferred; patient sitting EOB upon arrival           Transfers:  Sit to Stand: Moderate assistance;Assist x1 (from elevated bed); max A from toilet  Stand to Sit: Moderate assistance; Additional time (to toilet); CGA to elevated bed        Bed to Chair: Minimum assistance; Moderate assistance; Additional time;Assist x1;Adaptive equipment (RW)                    Balance:  Sitting: Impaired; Without support  Sitting - Static: Good (unsupported)  Sitting - Dynamic: Good (unsupported); Occassional  Standing: Impaired  Standing - Static: Good;Constant support  Standing - Dynamic : Fair  Ambulation/Gait Training:  Distance (ft):  (12 feet x 2 with seated rest on toilet)  Assistive Device: Walker, rolling;Gait belt  Ambulation - Level of Assistance: Contact guard assistance; Additional time        Gait Abnormalities:  (patient advances foot by pivoting and only ambulates laterally to to R side)     Left Side Weight Bearing: Toe touch  Base of Support: Shift to right     Speed/Katie: Pace decreased (<100 feet/min); Shuffled; Slow  Step Length:  (pivoting R foot to advance)              Patient pivoting R foot to advance laterally to R; unwilling to attempt stepping forward          Activity Tolerance:   Good    After treatment patient left in no apparent distress:   Sitting EOB; call bed in reach    COMMUNICATION/COLLABORATION:   The patients plan of care was discussed with: Occupational therapist and Registered nurse.      Danyelle Truong, PT

## 2023-02-06 NOTE — PROGRESS NOTES
Transition of Care Plan:     RUR:8% low risk   Disposition: SNF  Referrals sent to [id number]. WILL NEED INSURANCE AUTH  If SNF or IPR: Date FOC offered: 2/3/23   Date [address] received:2/3/23  Date authorization started with reference number:  Date authorization received and expires:  Accepting facility: [facility]  Follow up appointments:N/a  DME needed:N/a   Transportation at [address] or means to access home:  Pt has access       IM Medicare Letter:  Is patient a [place] and connected with the [address] [facility]? If yes, was [employer] transfer form completed and [facility] notified? Caregiver Contact:  Discharge Caregiver contacted prior to discharge? Mac Vyas (Spouse)   [phone]     2/6/23 3pm OT notes available for today. CM has sent for Auth. 2/6/23 1:35pm CM appreciates that [facility] has accepted pt. CM has updated spouse. CM to send for Auth. 2/6/23 0945am CM spoke with spouse regarding SNF, prefers [facility]. CM placed call to [facility], spoke with Elo Middleton. Elo Middleton will review referral sent on 2/3/23 and return call to .       [address] [place], [place] [address]andie Thompson RN,MSN  Care Manager  [phone]

## 2023-02-06 NOTE — PROGRESS NOTES
Problem: Self Care Deficits Care Plan (Adult)  Goal: *Acute Goals and Plan of Care (Insert Text)  Description: FUNCTIONAL STATUS PRIOR TO ADMISSION: Patient was independent and active without use of DME.     HOME SUPPORT: The patient lived with his wife but did not require assist.    Occupational Therapy Goals  Initiated 2/3/2023  1. Patient will perform grooming and upper body dressing with supervision/set-up seated edge of bed within 7 day(s). 2.  Patient will perform static standing maintaining NWB left LE >or = 1 minute with minimal assistance/contact guard assist and UE support on rolling walker within 7 day(s). 3.  Patient will perform toilet transfers to bedside commode with minimal assistance/contact guard assist using rolling walker and maintain NWB left LE within 7 day(s). 4.  Patient will perform chair push up x's 3 reps with CGA and min cues within 7 day(s). Outcome: Progressing Towards Goal   OCCUPATIONAL THERAPY TREATMENT  Patient: Michelle Jacques (85 y.o. male)  Date: 2/6/2023  Diagnosis: Pelvic fracture ([facility] [address] [S32. 9XXA] <principal problem not specified>      Precautions: NWB, Fall (left LE) - ADDENDUM @1400 per orthopedic surgery note 2/3 pt can be TTWB LLE  Chart, occupational therapy assessment, plan of care, and goals were reviewed. ASSESSMENT  Patient continues with skilled OT services and is progressing towards goals. Pt received seated EOB, alert and oriented x4. Pt c/o L hip/groin area pain 4/10 at rest and increases with hip flexion. Pt not fully maintaining NWB with functional transfers despite cues, however, pt reports decreased pain with minimal weight-bearing compared to hip flexion of L hip. Pt participated in multiple functional transfers, bed>chair transfer with RW, CHG bath seated/standing, and LB dressing. Pt required modAx1 for sit<>stand and SPT with RW support. Pt requires increased time to complete all tasks due to pain and activity tolerance.  Pt did require increased assistance with distal LB dressing as well as dynamic aspects of dressing in standing with RW support. Pt endorses baseline R knee pain and L elbow pain that inhibits independence with functional transfers as well. 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 ADDENDUM: 1400 per orthopedic surgery note 2/3/2023 pt can be TTWB LLE    Current Level of Function Impacting Discharge (ADLs): set-up to maxA; Marzette Olden transfers SPT    Other factors to consider for discharge: fall risk, pain         PLAN :  Patient continues to benefit from skilled intervention to address the above impairments. Continue treatment per established plan of care to address goals. Recommend with staff: OOB TID, RW for bed<>BSC transfers modA    Recommend next OT session: continue POC, AE for distal LB dressing    Recommendation for discharge: (in order for the patient to meet his/her long term goals)  Therapy up to 5 days/week in SNF setting    This discharge recommendation:  Has not yet been discussed the attending provider and/or case management    IF patient discharges home will need the following DME: TBD at rehab       SUBJECTIVE:   Patient stated I need a minute but then I can do it.     OBJECTIVE DATA SUMMARY:   Cognitive/Behavioral Status:  Neurologic State: Alert; Appropriate for age  Orientation Level: Oriented X4  Cognition: Follows commands; Appropriate for age attention/concentration  Perception: Appears intact  Perseveration: No perseveration noted  Safety/Judgement: Awareness of environment; Insight into deficits    Functional Mobility and Transfers for ADLs:  Bed Mobility:   NT received seated EOB    Transfers:  Sit to Stand: Moderate assistance;Assist x1     Bed to Chair: Minimum assistance; Moderate assistance; Additional time;Assist x1;Adaptive equipment (RW)    Balance:  Sitting: Impaired; Without support  Sitting - Static: Good (unsupported)  Sitting - Dynamic: Good (unsupported); Occassional  Standing: Impaired; With support  Standing - Static: Fair;Constant support  Standing - Dynamic : Fair;Constant support    ADL Intervention:            Upper Body Bathing  Bathing Assistance: Set-up; Supervision  Position Performed: Seated in chair    Type of Bath: Chlorhexidine (CHG)    Lower Body Bathing  Perineal  : Contact guard assistance  Position Performed: Standing  Adaptive Equipment: Walker  Lower Body : Minimum assistance  Position Performed: Seated in chair    Upper [address]: Set-up; Supervision    Lower Body Dressing Assistance  Underpants: Moderate assistance  Socks: Maximum assistance  Leg Crossed Method Used: No  Position Performed: Seated in chair;Standing  Cues: Don;Doff;Physical assistance;Verbal cues provided  Adaptive Equipment Used: Walker         Cognitive Retraining  Safety/Judgement: Awareness of environment; Insight into deficits        Pain:  4/10 L hip/groin    Activity Tolerance:   Fair    After treatment patient left in no apparent distress:   Sitting in chair and Call bell within reach    COMMUNICATION/COLLABORATION:   The patients plan of care was discussed with: Physical therapist and Registered nurse.      Radames Anand OT  Time Calculation: 47 mins

## 2023-02-06 NOTE — PROGRESS NOTES
Hospitalist Progress Note    NAME: Debo Vernon   :  1955   MRN:  [id number]     Estimated discharge date:     Barriers: Medically stable for discharge, awaiting insurance approval for SNF    Assessment / Plan:    Left pubic rami fractures secondary to trauma POA  - Pile of hale jameel fell over pushed him over into a wooden pallet  - No head trauma, loss of consciousness, neck pain or back pain, chest pain, abdominal pain, diarrhea  - Severe pain in the left hip and thigh  - CT pelvis IMPRESSION  1. Nondisplaced fractures of the left pubic rami extend intra-articularly into  the anterior column of the left acetabulum. No articular surface depression or offset. Small left lipohemarthrosis. 2. No femur fracture.  -Emergency room cleared patient from a trauma standpoint, admitted to medicine. -ER MD spoke with Dr. Bret Baez from orthopedics, reviewed scans   Nonoperative management  - Orthopedics following  - Pain control with oxycodone.    - DVT prophylaxis  - PT OT consulted. - Will need rehab placement    CAD POA  Essential hypertension POA  Hyperlipidemia  -Continue aspirin, Lipitor, metoprolol    diabetes mellitus type 2 uncontrolled on chronic insulin  - Blood sugars are uncontrolled  - Increase Lantus to 20 units twice daily. Continue insulin lispro sliding scale. - Continue metformin.  - Continue diabetic diet  - , 195, 125    Borderline hyperkalemia  -Potassium is now normal    CKD stage III  -Creatinine is close to baseline of around 1.3    Elevated CPK   -CK is 495 --> 293. Start IV fluids.      Low platelets unclear etiology  Check Coags  Stable  Not consistent with SABRINA, low at admission     Code Status: Full     DVT Prophylaxis: lovenox  GI Prophylaxis: Not indicated    Diet: Cardiac/diabetic    Subjective:     Chief Complaint / Reason for Physician Visit  \" My left hip about the same\"  No CP or abdominal pain or headache or neck/back pain   No SOB, N/V, diarrhea  Bruising in groin  Awaiting bed at rehab, need insurance approval    Review of Systems:  Symptom Y/N Comments  Symptom Y/N Comments   Fever/Chills n   Chest Pain n    Poor Appetite    Edema     Cough n   Abdominal Pain n    Sputum    Joint Pain     SOB/HAMILTON n   Pruritis/Rash     Nausea/vomit n   Tolerating PT/OT     Diarrhea n   Tolerating Diet y    Constipation    Other       Could NOT obtain due to:      Objective:     VITALS:   Last 24hrs VS reviewed since prior progress note. Most recent are:  Patient Vitals for the past 24 hrs:   Temp Pulse Resp BP SpO2   02/06/23 0757 97.3 °F (36.3 °C) 62 18 103/61 99 %   02/05/23 1930 98.2 °F (36.8 °C) 91 18 (!) 106/57 97 %         Intake/Output Summary (Last 24 hours) at 2/6/2023 1732  Last data filed at 2/6/2023 1627  Gross per 24 hour   Intake 740 ml   Output 1615 ml   Net -875 ml          I had a face to face encounter and independently examined this patient on 2/6/2023, as outlined below:  PHYSICAL EXAM:  General: cooperative, no acute distress    EENT:  Anicteric sclerae. MMM  Resp:  CTA bilaterally, no wheezing or rales. No accessory muscle use  CV:  Regular  rhythm,  No edema  GI:  Soft, Non distended, Non tender. +Bowel sounds  Neurologic:  Alert and awake, normal speech, cannot lift both legs against gravity  Psych:   Not anxious nor agitated  Skin:  Ecchymoses left hip and scrotum  No jaundice    Reviewed most current lab test results and cultures  YES  Reviewed most current radiology test results   YES  Review and summation of old records today    NO  Reviewed patient's current orders and MAR    YES  PMH/SH reviewed - no change compared to H&P  ________________________________________________________________________  Care Plan discussed with:    Comments   Patient y    Family      RN y    Care Manager     Consultant                        Multidiciplinary team rounds were held today with , nursing, pharmacist and clinical coordinator.   Patient's plan of care was discussed; medications were reviewed and discharge planning was addressed. ________________________________________________________________________  Total NON critical care TIME:  36    Minutes    Total CRITICAL CARE TIME Spent:   Minutes non procedure based      Comments   >50% of visit spent in counseling and coordination of care     ________________________________________________________________________  Glen Washburn MD     Procedures: see electronic medical records for all procedures/Xrays and details which were not copied into this note but were reviewed prior to creation of Plan. LABS:  I reviewed today's most current labs and imaging studies.   Pertinent labs include:  Recent Labs     02/06/23 0440 02/04/23 0158   WBC 4.7 6.6   HGB 10.1* 9.6*   HCT 29.0* 28.1*   * 107*       Recent Labs     02/06/23 0440 02/04/23 0158    136   K 4.1 3.7    106   CO2 27 25   * 93   BUN 22* 18   CREA 1.18 1.18   CA 8.9 8.3*   ALB  --  2.9*   TBILI  --  0.7   ALT  --  26         Signed: Glen Washburn MD

## 2023-02-06 NOTE — WOUND CARE
Wound care nurse consult for bilateral buttock blisters. Chart reviewed and patients buttocks assessed    80 y/o CM admitted after fall resulting in pelvic fracture. Past Medical History:   Diagnosis Date    CAD (coronary artery disease) April 2014    NSTEMI, PCI/NATE RCA    Hypercholesterolemia     Hypertension      Patient able to stand slowly from sitting on SOB and he has blanchable, partial thickness wounds to both buttocks. Recommend:    Bilateral buttocks: Venelex ointment to bilateral buttocks partial thickness wounds. Leave ASTRID.     Kusum Ojeda RN, [employer]

## 2023-02-07 VITALS
RESPIRATION RATE: 16 BRPM | OXYGEN SATURATION: 95 % | HEIGHT: 72 IN | DIASTOLIC BLOOD PRESSURE: 67 MMHG | WEIGHT: 190 LBS | BODY MASS INDEX: 25.73 KG/M2 | SYSTOLIC BLOOD PRESSURE: 109 MMHG | TEMPERATURE: 97.7 F | HEART RATE: 66 BPM

## 2023-02-07 LAB
ALBUMIN SERPL-MCNC: 2.8 G/DL (ref 3.5–5)
ALBUMIN/GLOB SERPL: 0.8 (ref 1.1–2.2)
ALP SERPL-CCNC: 64 U/L (ref 45–117)
ALT SERPL-CCNC: 33 U/L (ref 12–78)
ANION GAP SERPL CALC-SCNC: 5 MMOL/L (ref 5–15)
APTT PPP: 24.2 SEC (ref 22.1–31)
AST SERPL-CCNC: 33 U/L (ref 15–37)
BASOPHILS # BLD: 0 K/UL (ref 0–0.1)
BASOPHILS NFR BLD: 1 % (ref 0–1)
BILIRUB SERPL-MCNC: 1 MG/DL (ref 0.2–1)
BUN SERPL-MCNC: 24 MG/DL (ref 6–20)
BUN/CREAT SERPL: 23 (ref 12–20)
CALCIUM SERPL-MCNC: 8.6 MG/DL (ref 8.5–10.1)
CHLORIDE SERPL-SCNC: 105 MMOL/L (ref 97–108)
CO2 SERPL-SCNC: 27 MMOL/L (ref 21–32)
CREAT SERPL-MCNC: 1.04 MG/DL (ref 0.7–1.3)
DIFFERENTIAL METHOD BLD: ABNORMAL
EOSINOPHIL # BLD: 0.2 K/UL (ref 0–0.4)
EOSINOPHIL NFR BLD: 5 % (ref 0–7)
ERYTHROCYTE [DISTWIDTH] IN BLOOD BY AUTOMATED COUNT: 12.9 % (ref 11.5–14.5)
FIBRINOGEN PPP-MCNC: 393 MG/DL (ref 200–475)
GLOBULIN SER CALC-MCNC: 3.5 G/DL (ref 2–4)
GLUCOSE BLD STRIP.AUTO-MCNC: 108 MG/DL (ref 65–117)
GLUCOSE BLD STRIP.AUTO-MCNC: 125 MG/DL (ref 65–117)
GLUCOSE BLD STRIP.AUTO-MCNC: 253 MG/DL (ref 65–117)
GLUCOSE SERPL-MCNC: 122 MG/DL (ref 65–100)
HAPTOGLOB SERPL-MCNC: 204 MG/DL (ref 30–200)
HCT VFR BLD AUTO: 27.3 % (ref 36.6–50.3)
HGB BLD-MCNC: 9.5 G/DL (ref 12.1–17)
IMM GRANULOCYTES # BLD AUTO: 0 K/UL (ref 0–0.04)
IMM GRANULOCYTES NFR BLD AUTO: 0 % (ref 0–0.5)
INR PPP: 1 (ref 0.9–1.1)
LDH SERPL L TO P-CCNC: 221 U/L (ref 85–241)
LYMPHOCYTES # BLD: 1.7 K/UL (ref 0.8–3.5)
LYMPHOCYTES NFR BLD: 40 % (ref 12–49)
MCH RBC QN AUTO: 32.1 PG (ref 26–34)
MCHC RBC AUTO-ENTMCNC: 34.8 G/DL (ref 30–36.5)
MCV RBC AUTO: 92.2 FL (ref 80–99)
MONOCYTES # BLD: 0.4 K/UL (ref 0–1)
MONOCYTES NFR BLD: 10 % (ref 5–13)
NEUTS SEG # BLD: 1.8 K/UL (ref 1.8–8)
NEUTS SEG NFR BLD: 44 % (ref 32–75)
NRBC # BLD: 0 K/UL (ref 0–0.01)
NRBC BLD-RTO: 0 PER 100 WBC
PLATELET # BLD AUTO: 111 K/UL (ref 150–400)
PMV BLD AUTO: 8.9 FL (ref 8.9–12.9)
POTASSIUM SERPL-SCNC: 4 MMOL/L (ref 3.5–5.1)
PROT SERPL-MCNC: 6.3 G/DL (ref 6.4–8.2)
PROTHROMBIN TIME: 10.3 SEC (ref 9–11.1)
RBC # BLD AUTO: 2.96 M/UL (ref 4.1–5.7)
SARS-COV-2 RDRP RESP QL NAA+PROBE: NOT DETECTED
SERVICE CMNT-IMP: ABNORMAL
SERVICE CMNT-IMP: ABNORMAL
SERVICE CMNT-IMP: NORMAL
SODIUM SERPL-SCNC: 137 MMOL/L (ref 136–145)
SOURCE, COVRS: NORMAL
THERAPEUTIC RANGE,PTTT: NORMAL SECS (ref 58–77)
WBC # BLD AUTO: 4.2 K/UL (ref 4.1–11.1)

## 2023-02-07 PROCEDURE — 83615 LACTATE (LD) (LDH) ENZYME: CPT

## 2023-02-07 PROCEDURE — 85730 THROMBOPLASTIN TIME PARTIAL: CPT

## 2023-02-07 PROCEDURE — 80053 COMPREHEN METABOLIC PANEL: CPT

## 2023-02-07 PROCEDURE — 82962 GLUCOSE BLOOD TEST: CPT

## 2023-02-07 PROCEDURE — 74011000250 HC RX REV CODE- 250: Performed by: STUDENT IN AN ORGANIZED HEALTH CARE EDUCATION/TRAINING PROGRAM

## 2023-02-07 PROCEDURE — 74011250637 HC RX REV CODE- 250/637: Performed by: STUDENT IN AN ORGANIZED HEALTH CARE EDUCATION/TRAINING PROGRAM

## 2023-02-07 PROCEDURE — 36415 COLL VENOUS BLD VENIPUNCTURE: CPT

## 2023-02-07 PROCEDURE — 85610 PROTHROMBIN TIME: CPT

## 2023-02-07 PROCEDURE — 85025 COMPLETE CBC W/AUTO DIFF WBC: CPT

## 2023-02-07 PROCEDURE — 94760 N-INVAS EAR/PLS OXIMETRY 1: CPT

## 2023-02-07 PROCEDURE — 74011250636 HC RX REV CODE- 250/636: Performed by: INTERNAL MEDICINE

## 2023-02-07 PROCEDURE — 87635 SARS-COV-2 COVID-19 AMP PRB: CPT

## 2023-02-07 PROCEDURE — 83010 ASSAY OF HAPTOGLOBIN QUANT: CPT

## 2023-02-07 PROCEDURE — 74011250637 HC RX REV CODE- 250/637: Performed by: INTERNAL MEDICINE

## 2023-02-07 PROCEDURE — 85384 FIBRINOGEN ACTIVITY: CPT

## 2023-02-07 PROCEDURE — 74011636637 HC RX REV CODE- 636/637: Performed by: INTERNAL MEDICINE

## 2023-02-07 RX ORDER — OXYCODONE HYDROCHLORIDE 5 MG/1
5-10 TABLET ORAL
Qty: 50 TABLET | Refills: 0 | OUTPATIENT
Start: 2023-02-07 | End: 2023-02-14

## 2023-02-07 RX ORDER — ACETAMINOPHEN 325 MG/1
650 TABLET ORAL EVERY 6 HOURS
Qty: 56 TABLET | Refills: 0 | Status: SHIPPED
Start: 2023-02-07 | End: 2023-02-14

## 2023-02-07 RX ORDER — FACIAL-BODY WIPES
10 EACH TOPICAL
Qty: 10 EACH | Refills: 1 | Status: SHIPPED
Start: 2023-02-07

## 2023-02-07 RX ADMIN — METOPROLOL SUCCINATE 25 MG: 50 TABLET, EXTENDED RELEASE ORAL at 09:54

## 2023-02-07 RX ADMIN — CASTOR OIL AND BALSAM, PERU: 788; 87 OINTMENT TOPICAL at 10:05

## 2023-02-07 RX ADMIN — OXYCODONE 5 MG: 5 TABLET ORAL at 10:05

## 2023-02-07 RX ADMIN — METFORMIN HYDROCHLORIDE 850 MG: 850 TABLET ORAL at 09:53

## 2023-02-07 RX ADMIN — ENOXAPARIN SODIUM 40 MG: 100 INJECTION SUBCUTANEOUS at 09:53

## 2023-02-07 RX ADMIN — Medication 20 UNITS: at 09:53

## 2023-02-07 RX ADMIN — SODIUM CHLORIDE, PRESERVATIVE FREE 10 ML: 5 INJECTION INTRAVENOUS at 06:03

## 2023-02-07 RX ADMIN — Medication 7 UNITS: at 12:12

## 2023-02-07 RX ADMIN — ACETAMINOPHEN 325MG 650 MG: 325 TABLET ORAL at 13:09

## 2023-02-07 RX ADMIN — OXYCODONE 10 MG: 5 TABLET ORAL at 06:02

## 2023-02-07 RX ADMIN — ASPIRIN 81 MG: 81 TABLET, COATED ORAL at 09:54

## 2023-02-07 NOTE — PROGRESS NOTES
Problem: Pressure Injury - Risk of  Goal: *Prevention of pressure injury  Description: Document Anthony Scale and appropriate interventions in the flowsheet. Outcome: Progressing Towards Goal  Note: Pressure Injury Interventions:  Sensory Interventions: Minimize linen layers    Moisture Interventions: Absorbent underpads, Minimize layers    Activity Interventions: Increase time out of bed, Pressure redistribution bed/mattress(bed type), PT/OT evaluation    Mobility Interventions: HOB 30 degrees or less, Pressure redistribution bed/mattress (bed type), PT/OT evaluation    Nutrition Interventions: Document food/fluid/supplement intake    Friction and Shear Interventions: HOB 30 degrees or less, Minimize layers                Problem: Patient Education: Go to Patient Education Activity  Goal: Patient/Family Education  Outcome: Progressing Towards Goal     Problem: Falls - Risk of  Goal: *Absence of Falls  Description: Document Nick Fall Risk and appropriate interventions in the flowsheet.   Outcome: Progressing Towards Goal  Note: Fall Risk Interventions:  Mobility Interventions: Bed/chair exit alarm, Patient to call before getting OOB, PT Consult for mobility concerns, PT Consult for assist device competence, Strengthening exercises (ROM-active/passive)    Mentation Interventions: Bed/chair exit alarm, Door open when patient unattended    Medication Interventions: Bed/chair exit alarm, Patient to call before getting OOB, Teach patient to arise slowly    Elimination Interventions: Call light in reach, Patient to call for help with toileting needs, Bed/chair exit alarm    History of Falls Interventions: Bed/chair exit alarm, Door open when patient unattended         Problem: Patient Education: Go to Patient Education Activity  Goal: Patient/Family Education  Outcome: Progressing Towards Goal     Problem: Patient Education: Go to Patient Education Activity  Goal: Patient/Family Education  Outcome: Progressing Towards Goal     Problem: Patient Education: Go to Patient Education Activity  Goal: Patient/Family Education  Outcome: Progressing Towards Goal

## 2023-02-07 NOTE — PROGRESS NOTES
Transition of Care Plan:     RUR:8% low risk   Disposition: SNF  Referrals sent to [id number] [facility]. WILL NEED INSURANCE AUTH  If SNF or IPR: Date FOC offered: 2/3/23   Date [address] received:2/3/23  Date authorization started with reference number: 2/6/23  Date authorization received and expires:  2/7/23   Ref# [id number] Service dates 2/7-2/9/23  Accepting facility: [address] [place] [address]  Follow up appointments:N/a  DME needed:N/a   Transportation at [address] or means to access home:  Pt has access       IM Medicare Letter: Reviewed with pt, signed and placed on chart  Is patient a  and connected with the [place]? If yes, was [employer] transfer form completed and VA notified? Caregiver Contact:  Discharge Caregiver contacted prior to discharge? Abram Mnotero (Spouse)   [phone]     2/7/23 1250 CM spoke with pt at bedside, updated pt on transition to [address] [place] [address] today. CM [facility] today. Bedside nurse updated. 2nd IM reviewed with pt, signed and place on chart. Pt cleared for d/c from CM standpoint. 2/7/23 1230 CM received return call from [facility] at [address] [place] [address],[address] [place] [address]. Pt will go to room 307. Nurse to call report to . Requests transportation after 4pm. Pt will need COVID test. CM has informed nurse that pt will need COVID test.    2/7/23 1130am CM has received Auth as noted above. CM placed call to [address] [place] [address] for bed availability. Spoke with Shelli Wilder who shares [facility] is in a meeting and will return call to this  for bed placement and transport time.       91 Tanya Thompson RN,MSN  Care Manager  [phone]

## 2023-02-07 NOTE — PROGRESS NOTES
Pt discharged/transported to [facility] with belongings. This nurse called report to [facility] receiving RN Nai Martin). This nurse gave receiving RN pt's latest vitals, Rapid COVID test results (not detected), and informed RN that MD included a prescription with pt's discharge summary.  Pt's IV removed by Margie (janee) per clinical lead's Lucia Crooks request.

## 2023-02-07 NOTE — DISCHARGE SUMMARY
Hospitalist Discharge Note    NAME: Marie Juan   :  1955   MRN:  [id number]     Admit date: 2023    Discharge date: 23    PCP: Audra Banda MD    Discharge Diagnoses:    Left pubic rami fractures secondary to trauma POA    CAD POA    Essential hypertension POA    Hyperlipidemia    diabetes mellitus type 2 uncontrolled on chronic insulin    Borderline hyperkalemia    CKD stage III    Elevated CPK  --> 293. Acute blood loss anemia POA HgB 9.5 to 10.3    Low platelets unclear etiology 107,000 to 126,000    Code Status: Full    Discharge Medications:  Current Discharge Medication List        START taking these medications    Details   bisacodyL (Dulcolax, bisacodyl,) 10 mg supp Insert 10 mg into rectum daily as needed for Constipation. Qty: 10 Each, Refills: 1      oxyCODONE IR (ROXICODONE) 5 mg immediate release tablet Take 1-2 Tablets by mouth every six (6) hours as needed for Pain for up to 7 days. Max Daily Amount: 40 mg. Indications: pain  Qty: 40 Tablet, Refills: 0    Associated Diagnoses: Closed nondisplaced fracture of pelvis, unspecified part of pelvis, initial encounter ([facility])      polyethylene glycol (MIRALAX) 17 gram packet Take 1 Packet by mouth daily. Qty: 30 Each, Refills: 0           CONTINUE these medications which have CHANGED    Details   acetaminophen (TYLENOL) 325 mg tablet Take 2 Tablets by mouth every six (6) hours for 7 days.   Qty: 56 Tablet, Refills: 0           CONTINUE these medications which have NOT CHANGED    Details   lancets (Accu-Chek Softclix Lancets) misc TEST BLOOD SUGAR TWICE DAILY  Qty: 200 Each, Refills: 3    Associated Diagnoses: Type 2 diabetes mellitus without complication, with long-term current use of insulin ([facility])      metFORMIN (GLUCOPHAGE) 500 mg tablet TAKE 3 TABLETS EVERY DAY WITH DINNER FOR TYPE 2 DIABETES  Qty: 270 Tablet, Refills: 0    Associated Diagnoses: Type 2 diabetes mellitus with stage 2 chronic kidney disease, with long-term current use of insulin ([facility])      metoprolol succinate (TOPROL-XL) 25 mg XL tablet TAKE 1 TABLET EVERY DAY FOR HIGH BLOOD PRESSURE AND HEART  Qty: 90 Tablet, Refills: 0    Associated Diagnoses: Benign hypertension with chronic kidney disease, stage II      atorvastatin (LIPITOR) 40 mg tablet TAKE 1 TABLET EVERY DAY FOR HIGH CHOLESTEROL  Qty: 90 Tablet, Refills: 0    Associated Diagnoses: Hypercholesterolemia      Droplet Pen Needle 31 gauge x 5/16\" ndle USE TO INJECT EVERY DAY AS DIRECTED  Qty: 100 Each, Refills: 3    Associated Diagnoses: Type 2 diabetes mellitus without complication, with long-term current use of insulin ([facility])      insulin aspart protamine/insulin aspart (NovoLOG Mix 70-30FlexPen U-100) 100 unit/mL (70-30) inpn INJECT 25 UNITS SUBCUTANEOUSLY BEFORE BREAKFAST AND SUPPER  Qty: 45 mL, Refills: 1    Comments: Please instruct patient to schedule a clinic visit with Dr. Vidal Farias by calling [phone]. Associated Diagnoses: Type 2 diabetes mellitus with stage 2 chronic kidney disease, with long-term current use of insulin ([facility])      Accu-Chek Haydee Plus Meter misc USE AS DIRECTED  Qty: 1 Each, Refills: 0    Associated Diagnoses: Type 2 diabetes mellitus without complication, with long-term current use of insulin ([facility])      glucose blood VI test strips (Accu-Chek Haydee Plus test strp) strip TEST BLOOD SUGAR TWICE DAILY  Qty: 200 Strip, Refills: 5    Associated Diagnoses: Type 2 diabetes mellitus without complication, with long-term current use of insulin ([facility])      aspirin delayed-release 81 mg tablet Take 1 Tab by mouth daily. Qty: 90 Tab, Refills: 3      nitroglycerin (NITROSTAT) 0.4 mg SL tablet Take 1 tablet under tongue every 5 minutes up to 3 doses prn chest pain.   Qty: 1 Bottle, Refills: 3           STOP taking these medications       amLODIPine (NORVASC) 2.5 mg tablet Comments:   Reason for Stopping:         ondansetron hcl (Zofran) 4 mg tablet Comments:   Reason for Stopping: Recommended diet: Diabetic Diet    Recommended activity: Activity as tolerated, non weight bearing left leg till cleared by Orthopedics    Wound care: None    Indwelling devices:  None    Supplemental Oxygen: None    Required Lab work: Weekly BMP and Weekly CBC every Monday x 2, results to covering MD    Glucose management:  Accucheck ACHS with sliding scale per SNF protocol    Code status: Full    Needs DVT prophylaxis with heparin or lovenox per SNF protocol    Follow-up Information       Follow up With Specialties Details Why Contact Info    Cristina De Jesus MD Internal Medicine Physician Schedule an appointment as soon as possible for a visit in 1 week(s) after leaving rehab [facility] Mayra Parra DO Orthopedic Surgery Schedule an appointment as soon as possible for a visit in 3 week(s)  [address]  [phone]            Time spent on discharge:   I spent greater than 30 minutes on discharge, seeing and examining the patient, reconciling home meds and new meds, coordinating care with case management, doing the discharge papers and the D/C summary    Discharge disposition: SNF    Discharge Condition: Stable    Summary of admission H+P(copied from Dr Tari Pritchard Note):     CHIEF COMPLAINT: Pelvic fracture     HISTORY OF PRESENT ILLNESS:     Lionel Messina is a 79 y.o.  male with pertinent past medical history as listed below who presents after a ladder and multiple hay bells fell on him resulting in severe left hip pain and inability to bear weight. In the ED, patient afebrile and hemodynamically stable saturating mid 90s on room air. ECG demonstrates normal sinus rhythm.   Radiographs demonstrate nondisplaced fractures of left pubic rami extending intra-articularly into the anterior column of left acetabulum with small left lipohemarthrosis. CBC notable for thrombocytopenia of 126, UA not reflexed to culture, CK4 195, sodium 132, potassium 5.5, glucose 597, BUN 23, creatinine 1.57 (1.2-1.8 on most recent priors). Orthopedics consulted by ED provider and feel this is a nonoperative fracture recommending nonweightbearing at this time. We were asked to admit for work up and evaluation of the above problems. Past Medical History:   Diagnosis Date    CAD (coronary artery disease) April 2014     NSTEMI, PCI/NATE RCA    Hypercholesterolemia      Hypertension      PELVIC x-RAY READ BY RADIOLOGY FINDINGS:  Multiple views of the pelvis demonstrate nondisplaced left pubic rami  fractures extending into the anterior column of the left acetabulum. There is no  evidence of proximal femur fracture. IMPRESSION  Left pubic rami fractures extending into the anterior column of the  left acetabulum. CT pelvis read by radiology results:  Bones: Osteopenia is mild. Nondisplaced fractures of the left inferior  pubic ramus extending to the left pubic body. Mildly displaced left superior  pubic ramus fracture extends to the anterior margin of the anterior column of  the left acetabulum. No articular surface depression or offset. No femoral  fracture. No other pelvic fracture. Grade 2 anterolisthesis of L5 on S1 is due to disc disease, facet arthrosis, and  bilateral L5 spondylolysis. Joint fluid: Small left hip lipohemarthrosis. Articulations: Mild bilateral hip osteoarthritis. No sacroiliitis. Tendons: No full-thickness tendon tear. Muscles: Enlargement of the left obturator internus muscle is likely due to  hemorrhage. Soft tissue mass: Left pelvic soft tissue hemorrhage is extraperitoneal. Colonic  diverticulosis is partially imaged. Vascular calcifications are arterial.     IMPRESSION  1.  Nondisplaced fractures of the left pubic rami extend intra-articularly into  the anterior column of the left acetabulum. No articular surface depression or  offset. Small left lipohemarthrosis. 2. No femur fracture. Hospital course:     Left pubic rami fractures secondary to trauma POA  - Pile of hale jameel fell over pushed him over into a wooden pallet  - No head trauma, loss of consciousness, neck pain or back pain, chest pain, abdominal pain, diarrhea  - Severe pain in the left hip and thigh  - CT pelvis IMPRESSION  1. Nondisplaced fractures of the left pubic rami extend intra-articularly into  the anterior column of the left acetabulum. No articular surface depression or offset. Small left lipohemarthrosis. 2. No femur fracture.  -Emergency room cleared patient from a trauma standpoint, admitted to medicine. -ER MD spoke with Dr. Selina Mcgrath from orthopedics, reviewed scans   Nonoperative management  - Orthopedics followed  - Pain control with oxycodone.    - DVT prophylaxis  - PT OT consulted. - SNF, non weight bearing till cleared by orthopedics   Saw Dr Selina Mcgrath, needs follow up in 1 week with repeat pelvic X-rays    CAD POA  Essential hypertension POA  Hyperlipidemia  -Continue aspirin, Lipitor, metoprolol    diabetes mellitus type 2 uncontrolled on chronic insulin  - Blood sugars are uncontrolled  - Increase Lantus to 20 units twice daily. Continue insulin lispro sliding scale. - Continue metformin.  - Continue diabetic diet  - , 195, 125    Borderline hyperkalemia  -Potassium is now normal    CKD stage III  -Creatinine is close to baseline of around 1.3    Elevated CPK   -CK is 495 --> 293.     Acute blood loss anemia HgB  Low platelets unclear etiology  Suspected katie fracture hemorrhage, see CT report  Plts normally 160,000 to 180,000, now 107,000 to 126,000  Normal Coags and fibrinogen  Normal T bili, Haptoglobin 204,    No evidence of hemolysis  HGb drifted down 13.0 to 9.5 to 10.3 range with IVF   HgB stable in 9.5 to 10.3 range for 4 days  Stable  Serial labs     Code Status: Full     DVT Prophylaxis: lovenox  GI Prophylaxis: Not indicated    Diet: Cardiac/diabetic    Subjective:     Chief Complaint / Reason for Physician Visit  \" My left hip feels a bit better\"  No CP or abdominal pain or headache or neck/back pain   No SOB, N/V, diarrhea  Bruising in groin    Review of Systems:  Symptom Y/N Comments  Symptom Y/N Comments   Fever/Chills n   Chest Pain n    Poor Appetite    Edema     Cough n   Abdominal Pain n    Sputum    Joint Pain     SOB/HAMILTON n   Pruritis/Rash     Nausea/vomit n   Tolerating PT/OT     Diarrhea n   Tolerating Diet y    Constipation    Other       Could NOT obtain due to:      Objective:     VITALS:   Last 24hrs VS reviewed since prior progress note. Most recent are:  Patient Vitals for the past 24 hrs:   Temp Pulse Resp BP SpO2   02/07/23 0759 97.7 °F (36.5 °C) 66 16 109/67 95 %   02/07/23 0305 97.8 °F (36.6 °C) 67 16 125/73 98 %   02/06/23 2154 98.1 °F (36.7 °C) 66 18 122/74 97 %         Intake/Output Summary (Last 24 hours) at 2/7/2023 1622  Last data filed at 2/6/2023 2329  Gross per 24 hour   Intake --   Output 1400 ml   Net -1400 ml          I had a face to face encounter and independently examined this patient on 2/7/2023, as outlined below:  PHYSICAL EXAM:  General: cooperative, no acute distress    EENT:  Anicteric sclerae. MMM  Resp:  CTA bilaterally, no wheezing or rales. No accessory muscle use  CV:  Regular  rhythm,  No edema  GI:  Soft, Non distended, Non tender.   +Bowel sounds  Neurologic:  Alert and awake, normal speech, cannot lift both legs against gravity  Psych:   Not anxious nor agitated  Skin:  Ecchymoses left hip and scrotum  No jaundice    Reviewed most current lab test results and cultures  YES  Reviewed most current radiology test results   YES  Review and summation of old records today    NO  Reviewed patient's current orders and MAR    YES  PMH/SH reviewed - no change compared to H&P  ________________________________________________________________________  Care Plan discussed with:    Comments   Patient y    Family  y Wife   RN y    Care Manager     Consultant                        Multidiciplinary team rounds were held today with , nursing, pharmacist and clinical coordinator. Patient's plan of care was discussed; medications were reviewed and discharge planning was addressed. ________________________________________________________________________      Comments   >50% of visit spent in counseling and coordination of care     ________________________________________________________________________  Collins Tavares MD     Procedures: see electronic medical records for all procedures/Xrays and details which were not copied into this note but were reviewed prior to creation of Plan. LABS:  I reviewed today's most current labs and imaging studies.   Pertinent labs include:  Recent Labs     02/07/23  0400 02/06/23  0440   WBC 4.2 4.7   HGB 9.5* 10.1*   HCT 27.3* 29.0*   * 124*       Recent Labs     02/07/23  0400 02/06/23  0440    137   K 4.0 4.1    104   CO2 27 27   * 108*   BUN 24* 22*   CREA 1.04 1.18   CA 8.6 8.9   ALB 2.8*  --    TBILI 1.0  --    ALT 33  --    INR 1.0  --          Signed: Collins Tavares MD

## 2023-02-07 NOTE — DISCHARGE INSTRUCTIONS
HOSPITALIST DISCHARGE INSTRUCTIONS    NAME: Dennis Bland   :  1955   MRN:  [id number]     Date/Time:  2023 4:33 PM    ADMIT DATE: 2023   DISCHARGE DATE: 2023     Attending Physician: Fuad Desai MD      Medications: Per above medication reconciliation.     Pain Management: per above medications    Recommended diet: Diabetic Diet    Recommended activity: Activity as tolerated, non weight bearing left leg till cleared by Orthopedics    Wound care: None    Indwelling devices:  None    Supplemental Oxygen: None    Required Lab work: Weekly BMP and Weekly CBC every Monday x 2, results to covering MD    Glucose management:  Accucheck ACHS with sliding scale per SNF protocol    Code status: Full     Needs DVT prophylaxis with heparin or lovenox per SNF protocol

## 2023-02-13 DIAGNOSIS — Z47.89 ORTHOPEDIC AFTERCARE: Primary | ICD-10-CM

## 2023-02-17 ENCOUNTER — HOSPITAL ENCOUNTER (OUTPATIENT)
Dept: GENERAL RADIOLOGY | Age: 68
Discharge: HOME | End: 2023-02-17
Payer: MEDICARE

## 2023-02-17 ENCOUNTER — OFFICE VISIT (OUTPATIENT)
Dept: ORTHOPEDIC SURGERY | Age: 68
End: 2023-02-17
Payer: MEDICARE

## 2023-02-17 VITALS
TEMPERATURE: 97.7 F | WEIGHT: 190 LBS | HEIGHT: 72 IN | SYSTOLIC BLOOD PRESSURE: 124 MMHG | DIASTOLIC BLOOD PRESSURE: 72 MMHG | HEART RATE: 61 BPM | OXYGEN SATURATION: 96 % | BODY MASS INDEX: 25.73 KG/M2

## 2023-02-17 DIAGNOSIS — S32.402A: Primary | ICD-10-CM

## 2023-02-17 DIAGNOSIS — Z47.89 ORTHOPEDIC AFTERCARE: ICD-10-CM

## 2023-02-17 PROCEDURE — 72190 X-RAY EXAM OF PELVIS: CPT

## 2023-02-17 RX ORDER — AMLODIPINE BESYLATE 2.5 MG/1
1 TABLET ORAL AS DIRECTED
COMMUNITY

## 2023-02-17 NOTE — PROGRESS NOTES
Identified pt with two pt identifiers (name and ). Reviewed chart in preparation for visit and have obtained necessary documentation. Helen Carvajal is a 79 y.o. male  Chief Complaint   Patient presents with    Fracture     pelvis     Visit Vitals  /72 (BP 1 Location: Right upper arm, BP Patient Position: Sitting, BP Cuff Size: Large adult)   Pulse 61   Temp 97.7 °F (36.5 °C) (Tympanic)   Ht 6' (1.829 m)   Wt 190 lb (86.2 kg)   SpO2 96%   BMI 25.77 kg/m²     1. Have you been to the ER, urgent care clinic since your last visit? Hospitalized since your last visit? No    2. Have you seen or consulted any other health care providers outside of the [address] [place] [address] since your last visit? Include any pap smears or colon screening.  No

## 2023-02-17 NOTE — PROGRESS NOTES
2/17/2023      CC: left groin pain    HPI:      This is a 79y.o. year old male who presents for a follow up visit. The patient was last seen and diagnosed with left acetabular and pubic ramus fracture. The patient's treatments since the most recent visit have comprised of weight bearing restrictions, pain control. The patient has had moderate relief of the chief complaint.         PMH:  Past Medical History:   Diagnosis Date    CAD (coronary artery disease) April 2014    NSTEMI, PCI/NATE RCA    Hypercholesterolemia     Hypertension        PSxHx:  Past Surgical History:   Procedure Laterality Date    HX CHOLECYSTECTOMY  07/17/2019    Laparoscopic cholecystectomy with intraoperative cholangiogram    HX ORTHOPAEDIC      left shoulder, torn tendon    HX ORTHOPAEDIC      right knee X 4    [place] COLONOSCOPY FLX DX W/COLLJ SPEC WHEN PFRMD  12/19/2007    Dr Trujillo Salts 1 polyp repeat 12/2012    [place] UNLISTED PROCEDURE CARDIAC SURGERY      Stent RCA 4/2014       Meds:    Current Outpatient Medications:     amLODIPine (NORVASC) 2.5 mg tablet, Take 1 Tablet by mouth as directed., Disp: , Rfl:     lancets (Accu-Chek Softclix Lancets) misc, TEST BLOOD SUGAR TWICE DAILY, Disp: 200 Each, Rfl: 3    metFORMIN (GLUCOPHAGE) 500 mg tablet, TAKE 3 TABLETS EVERY DAY WITH DINNER FOR TYPE 2 DIABETES, Disp: 270 Tablet, Rfl: 0    metoprolol succinate (TOPROL-XL) 25 mg XL tablet, TAKE 1 TABLET EVERY DAY FOR HIGH BLOOD PRESSURE AND HEART, Disp: 90 Tablet, Rfl: 0    atorvastatin (LIPITOR) 40 mg tablet, TAKE 1 TABLET EVERY DAY FOR HIGH CHOLESTEROL, Disp: 90 Tablet, Rfl: 0    Droplet Pen Needle 31 gauge x 5/16\" ndle, USE TO INJECT EVERY DAY AS DIRECTED, Disp: 100 Each, Rfl: 3    insulin aspart protamine/insulin aspart (NovoLOG Mix 70-30FlexPen U-100) 100 unit/mL (70-30) inpn, INJECT 25 UNITS SUBCUTANEOUSLY BEFORE BREAKFAST AND SUPPER, Disp: 45 mL, Rfl: 1    Accu-Chek Haydee Plus Meter misc, USE AS DIRECTED, Disp: 1 Each, Rfl: 0    glucose blood VI test strips (Accu-Chek Haydee Plus test strp) strip, TEST BLOOD SUGAR TWICE DAILY, Disp: 200 Strip, Rfl: 5    nitroglycerin (NITROSTAT) 0.4 mg SL tablet, Take 1 tablet under tongue every 5 minutes up to 3 doses prn chest pain., Disp: 1 Bottle, Rfl: 3    aspirin delayed-release 81 mg tablet, Take 1 Tab by mouth daily. , Disp: 90 Tab, Rfl: 3    bisacodyL (Dulcolax, bisacodyl,) 10 mg supp, Insert 10 mg into rectum daily as needed for Constipation. (Patient not taking: Reported on 2023), Disp: 10 Each, Rfl: 1    polyethylene glycol (MIRALAX) 17 gram packet, Take 1 Packet by mouth daily.  (Patient not taking: Reported on 2023), Disp: 30 Each, Rfl: 0    All:  No Known Allergies    Social Hx:  Social History     Socioeconomic History    Marital status:    Tobacco Use    Smoking status: Former     Packs/day: 0.25     Years: 35.00     Pack years: 8.75     Types: Cigarettes     Quit date: 2014     Years since quittin.2    Smokeless tobacco: Never    Tobacco comments:     Never smoked over a pack per day   Vaping Use    Vaping Use: Never used   Substance and Sexual Activity    Alcohol use: No     Alcohol/week: 0.0 standard drinks    Drug use: No       Family Hx:  Family History   Problem Relation Age of Onset    Heart Disease Mother         CAD    Hypertension Mother     Elevated Lipids Mother     Cancer Father         lung    Mental Retardation Brother     Seizures Brother     Diabetes Brother     Hypertension Brother     Elevated Lipids Brother          Review of Systems:       General: Denies headache, lethargy, fever, weight loss  Ears/Nose/Throat: Denies ear discharge, drainage, nosebleeds, hoarse voice, dental problems  Cardiovascular: Denies chest pain, shortness of breath  Lungs: Denies chest pain, breathing problems, wheezing, pneumonia  Stomach: Denies stomach pain, heartburn, constipation, irritable bowel  Skin: Denies rash, sores, open wounds  Musculoskeletal: Left groin pain  Genitourinary: Denies dysuria, hematuria, polyuria  Gastrointestinal: Denies constipation, obstipation, diarrhea  Neurological: Denies changes in sight, smell, hearing, taste, seizures. Denies loss of consciousness. Psychiatric: Denies depression, sleep pattern changes, anxiety, change in personality  Endocrine: Denies mood swings, heat or cold intolerance  Hematologic/Lymphatic: Denies anemia, purpura, petechia  Allergic/Immunologic: Denies swelling of throat, pain or swelling at lymph nodes      Physical Examination:    Visit Vitals  /72 (BP 1 Location: Right upper arm, BP Patient Position: Sitting, BP Cuff Size: Large adult)   Pulse 61   Temp 97.7 °F (36.5 °C) (Tympanic)   Ht 6' (1.829 m)   Wt 190 lb (86.2 kg)   SpO2 96%   BMI 25.77 kg/m²        General: AOX3, no apparent distress  Psychiatric: mood and affect appropriate  Lungs: breathing is symmetric and unlabored bilaterally  Heart: regular rate and rhythm  Abdomen: no guarding  Head: normocephalic, atraumatic  Skin: No significant abnormalities, good turgor  Sensation intact to light touch: C5-T1 dermatomes  Muscular exam: 5/5 strength in all major muscle groups unless noted in specialty exam.    Extremities        Left lower extremity:  No deformity is noted. Circumduction of the hip causes significant pain in the groin, reproductive of the chief complaint. Range of motion is limited, with a positive impingement sign. Gait is antalgic. Sensation is intact to light touch in the L1-S1 dermatomes. Skin is intact about the hip. Hip flexion and abduction strength is 4+/5, hip extension and knee extension as well as tibialis anterior and EHL/GCS are 5/5 strength. Right lower extremity: No gross deformity. No restriction to range of motion of the hip, knee, ankle. Muscle bulk is appropriate without wasting. Sensation is intact to light touch in the L1-S1 dermatomes. Capillary refill is less than 2 seconds in the fingers.   Strength testing is 5/5 at the major muscle groups of the hip, knee, ankle. Diagnostics:    Pertinent Diagnostics: X-rays ordered and reviewed by myself of the pelvis, 3 views, they indicate minimally displaced superior pubic ramus fracture with extension into the medial wall of the acetabulum, early callus formation is noted. Assessment: Left acetabular fracture, superior pubic ramus fracture  Plan: This patient has above-mentioned issue, no significant displacement has occurred, early callus is noted, he will be weightbearing toe-touch for the next 3 weeks, I will then progress his weightbearing after that. Pain medications will be through his rehab facility, follow-up with me will be 3 weeks with 3 view pelvis x-rays. Mr. Yaritza Araujo has a reminder for a \"due or due soon\" health maintenance. I have asked that he contact his primary care provider for follow-up on this health maintenance.

## 2023-02-22 ENCOUNTER — PATIENT OUTREACH (OUTPATIENT)
Dept: CASE MANAGEMENT | Age: 68
End: 2023-02-22

## 2023-02-22 NOTE — PROGRESS NOTES
Patient currently admitted to [facility]. Transition of care outreach postponed for 7 days due to patient's discharge to SNF.

## 2023-02-28 ENCOUNTER — PATIENT OUTREACH (OUTPATIENT)
Dept: CASE MANAGEMENT | Age: 68
End: 2023-02-28

## 2023-02-28 NOTE — PROGRESS NOTES
Patient currently admitted to [facility]. Transition of care outreach postponed for 7 days due to patient's discharge to SNF.

## 2023-03-07 ENCOUNTER — TELEPHONE (OUTPATIENT)
Dept: INTERNAL MEDICINE CLINIC | Age: 68
End: 2023-03-07

## 2023-03-07 NOTE — TELEPHONE ENCOUNTER
I called [facility] and informed them on the message from Dr. Miguel Hampton. They stated understanding and had no further questions.

## 2023-03-07 NOTE — TELEPHONE ENCOUNTER
Pt was referred to [address] [place] [address] for skilled nursing and PT. They would like to know if [facility] would follow these orders.      Phone: [phone]  Fax: [phone]

## 2023-03-08 DIAGNOSIS — S32.402A: Primary | ICD-10-CM

## 2023-03-10 ENCOUNTER — HOSPITAL ENCOUNTER (OUTPATIENT)
Dept: GENERAL RADIOLOGY | Age: 68
Discharge: HOME | End: 2023-03-10
Payer: MEDICARE

## 2023-03-10 ENCOUNTER — OFFICE VISIT (OUTPATIENT)
Dept: ORTHOPEDIC SURGERY | Age: 68
End: 2023-03-10

## 2023-03-10 ENCOUNTER — PATIENT OUTREACH (OUTPATIENT)
Dept: CASE MANAGEMENT | Age: 68
End: 2023-03-10

## 2023-03-10 VITALS
HEART RATE: 70 BPM | DIASTOLIC BLOOD PRESSURE: 72 MMHG | HEIGHT: 72 IN | WEIGHT: 190 LBS | SYSTOLIC BLOOD PRESSURE: 117 MMHG | TEMPERATURE: 97.7 F | BODY MASS INDEX: 25.73 KG/M2 | OXYGEN SATURATION: 96 %

## 2023-03-10 DIAGNOSIS — S32.402A: ICD-10-CM

## 2023-03-10 DIAGNOSIS — S32.402A: Primary | ICD-10-CM

## 2023-03-10 PROCEDURE — 72170 X-RAY EXAM OF PELVIS: CPT

## 2023-03-10 NOTE — PROGRESS NOTES
Identified pt with two pt identifiers (name and ). Reviewed chart in preparation for visit and have obtained necessary documentation. Alan Orantes is a 79 y.o. male  Chief Complaint   Patient presents with    Hip Injury     Visit Vitals  /72 (BP 1 Location: Right upper arm, BP Patient Position: Sitting, BP Cuff Size: Large adult)   Pulse 70   Temp 97.7 °F (36.5 °C) (Temporal)   Ht 6' (1.829 m)   Wt 190 lb (86.2 kg)   SpO2 96%   BMI 25.77 kg/m²     1. Have you been to the ER, urgent care clinic since your last visit? Hospitalized since your last visit? No    2. Have you seen or consulted any other health care providers outside of the [address] [place] [address] since your last visit? Include any pap smears or colon screening.  No

## 2023-03-10 NOTE — PROGRESS NOTES
3/10/2023      CC: left hip pain    HPI:      This is a 79y.o. year old male who presents for a follow up visit. The patient was last seen and diagnosed with left acetabular and pubic ramus fracture. The patient's treatments since the most recent visit have comprised of weight bearing restrictions. The patient has had good relief of the chief complaint. PMH:  Past Medical History:   Diagnosis Date    CAD (coronary artery disease) April 2014    NSTEMI, PCI/NATE RCA    Hypercholesterolemia     Hypertension        PSxHx:  Past Surgical History:   Procedure Laterality Date    HX CHOLECYSTECTOMY  07/17/2019    Laparoscopic cholecystectomy with intraoperative cholangiogram    HX ORTHOPAEDIC      left shoulder, torn tendon    HX ORTHOPAEDIC      right knee X 4    [place] COLONOSCOPY FLX DX W/COLLJ SPEC WHEN PFRMD  12/19/2007    Dr Parmar April 1 polyp repeat 12/2012    [place] UNLISTED PROCEDURE CARDIAC SURGERY      Stent RCA 4/2014       Meds:    Current Outpatient Medications:     amLODIPine (NORVASC) 2.5 mg tablet, Take 1 Tablet by mouth as directed., Disp: , Rfl:     bisacodyL (Dulcolax, bisacodyl,) 10 mg supp, Insert 10 mg into rectum daily as needed for Constipation. , Disp: 10 Each, Rfl: 1    polyethylene glycol (MIRALAX) 17 gram packet, Take 1 Packet by mouth daily. , Disp: 30 Each, Rfl: 0    lancets (Accu-Chek Softclix Lancets) misc, TEST BLOOD SUGAR TWICE DAILY, Disp: 200 Each, Rfl: 3    metFORMIN (GLUCOPHAGE) 500 mg tablet, TAKE 3 TABLETS EVERY DAY WITH DINNER FOR TYPE 2 DIABETES, Disp: 270 Tablet, Rfl: 0    metoprolol succinate (TOPROL-XL) 25 mg XL tablet, TAKE 1 TABLET EVERY DAY FOR HIGH BLOOD PRESSURE AND HEART, Disp: 90 Tablet, Rfl: 0    atorvastatin (LIPITOR) 40 mg tablet, TAKE 1 TABLET EVERY DAY FOR HIGH CHOLESTEROL, Disp: 90 Tablet, Rfl: 0    Droplet Pen Needle 31 gauge x 5/16\" ndle, USE TO INJECT EVERY DAY AS DIRECTED, Disp: 100 Each, Rfl: 3    insulin aspart protamine/insulin aspart (NovoLOG Mix 70-30FlexPen U-100) 100 unit/mL (70-30) inpn, INJECT 25 UNITS SUBCUTANEOUSLY BEFORE BREAKFAST AND SUPPER, Disp: 45 mL, Rfl: 1    Accu-Chek Haydee Plus Meter misc, USE AS DIRECTED, Disp: 1 Each, Rfl: 0    glucose blood VI test strips (Accu-Chek Haydee Plus test strp) strip, TEST BLOOD SUGAR TWICE DAILY, Disp: 200 Strip, Rfl: 5    nitroglycerin (NITROSTAT) 0.4 mg SL tablet, Take 1 tablet under tongue every 5 minutes up to 3 doses prn chest pain., Disp: 1 Bottle, Rfl: 3    aspirin delayed-release 81 mg tablet, Take 1 Tab by mouth daily. , Disp: 80 Tab, Rfl: 3    All:  No Known Allergies    Social Hx:  Social History     Socioeconomic History    Marital status:    Tobacco Use    Smoking status: Former     Packs/day: 0.25     Years: 35.00     Pack years: 8.75     Types: Cigarettes     Quit date: 2014     Years since quittin.3    Smokeless tobacco: Never    Tobacco comments:     Never smoked over a pack per day   Vaping Use    Vaping Use: Never used   Substance and Sexual Activity    Alcohol use: No     Alcohol/week: 0.0 standard drinks    Drug use: No       Family Hx:  Family History   Problem Relation Age of Onset    Heart Disease Mother         CAD    Hypertension Mother     Elevated Lipids Mother     Cancer Father         lung    Mental Retardation Brother     Seizures Brother     Diabetes Brother     Hypertension Brother     Elevated Lipids Brother          Review of Systems:       General: Denies headache, lethargy, fever, weight loss  Ears/Nose/Throat: Denies ear discharge, drainage, nosebleeds, hoarse voice, dental problems  Cardiovascular: Denies chest pain, shortness of breath  Lungs: Denies chest pain, breathing problems, wheezing, pneumonia  Stomach: Denies stomach pain, heartburn, constipation, irritable bowel  Skin: Denies rash, sores, open wounds  Musculoskeletal: improving left hip pain  Genitourinary: Denies dysuria, hematuria, polyuria  Gastrointestinal: Denies constipation, obstipation, diarrhea  Neurological: Denies changes in sight, smell, hearing, taste, seizures. Denies loss of consciousness. Psychiatric: Denies depression, sleep pattern changes, anxiety, change in personality  Endocrine: Denies mood swings, heat or cold intolerance  Hematologic/Lymphatic: Denies anemia, purpura, petechia  Allergic/Immunologic: Denies swelling of throat, pain or swelling at lymph nodes      Physical Examination:    Visit Vitals  /72 (BP 1 Location: Right upper arm, BP Patient Position: Sitting, BP Cuff Size: Large adult)   Pulse 70   Temp 97.7 °F (36.5 °C) (Temporal)   Ht 6' (1.829 m)   Wt 190 lb (86.2 kg)   SpO2 96%   BMI 25.77 kg/m²        General: AOX3, no apparent distress  Psychiatric: mood and affect appropriate  Lungs: breathing is symmetric and unlabored bilaterally  Heart: regular rate and rhythm  Abdomen: no guarding  Head: normocephalic, atraumatic  Skin: No significant abnormalities, good turgor  Sensation intact to light touch: C5-T1 dermatomes  Muscular exam: 5/5 strength in all major muscle groups unless noted in specialty exam.    Extremities        Left lower extremity:  No gross deformity. No restriction to range of motion of the hip, knee, ankle. Muscle bulk is appropriate without wasting. Sensation is intact to light touch in the L1-S1 dermatomes. Capillary refill is less than 2 seconds in the fingers. Strength testing is 5/5 at the major muscle groups of the hip, knee, ankle. Right lower extremity: No gross deformity. No restriction to range of motion of the hip, knee, ankle. Muscle bulk is appropriate without wasting. Sensation is intact to light touch in the L1-S1 dermatomes. Capillary refill is less than 2 seconds in the fingers. Strength testing is 5/5 at the major muscle groups of the hip, knee, ankle. Diagnostics:    Pertinent Diagnostics: Xrays ordered and reviewed by myself indicate healing acetabular and pubic rami fractures on the left.   No significant displacement, no change in acetabulum. Assessment: Left acetabular and pubic ramus fractures  Plan:    He will now be weight bearing as tolerated, continue PT, new script given. We did discuss the risks and benefits of treatment of nonoperative management of fractures. This does include, but are not limited to: stiffness, later displacement of fracture, potential for prolonged immobilization and longer recovery times, possible stiffness of associated joints, skin and deep tissue irritation or breakdown. As with all fractures good clinical outcome is dependent on bone healing, and mobilization of the soft tissues in a regimented and reasonable manner. Follow up in 4 weeks with xrays pelvis and left elbow      Mr. Dayana Flores has a reminder for a \"due or due soon\" health maintenance. I have asked that he contact his primary care provider for follow-up on this health maintenance.

## 2023-03-23 ENCOUNTER — TELEPHONE (OUTPATIENT)
Dept: INTERNAL MEDICINE CLINIC | Age: 68
End: 2023-03-23

## 2023-03-23 NOTE — TELEPHONE ENCOUNTER
Fredy [address] [place] [address] health is calling to let us know that the pt refused to see a nurse to get checked up on. Pt is being treated for a kidney infection and started antibiotics yesterday. He refused care bc he is not feeling well.

## 2023-03-31 NOTE — PROGRESS NOTES
HISTORY OF PRESENT ILLNESS  Magdalene Baeza is a 61 y.o. male. HPI  He presents with complaint of 30 lb weight loss since. December. diabetes mellitus, hypertension with CKD, hyperlipidemia, coronary artery disease, history of prior MI, status post coronary artery stenting    He was in the hospital from July 13 through July 22 for acute pancreatitis. Chart shows a 20 lb weight loss between July 13 and August 5 when he had post op cholecystectomy followup.with surgeon. He has lost another 7 lbs since then, although these were different scales. He had severe pancreatitis as result of gallbladder disease. Appetite has been okay  Diet has changed since hospitalization. No fried food, not red meat, no sodas. He has ahd nausea off an on throughtout the day. He has nocturia 4-5 times at night. Not as much daytime frequency as at night He feels weak. He denies chest pain, dyspnea, abdominal pain , nausea,  diarrhea, constipation, tremor, sweat, fevers, headches. He denies derpression symtoms including  depressed mood, feelings of worthlessness/guilt, difficulty concentrating, hopelessness and recurrent thoughts of death. Yesterday: Breakfast bowl of cereal, lunch tomato sandwich, and dinner 2 tomato sandwiches.      Patient Active Problem List   Diagnosis Code    History of colonoscopy Z98.890    Low back pain M54.5    Hypercholesterolemia E78.00    Osteoarthritis of both knees M17.0    CKD (chronic kidney disease) stage 2, GFR 60-89 ml/min N18.2    Benign hypertension with chronic kidney disease, stage II I12.9, N18.2    Coronary artery disease involving native coronary artery of native heart without angina pectoris I25.10    Uncontrolled type 2 diabetes mellitus without complication, without long-term current use of insulin (HCC) E11.65    Chronic superficial gastritis without bleeding K29.30    Type 2 diabetes with nephropathy (HCC) E11.21    Pancreatitis, acute K85.90    Uncontrolled type II diabetes mellitus ([facility] [address] E11.65    MONET (acute kidney injury) ([facility] [address] N17.9    Acute pancreatitis K85.90     Past Medical History:   Diagnosis Date    CAD (coronary artery disease) 2014    NSTEMI, PCI/NATE RCA    Hypercholesterolemia     Hypertension      Past Surgical History:   Procedure Laterality Date    CARDIAC SURG PROCEDURE UNLIST      Stent RCA 2014    HX CHOLECYSTECTOMY  2019    Laparoscopic cholecystectomy with intraoperative cholangiogram    HX ORTHOPAEDIC      left shoulder, torn tendon    HX ORTHOPAEDIC      right knee X 4    AL COLONOSCOPY FLX DX W/COLLJ SPEC WHEN PFRMD  2007    Dr Maame Gregg 1 polyp repeat 2012     Social History     Socioeconomic History    Marital status:      Spouse name: Not on file    Number of children: Not on file    Years of education: Not on file    Highest education level: Not on file   Tobacco Use    Smoking status: Former Smoker     Packs/day: 0.25     Years: 35.00     Pack years: 8.75     Types: Cigarettes     Last attempt to quit: 2014     Years since quittin.7    Smokeless tobacco: Never Used    Tobacco comment: Never smoked over a pack per day   Substance and Sexual Activity    Alcohol use: No     Alcohol/week: 0.0 standard drinks    Drug use: No     Family History   Problem Relation Age of Onset    Heart Disease Mother         CAD    Hypertension Mother     Elevated Lipids Mother     Cancer Father         lung    Mental Retardation Brother     Seizures Brother     Diabetes Brother     Hypertension Brother     Elevated Lipids Brother      No Known Allergies  Current Outpatient Medications   Medication Sig Dispense Refill    metFORMIN ER (GLUCOPHAGE XR) 500 mg tablet Take 3 Tabs by mouth daily (with dinner).  270 Tab 3    insulin glargine (LANTUS,BASAGLAR) 100 unit/mL (3 mL) inpn Inject 15 units under the skin daily 1 Adjustable Dose Pre-filled Pen Syringe 11    Blood-Glucose Meter monitoring kit Use to check blood sugar twice a day. 1 Kit 0    glucose blood VI test strips (BLOOD GLUCOSE TEST) strip Use to check blood sugar twice a day. 100 Strip 6    lancets misc Use to check blood sugar twice a day 100 Each 5    Insulin Needles, Disposable, 31 gauge x 5/16\" ndle by SubCUTAneous route daily. 50 Pen Needle 5    metoprolol succinate (TOPROL-XL) 25 mg XL tablet TAKE 1 TABLET BY MOUTH ONCE A DAY 90 Tab 3    pantoprazole (PROTONIX) 40 mg tablet pantoprazole 40 mg tablet,delayed release      nitroglycerin (NITROSTAT) 0.4 mg SL tablet Take 1 tablet under tongue every 5 minutes up to 3 doses prn chest pain. 1 Bottle 3    atorvastatin (LIPITOR) 40 mg tablet TAKE 1 TABLET BY MOUTH EVERY EVENING 90 Tab 3    polyethylene glycol (MIRALAX) 17 gram packet Take 1 Packet by mouth daily as needed (constipation).  ondansetron (ZOFRAN ODT) 4 mg disintegrating tablet Take 1 Tab by mouth every eight (8) hours as needed for Nausea. 10 Tab 0    acetaminophen (TYLENOL) 325 mg tablet Take 2 Tabs by mouth every six (6) hours as needed for Pain.  oxymetazoline (AFRIN) 0.05 % nasal spray 2 Sprays two (2) times a day.  amLODIPine (NORVASC) 2.5 mg tablet TAKE 1 TABLET DAILY 90 Tab 3    aspirin delayed-release 81 mg tablet Take 1 Tab by mouth daily. 90 Tab 3             ROS  Visit Vitals  /74 (BP 1 Location: Left arm, BP Patient Position: Sitting)   Pulse 80   Temp 97.9 °F (36.6 °C) (Oral)   Resp 12   Ht 6' 1\" (1.854 m)   Wt 189 lb (85.7 kg)   SpO2 97%   BMI 24.94 kg/m²     Physical Exam   Constitutional: He is oriented to person, place, and time. He appears well-developed and well-nourished. HENT:   Head: Normocephalic and atraumatic. Eyes: No scleral icterus. Neck: Neck supple. Cardiovascular: Normal rate, regular rhythm, S1 normal, S2 normal and normal heart sounds. Exam reveals no gallop. No murmur heard. Pulmonary/Chest: Effort normal. He has no wheezes. He has no rhonchi. He has no rales. Abdominal: Soft.  Normal appearance and bowel sounds are normal. He exhibits no distension, no abdominal bruit and no mass. There is no hepatosplenomegaly. There is no tenderness. There is no CVA tenderness. Neurological: He is alert and oriented to person, place, and time. Skin: Skin is warm, dry and intact. Psychiatric: He has a normal mood and affect. His behavior is normal.     Results for orders placed or performed in visit on 08/26/19   AMB POC GLUCOSE BLOOD, BY GLUCOSE MONITORING DEVICE   Result Value Ref Range    Glucose POC high mg/dL   AMB POC HEMOGLOBIN A1C   Result Value Ref Range    Hemoglobin A1c (POC) 13.3 %       Lab Results   Component Value Date/Time    Hemoglobin A1c 9.2 (H) 07/13/2019 07:24 PM    Hemoglobin A1c (POC) 6.3 12/04/2018 08:50 AM       Results for orders placed or performed in visit on 07/29/19   MICROALBUMIN, UR, RAND W/ MICROALB/CREAT RATIO   Result Value Ref Range    Creatinine, urine 63.2 Not Estab. mg/dL    Microalbumin, urine 11.3 Not Estab. ug/mL    Microalb/Creat ratio (ug/mg creat.) 17.9 0.0 - 30.0 mg/g creat   TSH 3RD GENERATION   Result Value Ref Range    TSH 2.860 0.450 - 4.500 uIU/mL   HEPATIC FUNCTION PANEL   Result Value Ref Range    Protein, total 6.4 6.0 - 8.5 g/dL    Albumin 3.3 (L) 3.6 - 4.8 g/dL    Bilirubin, total 0.3 0.0 - 1.2 mg/dL    Bilirubin, direct 0.16 0.00 - 0.40 mg/dL    Alk. phosphatase 103 39 - 117 IU/L    AST (SGOT) 26 0 - 40 IU/L    ALT (SGPT) 54 (H) 0 - 44 IU/L     CT Results (most recent):  Results from [place] encounter on 07/13/19   CT ABD PELV W CONT    Narrative EXAM: CT ABD PELV W CONT    INDICATION: Increasing abdominal pain in the setting of acute pancreatitis. EMR  is not available at this time. COMPARISON: MRCP on 7/14/2019. CONTRAST: Postcontrast Isovue-370. TECHNIQUE:   Following the uneventful intravenous administration of contrast, thin axial  images were obtained through the abdomen and pelvis.  Coronal and sagittal  reconstructions were generated. Oral contrast was not administered. CT dose  reduction was achieved through use of a standardized protocol tailored for this  examination and automatic exposure control for dose modulation. FINDINGS:   LUNG BASES: New trace bilateral pleural effusions. Increased bibasilar dependent  and compressive atelectasis. INCIDENTALLY IMAGED HEART AND MEDIASTINUM: Unremarkable. LIVER: No mass or biliary dilatation. GALLBLADDER: Cholecystectomy is new. CBD is not dilated. SPLEEN: Normal size and enhancement. Splenic vein is patent. PANCREAS: Edema within and around the pancreas is increased. No necrosis. No  drainable fluid collection. No pseudocyst formation. Hepatic duct is not  dilated. ADRENALS: Unremarkable. KIDNEYS: No mass, calculus, or hydronephrosis. STOMACH: Nondistended. SMALL BOWEL: Secondary inflammation of the duodenum. No bowel obstruction. COLON: Gas-filled dilated transverse colon measures 8.5 cm. No pneumatosis. No  mural thickening or abrupt transition. APPENDIX: Unremarkable. PERITONEUM: Trace ascites is increased. No pneumoperitoneum. RETROPERITONEUM: No lymphadenopathy or aortic aneurysm. REPRODUCTIVE ORGANS: Prostate gland is not enlarged. URINARY BLADDER: No mass or calculus. BONES: Grade 2 anterolisthesis of L5-S1 secondary to bilateral L5 spondylolysis. No aggressive bone lesion. ADDITIONAL COMMENTS: N/A      Impression IMPRESSION:    1. Increased inflammation associated with acute pancreatitis. No drainable fluid  collection. No evidence of necrosis. 2. New dilated colon most likely represents ileus. Patient is at risk for wall  tension ischemia. No pneumatosis or pneumoperitoneum at this time. 3. New trace bilateral pleural effusions. Increased bibasilar atelectasis. 4. Cholecystectomy. Normal CBD. 5. L5-S1 grade 2 anterolisthesis due to bilateral L5 spondylolysis. ASSESSMENT and PLAN    ICD-10-CM ICD-9-CM    1.  Uncontrolled type 2 diabetes mellitus without complication, without long-term current use of insulin ([facility]) E11.65 250.02 metFORMIN ER (GLUCOPHAGE XR) 500 mg tablet      AMB POC GLUCOSE BLOOD, BY GLUCOSE MONITORING DEVICE      AMB POC HEMOGLOBIN A1C      insulin glargine (LANTUS,BASAGLAR) 100 unit/mL (3 mL) inpn      Blood-Glucose Meter monitoring kit      glucose blood VI test strips (BLOOD GLUCOSE TEST) strip      lancets misc      Insulin Needles, Disposable, 31 gauge x 5/16\" ndle   2. Weight loss, unintentional M16.5 750.28 METABOLIC PANEL, COMPREHENSIVE      URINALYSIS W/ RFLX MICROSCOPIC      SED RATE (ESR)      CBC WITH AUTOMATED DIFF     Diagnoses and all orders for this visit:    1. Uncontrolled type 2 diabetes mellitus without complication, without long-term current use of insulin ([address]   Control has worsened since pancreatitis and cholecystectomy. . Pancreatitis might have compromised insulin production. Explained that insulin may or may not allow pancreas to rest and resume production of more insulin. Discussed diet and need for protein. My nurse will meet with him this afternoon and will refer to pharmacist as well. He will contact us with blood sugars every 5 days for insulin adjustment. -     Continue metFORMIN ER (GLUCOPHAGE XR) 500 mg tablet; Take 3 Tabs by mouth daily (with dinner). -     AMB POC GLUCOSE BLOOD, BY GLUCOSE MONITORING DEVICE  -     AMB POC HEMOGLOBIN A1C  -     insulin glargine (LANTUS,BASAGLAR) 100 unit/mL (3 mL) inpn; Inject 15 units under the skin daily  -     Blood-Glucose Meter monitoring kit; Use to check blood sugar twice a day. -     glucose blood VI test strips (BLOOD GLUCOSE TEST) strip; Use to check blood sugar twice a day. -     lancets misc; Use to check blood sugar twice a day  -     Insulin Needles, Disposable, 31 gauge x 5/16\" ndle; by SubCUTAneous route daily. 2. Weight loss, unintentional  Almost surely due to uncontrolled diabetes.    -     METABOLIC PANEL, COMPREHENSIVE  -     URINALYSIS W/ RFLX MICROSCOPIC  -     SED RATE (ESR)  -     CBC WITH AUTOMATED DIFF      Follow-up and Dispositions    · Return in about 1 month (around 9/26/2019), or Diabetes, weight loss . lab results and schedule of future lab studies reviewed with patient  reviewed diet, exercise and weight control  I have discussed the diagnosis, evaluation and treatment options and the intended plan with the patient. Patient understands and is in agreement. The patient has received an after-visit summary and questions were answered concerning future plans. I have discussed side effects and warnings of any new medications with the patient as well. DISCHARGE

## 2023-04-03 PROBLEM — Z79.4 TYPE 2 DIABETES MELLITUS WITHOUT COMPLICATION, WITH LONG-TERM CURRENT USE OF INSULIN (HCC): Status: RESOLVED | Noted: 2017-08-29 | Resolved: 2022-01-25

## 2023-04-03 PROBLEM — E11.9 TYPE 2 DIABETES MELLITUS WITHOUT COMPLICATION, WITH LONG-TERM CURRENT USE OF INSULIN (HCC): Status: RESOLVED | Noted: 2017-08-29 | Resolved: 2022-01-25

## 2023-04-14 ENCOUNTER — HOSPITAL ENCOUNTER (OUTPATIENT)
Dept: GENERAL RADIOLOGY | Age: 68
Discharge: HOME | End: 2023-04-14
Payer: MEDICARE

## 2023-04-14 DIAGNOSIS — S32.402A: ICD-10-CM

## 2023-04-14 DIAGNOSIS — M25.522 LEFT ELBOW PAIN: ICD-10-CM

## 2023-04-14 PROCEDURE — 73080 X-RAY EXAM OF ELBOW: CPT

## 2023-04-14 PROCEDURE — 72170 X-RAY EXAM OF PELVIS: CPT

## 2023-05-25 ENCOUNTER — ANESTHESIA (OUTPATIENT)
Facility: HOSPITAL | Age: 68
End: 2023-05-25
Payer: MEDICARE

## 2023-05-25 ENCOUNTER — HOSPITAL ENCOUNTER (OUTPATIENT)
Facility: HOSPITAL | Age: 68
Setting detail: OUTPATIENT SURGERY
Discharge: HOME | End: 2023-05-25
Attending: INTERNAL MEDICINE | Admitting: INTERNAL MEDICINE
Payer: MEDICARE

## 2023-05-25 ENCOUNTER — ANESTHESIA EVENT (OUTPATIENT)
Facility: HOSPITAL | Age: 68
End: 2023-05-25
Payer: MEDICARE

## 2023-05-25 VITALS
SYSTOLIC BLOOD PRESSURE: 102 MMHG | OXYGEN SATURATION: 100 % | HEART RATE: 71 BPM | RESPIRATION RATE: 19 BRPM | DIASTOLIC BLOOD PRESSURE: 65 MMHG | TEMPERATURE: 97.8 F

## 2023-05-25 LAB
GLUCOSE BLD STRIP.AUTO-MCNC: 105 MG/DL (ref 65–117)
SERVICE CMNT-IMP: NORMAL

## 2023-05-25 PROCEDURE — 7100000000 HC PACU RECOVERY - FIRST 15 MIN: Performed by: INTERNAL MEDICINE

## 2023-05-25 PROCEDURE — 82962 GLUCOSE BLOOD TEST: CPT

## 2023-05-25 PROCEDURE — 6360000002 HC RX W HCPCS: Performed by: NURSE ANESTHETIST, CERTIFIED REGISTERED

## 2023-05-25 PROCEDURE — 7100000010 HC PHASE II RECOVERY - FIRST 15 MIN: Performed by: INTERNAL MEDICINE

## 2023-05-25 PROCEDURE — 2580000003 HC RX 258: Performed by: INTERNAL MEDICINE

## 2023-05-25 PROCEDURE — 3600007502: Performed by: INTERNAL MEDICINE

## 2023-05-25 PROCEDURE — 88305 TISSUE EXAM BY PATHOLOGIST: CPT

## 2023-05-25 PROCEDURE — 2500000003 HC RX 250 WO HCPCS: Performed by: NURSE ANESTHETIST, CERTIFIED REGISTERED

## 2023-05-25 PROCEDURE — 7100000011 HC PHASE II RECOVERY - ADDTL 15 MIN: Performed by: INTERNAL MEDICINE

## 2023-05-25 PROCEDURE — 3700000001 HC ADD 15 MINUTES (ANESTHESIA): Performed by: INTERNAL MEDICINE

## 2023-05-25 PROCEDURE — 2709999900 HC NON-CHARGEABLE SUPPLY: Performed by: INTERNAL MEDICINE

## 2023-05-25 PROCEDURE — 3600007512: Performed by: INTERNAL MEDICINE

## 2023-05-25 PROCEDURE — 3700000000 HC ANESTHESIA ATTENDED CARE: Performed by: INTERNAL MEDICINE

## 2023-05-25 RX ORDER — LIDOCAINE HYDROCHLORIDE 20 MG/ML
INJECTION, SOLUTION EPIDURAL; INFILTRATION; INTRACAUDAL; PERINEURAL PRN
Status: DISCONTINUED | OUTPATIENT
Start: 2023-05-25 | End: 2023-05-25 | Stop reason: SDUPTHER

## 2023-05-25 RX ORDER — SODIUM CHLORIDE 9 MG/ML
25 INJECTION, SOLUTION INTRAVENOUS PRN
Status: DISCONTINUED | OUTPATIENT
Start: 2023-05-25 | End: 2023-05-25 | Stop reason: HOSPADM

## 2023-05-25 RX ORDER — SODIUM CHLORIDE 0.9 % (FLUSH) 0.9 %
5-40 SYRINGE (ML) INJECTION PRN
Status: DISCONTINUED | OUTPATIENT
Start: 2023-05-25 | End: 2023-05-25 | Stop reason: HOSPADM

## 2023-05-25 RX ORDER — SODIUM CHLORIDE 0.9 % (FLUSH) 0.9 %
5-40 SYRINGE (ML) INJECTION EVERY 12 HOURS SCHEDULED
Status: DISCONTINUED | OUTPATIENT
Start: 2023-05-25 | End: 2023-05-25 | Stop reason: HOSPADM

## 2023-05-25 RX ADMIN — PROPOFOL 50 MG: 10 INJECTION, EMULSION INTRAVENOUS at 09:16

## 2023-05-25 RX ADMIN — PROPOFOL 100 MG: 10 INJECTION, EMULSION INTRAVENOUS at 09:04

## 2023-05-25 RX ADMIN — PROPOFOL 100 MG: 10 INJECTION, EMULSION INTRAVENOUS at 08:57

## 2023-05-25 RX ADMIN — LIDOCAINE HYDROCHLORIDE 80 MG: 20 INJECTION, SOLUTION EPIDURAL; INFILTRATION; INTRACAUDAL; PERINEURAL at 08:57

## 2023-05-25 RX ADMIN — SODIUM CHLORIDE 25 ML: 9 INJECTION, SOLUTION INTRAVENOUS at 08:52

## 2023-05-25 RX ADMIN — PROPOFOL 100 MG: 10 INJECTION, EMULSION INTRAVENOUS at 09:10

## 2023-05-25 NOTE — ANESTHESIA PRE PROCEDURE
Department of Anesthesiology  Preprocedure Note       Name:  Nupur Dickson   Age:  79 y.o.  :  1955                                          MRN:  [id number]         Date:  2023      Surgeon: Lizandro Martinez):  Immanuel Jain MD    Procedure: Procedure(s):  COLONOSCOPY WITH BIOPSY/POLYPECTOMY/EGD WITH BIOPSY  COLONOSCOPY WITH BIOPSY    Medications prior to admission:   Prior to Admission medications    Medication Sig Start Date End Date Taking? Authorizing Provider   amLODIPine (NORVASC) 2.5 MG tablet Take 1 tablet by mouth daily    Ar Automatic Reconciliation   aspirin 81 MG EC tablet Take by mouth daily 17   Ar Automatic Reconciliation   atorvastatin (LIPITOR) 40 MG tablet Take 1 tablet by mouth daily 22   Ar Automatic Reconciliation   bisacodyl (DULCOLAX) 10 MG suppository Place 1 suppository rectally daily as needed 23   Ar Automatic Reconciliation   insulin aspart protamine-insulin aspart (NOVOLOG 70/30) (70-30) 100 UNIT/ML injection 25 Units 22   Ar Automatic Reconciliation   metFORMIN (GLUCOPHAGE) 500 MG tablet 3 tablets every evening 22   Ar Automatic Reconciliation   metoprolol succinate (TOPROL XL) 25 MG extended release tablet 1 tablet daily 22   Ar Automatic Reconciliation   nitroGLYCERIN (NITROSTAT) 0.4 MG SL tablet Take 1 tablet under tongue every 5 minutes up to 3 doses prn chest pain. 20   Ar Automatic Reconciliation   polyethylene glycol (GLYCOLAX) 17 GM/SCOOP powder Take by mouth daily 23   Ar Automatic Reconciliation       Current medications:    No current facility-administered medications for this encounter.      Current Outpatient Medications   Medication Sig Dispense Refill    amLODIPine (NORVASC) 2.5 MG tablet Take 1 tablet by mouth daily      aspirin 81 MG EC tablet Take by mouth daily      atorvastatin (LIPITOR) 40 MG tablet Take 1 tablet by mouth daily      bisacodyl (DULCOLAX) 10 MG suppository Place 1 suppository rectally daily as

## 2023-05-25 NOTE — OP NOTE
NAME:  Hanford Kussmaul   :   1955   MRN:   [id number]     Date/Time:  2023 9:26 AM    Esophagogastroduodenoscopy (EGD) Procedure Note    Procedure: Esophagogastroduodenoscopy with biopsy    Indication: Clyaton's/esophageal ulcer  Pre-operative Diagnosis: see indication above  Post-operative Diagnosis: see findings below  :  Harjinder Tate MD  Referring Provider:   --Claudean Grosser, MD    Exam:  Airway: clear, no airway problems anticipated  Heart: RRR, without gallops or rubs  Lungs: clear bilaterally without wheezes, crackles, or rhonchi  Abdomen: soft, nontender, nondistended, bowel sounds present  Mental Status: awake, alert and oriented to person, place and time     Anethesia/Sedation:  MAC anesthesia Propofol  Procedure Details   After informed consent was obtained for the procedure, with all risks and benefits of procedure explained the patient was taken to the endoscopy suite and placed in the left lateral decubitus position. Following sequential administration of sedation as per above, the [id number] gastroscope was inserted into the mouth and advanced under direct vision to second portion of the duodenum. A careful inspection was made as the gastroscope was withdrawn, including a retroflexed view of the proximal stomach; findings and interventions are described below. Findings:  Normal proximal and mid esophagus  Mucosal changes suggestive of Clayton's esophagus, C0M1 per [place] criteria. Biopsied  Mild, segmental, non-erosive gastropathy in body and antrum. Biopsied  Stomach otherwise normal, including retroflexion  Normal duodenal bulb and 2nd portion of the duodenum    Therapies:  1. Biopsies    Specimens: 1. Gastric 2. GE junction    EBL:  None. Complications:   None; patient tolerated the procedure well. Impression:    Normal proximal and mid esophagus  Mucosal changes suggestive of Clayton's esophagus, C0M1 per [place] criteria.

## 2023-05-25 NOTE — OP NOTE
NAME:  Nupur Dickson   :   1955   MRN:   [id number]     Date/Time:  2023 9:31 AM    Colonoscopy Operative Report    Procedure Type:   Colonoscopy with polypectomy (cold snare)     Indications:     Personal history of colon polyps (screening only)  Pre-operative Diagnosis: see indication above  Post-operative Diagnosis:  See findings below  :  Ney Estrella MD  Referring Provider: --Eladio Hdez MD    Exam:  Airway: clear, no airway problems anticipated  Heart: RRR, without gallops or rubs  Lungs: clear bilaterally without wheezes, crackles, or rhonchi  Abdomen: soft, nontender, nondistended, bowel sounds present  Mental Status: awake, alert and oriented to person, place and time    Sedation:  MAC anesthesia Propofol  Procedure Details:  After informed consent was obtained with all risks and benefits of procedure explained and preoperative exam completed, the patient was taken to the endoscopy suite and placed in the left lateral decubitus position. Upon sequential sedation as per above, a digital rectal exam was performed demonstrating internal hemorrhoids. The Olympus videocolonoscope  was inserted in the rectum and carefully advanced to the cecum, which was identified by the ileocecal valve and appendiceal orifice. The quality of preparation was good. The colonoscope was slowly withdrawn with careful evaluation between folds. Retroflexion in the rectum was completed demonstrating internal hemorrhoids. Findings:   10 mm sessile polyp at hepatic flexure. Removed by cold snare polypectomy  4 mm sessile polyp in transverse colon. Removed by cold snare polypectomy  4 mm sessile polyp in sigmoid colon. Removed by cold snare polypectomy  Moderate sigmoid diverticulosis  Medium sized internal hemorrhoids seen on retroflexion  Otherwise normal colonoscopy through to the cecum    Specimen Removed:  1. Hepatic flexure polyp 2. Transverse colon polyp 3.  Sigmoid colon

## 2023-05-25 NOTE — H&P
Gastroenterology Outpatient History and Physical    Patient: Elena Soriano    Physician: Lv Ha MD    Chief Complaint: Clayton's  History of Present Illness: H/o colon polyps    History:  Past Medical History:   Diagnosis Date    CAD (coronary artery disease) 2014    NSTEMI, PCI/[place] RCA    Diabetes mellitus ([address]     Hypercholesterolemia     Hypertension       Past Surgical History:   Procedure Laterality Date    CHOLECYSTECTOMY  2019    Laparoscopic cholecystectomy with intraoperative cholangiogram    COLONOSCOPY FLX DX W/COLLJ SPEC WHEN PFRMD  2007    Dr Murl Leventhal 1 polyp repeat 2012    ORTHOPEDIC SURGERY      left shoulder, torn tendon    ORTHOPEDIC SURGERY      right knee X 4    MD UNLISTED PROCEDURE CARDIAC SURGERY      Stent RCA 2014      Social History     Socioeconomic History    Marital status:      Spouse name: None    Number of children: None    Years of education: None    Highest education level: None   Tobacco Use    Smoking status: Former     Packs/day: 0.25     Types: Cigarettes     Quit date: 2014     Years since quittin.5    Smokeless tobacco: Never    Tobacco comments:     Quit smoking: Never smoked over a pack per day   Vaping Use    Vaping Use: Never used   Substance and Sexual Activity    Alcohol use: No     Alcohol/week: 0.0 standard drinks    Drug use: No      Family History   Problem Relation Age of Onset    Mental Retardation Brother     Seizures Brother     Diabetes Brother     Hypertension Brother     Elevated Lipids Brother     Cancer Father         lung    Elevated Lipids Mother     Heart Disease Mother         CAD    Hypertension Mother       Patient Active Problem List   Diagnosis    CKD (chronic kidney disease) stage 2, GFR 60-89 ml/min    Osteoarthritis of both knees    Type 2 diabetes mellitus with stage 2 chronic kidney disease, with long-term current use of insulin (HCC)    Coronary artery disease involving native coronary artery of

## 2023-05-25 NOTE — PROGRESS NOTES
Endoscopy Case End Note:     6032: EGD: Procedure scope was pre-cleaned, per protocol, at bedside by LT.       3068: COLON: Procedure scope was pre-cleaned, per protocol, at bedside by LT. Report received from anesthesia. See anesthesia flowsheet for intra-procedure vital signs and events. Belongings remain under stretcher with patient.

## 2023-05-25 NOTE — DISCHARGE INSTRUCTIONS
have new or worse symptoms. You are losing weight. You do not get better as expected. Where can you learn more? Go to http://www.woods.com/ and enter Z536 to learn more about \"Gastritis: Care Instructions. \"  Current as of: June 6, 2022               Content Version: 13.6  © 6349-8262 [employer]. Care instructions adapted under license by [facility] ([facility]). If you have questions about a medical condition or this instruction, always ask your healthcare professional. [place] [address] any warranty or liability for your use of this information. Learning About Diverticulosis and Diverticulitis  What are diverticulosis and diverticulitis? In diverticulosis and diverticulitis, pouches called diverticula form in the wall of the large intestine, or colon. In diverticulosis, the pouches do not cause any pain or other symptoms. In diverticulitis, the pouches get inflamed or infected and cause symptoms. Doctors aren't sure what causes these pouches in the colon. But they think that a low-fiber diet may play a role. A low-fiber diet can cause small, hard stools. This means it takes more pressure in the colon to move stools out of the body. This puts more pressure on the walls of the colon. The pressure from this may cause pouches to form in weak spots along the colon. What are the symptoms? In diverticulosis, most people don't have symptoms. In diverticulitis, symptoms may last from a few hours to a week or more. They include:  Belly pain. This is usually in the lower left side. It is sometimes worse when you move. This is the most common symptom. Fever and chills. Bloating and gas. Diarrhea or constipation. Nausea and sometimes vomiting. Not feeling like eating. If the pouches bleed, it is called diverticular bleeding. How can you prevent diverticulitis? You may be able to lower your chance of getting diverticulitis.  You can do this by taking steps

## 2023-05-25 NOTE — ANESTHESIA POSTPROCEDURE EVALUATION
Department of Anesthesiology  Postprocedure Note    Patient: Tay Locke  MRN: [id number]  YOB: 1955  Date of evaluation: 5/25/2023      Procedure Summary     Date: 05/25/23 Room / Location: [facility] ENDO 04 / [facility] ENDOSCOPY    Anesthesia Start: 4016 Anesthesia Stop: 9831    Procedures:       COLONOSCOPY WITH BIOPSY/POLYPECTOMY/EGD WITH BIOPSY (Upper GI Region)      COLONOSCOPY WITH BIOPSY (Lower GI Region) Diagnosis:       Clayton's esophagus without dysplasia      Hx of colonic polyps      (Clayton's esophagus without dysplasia [K22.70])      (Hx of colonic polyps [Z86.010])    Surgeons: Cherylene Marin, MD Responsible Provider: Iglesia Mendosa MD    Anesthesia Type: MAC ASA Status: 3          Anesthesia Type: MAC    Antonia Phase I: Antonia Score: 10    Antonia Phase II: Antonia Score: 10      Anesthesia Post Evaluation    Patient location during evaluation: bedside  Patient participation: complete - patient participated  Level of consciousness: awake and alert  Airway patency: patent  Nausea & Vomiting: no nausea and no vomiting  Complications: no  Cardiovascular status: hemodynamically stable  Respiratory status: acceptable  Hydration status: stable

## 2023-06-19 NOTE — PROGRESS NOTES
Transition of care outreach patient discharged from [facility] 3/2/23. Patient currently at [facility]. Patient attended follow up with Dr. Alicia Reardon 2/17/22. Patient has graduated from the Transitions of Care Coordination  program on 03/10/23 . Patient/family has the ability to self-manage at this time Care management goals have been completed. Patient was not referred to the [facility] team for further management. Patient has Care Transition Nurse's contact information for any further questions, concerns, or needs.   Patients upcoming visits:    Future Appointments   Date Time Provider Gosia Rogers   4/14/2023 11:00 AM Linzy Simmonds, DO [facility] BS AMB Intermediate Repair And Graft Additional Text (Will Appearing After The Standard Complex Repair Text): The intermediate repair was not sufficient to completely close the primary defect. The remaining additional defect was repaired with the graft mentioned below.

## 2023-06-22 RX ORDER — INSULIN ASPART 100 [IU]/ML
25 INJECTION, SUSPENSION SUBCUTANEOUS 2 TIMES DAILY WITH MEALS
Qty: 45 ML | Refills: 1 | Status: SHIPPED | OUTPATIENT
Start: 2023-06-22 | End: 2023-06-23 | Stop reason: SDUPTHER

## 2023-06-22 NOTE — TELEPHONE ENCOUNTER
PCP: Hadley Love MD     Last appt: 1/25/2022    No future appointments.        Requested Prescriptions     Pending Prescriptions Disp Refills    NOVOLOG MIX 70/30 FLEXPEN injection [Pharmacy Med Name: Stephanie Mixonack MIX 70/30 PREFILLED FLEXPEN (70-30) 100 UNIT/ML Suspension Pen-injector] 45 mL      Sig: INJECT 25 UNITS SUBCUTANEOUSLY BEFORE BREAKFAST AND SUPPER - (NEED MD APPOINTMENT)

## 2023-06-23 DIAGNOSIS — Z79.4 TYPE 2 DIABETES MELLITUS WITH STAGE 2 CHRONIC KIDNEY DISEASE, WITH LONG-TERM CURRENT USE OF INSULIN (HCC): Primary | ICD-10-CM

## 2023-06-23 DIAGNOSIS — N18.2 TYPE 2 DIABETES MELLITUS WITH STAGE 2 CHRONIC KIDNEY DISEASE, WITH LONG-TERM CURRENT USE OF INSULIN (HCC): Primary | ICD-10-CM

## 2023-06-23 DIAGNOSIS — E11.22 TYPE 2 DIABETES MELLITUS WITH STAGE 2 CHRONIC KIDNEY DISEASE, WITH LONG-TERM CURRENT USE OF INSULIN (HCC): Primary | ICD-10-CM

## 2023-06-26 DIAGNOSIS — E11.22 TYPE 2 DIABETES MELLITUS WITH STAGE 2 CHRONIC KIDNEY DISEASE, WITH LONG-TERM CURRENT USE OF INSULIN (HCC): ICD-10-CM

## 2023-06-26 DIAGNOSIS — N18.2 TYPE 2 DIABETES MELLITUS WITH STAGE 2 CHRONIC KIDNEY DISEASE, WITH LONG-TERM CURRENT USE OF INSULIN (HCC): ICD-10-CM

## 2023-06-26 DIAGNOSIS — Z79.4 TYPE 2 DIABETES MELLITUS WITH STAGE 2 CHRONIC KIDNEY DISEASE, WITH LONG-TERM CURRENT USE OF INSULIN (HCC): ICD-10-CM

## 2023-07-03 ENCOUNTER — TELEPHONE (OUTPATIENT)
Facility: CLINIC | Age: 68
End: 2023-07-03

## 2023-07-03 DIAGNOSIS — E78.00 HYPERCHOLESTEROLEMIA: ICD-10-CM

## 2023-07-03 DIAGNOSIS — Z79.4 TYPE 2 DIABETES MELLITUS WITH STAGE 2 CHRONIC KIDNEY DISEASE, WITH LONG-TERM CURRENT USE OF INSULIN (HCC): Primary | ICD-10-CM

## 2023-07-03 DIAGNOSIS — N18.2 TYPE 2 DIABETES MELLITUS WITH STAGE 2 CHRONIC KIDNEY DISEASE, WITH LONG-TERM CURRENT USE OF INSULIN (HCC): Primary | ICD-10-CM

## 2023-07-03 DIAGNOSIS — E11.22 TYPE 2 DIABETES MELLITUS WITH STAGE 2 CHRONIC KIDNEY DISEASE, WITH LONG-TERM CURRENT USE OF INSULIN (HCC): Primary | ICD-10-CM

## 2023-07-03 NOTE — TELEPHONE ENCOUNTER
----- Message from Felix Alegre sent at 7/3/2023 12:54 PM EDT -----  Subject: Referral Request    Reason for referral request? Patient would like to know if he should get   his labs done prior to his appointment. Provider patient wants to be referred to(if known):     Provider Phone Number(if known):     Additional Information for Provider?   ---------------------------------------------------------------------------  --------------  Misty Washington Carl    [phone]; OK to leave message on voicemail  ---------------------------------------------------------------------------  --------------

## 2023-07-20 DIAGNOSIS — N18.2 TYPE 2 DIABETES MELLITUS WITH STAGE 2 CHRONIC KIDNEY DISEASE, WITH LONG-TERM CURRENT USE OF INSULIN (HCC): ICD-10-CM

## 2023-07-20 DIAGNOSIS — E78.00 HYPERCHOLESTEROLEMIA: ICD-10-CM

## 2023-07-20 DIAGNOSIS — E11.22 TYPE 2 DIABETES MELLITUS WITH STAGE 2 CHRONIC KIDNEY DISEASE, WITH LONG-TERM CURRENT USE OF INSULIN (HCC): ICD-10-CM

## 2023-07-20 DIAGNOSIS — Z79.4 TYPE 2 DIABETES MELLITUS WITH STAGE 2 CHRONIC KIDNEY DISEASE, WITH LONG-TERM CURRENT USE OF INSULIN (HCC): ICD-10-CM

## 2023-07-24 ENCOUNTER — TELEPHONE (OUTPATIENT)
Facility: CLINIC | Age: 68
End: 2023-07-24

## 2023-07-25 LAB
ALBUMIN SERPL-MCNC: 4.5 G/DL (ref 3.9–4.9)
ALBUMIN/CREAT UR: 24 MG/G CREAT (ref 0–29)
ALBUMIN/GLOB SERPL: 1.9 {RATIO} (ref 1.2–2.2)
ALP SERPL-CCNC: 103 IU/L (ref 44–121)
ALT SERPL-CCNC: 23 IU/L (ref 0–44)
AST SERPL-CCNC: 21 IU/L (ref 0–40)
BASOPHILS # BLD AUTO: 0.1 X10E3/UL (ref 0–0.2)
BASOPHILS NFR BLD AUTO: 1 %
BILIRUB SERPL-MCNC: 0.2 MG/DL (ref 0–1.2)
BUN SERPL-MCNC: 18 MG/DL (ref 8–27)
BUN/CREAT SERPL: 14 (ref 10–24)
CALCIUM SERPL-MCNC: 8.7 MG/DL (ref 8.6–10.2)
CHLORIDE SERPL-SCNC: 103 MMOL/L (ref 96–106)
CHOLEST SERPL-MCNC: 95 MG/DL (ref 100–199)
CO2 SERPL-SCNC: 22 MMOL/L (ref 20–29)
CREAT SERPL-MCNC: 1.31 MG/DL (ref 0.76–1.27)
CREAT UR-MCNC: 218.4 MG/DL
EGFRCR SERPLBLD CKD-EPI 2021: 60 ML/MIN/1.73
EOSINOPHIL # BLD AUTO: 0.2 X10E3/UL (ref 0–0.4)
EOSINOPHIL NFR BLD AUTO: 3 %
ERYTHROCYTE [DISTWIDTH] IN BLOOD BY AUTOMATED COUNT: 13.4 % (ref 11.6–15.4)
GLOBULIN SER CALC-MCNC: 2.4 G/DL (ref 1.5–4.5)
GLUCOSE SERPL-MCNC: 145 MG/DL (ref 70–99)
HBA1C MFR BLD: 12.8 % (ref 4.8–5.6)
HCT VFR BLD AUTO: 37.9 % (ref 37.5–51)
HDLC SERPL-MCNC: 37 MG/DL
HGB BLD-MCNC: 13 G/DL (ref 13–17.7)
IMM GRANULOCYTES # BLD AUTO: 0 X10E3/UL (ref 0–0.1)
IMM GRANULOCYTES NFR BLD AUTO: 0 %
LDLC SERPL CALC-MCNC: 38 MG/DL (ref 0–99)
LYMPHOCYTES # BLD AUTO: 1.9 X10E3/UL (ref 0.7–3.1)
LYMPHOCYTES NFR BLD AUTO: 30 %
MCH RBC QN AUTO: 31.6 PG (ref 26.6–33)
MCHC RBC AUTO-ENTMCNC: 34.3 G/DL (ref 31.5–35.7)
MCV RBC AUTO: 92 FL (ref 79–97)
MICROALBUMIN UR-MCNC: 52.7 UG/ML
MONOCYTES # BLD AUTO: 0.5 X10E3/UL (ref 0.1–0.9)
MONOCYTES NFR BLD AUTO: 7 %
NEUTROPHILS # BLD AUTO: 3.7 X10E3/UL (ref 1.4–7)
NEUTROPHILS NFR BLD AUTO: 59 %
PLATELET # BLD AUTO: 176 X10E3/UL (ref 150–450)
POTASSIUM SERPL-SCNC: 3.8 MMOL/L (ref 3.5–5.2)
PROT SERPL-MCNC: 6.9 G/DL (ref 6–8.5)
RBC # BLD AUTO: 4.12 X10E6/UL (ref 4.14–5.8)
SODIUM SERPL-SCNC: 139 MMOL/L (ref 134–144)
TRIGL SERPL-MCNC: 107 MG/DL (ref 0–149)
VLDLC SERPL CALC-MCNC: 20 MG/DL (ref 5–40)
WBC # BLD AUTO: 6.3 X10E3/UL (ref 3.4–10.8)

## 2023-07-27 ENCOUNTER — OFFICE VISIT (OUTPATIENT)
Facility: CLINIC | Age: 68
End: 2023-07-27
Payer: MEDICARE

## 2023-07-27 VITALS
TEMPERATURE: 97.8 F | HEART RATE: 59 BPM | HEIGHT: 73 IN | SYSTOLIC BLOOD PRESSURE: 102 MMHG | RESPIRATION RATE: 16 BRPM | WEIGHT: 187 LBS | OXYGEN SATURATION: 95 % | BODY MASS INDEX: 24.78 KG/M2 | DIASTOLIC BLOOD PRESSURE: 66 MMHG

## 2023-07-27 DIAGNOSIS — E11.22 TYPE 2 DIABETES MELLITUS WITH STAGE 2 CHRONIC KIDNEY DISEASE, WITH LONG-TERM CURRENT USE OF INSULIN (HCC): Primary | ICD-10-CM

## 2023-07-27 DIAGNOSIS — N18.2 TYPE 2 DIABETES MELLITUS WITH STAGE 2 CHRONIC KIDNEY DISEASE, WITH LONG-TERM CURRENT USE OF INSULIN (HCC): Primary | ICD-10-CM

## 2023-07-27 DIAGNOSIS — Z79.4 TYPE 2 DIABETES MELLITUS WITH STAGE 2 CHRONIC KIDNEY DISEASE, WITH LONG-TERM CURRENT USE OF INSULIN (HCC): Primary | ICD-10-CM

## 2023-07-27 DIAGNOSIS — N18.2 BENIGN HYPERTENSION WITH CHRONIC KIDNEY DISEASE, STAGE II: ICD-10-CM

## 2023-07-27 DIAGNOSIS — I12.9 BENIGN HYPERTENSION WITH CHRONIC KIDNEY DISEASE, STAGE II: ICD-10-CM

## 2023-07-27 LAB — HBA1C MFR BLD: 13.3 %

## 2023-07-27 PROCEDURE — 1036F TOBACCO NON-USER: CPT | Performed by: INTERNAL MEDICINE

## 2023-07-27 PROCEDURE — 2022F DILAT RTA XM EVC RTNOPTHY: CPT | Performed by: INTERNAL MEDICINE

## 2023-07-27 PROCEDURE — 99214 OFFICE O/P EST MOD 30 MIN: CPT | Performed by: INTERNAL MEDICINE

## 2023-07-27 PROCEDURE — 3046F HEMOGLOBIN A1C LEVEL >9.0%: CPT | Performed by: INTERNAL MEDICINE

## 2023-07-27 PROCEDURE — 83036 HEMOGLOBIN GLYCOSYLATED A1C: CPT | Performed by: INTERNAL MEDICINE

## 2023-07-27 PROCEDURE — 3017F COLORECTAL CA SCREEN DOC REV: CPT | Performed by: INTERNAL MEDICINE

## 2023-07-27 PROCEDURE — G8420 CALC BMI NORM PARAMETERS: HCPCS | Performed by: INTERNAL MEDICINE

## 2023-07-27 PROCEDURE — 1123F ACP DISCUSS/DSCN MKR DOCD: CPT | Performed by: INTERNAL MEDICINE

## 2023-07-27 PROCEDURE — G8427 DOCREV CUR MEDS BY ELIG CLIN: HCPCS | Performed by: INTERNAL MEDICINE

## 2023-07-27 RX ORDER — LANCETS
EACH MISCELLANEOUS
COMMUNITY

## 2023-07-27 RX ORDER — BLOOD-GLUCOSE METER
EACH MISCELLANEOUS
COMMUNITY
Start: 2022-03-19

## 2023-07-27 SDOH — ECONOMIC STABILITY: FOOD INSECURITY: WITHIN THE PAST 12 MONTHS, THE FOOD YOU BOUGHT JUST DIDN'T LAST AND YOU DIDN'T HAVE MONEY TO GET MORE.: NEVER TRUE

## 2023-07-27 SDOH — ECONOMIC STABILITY: FOOD INSECURITY: WITHIN THE PAST 12 MONTHS, YOU WORRIED THAT YOUR FOOD WOULD RUN OUT BEFORE YOU GOT MONEY TO BUY MORE.: NEVER TRUE

## 2023-07-27 SDOH — ECONOMIC STABILITY: INCOME INSECURITY: HOW HARD IS IT FOR YOU TO PAY FOR THE VERY BASICS LIKE FOOD, HOUSING, MEDICAL CARE, AND HEATING?: NOT HARD AT ALL

## 2023-07-27 SDOH — ECONOMIC STABILITY: HOUSING INSECURITY
IN THE LAST 12 MONTHS, WAS THERE A TIME WHEN YOU DID NOT HAVE A STEADY PLACE TO SLEEP OR SLEPT IN A SHELTER (INCLUDING NOW)?: NO

## 2023-07-27 NOTE — PROGRESS NOTES
Chief Complaint   Patient presents with    Diabetes     3C       HISTORY OF PRESENT ILLNESS  Melina Cline is a 79 y.o. male    Presents for follow up evaluation of type 2 DM. Last seen in clinic by virtual visit on 1/25/22. Denies polydipsia, polyuria, hypoglycemia. Mild tingling at feet at night. Takes insulin 75/25 25 units twice daily and metformin 500 mg 3 tabs every evening, sometimes takes 30 units of insulin at night. FBS: , PM BS (after dinner): 160-180's. Usually compliant with diabetic diet. Eats bread occasionally. Drinks 1 diet soda daily. A1c was 12.2% on 7/24/23, was 11.2% on 12/23/21 and 9.9% on 9/23/21. Denies CP, SOB, dizziness, or leg swelling.     Patient Active Problem List   Diagnosis    CKD (chronic kidney disease) stage 2, GFR 60-89 ml/min    Osteoarthritis of both knees    Type 2 diabetes mellitus with stage 2 chronic kidney disease, with long-term current use of insulin (HCC)    Coronary artery disease involving native coronary artery of native heart without angina pectoris    Benign hypertension with chronic kidney disease, stage II    Hypercholesterolemia    S/P drug eluting coronary stent placement    Clayton's esophagus without dysplasia    Pelvic fracture (HCC)     Past Medical History:   Diagnosis Date    CAD (coronary artery disease) April 2014    NSTEMI, PCI/ALOK RCA    Diabetes mellitus (HCC)     Hypercholesterolemia     Hypertension      No Known Allergies    Current Outpatient Medications   Medication Sig Dispense Refill    Blood Glucose Monitoring Suppl (ACCU-CHEK ASHLEY PLUS) w/Device KIT USE AS DIRECTED      blood glucose test strips (ASCENSIA AUTODISC VI;ONE TOUCH ULTRA TEST VI) strip Accu-Chek Guide test strips      Accu-Chek Softclix Lancets MISC Accu-Chek Softclix Lancets      Needle, Disp, 31G X 5/16\" MISC USE TO INJECT EVERY DAY AS DIRECTED      insulin aspart protamine-insulin aspart (NOVOLOG 70/30) injection pen Inject 25 Units into the skin 2 times daily

## 2023-09-16 DIAGNOSIS — E11.22 TYPE 2 DIABETES MELLITUS WITH STAGE 2 CHRONIC KIDNEY DISEASE, WITH LONG-TERM CURRENT USE OF INSULIN (HCC): Primary | ICD-10-CM

## 2023-09-16 DIAGNOSIS — N18.2 TYPE 2 DIABETES MELLITUS WITH STAGE 2 CHRONIC KIDNEY DISEASE, WITH LONG-TERM CURRENT USE OF INSULIN (HCC): Primary | ICD-10-CM

## 2023-09-16 DIAGNOSIS — Z79.4 TYPE 2 DIABETES MELLITUS WITH STAGE 2 CHRONIC KIDNEY DISEASE, WITH LONG-TERM CURRENT USE OF INSULIN (HCC): Primary | ICD-10-CM

## 2023-09-18 RX ORDER — PEN NEEDLE, DIABETIC 31 GX5/16"
NEEDLE, DISPOSABLE MISCELLANEOUS
Qty: 100 EACH | Refills: 3 | Status: SHIPPED | OUTPATIENT
Start: 2023-09-18

## 2023-09-18 NOTE — TELEPHONE ENCOUNTER
PCP: Sully Kelsey MD     Last appt:  7/27/2023    Future Appointments   Date Time Provider [address]   10/30/2023  9:10 AM Sully Kelsey MD [facility] BS AMB          Requested Prescriptions     Pending Prescriptions Disp Refills    DROPLET PEN NEEDLES 31G X 8 MM Jono Rai [Pharmacy Med Name: DROPLET PEN NEEDLES [id number] 31G X 8 MM] 100 each 3     Sig: USE EVERY DAY AS DIRECTED

## 2023-09-27 NOTE — PROGRESS NOTES
Hospitalist Progress Note    NAME: Octavia Lester   :  1955   MRN:  [id number]       Assessment / Plan:  Acute Pancreatitis, resolved  -suspected secondary to gallstones -- s/p cholecystectomy and intraoperative cholangiogram    -Discontinue IVF  -Advancing diet serially  -GI and surgical consultations appreciated  MRCP : Acute pancreatitis. No evidence of pancreatic necrosis or pseudocyst  formation. No evidence of gallstones, biliary dilatation, or filling defects in  the common bile duct. -CT AP : There isinflammation adjacent to the pancreatic head extending anterior to Gerota's fascia. Volume overload  -Iatrogenic secondary to large volume of IVF used for management of pancreatitis (appropriately) / third spacing  -Given that pancreatitis has improved, will give low dose Lasix x1 today and reassess     DM2, uncontrolled with hyperglycemia, improved  -Holding metformin  -DM with recent steroid use  -BS elevated on admission -- now improved significantly and meeting goals    HTN, CAD:  -Continue Aspirin and Metoprolol  -Had held Lipitor with elevated LFT's -- can now resume  -denies CP    CKD 3  -Cr at baseline, 1.3  -renally dosing of meds  -monitor bmp   -avoid nephrotoxins     Prophylaxis:  Pepcid BID  Heparin SC    Disposition pending progress with diet and pain control     Subjective:     Chief Complaint / Reason for Physician Visit: follow up acute pancreatitis  Underwent cholecystectomy and cholangiogram without incident. He endorses postoperative pain today, different from his pancreatitis pain. Tolerated liquids last night. States he has not passed any flatus in days and ileus was observed during surgery.     Review of Systems:  Symptom Y/N Comments  Symptom Y/N Comments   Fever/Chills n   Chest Pain n    Poor Appetite n   Edema y    Cough    Abdominal Pain y    Sputum    Joint Pain     SOB/HAMILTON    Pruritis/Rash     Nausea/vomit n   Tolerating PT/OT     Diarrhea n   Tolerating Diet npo    Constipation n   Other       Could NOT obtain due to:      Objective:     VITALS:   Last 24hrs VS reviewed since prior progress note. Most recent are:  Patient Vitals for the past 24 hrs:   Temp Pulse Resp BP SpO2   07/18/19 1059 98 °F (36.7 °C) 66 16 145/85 96 %   07/18/19 1040  67   95 %   07/18/19 0903  69  157/88    07/18/19 0716 98.2 °F (36.8 °C) 84 18 158/84 96 %   07/18/19 0300 98.7 °F (37.1 °C) 70 18 (!) 159/94 96 %   07/18/19 0259  70   96 %   07/17/19 2301  88   97 %   07/17/19 2300 98.8 °F (37.1 °C) 88 16 (!) 164/95 97 %   07/17/19 1900 98.4 °F (36.9 °C) 86 18 159/89 97 %   07/17/19 1852  99 18 172/90 93 %   07/17/19 1830  82 18 (!) 154/112 99 %   07/17/19 1800  82 18 (!) 167/92 98 %   07/17/19 1741  78 16 153/88 99 %   07/17/19 1703  80      07/17/19 1700  84 16 154/84 99 %   07/17/19 1630  82 16 (!) 160/92 98 %   07/17/19 1552 98 °F (36.7 °C) 82 16 (!) 158/95 97 %   07/17/19 1441 98.4 °F (36.9 °C) 88 12 162/90 96 %   07/17/19 1415 98.2 °F (36.8 °C) 81 11 153/78 95 %   07/17/19 1403  86 15 160/88 93 %   07/17/19 1345 98.3 °F (36.8 °C) 86 11 160/90 94 %   07/17/19 1340  82 11 167/86 94 %   07/17/19 1335  83 14 165/85 92 %   07/17/19 1330  88 15 168/84 93 %   07/17/19 1328 98.9 °F (37.2 °C) 81 11 152/81 93 %   07/17/19 1325  81 10 152/81 94 %       Intake/Output Summary (Last 24 hours) at 7/18/2019 1222  Last data filed at 7/18/2019 1138  Gross per 24 hour   Intake 3121.25 ml   Output 1305 ml   Net 1816.25 ml        PHYSICAL EXAM:  General: WD, WN. Alert, cooperative, nontoxic  EENT:  EOMI. Anicteric sclerae. MMM  Resp:  Basilar rales present bilaterally. No wheezing or rhonchi and no accessory muscle use. CV:  Regular  rhythm,  2+ generalized edema  GI:  Soft, distended, minimal abdominal tenderness, hypoactive bowel sounds  Neurologic:  Alert and oriented X 3, normal speech   Psych:   Fair insight. Not anxious nor agitated  Skin:  No rashes.   No jaundice    Reviewed most current lab test results and cultures  YES  Reviewed most current radiology test results   YES  Review and summation of old records today    NO  Reviewed patient's current orders and MAR    YES  PMH/SH reviewed - no change compared to H&P  ________________________________________________________________________  Care Plan discussed with:    Comments   Patient x    Family      RN x    Care Manager     Consultant                        Multidiciplinary team rounds were held today with , nursing, pharmacist and clinical coordinator. Patient's plan of care was discussed; medications were reviewed and discharge planning was addressed. ________________________________________________________________________  Total NON critical care TIME:  25   Minutes    Total CRITICAL CARE TIME Spent:   Minutes non procedure based      Comments   >50% of visit spent in counseling and coordination of care x    ________________________________________________________________________  Serena Rodriguez MD     Procedures: see electronic medical records for all procedures/Xrays and details which were not copied into this note but were reviewed prior to creation of Plan. LABS:  I reviewed today's most current labs and imaging studies.   Pertinent labs include:  Recent Labs     07/18/19 0417 07/17/19  0358 07/16/19  0410   WBC 6.7 6.4 8.0   HGB 11.7* 11.5* 13.7   HCT 34.1* 34.3* 41.2    138* 132*     Recent Labs     07/18/19  0417 07/17/19  0358 07/16/19  0410   * 141 143   K 3.3* 3.6 3.8    108 108   CO2 26 27 28   * 125* 122*   BUN 15 18 17   CREA 1.04 1.11 1.17   CA 7.7* 7.7* 7.9*   ALB 2.3* 2.4* 2.6*   TBILI 0.7 0.6 0.6   SGOT 121* 30 48*   * 126* 202*       Signed: Serena Rodriguez MD Benzoyl Peroxide Pregnancy And Lactation Text: This medication is Pregnancy Category C. It is unknown if benzoyl peroxide is excreted in breast milk.

## 2023-11-03 ENCOUNTER — OFFICE VISIT (OUTPATIENT)
Facility: CLINIC | Age: 68
End: 2023-11-03
Payer: MEDICARE

## 2023-11-03 VITALS
WEIGHT: 186 LBS | HEART RATE: 68 BPM | OXYGEN SATURATION: 94 % | HEIGHT: 73 IN | SYSTOLIC BLOOD PRESSURE: 107 MMHG | BODY MASS INDEX: 24.65 KG/M2 | RESPIRATION RATE: 16 BRPM | TEMPERATURE: 98.2 F | DIASTOLIC BLOOD PRESSURE: 69 MMHG

## 2023-11-03 DIAGNOSIS — M77.8 LEFT ELBOW TENDONITIS: ICD-10-CM

## 2023-11-03 DIAGNOSIS — E78.00 HYPERCHOLESTEROLEMIA: ICD-10-CM

## 2023-11-03 DIAGNOSIS — N18.2 BENIGN HYPERTENSION WITH CHRONIC KIDNEY DISEASE, STAGE II: ICD-10-CM

## 2023-11-03 DIAGNOSIS — I25.10 CORONARY ARTERY DISEASE INVOLVING NATIVE CORONARY ARTERY OF NATIVE HEART WITHOUT ANGINA PECTORIS: ICD-10-CM

## 2023-11-03 DIAGNOSIS — Z79.4 TYPE 2 DIABETES MELLITUS WITH STAGE 2 CHRONIC KIDNEY DISEASE, WITH LONG-TERM CURRENT USE OF INSULIN (HCC): Primary | ICD-10-CM

## 2023-11-03 DIAGNOSIS — I12.9 BENIGN HYPERTENSION WITH CHRONIC KIDNEY DISEASE, STAGE II: ICD-10-CM

## 2023-11-03 DIAGNOSIS — E11.22 TYPE 2 DIABETES MELLITUS WITH STAGE 2 CHRONIC KIDNEY DISEASE, WITH LONG-TERM CURRENT USE OF INSULIN (HCC): Primary | ICD-10-CM

## 2023-11-03 DIAGNOSIS — N18.2 TYPE 2 DIABETES MELLITUS WITH STAGE 2 CHRONIC KIDNEY DISEASE, WITH LONG-TERM CURRENT USE OF INSULIN (HCC): Primary | ICD-10-CM

## 2023-11-03 LAB — HBA1C MFR BLD: 13.7 %

## 2023-11-03 PROCEDURE — 3017F COLORECTAL CA SCREEN DOC REV: CPT | Performed by: INTERNAL MEDICINE

## 2023-11-03 PROCEDURE — G8420 CALC BMI NORM PARAMETERS: HCPCS | Performed by: INTERNAL MEDICINE

## 2023-11-03 PROCEDURE — 99214 OFFICE O/P EST MOD 30 MIN: CPT | Performed by: INTERNAL MEDICINE

## 2023-11-03 PROCEDURE — G8484 FLU IMMUNIZE NO ADMIN: HCPCS | Performed by: INTERNAL MEDICINE

## 2023-11-03 PROCEDURE — 1123F ACP DISCUSS/DSCN MKR DOCD: CPT | Performed by: INTERNAL MEDICINE

## 2023-11-03 PROCEDURE — 3046F HEMOGLOBIN A1C LEVEL >9.0%: CPT | Performed by: INTERNAL MEDICINE

## 2023-11-03 PROCEDURE — G8427 DOCREV CUR MEDS BY ELIG CLIN: HCPCS | Performed by: INTERNAL MEDICINE

## 2023-11-03 PROCEDURE — 2022F DILAT RTA XM EVC RTNOPTHY: CPT | Performed by: INTERNAL MEDICINE

## 2023-11-03 PROCEDURE — 1036F TOBACCO NON-USER: CPT | Performed by: INTERNAL MEDICINE

## 2023-11-03 PROCEDURE — 83036 HEMOGLOBIN GLYCOSYLATED A1C: CPT | Performed by: INTERNAL MEDICINE

## 2023-11-03 RX ORDER — AMLODIPINE BESYLATE 2.5 MG/1
2.5 TABLET ORAL DAILY
Qty: 90 TABLET | Refills: 2 | Status: CANCELLED | OUTPATIENT
Start: 2023-11-03

## 2023-11-03 RX ORDER — LISINOPRIL 5 MG/1
5 TABLET ORAL DAILY
Qty: 90 TABLET | Refills: 1 | Status: SHIPPED | OUTPATIENT
Start: 2023-11-03

## 2023-11-03 RX ORDER — NAPROXEN 500 MG/1
500 TABLET ORAL 2 TIMES DAILY WITH MEALS
Qty: 14 TABLET | Refills: 0 | Status: SHIPPED | OUTPATIENT
Start: 2023-11-03 | End: 2023-11-10

## 2023-11-03 NOTE — PROGRESS NOTES
Chief Complaint   Patient presents with    Diabetes       HISTORY OF PRESENT ILLNESS  Primitivo Roman is a 79 y.o. male    Presents for follow up on uncontrolled type 2 DM. Has polydipsia, denies polyuria. Had an episode of hypoglycemia in the middle of the night about 2 weeks ago. Chronic numbness at feet. Takes insulin 75/25 30 units twice daily and metformin 500 mg 3 tabs every evening. Did not bring in glucometer. Reports FBS: 120-130's. Bedtime BS: 150's. Lab review: A1c 13.7% on 11/3/23 (today), was 12.2% on 7/24/23, and 11.2% on 12/23/21. Typical Meals:  Breakfast (8 AM): [place] instant breakfast with mild  Morning Snack: 6 peanut butter sandwich crackers  Lunch (3-4 pm): Tuna fish sandwith, bologna and cheese  Dinner (7-8 pm): Pork chops, green beans, mashed potatoes, corn  Evening Snack: nuts  Drinks Diet Pepper and water  Infrequently eats [employer] chocolate chip cookies. Says he keeps on hand in case his BS gets low. Complains of left elbow pain. Bothering him over a year. Comes and goes. X-ray elbow 4/24/23: Mild DJD olecranon process. Follows with Dr. Audelia Hyde for CAD. Denies CP, SOB, dizziness, or leg swelling.       Patient Active Problem List   Diagnosis    CKD (chronic kidney disease) stage 2, GFR 60-89 ml/min    Osteoarthritis of both knees    Type 2 diabetes mellitus with stage 2 chronic kidney disease, with long-term current use of insulin ([facility])    Coronary artery disease involving native coronary artery of native heart without angina pectoris    Benign hypertension with chronic kidney disease, stage II    Hypercholesterolemia    S/P drug eluting coronary stent placement    Clayton's esophagus without dysplasia    Pelvic fracture [facility])     Past Medical History:   Diagnosis Date    CAD (coronary artery disease) April 2014    NSTEMI, PCI/ALOK RCA    Diabetes mellitus ([address])     Hypercholesterolemia     Hypertension      No Known Allergies    Current Outpatient Medications

## 2023-11-15 DIAGNOSIS — I12.9 BENIGN HYPERTENSION WITH CHRONIC KIDNEY DISEASE, STAGE II: ICD-10-CM

## 2023-11-15 DIAGNOSIS — N18.2 TYPE 2 DIABETES MELLITUS WITH STAGE 2 CHRONIC KIDNEY DISEASE, WITH LONG-TERM CURRENT USE OF INSULIN (HCC): ICD-10-CM

## 2023-11-15 DIAGNOSIS — Z79.4 TYPE 2 DIABETES MELLITUS WITH STAGE 2 CHRONIC KIDNEY DISEASE, WITH LONG-TERM CURRENT USE OF INSULIN (HCC): ICD-10-CM

## 2023-11-15 DIAGNOSIS — N18.2 BENIGN HYPERTENSION WITH CHRONIC KIDNEY DISEASE, STAGE II: ICD-10-CM

## 2023-11-15 DIAGNOSIS — E11.22 TYPE 2 DIABETES MELLITUS WITH STAGE 2 CHRONIC KIDNEY DISEASE, WITH LONG-TERM CURRENT USE OF INSULIN (HCC): ICD-10-CM

## 2023-11-15 RX ORDER — LISINOPRIL 5 MG/1
5 TABLET ORAL DAILY
Qty: 90 TABLET | Refills: 1 | Status: SHIPPED | OUTPATIENT
Start: 2023-11-15

## 2023-11-15 NOTE — TELEPHONE ENCOUNTER
insulin aspart prot & aspart (NOVOLOG 70/30) injection pen  lisinopril (PRINIVIL;ZESTRIL) 5 MG tablet    Needs to be sent to [facility]

## 2023-11-15 NOTE — TELEPHONE ENCOUNTER
PCP: Meryl Clarke MD     Last appt:  11/3/2023      Future Appointments   Date Time Provider [address]   2/21/2024 11:10 AM Meryl Clarke MD [facility] AMB          Requested Prescriptions     Pending Prescriptions Disp Refills    insulin aspart prot & aspart (NOVOLOG 70/30) injection pen 45 mL 1     Sig: Inject 35 Units into the skin 2 times daily (before meals) Breakfast and supper.     lisinopril (PRINIVIL;ZESTRIL) 5 MG tablet 90 tablet 1     Sig: Take 1 tablet by mouth daily

## 2023-12-01 DIAGNOSIS — E78.00 HYPERCHOLESTEROLEMIA: ICD-10-CM

## 2023-12-01 DIAGNOSIS — I12.9 BENIGN HYPERTENSION WITH CHRONIC KIDNEY DISEASE, STAGE II: Primary | ICD-10-CM

## 2023-12-01 DIAGNOSIS — E11.22 TYPE 2 DIABETES MELLITUS WITH STAGE 2 CHRONIC KIDNEY DISEASE, WITH LONG-TERM CURRENT USE OF INSULIN (HCC): ICD-10-CM

## 2023-12-01 DIAGNOSIS — N18.2 TYPE 2 DIABETES MELLITUS WITH STAGE 2 CHRONIC KIDNEY DISEASE, WITH LONG-TERM CURRENT USE OF INSULIN (HCC): ICD-10-CM

## 2023-12-01 DIAGNOSIS — Z79.4 TYPE 2 DIABETES MELLITUS WITH STAGE 2 CHRONIC KIDNEY DISEASE, WITH LONG-TERM CURRENT USE OF INSULIN (HCC): ICD-10-CM

## 2023-12-01 DIAGNOSIS — N18.2 BENIGN HYPERTENSION WITH CHRONIC KIDNEY DISEASE, STAGE II: Primary | ICD-10-CM

## 2023-12-04 DIAGNOSIS — E11.22 TYPE 2 DIABETES MELLITUS WITH STAGE 2 CHRONIC KIDNEY DISEASE, WITH LONG-TERM CURRENT USE OF INSULIN (HCC): Primary | ICD-10-CM

## 2023-12-04 DIAGNOSIS — N18.2 TYPE 2 DIABETES MELLITUS WITH STAGE 2 CHRONIC KIDNEY DISEASE, WITH LONG-TERM CURRENT USE OF INSULIN (HCC): Primary | ICD-10-CM

## 2023-12-04 DIAGNOSIS — Z79.4 TYPE 2 DIABETES MELLITUS WITH STAGE 2 CHRONIC KIDNEY DISEASE, WITH LONG-TERM CURRENT USE OF INSULIN (HCC): Primary | ICD-10-CM

## 2023-12-04 RX ORDER — PEN NEEDLE, DIABETIC 31 GX5/16"
NEEDLE, DISPOSABLE MISCELLANEOUS
Qty: 100 EACH | Refills: 4 | Status: SHIPPED | OUTPATIENT
Start: 2023-12-04

## 2023-12-04 RX ORDER — ATORVASTATIN CALCIUM 40 MG/1
TABLET, FILM COATED ORAL
Qty: 90 TABLET | Refills: 3 | Status: SHIPPED | OUTPATIENT
Start: 2023-12-04

## 2023-12-04 RX ORDER — METOPROLOL SUCCINATE 25 MG/1
TABLET, EXTENDED RELEASE ORAL
Qty: 90 TABLET | Refills: 3 | Status: SHIPPED | OUTPATIENT
Start: 2023-12-04

## 2023-12-04 NOTE — TELEPHONE ENCOUNTER
PCP: Leslee Whitehead MD     Last appt:  11/3/2023      Future Appointments   Date Time Provider [address]   2/21/2024 11:10 AM Leslee Whitehead MD [facility] BS AMB          Requested Prescriptions     Pending Prescriptions Disp Refills    Insulin Pen Needle (DROPLET PEN NEEDLES) 31G X 8 MM MISC 100 each 4     Sig: USE EVERY DAY AS DIRECTED

## 2024-01-25 DIAGNOSIS — Z79.4 TYPE 2 DIABETES MELLITUS WITH STAGE 2 CHRONIC KIDNEY DISEASE, WITH LONG-TERM CURRENT USE OF INSULIN (HCC): ICD-10-CM

## 2024-01-25 DIAGNOSIS — E11.22 TYPE 2 DIABETES MELLITUS WITH STAGE 2 CHRONIC KIDNEY DISEASE, WITH LONG-TERM CURRENT USE OF INSULIN (HCC): ICD-10-CM

## 2024-01-25 DIAGNOSIS — N18.2 TYPE 2 DIABETES MELLITUS WITH STAGE 2 CHRONIC KIDNEY DISEASE, WITH LONG-TERM CURRENT USE OF INSULIN (HCC): ICD-10-CM

## 2024-01-26 RX ORDER — INSULIN ASPART 100 [IU]/ML
INJECTION, SUSPENSION SUBCUTANEOUS
Qty: 60 ML | Refills: 3 | Status: SHIPPED | OUTPATIENT
Start: 2024-01-26

## 2024-01-26 NOTE — TELEPHONE ENCOUNTER
PCP: Nicky Aldrich MD     Last appt:  11/3/2023      Future Appointments   Date Time Provider Department Center   2/21/2024 11:10 AM Nicky Aldrich MD [facility] BS AMB          Requested Prescriptions     Pending Prescriptions Disp Refills    NOVOLOG MIX 70/30 FLEXPEN injection [Pharmacy Med Name: NOVOLOG MIX 70/30 PREFILLED FLEXPEN (70-30) 100 UNIT/ML Suspension Pen-injector] 60 mL 3     Sig: INJECT 35 UNITS INTO THE SKIN 2 TIMES DAILY (BEFORE MEALS) BREAKFAST AND SUPPER.

## 2024-02-21 ENCOUNTER — OFFICE VISIT (OUTPATIENT)
Facility: CLINIC | Age: 69
End: 2024-02-21
Payer: MEDICARE

## 2024-02-21 VITALS
OXYGEN SATURATION: 95 % | DIASTOLIC BLOOD PRESSURE: 69 MMHG | BODY MASS INDEX: 26.93 KG/M2 | HEIGHT: 73 IN | RESPIRATION RATE: 16 BRPM | SYSTOLIC BLOOD PRESSURE: 133 MMHG | HEART RATE: 63 BPM | TEMPERATURE: 98 F | WEIGHT: 203.2 LBS

## 2024-02-21 DIAGNOSIS — N18.2 TYPE 2 DIABETES MELLITUS WITH STAGE 2 CHRONIC KIDNEY DISEASE, WITH LONG-TERM CURRENT USE OF INSULIN (HCC): ICD-10-CM

## 2024-02-21 DIAGNOSIS — I12.9 BENIGN HYPERTENSION WITH CHRONIC KIDNEY DISEASE, STAGE II: ICD-10-CM

## 2024-02-21 DIAGNOSIS — G31.84 MILD COGNITIVE IMPAIRMENT: ICD-10-CM

## 2024-02-21 DIAGNOSIS — E11.22 TYPE 2 DIABETES MELLITUS WITH STAGE 2 CHRONIC KIDNEY DISEASE, WITH LONG-TERM CURRENT USE OF INSULIN (HCC): ICD-10-CM

## 2024-02-21 DIAGNOSIS — Z00.00 MEDICARE ANNUAL WELLNESS VISIT, SUBSEQUENT: Primary | ICD-10-CM

## 2024-02-21 DIAGNOSIS — Z79.4 TYPE 2 DIABETES MELLITUS WITH STAGE 2 CHRONIC KIDNEY DISEASE, WITH LONG-TERM CURRENT USE OF INSULIN (HCC): ICD-10-CM

## 2024-02-21 DIAGNOSIS — E78.00 HYPERCHOLESTEROLEMIA: ICD-10-CM

## 2024-02-21 DIAGNOSIS — N18.2 BENIGN HYPERTENSION WITH CHRONIC KIDNEY DISEASE, STAGE II: ICD-10-CM

## 2024-02-21 LAB — HBA1C MFR BLD: 9.8 %

## 2024-02-21 PROCEDURE — G8419 CALC BMI OUT NRM PARAM NOF/U: HCPCS | Performed by: INTERNAL MEDICINE

## 2024-02-21 PROCEDURE — 83036 HEMOGLOBIN GLYCOSYLATED A1C: CPT | Performed by: INTERNAL MEDICINE

## 2024-02-21 PROCEDURE — 1123F ACP DISCUSS/DSCN MKR DOCD: CPT | Performed by: INTERNAL MEDICINE

## 2024-02-21 PROCEDURE — 2022F DILAT RTA XM EVC RTNOPTHY: CPT | Performed by: INTERNAL MEDICINE

## 2024-02-21 PROCEDURE — 3046F HEMOGLOBIN A1C LEVEL >9.0%: CPT | Performed by: INTERNAL MEDICINE

## 2024-02-21 PROCEDURE — 1036F TOBACCO NON-USER: CPT | Performed by: INTERNAL MEDICINE

## 2024-02-21 PROCEDURE — 3017F COLORECTAL CA SCREEN DOC REV: CPT | Performed by: INTERNAL MEDICINE

## 2024-02-21 PROCEDURE — 99214 OFFICE O/P EST MOD 30 MIN: CPT | Performed by: INTERNAL MEDICINE

## 2024-02-21 PROCEDURE — G0439 PPPS, SUBSEQ VISIT: HCPCS | Performed by: INTERNAL MEDICINE

## 2024-02-21 PROCEDURE — G8427 DOCREV CUR MEDS BY ELIG CLIN: HCPCS | Performed by: INTERNAL MEDICINE

## 2024-02-21 PROCEDURE — G8484 FLU IMMUNIZE NO ADMIN: HCPCS | Performed by: INTERNAL MEDICINE

## 2024-02-21 ASSESSMENT — PATIENT HEALTH QUESTIONNAIRE - PHQ9
2. FEELING DOWN, DEPRESSED OR HOPELESS: 0
SUM OF ALL RESPONSES TO PHQ QUESTIONS 1-9: 0
1. LITTLE INTEREST OR PLEASURE IN DOING THINGS: 0
SUM OF ALL RESPONSES TO PHQ9 QUESTIONS 1 & 2: 0

## 2024-02-21 ASSESSMENT — LIFESTYLE VARIABLES
HOW OFTEN DO YOU HAVE A DRINK CONTAINING ALCOHOL: NEVER
HOW MANY STANDARD DRINKS CONTAINING ALCOHOL DO YOU HAVE ON A TYPICAL DAY: PATIENT DOES NOT DRINK

## 2024-02-21 NOTE — PROGRESS NOTES
Medicare Annual Wellness Visit    Helio Cross is here for Medicare AWV and Diabetes    Assessment & Plan     Medicare annual wellness visit, subsequent  Type 2 diabetes mellitus with stage 2 chronic kidney disease, with long-term current use of insulin (HCC)  A1c 9.8% today, down from 13.7% at last appointment. Improving but not at goal of <7.0%. His home glucometer is likely giving inaccurate readings because he denies every seeing BS's above 200. Sometimes noncompliant with diabetic diet. Will refer to Diabetes Education program.  -     AMB POC HEMOGLOBIN A1C  -     [facility]  -     Hemoglobin A1C; Future  -     Microalbumin / Creatinine Urine Ratio; Future  Benign hypertension with chronic kidney disease, stage II  Controlled. Continue present management.  -     CBC with Auto Differential; Future  -     Comprehensive Metabolic Panel; Future  Hypercholesterolemia  -     Lipid Panel; Future  Mild cognitive impairment  -     TSH; Future  -     Vitamin B12 & Folate; Future    Recommendations for Preventive Services Due: see orders and patient instructions/AVS.  Recommended screening schedule for the next 5-10 years is provided to the patient in written form: see Patient Instructions/AVS.    Plan to do MMSE at next appointment.     Return in about 3 months (around 5/21/2024), or if symptoms worsen or fail to improve, for DM; have fasting labs 1 week before appointment.       Subjective     Presents for Medicare AWV and DM follow up.     Chronic numbness at feet. Had 2 episodes of hypoglycemia over past 3 months. Denies polydipsia or polyuria. Takes insulin 75/25 30 units twice daily and metformin 500 mg 3 tabs every evening. Did not bring in glucometer. Recalls FBS: 85 this morning PM (after dinner): 130-160's. Lab review: A1c 9.8% on 2/21/24 (today), was 13.7% on 11/3/23 and 12.2% on 7/24/23.     Not always compliant with diabetic diet. Ate Hungarian pastry this morning. Eats 2

## 2024-02-21 NOTE — ACP (ADVANCE CARE PLANNING)
Advance Care Planning     Advance Care Planning (ACP) Physician/NP/PA (Provider) Conversation      Date of ACP Conversation: 2/21/2024    Conversation Conducted with:   Patient with Decision Making Capacity    Health Care Decision Maker:    Current Designated Health Care Decision Maker:    Primary Decision Maker: Terrie Solis - Spouse - [phone]    Note: Assess and validate information in current ACP documents, as indicated.     Care Preferences:    Hospitalization:  \"If your health worsens and it becomes clear that your chance of recovery is unlikely, what would your preference be regarding hospitalization?\"  If the patient would want hospitalization, answer \"yes\". If the patient would prefer comfort-focused treatment without hospitalization, answer \"no\". YES/NO/WILD CARDS: yes      Ventilation:  \"If you were in your present state of health and suddenly became very ill and were unable to breathe on your own, what would your preference be about the use of a ventilator (breathing machine) if it was available to you?\"    If patient would desire the use of a ventilator (breathing machine), answer \"yes\", if not answer \"no\":yes    \"If your health worsens and it becomes clear that your chance of recovery is unlikely, what would your preference be about the use of a ventilator (breathing machine) if it was available to you?\"   no    Resuscitation:  \"CPR works best to restart the heart when there is a sudden event, like a heart attack, in someone who is otherwise healthy. Unfortunately, CPR does not typically restart the heart for people who have serious health conditions or who are very sick.\"    \"In the event your heart stopped as a result of an underlying serious health condition, would you want attempts to be made to restart your heart (answer \"yes\" for attempt to resuscitate) or would you prefer a natural death (answer \"no\" for do not attempt to resuscitate)?\"   yes     NOTE: If the patient has a valid advance

## 2024-02-21 NOTE — PATIENT INSTRUCTIONS
This helps lower your blood pressure. Taste food before salting. Add only a little salt when you think you need it. With time, your taste buds will adjust to less salt.     Eat fewer snack items, fast foods, canned soups, and other high-salt, high-fat, processed foods.     Read food labels and try to avoid saturated and trans fats. They increase your risk of heart disease by raising cholesterol levels.     Limit the amount of solid fat-butter, margarine, and shortening-you eat. Use olive, peanut, or canola oil when you cook. Bake, broil, and steam foods instead of frying them.     Eat a variety of fruit and vegetables every day. Dark green, deep orange, red, or yellow fruits and vegetables are especially good for you. Examples include spinach, carrots, peaches, and berries.     Foods high in fiber can reduce your cholesterol and provide important vitamins and minerals. High-fiber foods include whole-grain cereals and breads, oatmeal, beans, brown rice, citrus fruits, and apples.     Eat lean proteins. Heart-healthy proteins include seafood, lean meats and poultry, eggs, beans, peas, nuts, seeds, and soy products.     Limit drinks and foods with added sugar. These include candy, desserts, and soda pop.   Lifestyle changes    If your doctor recommends it, get more exercise. Walking is a good choice. Bit by bit, increase the amount you walk every day. Try for at least 30 minutes on most days of the week. You also may want to swim, bike, or do other activities.     Do not smoke. If you need help quitting, talk to your doctor about stop-smoking programs and medicines. These can increase your chances of quitting for good. Quitting smoking may be the most important step you can take to protect your heart. It is never too late to quit.     Limit alcohol to 2 drinks a day for men and 1 drink a day for women. Too much alcohol can cause health problems.     Manage other health problems such as diabetes, high blood pressure,

## 2024-03-19 DIAGNOSIS — E11.22 TYPE 2 DIABETES MELLITUS WITH STAGE 2 CHRONIC KIDNEY DISEASE, WITH LONG-TERM CURRENT USE OF INSULIN (HCC): Primary | ICD-10-CM

## 2024-03-19 DIAGNOSIS — Z79.4 TYPE 2 DIABETES MELLITUS WITH STAGE 2 CHRONIC KIDNEY DISEASE, WITH LONG-TERM CURRENT USE OF INSULIN (HCC): Primary | ICD-10-CM

## 2024-03-19 DIAGNOSIS — N18.2 TYPE 2 DIABETES MELLITUS WITH STAGE 2 CHRONIC KIDNEY DISEASE, WITH LONG-TERM CURRENT USE OF INSULIN (HCC): Primary | ICD-10-CM

## 2024-03-19 RX ORDER — LANCETS
EACH MISCELLANEOUS
Qty: 200 EACH | Refills: 3 | Status: SHIPPED | OUTPATIENT
Start: 2024-03-19

## 2024-03-19 NOTE — TELEPHONE ENCOUNTER
PCP: Nicky Aldrich MD     Last appt:  2/21/2024      Future Appointments   Date Time Provider Department Center   6/5/2024 11:10 AM Nicky Aldrich MD [facility] BS AMB          Requested Prescriptions     Pending Prescriptions Disp Refills    Accu-Chek Softclix Lancets MISC [Pharmacy Med Name: ACCU-CHEK SOFTCLIX LANCETS] 200 each 3     Sig: TEST BLOOD SUGAR TWO TIMES DAILY

## 2024-05-28 DIAGNOSIS — Z79.4 TYPE 2 DIABETES MELLITUS WITH STAGE 2 CHRONIC KIDNEY DISEASE, WITH LONG-TERM CURRENT USE OF INSULIN (HCC): ICD-10-CM

## 2024-05-28 DIAGNOSIS — G31.84 MILD COGNITIVE IMPAIRMENT: ICD-10-CM

## 2024-05-28 DIAGNOSIS — E78.00 HYPERCHOLESTEROLEMIA: ICD-10-CM

## 2024-05-28 DIAGNOSIS — E11.22 TYPE 2 DIABETES MELLITUS WITH STAGE 2 CHRONIC KIDNEY DISEASE, WITH LONG-TERM CURRENT USE OF INSULIN (HCC): ICD-10-CM

## 2024-05-28 DIAGNOSIS — N18.2 BENIGN HYPERTENSION WITH CHRONIC KIDNEY DISEASE, STAGE II: ICD-10-CM

## 2024-05-28 DIAGNOSIS — N18.2 TYPE 2 DIABETES MELLITUS WITH STAGE 2 CHRONIC KIDNEY DISEASE, WITH LONG-TERM CURRENT USE OF INSULIN (HCC): ICD-10-CM

## 2024-05-28 DIAGNOSIS — I12.9 BENIGN HYPERTENSION WITH CHRONIC KIDNEY DISEASE, STAGE II: ICD-10-CM

## 2024-05-28 LAB
ALBUMIN SERPL-MCNC: 3.8 G/DL (ref 3.5–5)
ALBUMIN/GLOB SERPL: 1.2 (ref 1.1–2.2)
ALP SERPL-CCNC: 77 U/L (ref 45–117)
ALT SERPL-CCNC: 24 U/L (ref 12–78)
ANION GAP SERPL CALC-SCNC: 1 MMOL/L (ref 5–15)
AST SERPL-CCNC: 16 U/L (ref 15–37)
BASOPHILS # BLD: 0.1 K/UL (ref 0–0.1)
BASOPHILS NFR BLD: 1 % (ref 0–1)
BILIRUB SERPL-MCNC: 0.7 MG/DL (ref 0.2–1)
BUN SERPL-MCNC: 18 MG/DL (ref 6–20)
BUN/CREAT SERPL: 12 (ref 12–20)
CALCIUM SERPL-MCNC: 9.1 MG/DL (ref 8.5–10.1)
CHLORIDE SERPL-SCNC: 111 MMOL/L (ref 97–108)
CHOLEST SERPL-MCNC: 82 MG/DL
CO2 SERPL-SCNC: 29 MMOL/L (ref 21–32)
CREAT SERPL-MCNC: 1.47 MG/DL (ref 0.7–1.3)
DIFFERENTIAL METHOD BLD: ABNORMAL
EOSINOPHIL # BLD: 0.2 K/UL (ref 0–0.4)
EOSINOPHIL NFR BLD: 3 % (ref 0–7)
ERYTHROCYTE [DISTWIDTH] IN BLOOD BY AUTOMATED COUNT: 13.2 % (ref 11.5–14.5)
EST. AVERAGE GLUCOSE BLD GHB EST-MCNC: 212 MG/DL
FOLATE SERPL-MCNC: 11.3 NG/ML (ref 5–21)
GLOBULIN SER CALC-MCNC: 3.1 G/DL (ref 2–4)
GLUCOSE SERPL-MCNC: 90 MG/DL (ref 65–100)
HBA1C MFR BLD: 9 % (ref 4–5.6)
HCT VFR BLD AUTO: 37.4 % (ref 36.6–50.3)
HDLC SERPL-MCNC: 43 MG/DL
HDLC SERPL: 1.9 (ref 0–5)
HGB BLD-MCNC: 12.8 G/DL (ref 12.1–17)
IMM GRANULOCYTES # BLD AUTO: 0 K/UL (ref 0–0.04)
IMM GRANULOCYTES NFR BLD AUTO: 0 % (ref 0–0.5)
LDLC SERPL CALC-MCNC: 32.6 MG/DL (ref 0–100)
LYMPHOCYTES # BLD: 2 K/UL (ref 0.8–3.5)
LYMPHOCYTES NFR BLD: 30 % (ref 12–49)
MCH RBC QN AUTO: 32.5 PG (ref 26–34)
MCHC RBC AUTO-ENTMCNC: 34.2 G/DL (ref 30–36.5)
MCV RBC AUTO: 94.9 FL (ref 80–99)
MONOCYTES # BLD: 0.5 K/UL (ref 0–1)
MONOCYTES NFR BLD: 7 % (ref 5–13)
NEUTS SEG # BLD: 4 K/UL (ref 1.8–8)
NEUTS SEG NFR BLD: 59 % (ref 32–75)
NRBC # BLD: 0 K/UL (ref 0–0.01)
NRBC BLD-RTO: 0 PER 100 WBC
PLATELET # BLD AUTO: 159 K/UL (ref 150–400)
PMV BLD AUTO: 10.1 FL (ref 8.9–12.9)
POTASSIUM SERPL-SCNC: 4.3 MMOL/L (ref 3.5–5.1)
PROT SERPL-MCNC: 6.9 G/DL (ref 6.4–8.2)
RBC # BLD AUTO: 3.94 M/UL (ref 4.1–5.7)
SODIUM SERPL-SCNC: 141 MMOL/L (ref 136–145)
TRIGL SERPL-MCNC: 32 MG/DL
TSH SERPL DL<=0.05 MIU/L-ACNC: 2.08 UIU/ML (ref 0.36–3.74)
VIT B12 SERPL-MCNC: 328 PG/ML (ref 193–986)
VLDLC SERPL CALC-MCNC: 6.4 MG/DL
WBC # BLD AUTO: 6.7 K/UL (ref 4.1–11.1)

## 2024-06-04 DIAGNOSIS — N18.2 BENIGN HYPERTENSION WITH CHRONIC KIDNEY DISEASE, STAGE II: ICD-10-CM

## 2024-06-04 DIAGNOSIS — I12.9 BENIGN HYPERTENSION WITH CHRONIC KIDNEY DISEASE, STAGE II: ICD-10-CM

## 2024-06-04 RX ORDER — LISINOPRIL 5 MG/1
5 TABLET ORAL DAILY
Qty: 90 TABLET | Refills: 1 | Status: SHIPPED | OUTPATIENT
Start: 2024-06-04

## 2024-06-04 NOTE — TELEPHONE ENCOUNTER
Caller requests Refill of:    lisinopril (PRINIVIL;ZESTRIL) 5 MG tablet     Please send to:  [facility] pharmacy      Visit Appointment History:  Future Appointments:  Future Appointments   Date Time Provider Department Center   6/5/2024 11:10 AM Nicky Aldrich MD [facility] BS AMB         Last Appointment With Me:  2/21/2024         Caller confirmed instructions and dosages as correct.    Caller was advised that Meds will be refilled as soon as possible, however there can be a 48-72 business hour turn around on refill requests.

## 2024-06-04 NOTE — TELEPHONE ENCOUNTER
PCP: Nicky Aldrich MD     Last appt:  2/21/2024      Future Appointments   Date Time Provider Department Center   6/5/2024 11:10 AM Nicky Aldrich MD [facility] BS AMB          Requested Prescriptions     Pending Prescriptions Disp Refills    lisinopril (PRINIVIL;ZESTRIL) 5 MG tablet 90 tablet 1     Sig: Take 1 tablet by mouth daily

## 2024-06-05 ENCOUNTER — OFFICE VISIT (OUTPATIENT)
Facility: CLINIC | Age: 69
End: 2024-06-05
Payer: MEDICARE

## 2024-06-05 VITALS
DIASTOLIC BLOOD PRESSURE: 66 MMHG | OXYGEN SATURATION: 95 % | WEIGHT: 197.2 LBS | HEART RATE: 54 BPM | SYSTOLIC BLOOD PRESSURE: 114 MMHG | TEMPERATURE: 98.1 F | RESPIRATION RATE: 16 BRPM | HEIGHT: 73 IN | BODY MASS INDEX: 26.14 KG/M2

## 2024-06-05 DIAGNOSIS — I10 PRIMARY HYPERTENSION: ICD-10-CM

## 2024-06-05 DIAGNOSIS — N18.31 TYPE 2 DIABETES MELLITUS WITH STAGE 3A CHRONIC KIDNEY DISEASE, WITH LONG-TERM CURRENT USE OF INSULIN (HCC): Primary | ICD-10-CM

## 2024-06-05 DIAGNOSIS — I25.10 CORONARY ARTERY DISEASE INVOLVING NATIVE CORONARY ARTERY OF NATIVE HEART WITHOUT ANGINA PECTORIS: ICD-10-CM

## 2024-06-05 DIAGNOSIS — E11.22 TYPE 2 DIABETES MELLITUS WITH STAGE 3A CHRONIC KIDNEY DISEASE, WITH LONG-TERM CURRENT USE OF INSULIN (HCC): Primary | ICD-10-CM

## 2024-06-05 DIAGNOSIS — Z79.4 TYPE 2 DIABETES MELLITUS WITH STAGE 3A CHRONIC KIDNEY DISEASE, WITH LONG-TERM CURRENT USE OF INSULIN (HCC): Primary | ICD-10-CM

## 2024-06-05 PROCEDURE — G8419 CALC BMI OUT NRM PARAM NOF/U: HCPCS | Performed by: INTERNAL MEDICINE

## 2024-06-05 PROCEDURE — 99214 OFFICE O/P EST MOD 30 MIN: CPT | Performed by: INTERNAL MEDICINE

## 2024-06-05 PROCEDURE — 3074F SYST BP LT 130 MM HG: CPT | Performed by: INTERNAL MEDICINE

## 2024-06-05 PROCEDURE — 3052F HG A1C>EQUAL 8.0%<EQUAL 9.0%: CPT | Performed by: INTERNAL MEDICINE

## 2024-06-05 PROCEDURE — 2022F DILAT RTA XM EVC RTNOPTHY: CPT | Performed by: INTERNAL MEDICINE

## 2024-06-05 PROCEDURE — 1036F TOBACCO NON-USER: CPT | Performed by: INTERNAL MEDICINE

## 2024-06-05 PROCEDURE — 3017F COLORECTAL CA SCREEN DOC REV: CPT | Performed by: INTERNAL MEDICINE

## 2024-06-05 PROCEDURE — 3078F DIAST BP <80 MM HG: CPT | Performed by: INTERNAL MEDICINE

## 2024-06-05 PROCEDURE — 1123F ACP DISCUSS/DSCN MKR DOCD: CPT | Performed by: INTERNAL MEDICINE

## 2024-06-05 PROCEDURE — G8427 DOCREV CUR MEDS BY ELIG CLIN: HCPCS | Performed by: INTERNAL MEDICINE

## 2024-06-05 RX ORDER — DULAGLUTIDE 0.75 MG/.5ML
0.75 INJECTION, SOLUTION SUBCUTANEOUS WEEKLY
Qty: 2 ML | Refills: 0 | Status: SHIPPED | OUTPATIENT
Start: 2024-06-05

## 2024-06-05 NOTE — PROGRESS NOTES
Helio Cross  Identified pt with two pt identifiers(name and ).  Chief Complaint   Patient presents with    Diabetes       1. Have you been to the ER, urgent care clinic since your last visit?  Hospitalized since your last visit? NO    2. Have you seen or consulted any other health care providers outside of the [facility] since your last visit?  Include any pap smears or colon screening. NO      Provider notified of reason for visit, vitals and flowsheets obtained on patients.     Patient received paperwork for advance directive during previous visit but has not completed at this time     Reviewed record In preparation for visit, huddled with provider and have obtained necessary documentation      Health Maintenance Due   Topic    Pneumococcal 65+ years Vaccine (2 of 2 - PCV)       Wt Readings from Last 3 Encounters:   24 92.2 kg (203 lb 3.2 oz)   23 84.4 kg (186 lb)   23 84.8 kg (187 lb)     Temp Readings from Last 3 Encounters:   24 98 °F (36.7 °C) (Oral)   23 98.2 °F (36.8 °C) (Oral)   23 97.8 °F (36.6 °C) (Oral)     BP Readings from Last 3 Encounters:   24 133/69   23 107/69   23 102/66     Pulse Readings from Last 3 Encounters:   24 63   23 68   23 59          No data to display                  Learning Assessment:  :         2024    11:10 AM   [facility] AMB LEARNING ASSESSMENT   Primary Learner Patient   level of education GRADUATED HIGH SCHOOL OR GED   Primary Language ENGLISH   Learning Preference DEMONSTRATION   Answered By Patient   Relationship to Learner SELF       Fall Risk Assessment:  :         2024    11:11 AM 2023     8:35 AM 2023    10:24 AM 3/10/2023     9:44 AM 3/10/2023     9:42 AM 2023    10:26 AM 2023     8:00 AM   Amb Fall Risk Assessment and TUG Test   Do you feel unsteady or are you worried about falling?  no no        2 or more falls in past year? no no        Fall with 
received an after-visit summary and questions were answered concerning future plans. I have discussed medication side effects and warnings with the patient as well.    The patient (or guardian, if applicable) and other individuals in attendance with the patient were advised that Artificial Intelligence will be utilized during this visit to record and process the conversation to generate a clinical note. The patient (or guardian, if applicable) and other individuals in attendance at the appointment consented to the use of AI, including the recording.

## 2024-06-07 RX ORDER — BLOOD SUGAR DIAGNOSTIC
STRIP MISCELLANEOUS
Qty: 200 STRIP | Refills: 5 | Status: SHIPPED | OUTPATIENT
Start: 2024-06-07

## 2024-06-07 NOTE — TELEPHONE ENCOUNTER
PCP: Nicky Aldrich MD     Last appt:  6/5/2024      Future Appointments   Date Time Provider Department Center   10/7/2024 11:30 AM Nicky Aldrich MD [facility] BS AMB          Requested Prescriptions     Pending Prescriptions Disp Refills    ACCU-CHEK GUIDE strip [Pharmacy Med Name: ACCU-CHEK GUIDE   Strip] 200 strip 5     Sig: TEST BLOOD SUGAR TWICE DAILY. CLINIC APPOINTMENT NEEDED ([phone])

## 2024-09-07 DIAGNOSIS — Z79.4 TYPE 2 DIABETES MELLITUS WITH STAGE 2 CHRONIC KIDNEY DISEASE, WITH LONG-TERM CURRENT USE OF INSULIN (HCC): ICD-10-CM

## 2024-09-07 DIAGNOSIS — E78.00 HYPERCHOLESTEROLEMIA: ICD-10-CM

## 2024-09-07 DIAGNOSIS — I12.9 BENIGN HYPERTENSION WITH CHRONIC KIDNEY DISEASE, STAGE II: ICD-10-CM

## 2024-09-07 DIAGNOSIS — N18.2 TYPE 2 DIABETES MELLITUS WITH STAGE 2 CHRONIC KIDNEY DISEASE, WITH LONG-TERM CURRENT USE OF INSULIN (HCC): ICD-10-CM

## 2024-09-07 DIAGNOSIS — E11.22 TYPE 2 DIABETES MELLITUS WITH STAGE 2 CHRONIC KIDNEY DISEASE, WITH LONG-TERM CURRENT USE OF INSULIN (HCC): ICD-10-CM

## 2024-09-07 DIAGNOSIS — N18.2 BENIGN HYPERTENSION WITH CHRONIC KIDNEY DISEASE, STAGE II: ICD-10-CM

## 2024-09-09 RX ORDER — ATORVASTATIN CALCIUM 40 MG/1
TABLET, FILM COATED ORAL
Qty: 90 TABLET | Refills: 3 | Status: SHIPPED | OUTPATIENT
Start: 2024-09-09

## 2024-09-09 RX ORDER — METOPROLOL SUCCINATE 25 MG/1
TABLET, EXTENDED RELEASE ORAL
Qty: 90 TABLET | Refills: 3 | Status: SHIPPED | OUTPATIENT
Start: 2024-09-09

## 2024-10-07 ENCOUNTER — OFFICE VISIT (OUTPATIENT)
Facility: CLINIC | Age: 69
End: 2024-10-07
Payer: MEDICARE

## 2024-10-07 VITALS
TEMPERATURE: 98.4 F | BODY MASS INDEX: 26.29 KG/M2 | DIASTOLIC BLOOD PRESSURE: 61 MMHG | SYSTOLIC BLOOD PRESSURE: 100 MMHG | WEIGHT: 198.4 LBS | RESPIRATION RATE: 16 BRPM | OXYGEN SATURATION: 96 % | HEART RATE: 54 BPM | HEIGHT: 73 IN

## 2024-10-07 DIAGNOSIS — E11.22 TYPE 2 DIABETES MELLITUS WITH STAGE 3A CHRONIC KIDNEY DISEASE, WITH LONG-TERM CURRENT USE OF INSULIN (HCC): Primary | ICD-10-CM

## 2024-10-07 DIAGNOSIS — N18.31 TYPE 2 DIABETES MELLITUS WITH STAGE 3A CHRONIC KIDNEY DISEASE, WITH LONG-TERM CURRENT USE OF INSULIN (HCC): Primary | ICD-10-CM

## 2024-10-07 DIAGNOSIS — Z79.4 TYPE 2 DIABETES MELLITUS WITH STAGE 3A CHRONIC KIDNEY DISEASE, WITH LONG-TERM CURRENT USE OF INSULIN (HCC): Primary | ICD-10-CM

## 2024-10-07 DIAGNOSIS — E11.42 DIABETIC POLYNEUROPATHY ASSOCIATED WITH TYPE 2 DIABETES MELLITUS (HCC): ICD-10-CM

## 2024-10-07 LAB — HBA1C MFR BLD: 10.2 %

## 2024-10-07 PROCEDURE — G8419 CALC BMI OUT NRM PARAM NOF/U: HCPCS | Performed by: INTERNAL MEDICINE

## 2024-10-07 PROCEDURE — 2022F DILAT RTA XM EVC RTNOPTHY: CPT | Performed by: INTERNAL MEDICINE

## 2024-10-07 PROCEDURE — 1123F ACP DISCUSS/DSCN MKR DOCD: CPT | Performed by: INTERNAL MEDICINE

## 2024-10-07 PROCEDURE — 3052F HG A1C>EQUAL 8.0%<EQUAL 9.0%: CPT | Performed by: INTERNAL MEDICINE

## 2024-10-07 PROCEDURE — G8484 FLU IMMUNIZE NO ADMIN: HCPCS | Performed by: INTERNAL MEDICINE

## 2024-10-07 PROCEDURE — 3017F COLORECTAL CA SCREEN DOC REV: CPT | Performed by: INTERNAL MEDICINE

## 2024-10-07 PROCEDURE — 83036 HEMOGLOBIN GLYCOSYLATED A1C: CPT | Performed by: INTERNAL MEDICINE

## 2024-10-07 PROCEDURE — 1036F TOBACCO NON-USER: CPT | Performed by: INTERNAL MEDICINE

## 2024-10-07 PROCEDURE — G8427 DOCREV CUR MEDS BY ELIG CLIN: HCPCS | Performed by: INTERNAL MEDICINE

## 2024-10-07 PROCEDURE — 99213 OFFICE O/P EST LOW 20 MIN: CPT | Performed by: INTERNAL MEDICINE

## 2024-10-07 RX ORDER — INSULIN ASPART 100 [IU]/ML
INJECTION, SUSPENSION SUBCUTANEOUS
Qty: 60 ML | Refills: 3
Start: 2024-10-07

## 2024-10-07 SDOH — ECONOMIC STABILITY: FOOD INSECURITY: WITHIN THE PAST 12 MONTHS, THE FOOD YOU BOUGHT JUST DIDN'T LAST AND YOU DIDN'T HAVE MONEY TO GET MORE.: NEVER TRUE

## 2024-10-07 SDOH — ECONOMIC STABILITY: INCOME INSECURITY: HOW HARD IS IT FOR YOU TO PAY FOR THE VERY BASICS LIKE FOOD, HOUSING, MEDICAL CARE, AND HEATING?: NOT HARD AT ALL

## 2024-10-07 SDOH — ECONOMIC STABILITY: FOOD INSECURITY: WITHIN THE PAST 12 MONTHS, YOU WORRIED THAT YOUR FOOD WOULD RUN OUT BEFORE YOU GOT MONEY TO BUY MORE.: NEVER TRUE

## 2024-10-07 NOTE — PROGRESS NOTES
Chief Complaint   Patient presents with    Diabetes     History of Present Illness  The patient is a 68-year-old male who presents for a 4-month follow-up evaluation of diabetes mellitus.    He denies polydipsia or polyuria. Machuca has occasional tingling at the toes. He has experienced episodes of hypoglycemia, particularly after strenuous physical activity, with one instance of a reading as low as 68 mg/dL. He monitors his blood glucose levels at home, typically before dinner and occasionally after breakfast. His readings usually range from 150 to 160 mg/dL post-breakfast.  He is currently on a regimen of insulin (35 units twice daily) and metformin (3 tablets nightly). He has previously tried dulaglutide (Trulicity) but discontinued due to severe diarrhea. Lab review: A1c 10.2% on 10/7/24 (today), 9.0% on 5/28/24, 9.8% on 2/21/24, and 13.7%on 11/3/23.    He denies chest pain, dyspnea, or leg swelling. However, he does report occasional orthostatic dizziness.     Patient Active Problem List   Diagnosis    CKD (chronic kidney disease) stage 2, GFR 60-89 ml/min    Osteoarthritis of both knees    Type 2 diabetes mellitus with stage 2 chronic kidney disease, with long-term current use of insulin (HCC)    Coronary artery disease involving native coronary artery of native heart without angina pectoris    Benign hypertension with chronic kidney disease, stage II    Hypercholesterolemia    S/P drug eluting coronary stent placement    Clayton's esophagus without dysplasia    Pelvic fracture (HCC)     Past Medical History:   Diagnosis Date    CAD (coronary artery disease) April 2014    NSTEMI, PCI/ALOK RCA    Diabetes mellitus (HCC)     Hypercholesterolemia     Hypertension      No Known Allergies    Current Outpatient Medications   Medication Sig Dispense Refill    metoprolol succinate (TOPROL XL) 25 MG extended release tablet TAKE 1 TABLET EVERY DAY FOR HIGH BLOOD PRESSURE AND HEART 90 tablet 3    atorvastatin (LIPITOR) 40 MG

## 2024-10-07 NOTE — PROGRESS NOTES
Helio Cross  Identified pt with two pt identifiers(name and ).  Chief Complaint   Patient presents with    Diabetes       1. Have you been to the ER, urgent care clinic since your last visit?  Hospitalized since your last visit? NO    2. Have you seen or consulted any other health care providers outside of the [facility] since your last visit?  Include any pap smears or colon screening. NO  Pt is going to get Flu and Covid vaccine with Wife at [employer].     Provider notified of reason for visit, vitals and flowsheets obtained on patients.     Patient received paperwork for advance directive during previous visit but has not completed at this time     Reviewed record In preparation for visit, huddled with provider and have obtained necessary documentation      Health Maintenance Due   Topic    Diabetic Alb to Cr ratio (uACR) test     Diabetic foot exam     Flu vaccine (1)    A1C test (Diabetic or Prediabetic)     COVID-19 Vaccine ( season)       Wt Readings from Last 3 Encounters:   10/07/24 90 kg (198 lb 6.4 oz)   24 89.4 kg (197 lb 3.2 oz)   24 92.2 kg (203 lb 3.2 oz)     Temp Readings from Last 3 Encounters:   10/07/24 98.4 °F (36.9 °C) (Oral)   24 98.1 °F (36.7 °C) (Oral)   24 98 °F (36.7 °C) (Oral)     BP Readings from Last 3 Encounters:   10/07/24 100/61   24 114/66   24 133/69     Pulse Readings from Last 3 Encounters:   10/07/24 54   24 54   24 63          No data to display                  Learning Assessment:  :         2024    11:10 AM   [facility] AMB LEARNING ASSESSMENT   Primary Learner Patient   level of education GRADUATED HIGH SCHOOL OR GED   Primary Language ENGLISH   Learning Preference DEMONSTRATION   Answered By Patient   Relationship to Learner SELF       Fall Risk Assessment:  :         2024    11:11 AM 2023     8:35 AM 2023    10:24 AM 3/10/2023     9:44 AM 3/10/2023     9:42 AM 2023    10:26

## 2024-10-21 DIAGNOSIS — I12.9 BENIGN HYPERTENSION WITH CHRONIC KIDNEY DISEASE, STAGE II: ICD-10-CM

## 2024-10-21 DIAGNOSIS — N18.2 BENIGN HYPERTENSION WITH CHRONIC KIDNEY DISEASE, STAGE II: ICD-10-CM

## 2024-10-21 RX ORDER — LISINOPRIL 5 MG/1
5 TABLET ORAL DAILY
Qty: 90 TABLET | Refills: 3 | Status: SHIPPED | OUTPATIENT
Start: 2024-10-21

## 2024-10-21 NOTE — TELEPHONE ENCOUNTER
PCP: Nicky Aldrich MD     Last appt: 10/7/2024    Future Appointments   Date Time Provider Department Center   2/5/2025 11:30 AM Nicky Aldrich MD [facility] DEP          Requested Prescriptions     Pending Prescriptions Disp Refills    lisinopril (PRINIVIL;ZESTRIL) 5 MG tablet [Pharmacy Med Name: Lisinopril Oral Tablet 5 MG] 90 tablet 3     Sig: TAKE 1 TABLET EVERY DAY

## 2024-11-14 DIAGNOSIS — E11.22 TYPE 2 DIABETES MELLITUS WITH STAGE 3A CHRONIC KIDNEY DISEASE, WITH LONG-TERM CURRENT USE OF INSULIN (HCC): ICD-10-CM

## 2024-11-14 DIAGNOSIS — Z79.4 TYPE 2 DIABETES MELLITUS WITH STAGE 3A CHRONIC KIDNEY DISEASE, WITH LONG-TERM CURRENT USE OF INSULIN (HCC): ICD-10-CM

## 2024-11-14 DIAGNOSIS — N18.31 TYPE 2 DIABETES MELLITUS WITH STAGE 3A CHRONIC KIDNEY DISEASE, WITH LONG-TERM CURRENT USE OF INSULIN (HCC): ICD-10-CM

## 2024-11-14 RX ORDER — INSULIN ASPART 100 [IU]/ML
INJECTION, SUSPENSION SUBCUTANEOUS
Qty: 60 ML | Refills: 3 | Status: SHIPPED | OUTPATIENT
Start: 2024-11-14

## 2024-11-20 DIAGNOSIS — E11.22 TYPE 2 DIABETES MELLITUS WITH STAGE 2 CHRONIC KIDNEY DISEASE, WITH LONG-TERM CURRENT USE OF INSULIN (HCC): ICD-10-CM

## 2024-11-20 DIAGNOSIS — N18.2 TYPE 2 DIABETES MELLITUS WITH STAGE 2 CHRONIC KIDNEY DISEASE, WITH LONG-TERM CURRENT USE OF INSULIN (HCC): ICD-10-CM

## 2024-11-20 DIAGNOSIS — Z79.4 TYPE 2 DIABETES MELLITUS WITH STAGE 2 CHRONIC KIDNEY DISEASE, WITH LONG-TERM CURRENT USE OF INSULIN (HCC): ICD-10-CM

## 2024-11-20 RX ORDER — PEN NEEDLE, DIABETIC 30 GX3/16"
NEEDLE, DISPOSABLE MISCELLANEOUS
Qty: 100 EACH | Refills: 5 | Status: SHIPPED | OUTPATIENT
Start: 2024-11-20

## 2024-11-20 NOTE — TELEPHONE ENCOUNTER
Future Appointments:  Future Appointments   Date Time Provider Department Center   2/5/2025 11:30 AM Nicky Aldrich MD [facility] BS ECC DEP        Last Appointment With Me:  10/7/2024     Requested Prescriptions     Pending Prescriptions Disp Refills    Needle, Disp, 31G X 5/16\" MISC 100 each 5

## 2025-01-31 ENCOUNTER — LAB (OUTPATIENT)
Facility: CLINIC | Age: 70
End: 2025-01-31

## 2025-01-31 DIAGNOSIS — E11.22 TYPE 2 DIABETES MELLITUS WITH STAGE 3A CHRONIC KIDNEY DISEASE, WITH LONG-TERM CURRENT USE OF INSULIN (HCC): ICD-10-CM

## 2025-01-31 DIAGNOSIS — Z79.4 TYPE 2 DIABETES MELLITUS WITH STAGE 3A CHRONIC KIDNEY DISEASE, WITH LONG-TERM CURRENT USE OF INSULIN (HCC): ICD-10-CM

## 2025-01-31 DIAGNOSIS — N18.31 TYPE 2 DIABETES MELLITUS WITH STAGE 3A CHRONIC KIDNEY DISEASE, WITH LONG-TERM CURRENT USE OF INSULIN (HCC): ICD-10-CM

## 2025-02-01 LAB
ANION GAP SERPL CALC-SCNC: 6 MMOL/L (ref 2–12)
BUN SERPL-MCNC: 21 MG/DL (ref 6–20)
BUN/CREAT SERPL: 15 (ref 12–20)
CALCIUM SERPL-MCNC: 8.9 MG/DL (ref 8.5–10.1)
CHLORIDE SERPL-SCNC: 106 MMOL/L (ref 97–108)
CO2 SERPL-SCNC: 27 MMOL/L (ref 21–32)
CREAT SERPL-MCNC: 1.41 MG/DL (ref 0.7–1.3)
GLUCOSE SERPL-MCNC: 142 MG/DL (ref 65–100)
POTASSIUM SERPL-SCNC: 3.7 MMOL/L (ref 3.5–5.1)
SODIUM SERPL-SCNC: 139 MMOL/L (ref 136–145)

## 2025-02-02 PROBLEM — Z79.4 TYPE 2 DIABETES MELLITUS WITH STAGE 3A CHRONIC KIDNEY DISEASE, WITH LONG-TERM CURRENT USE OF INSULIN (HCC): Status: ACTIVE | Noted: 2021-12-23

## 2025-02-02 PROBLEM — N18.31 TYPE 2 DIABETES MELLITUS WITH STAGE 3A CHRONIC KIDNEY DISEASE, WITH LONG-TERM CURRENT USE OF INSULIN (HCC): Status: ACTIVE | Noted: 2021-12-23

## 2025-02-02 PROBLEM — E11.22 TYPE 2 DIABETES MELLITUS WITH STAGE 3A CHRONIC KIDNEY DISEASE, WITH LONG-TERM CURRENT USE OF INSULIN (HCC): Status: ACTIVE | Noted: 2021-12-23

## 2025-02-05 ENCOUNTER — OFFICE VISIT (OUTPATIENT)
Facility: CLINIC | Age: 70
End: 2025-02-05

## 2025-02-05 VITALS
DIASTOLIC BLOOD PRESSURE: 70 MMHG | WEIGHT: 206.6 LBS | SYSTOLIC BLOOD PRESSURE: 120 MMHG | OXYGEN SATURATION: 98 % | HEIGHT: 73 IN | RESPIRATION RATE: 16 BRPM | TEMPERATURE: 97.9 F | HEART RATE: 61 BPM | BODY MASS INDEX: 27.38 KG/M2

## 2025-02-05 DIAGNOSIS — Z00.00 MEDICARE ANNUAL WELLNESS VISIT, SUBSEQUENT: Primary | ICD-10-CM

## 2025-02-05 DIAGNOSIS — E11.22 TYPE 2 DIABETES MELLITUS WITH STAGE 3A CHRONIC KIDNEY DISEASE, WITH LONG-TERM CURRENT USE OF INSULIN (HCC): ICD-10-CM

## 2025-02-05 DIAGNOSIS — I25.10 CORONARY ARTERY DISEASE INVOLVING NATIVE CORONARY ARTERY OF NATIVE HEART WITHOUT ANGINA PECTORIS: ICD-10-CM

## 2025-02-05 DIAGNOSIS — E78.00 HYPERCHOLESTEROLEMIA: ICD-10-CM

## 2025-02-05 DIAGNOSIS — K22.70 BARRETT'S ESOPHAGUS WITHOUT DYSPLASIA: ICD-10-CM

## 2025-02-05 DIAGNOSIS — I10 PRIMARY HYPERTENSION: ICD-10-CM

## 2025-02-05 DIAGNOSIS — N18.31 TYPE 2 DIABETES MELLITUS WITH STAGE 3A CHRONIC KIDNEY DISEASE, WITH LONG-TERM CURRENT USE OF INSULIN (HCC): ICD-10-CM

## 2025-02-05 DIAGNOSIS — M77.8 LEFT ELBOW TENDONITIS: ICD-10-CM

## 2025-02-05 DIAGNOSIS — Z79.4 TYPE 2 DIABETES MELLITUS WITH STAGE 3A CHRONIC KIDNEY DISEASE, WITH LONG-TERM CURRENT USE OF INSULIN (HCC): ICD-10-CM

## 2025-02-05 LAB — HBA1C MFR BLD: 8.7 %

## 2025-02-05 RX ORDER — INSULIN ASPART 100 [IU]/ML
INJECTION, SUSPENSION SUBCUTANEOUS
Qty: 60 ML | Refills: 3 | Status: SHIPPED | OUTPATIENT
Start: 2025-02-05

## 2025-02-05 RX ORDER — NAPROXEN 500 MG/1
500 TABLET ORAL 2 TIMES DAILY WITH MEALS
Qty: 14 TABLET | Refills: 0 | Status: SHIPPED | OUTPATIENT
Start: 2025-02-05 | End: 2025-02-12

## 2025-02-05 SDOH — ECONOMIC STABILITY: FOOD INSECURITY: WITHIN THE PAST 12 MONTHS, YOU WORRIED THAT YOUR FOOD WOULD RUN OUT BEFORE YOU GOT MONEY TO BUY MORE.: NEVER TRUE

## 2025-02-05 SDOH — ECONOMIC STABILITY: FOOD INSECURITY: WITHIN THE PAST 12 MONTHS, THE FOOD YOU BOUGHT JUST DIDN'T LAST AND YOU DIDN'T HAVE MONEY TO GET MORE.: NEVER TRUE

## 2025-02-05 ASSESSMENT — LIFESTYLE VARIABLES
HOW MANY STANDARD DRINKS CONTAINING ALCOHOL DO YOU HAVE ON A TYPICAL DAY: PATIENT DOES NOT DRINK
HOW OFTEN DO YOU HAVE A DRINK CONTAINING ALCOHOL: NEVER

## 2025-02-05 ASSESSMENT — PATIENT HEALTH QUESTIONNAIRE - PHQ9
1. LITTLE INTEREST OR PLEASURE IN DOING THINGS: NOT AT ALL
SUM OF ALL RESPONSES TO PHQ QUESTIONS 1-9: 0
SUM OF ALL RESPONSES TO PHQ QUESTIONS 1-9: 0
SUM OF ALL RESPONSES TO PHQ9 QUESTIONS 1 & 2: 0
2. FEELING DOWN, DEPRESSED OR HOPELESS: NOT AT ALL
SUM OF ALL RESPONSES TO PHQ QUESTIONS 1-9: 0
SUM OF ALL RESPONSES TO PHQ QUESTIONS 1-9: 0

## 2025-02-05 NOTE — PATIENT INSTRUCTIONS
Learning About Being Active as an Older Adult  Why is being active important as you get older?     Being active is one of the best things you can do for your health. And it's never too late to start. Being active--or getting active, if you aren't already--has definite benefits. It can:  Give you more energy,  Keep your mind sharp.  Improve balance to reduce your risk of falls.  Help you manage chronic illness with fewer medicines.  No matter how old you are, how fit you are, or what health problems you have, there is a form of activity that will work for you. And the more physical activity you can do, the better your overall health will be.  What kinds of activity can help you stay healthy?  Being more active will make your daily activities easier. Physical activity includes planned exercise and things you do in daily life. There are four types of activity:  Aerobic.  Doing aerobic activity makes your heart and lungs strong.  Includes walking, dancing, and gardening.  Aim for at least 2½ hours spread throughout the week.  It improves your energy and can help you sleep better.  Muscle-strengthening.  This type of activity can help maintain muscle and strengthen bones.  Includes climbing stairs, using resistance bands, and lifting or carrying heavy loads.  Aim for at least twice a week.  It can help protect the knees and other joints.  Stretching.  Stretching gives you better range of motion in joints and muscles.  Includes upper arm stretches, calf stretches, and gentle yoga.  Aim for at least twice a week, preferably after your muscles are warmed up from other activities.  It can help you function better in daily life.  Balancing.  This helps you stay coordinated and have good posture.  Includes heel-to-toe walking, ann chi, and certain types of yoga.  Aim for at least 3 days a week.  It can reduce your risk of falling.  Even if you have a hard time meeting the recommendations, it's better to be more active

## 2025-02-05 NOTE — PROGRESS NOTES
Helio Cross  Identified pt with two pt identifiers(name and ).  Chief Complaint   Patient presents with    Medicare AWV       1. Have you been to the ER, urgent care clinic since your last visit?  Hospitalized since your last visit? NO    2. Have you seen or consulted any other health care providers outside of the [facility] since your last visit?  Include any pap smears or colon screening. NO      Provider notified of reason for visit, vitals and flowsheets obtained on patients.     Patient received paperwork for advance directive during previous visit but has not completed at this time     Reviewed record In preparation for visit, huddled with provider and have obtained necessary documentation      Health Maintenance Due   Topic    Diabetic Alb to Cr ratio (uACR) test     Annual Wellness Visit (Medicare Advantage)     A1C test (Diabetic or Prediabetic)        Wt Readings from Last 3 Encounters:   25 93.7 kg (206 lb 9.6 oz)   10/07/24 90 kg (198 lb 6.4 oz)   24 89.4 kg (197 lb 3.2 oz)     Temp Readings from Last 3 Encounters:   25 97.9 °F (36.6 °C) (Oral)   10/07/24 98.4 °F (36.9 °C) (Oral)   24 98.1 °F (36.7 °C) (Oral)     BP Readings from Last 3 Encounters:   25 120/70   10/07/24 100/61   24 114/66     Pulse Readings from Last 3 Encounters:   25 61   10/07/24 54   24 54          No data to display                  Learning Assessment:  :         2024    11:10 AM   [facility] AMB LEARNING ASSESSMENT   Primary Learner Patient   level of education GRADUATED HIGH SCHOOL OR GED   Primary Language ENGLISH   Learning Preference DEMONSTRATION   Answered By Patient   Relationship to Learner SELF       Fall Risk Assessment:  :         2025    11:27 AM 2024    11:11 AM 2023     8:35 AM 2023    10:24 AM 3/10/2023     9:44 AM 3/10/2023     9:42 AM 2023    10:26 AM   Amb Fall Risk Assessment and TUG Test   Do you feel unsteady or are 
(chronic kidney disease) stage 2, GFR 60-89 ml/min  N18.2       6. Left elbow tendonitis  M77.8 naproxen (EC-NAPROSYN) 500 MG EC tablet           Return in about 4 months (around 6/5/2025), or if symptoms worsen or fail to improve, for DM; have fasting labs 1 week before appointment.       Subjective     History of Present Illness  The patient is a 69-year-old male who presents for a Medicare annual wellness visit and follow-up evaluation of type 2 diabetes.    He reports a general sense of well-being with no specific complaints of pain or stiffness. He maintains an active lifestyle, engaging in yard work and other physical activities as weather permits. He has a scheduled appointment with his cardiologist, Dr. West in May 2024. He had an ophthalmology consultation last month.     He has been diligently monitoring his blood glucose levels, recording readings of approximately 140 to 150 post-breakfast and around 120 to 130 pre-dinner. He has experienced hypoglycemic episodes on 2 or 3 occasions since his last visit, with one instance occurring at 3:00 AM when his blood glucose level dropped to 62. At that time, he had done more physical activity than usual and had eaten only a salad for dinner. He does not experience polydipsia or polyuria, although he occasionally experiences mild numbness in his toes which resolves upon movement. His current medication regimen includes NovoLog Mix 70/30, administered at doses of 35 units in the morning and 40 units in the evening.    He reports persistent discomfort in his left elbow, which he is unable to fully extend. He found relief with naproxen, but his supply has been exhausted.    Patient's complete Health Risk Assessment and screening values have been reviewed and are found in Flowsheets. The following problems were reviewed today and where indicated follow up appointments were made and/or referrals ordered.    Positive Risk Factor Screenings with Interventions:

## 2025-02-06 LAB
CREAT UR-MCNC: 217 MG/DL
MICROALBUMIN UR-MCNC: 29 MG/DL
MICROALBUMIN/CREAT UR-RTO: 134 MG/G (ref 0–30)
SPECIMEN HOLD: NORMAL

## 2025-02-14 DIAGNOSIS — M77.8 LEFT ELBOW TENDONITIS: ICD-10-CM

## 2025-02-14 RX ORDER — NAPROXEN 500 MG/1
TABLET, DELAYED RELEASE ORAL
Qty: 14 TABLET | Refills: 0 | OUTPATIENT
Start: 2025-02-14

## 2025-02-14 NOTE — TELEPHONE ENCOUNTER
Future Appointments:  Future Appointments   Date Time Provider Department Center   6/11/2025 11:10 AM Nicky Aldrich MD [facility] BS ECC DEP        Last Appointment With Me:  2/5/2025     Requested Prescriptions     Pending Prescriptions Disp Refills    naproxen 500 MG TBEC EC tablet [Pharmacy Med Name: Naproxen DR Oral Tablet Delayed Release 500 MG] 14 tablet 0     Sig: TAKE 1 TABLET BY MOUTH 2 TIMES DAILY (WITH MEALS) FOR 7 DAYS FOR ELBOW PAIN.

## 2025-02-21 DIAGNOSIS — N18.2 TYPE 2 DIABETES MELLITUS WITH STAGE 2 CHRONIC KIDNEY DISEASE, WITH LONG-TERM CURRENT USE OF INSULIN (HCC): ICD-10-CM

## 2025-02-21 DIAGNOSIS — Z79.4 TYPE 2 DIABETES MELLITUS WITH STAGE 2 CHRONIC KIDNEY DISEASE, WITH LONG-TERM CURRENT USE OF INSULIN (HCC): ICD-10-CM

## 2025-02-21 DIAGNOSIS — E11.22 TYPE 2 DIABETES MELLITUS WITH STAGE 2 CHRONIC KIDNEY DISEASE, WITH LONG-TERM CURRENT USE OF INSULIN (HCC): ICD-10-CM

## 2025-02-21 RX ORDER — LANCETS
EACH MISCELLANEOUS
Qty: 200 EACH | Refills: 3 | Status: SHIPPED | OUTPATIENT
Start: 2025-02-21

## 2025-02-21 NOTE — TELEPHONE ENCOUNTER
Future Appointments:  Future Appointments   Date Time Provider Department Center   6/11/2025 11:10 AM Nicky Aldrich MD [facility] BS ECC DEP        Last Appointment With Me:  2/5/2025     Requested Prescriptions     Pending Prescriptions Disp Refills    Accu-Chek Softclix Lancets MISC [Pharmacy Med Name: Accu-Chek Softclix Lancets Miscellaneous] 200 each 3     Sig: TEST BLOOD SUGAR TWO TIMES DAILY

## 2025-06-11 ENCOUNTER — OFFICE VISIT (OUTPATIENT)
Facility: CLINIC | Age: 70
End: 2025-06-11
Payer: MEDICARE

## 2025-06-11 VITALS
TEMPERATURE: 98.3 F | HEART RATE: 66 BPM | SYSTOLIC BLOOD PRESSURE: 120 MMHG | DIASTOLIC BLOOD PRESSURE: 60 MMHG | RESPIRATION RATE: 16 BRPM | BODY MASS INDEX: 26.88 KG/M2 | WEIGHT: 202.8 LBS | HEIGHT: 73 IN | OXYGEN SATURATION: 97 %

## 2025-06-11 DIAGNOSIS — E78.00 HYPERCHOLESTEROLEMIA: ICD-10-CM

## 2025-06-11 DIAGNOSIS — Z79.4 TYPE 2 DIABETES MELLITUS WITH STAGE 3A CHRONIC KIDNEY DISEASE, WITH LONG-TERM CURRENT USE OF INSULIN (HCC): Primary | ICD-10-CM

## 2025-06-11 DIAGNOSIS — E11.22 TYPE 2 DIABETES MELLITUS WITH STAGE 3A CHRONIC KIDNEY DISEASE, WITH LONG-TERM CURRENT USE OF INSULIN (HCC): Primary | ICD-10-CM

## 2025-06-11 DIAGNOSIS — N18.31 TYPE 2 DIABETES MELLITUS WITH STAGE 3A CHRONIC KIDNEY DISEASE, WITH LONG-TERM CURRENT USE OF INSULIN (HCC): Primary | ICD-10-CM

## 2025-06-11 DIAGNOSIS — I10 PRIMARY HYPERTENSION: ICD-10-CM

## 2025-06-11 LAB
ALBUMIN SERPL-MCNC: 3.7 G/DL (ref 3.5–5)
ALBUMIN/GLOB SERPL: 1.2 (ref 1.1–2.2)
ALP SERPL-CCNC: 79 U/L (ref 45–117)
ALT SERPL-CCNC: 29 U/L (ref 12–78)
ANION GAP SERPL CALC-SCNC: 6 MMOL/L (ref 2–12)
AST SERPL-CCNC: 20 U/L (ref 15–37)
BASOPHILS # BLD: 0.08 K/UL (ref 0–0.1)
BASOPHILS NFR BLD: 1.2 % (ref 0–1)
BILIRUB SERPL-MCNC: 0.4 MG/DL (ref 0.2–1)
BUN SERPL-MCNC: 26 MG/DL (ref 6–20)
BUN/CREAT SERPL: 17 (ref 12–20)
CALCIUM SERPL-MCNC: 8.7 MG/DL (ref 8.5–10.1)
CHLORIDE SERPL-SCNC: 114 MMOL/L (ref 97–108)
CHOLEST SERPL-MCNC: 81 MG/DL
CO2 SERPL-SCNC: 22 MMOL/L (ref 21–32)
CREAT SERPL-MCNC: 1.49 MG/DL (ref 0.7–1.3)
DIFFERENTIAL METHOD BLD: ABNORMAL
EOSINOPHIL # BLD: 0.17 K/UL (ref 0–0.4)
EOSINOPHIL NFR BLD: 2.5 % (ref 0–7)
ERYTHROCYTE [DISTWIDTH] IN BLOOD BY AUTOMATED COUNT: 13.3 % (ref 11.5–14.5)
GLOBULIN SER CALC-MCNC: 3.1 G/DL (ref 2–4)
GLUCOSE SERPL-MCNC: 90 MG/DL (ref 65–100)
HBA1C MFR BLD: 8.5 %
HCT VFR BLD AUTO: 36.7 % (ref 36.6–50.3)
HDLC SERPL-MCNC: 37 MG/DL
HDLC SERPL: 2.2 (ref 0–5)
HGB BLD-MCNC: 12.4 G/DL (ref 12.1–17)
IMM GRANULOCYTES # BLD AUTO: 0.02 K/UL (ref 0–0.04)
IMM GRANULOCYTES NFR BLD AUTO: 0.3 % (ref 0–0.5)
LDLC SERPL CALC-MCNC: 29.2 MG/DL (ref 0–100)
LYMPHOCYTES # BLD: 2.05 K/UL (ref 0.8–3.5)
LYMPHOCYTES NFR BLD: 30 % (ref 12–49)
MCH RBC QN AUTO: 32.4 PG (ref 26–34)
MCHC RBC AUTO-ENTMCNC: 33.8 G/DL (ref 30–36.5)
MCV RBC AUTO: 95.8 FL (ref 80–99)
MONOCYTES # BLD: 0.6 K/UL (ref 0–1)
MONOCYTES NFR BLD: 8.8 % (ref 5–13)
NEUTS SEG # BLD: 3.92 K/UL (ref 1.8–8)
NEUTS SEG NFR BLD: 57.2 % (ref 32–75)
NRBC # BLD: 0 K/UL (ref 0–0.01)
NRBC BLD-RTO: 0 PER 100 WBC
PLATELET # BLD AUTO: 175 K/UL (ref 150–400)
PMV BLD AUTO: 11.4 FL (ref 8.9–12.9)
POTASSIUM SERPL-SCNC: 4 MMOL/L (ref 3.5–5.1)
PROT SERPL-MCNC: 6.8 G/DL (ref 6.4–8.2)
RBC # BLD AUTO: 3.83 M/UL (ref 4.1–5.7)
SODIUM SERPL-SCNC: 142 MMOL/L (ref 136–145)
TRIGL SERPL-MCNC: 74 MG/DL
VLDLC SERPL CALC-MCNC: 14.8 MG/DL
WBC # BLD AUTO: 6.8 K/UL (ref 4.1–11.1)

## 2025-06-11 PROCEDURE — G8419 CALC BMI OUT NRM PARAM NOF/U: HCPCS | Performed by: INTERNAL MEDICINE

## 2025-06-11 PROCEDURE — 3046F HEMOGLOBIN A1C LEVEL >9.0%: CPT | Performed by: INTERNAL MEDICINE

## 2025-06-11 PROCEDURE — 3052F HG A1C>EQUAL 8.0%<EQUAL 9.0%: CPT | Performed by: INTERNAL MEDICINE

## 2025-06-11 PROCEDURE — 1123F ACP DISCUSS/DSCN MKR DOCD: CPT | Performed by: INTERNAL MEDICINE

## 2025-06-11 PROCEDURE — 3017F COLORECTAL CA SCREEN DOC REV: CPT | Performed by: INTERNAL MEDICINE

## 2025-06-11 PROCEDURE — 1159F MED LIST DOCD IN RCRD: CPT | Performed by: INTERNAL MEDICINE

## 2025-06-11 PROCEDURE — 1036F TOBACCO NON-USER: CPT | Performed by: INTERNAL MEDICINE

## 2025-06-11 PROCEDURE — G8427 DOCREV CUR MEDS BY ELIG CLIN: HCPCS | Performed by: INTERNAL MEDICINE

## 2025-06-11 PROCEDURE — 2022F DILAT RTA XM EVC RTNOPTHY: CPT | Performed by: INTERNAL MEDICINE

## 2025-06-11 PROCEDURE — 99214 OFFICE O/P EST MOD 30 MIN: CPT | Performed by: INTERNAL MEDICINE

## 2025-06-11 PROCEDURE — 83036 HEMOGLOBIN GLYCOSYLATED A1C: CPT | Performed by: INTERNAL MEDICINE

## 2025-06-11 PROCEDURE — 3074F SYST BP LT 130 MM HG: CPT | Performed by: INTERNAL MEDICINE

## 2025-06-11 PROCEDURE — 1160F RVW MEDS BY RX/DR IN RCRD: CPT | Performed by: INTERNAL MEDICINE

## 2025-06-11 PROCEDURE — 1126F AMNT PAIN NOTED NONE PRSNT: CPT | Performed by: INTERNAL MEDICINE

## 2025-06-11 PROCEDURE — 3078F DIAST BP <80 MM HG: CPT | Performed by: INTERNAL MEDICINE

## 2025-06-11 NOTE — PROGRESS NOTES
Helio Cross  Identified pt with two pt identifiers(name and ).  Chief Complaint   Patient presents with    Diabetes       1. Have you been to the ER, urgent care clinic since your last visit?  Hospitalized since your last visit? NO    2. Have you seen or consulted any other health care providers outside of the [facility] since your last visit?  Include any pap smears or colon screening. NO      Provider notified of reason for visit, vitals and flowsheets obtained on patients.     Patient received paperwork for advance directive during previous visit but has not completed at this time     Reviewed record In preparation for visit, huddled with provider and have obtained necessary documentation      Health Maintenance Due   Topic    Lipids        Wt Readings from Last 3 Encounters:   25 93.7 kg (206 lb 9.6 oz)   10/07/24 90 kg (198 lb 6.4 oz)   24 89.4 kg (197 lb 3.2 oz)     Temp Readings from Last 3 Encounters:   25 97.9 °F (36.6 °C) (Oral)   10/07/24 98.4 °F (36.9 °C) (Oral)   24 98.1 °F (36.7 °C) (Oral)     BP Readings from Last 3 Encounters:   25 120/70   10/07/24 100/61   24 114/66     Pulse Readings from Last 3 Encounters:   25 61   10/07/24 54   24 54          No data to display                  Learning Assessment:  :         2024    11:10 AM   [facility] AMB LEARNING ASSESSMENT   Primary Learner Patient   level of education GRADUATED HIGH SCHOOL OR GED   Primary Language ENGLISH   Learning Preference DEMONSTRATION   Answered By Patient   Relationship to Learner SELF       Fall Risk Assessment:  :         2025    11:27 AM 2024    11:11 AM 2023     8:35 AM 2023    10:24 AM 3/10/2023     9:44 AM 3/10/2023     9:42 AM 2023    10:26 AM   Amb Fall Risk Assessment and TUG Test   Do you feel unsteady or are you worried about falling?  no no no       2 or more falls in past year? no no no       Fall with injury in past year? 
Plan  1. Type 2 diabetes mellitus - A1c 8.5% today. Improving, but not at goal of less than 7.0%.  - Hypoglycemic episodes due to increased physical activity, blood glucose dropped to 44.  - Continue insulin 70/30 40 units before breakfast and before supper.  - Reduce evening insulin to 30 units on days of increased physical activity, limit cookies to 2 during hypoglycemic episodes, with an option for an additional one if necessary.  - Consider adding Januvia or glimepiride if A1c remains around 8%.    Fasting labs today: CMP, CBC, lipid panel.      ICD-10-CM    1. Type 2 diabetes mellitus with stage 3a chronic kidney disease, with long-term current use of insulin (HCC)  E11.22 AMB POC HEMOGLOBIN A1C    N18.31     Z79.4       2. Primary hypertension  I10 Comprehensive Metabolic Panel     CBC with Auto Differential      3. Hypercholesterolemia  E78.00 Lipid Panel           Return in about 4 months (around 10/11/2025), or if symptoms worsen or fail to improve, for DM, HTN, POC A1c.     I have discussed the diagnosis with the patient and the intended plan as seen in the above orders. Patient is in agreement. The patient has received an after-visit summary and questions were answered concerning future plans. I have discussed medication side effects and warnings with the patient as well.    The patient (or guardian, if applicable) and other individuals in attendance with the patient were advised that Artificial Intelligence will be utilized during this visit to record and process the conversation to generate a clinical note. The patient (or guardian, if applicable) and other individuals in attendance at the appointment consented to the use of AI, including the recording.

## 2025-06-15 ENCOUNTER — RESULTS FOLLOW-UP (OUTPATIENT)
Facility: CLINIC | Age: 70
End: 2025-06-15

## 2025-06-23 DIAGNOSIS — Z79.4 TYPE 2 DIABETES MELLITUS WITH STAGE 3A CHRONIC KIDNEY DISEASE, WITH LONG-TERM CURRENT USE OF INSULIN (HCC): ICD-10-CM

## 2025-06-23 DIAGNOSIS — E11.22 TYPE 2 DIABETES MELLITUS WITH STAGE 3A CHRONIC KIDNEY DISEASE, WITH LONG-TERM CURRENT USE OF INSULIN (HCC): ICD-10-CM

## 2025-06-23 DIAGNOSIS — N18.31 TYPE 2 DIABETES MELLITUS WITH STAGE 3A CHRONIC KIDNEY DISEASE, WITH LONG-TERM CURRENT USE OF INSULIN (HCC): ICD-10-CM

## 2025-06-23 DIAGNOSIS — N18.2 BENIGN HYPERTENSION WITH CHRONIC KIDNEY DISEASE, STAGE II: ICD-10-CM

## 2025-06-23 DIAGNOSIS — E78.00 HYPERCHOLESTEROLEMIA: ICD-10-CM

## 2025-06-23 DIAGNOSIS — I12.9 BENIGN HYPERTENSION WITH CHRONIC KIDNEY DISEASE, STAGE II: ICD-10-CM

## 2025-06-23 RX ORDER — ATORVASTATIN CALCIUM 40 MG/1
TABLET, FILM COATED ORAL
Qty: 90 TABLET | Refills: 3 | Status: SHIPPED | OUTPATIENT
Start: 2025-06-23

## 2025-06-23 RX ORDER — METOPROLOL SUCCINATE 25 MG/1
TABLET, EXTENDED RELEASE ORAL
Qty: 90 TABLET | Refills: 3 | Status: SHIPPED | OUTPATIENT
Start: 2025-06-23

## 2025-06-23 NOTE — TELEPHONE ENCOUNTER
PCP: Nicky Aldrich MD     Last appt:  6/11/2025      Future Appointments   Date Time Provider Department Center   10/13/2025 11:10 AM Nicky Aldrich MD [facility] DEP          Requested Prescriptions     Pending Prescriptions Disp Refills    metFORMIN (GLUCOPHAGE) 500 MG tablet [Pharmacy Med Name: metFORMIN HCl Oral Tablet 500 MG] 270 tablet 3     Sig: TAKE 3 TABLETS EVERY DAY WITH DINNER FOR TYPE 2 DIABETES    atorvastatin (LIPITOR) 40 MG tablet [Pharmacy Med Name: Atorvastatin Calcium Oral Tablet 40 MG] 90 tablet 3     Sig: TAKE 1 TABLET EVERY DAY FOR HIGH CHOLESTEROL    metoprolol succinate (TOPROL XL) 25 MG extended release tablet [Pharmacy Med Name: Metoprolol Succinate ER Oral Tablet Extended Release 24 Hour 25 MG] 90 tablet 3     Sig: TAKE 1 TABLET EVERY DAY FOR HIGH BLOOD PRESSURE AND HEART

## 2025-07-20 DIAGNOSIS — E11.22 TYPE 2 DIABETES MELLITUS WITH STAGE 2 CHRONIC KIDNEY DISEASE, WITH LONG-TERM CURRENT USE OF INSULIN (HCC): ICD-10-CM

## 2025-07-20 DIAGNOSIS — N18.2 TYPE 2 DIABETES MELLITUS WITH STAGE 2 CHRONIC KIDNEY DISEASE, WITH LONG-TERM CURRENT USE OF INSULIN (HCC): ICD-10-CM

## 2025-07-20 DIAGNOSIS — Z79.4 TYPE 2 DIABETES MELLITUS WITH STAGE 2 CHRONIC KIDNEY DISEASE, WITH LONG-TERM CURRENT USE OF INSULIN (HCC): ICD-10-CM

## 2025-07-21 RX ORDER — PEN NEEDLE, DIABETIC 32GX 5/32"
NEEDLE, DISPOSABLE MISCELLANEOUS
Qty: 200 EACH | Refills: 3 | Status: SHIPPED | OUTPATIENT
Start: 2025-07-21

## 2025-07-21 NOTE — TELEPHONE ENCOUNTER
PCP: Nicky Aldrich MD     Last appt:  6/11/2025      Future Appointments   Date Time Provider Department Center   10/13/2025 11:10 AM Nicky Aldrich MD [facility] DEP          Requested Prescriptions     Pending Prescriptions Disp Refills    Insulin Pen Needle (DROPLET PEN NEEDLES) 31G X 8 MM MISC [Pharmacy Med Name: DROPLET PEN NEEDLES [id number] 31G X 8 MM] 200 each 3     Sig: USE TWICE DAILY WITH INSULIN PENS AS INSTRUCTED

## 2025-07-30 DIAGNOSIS — N18.31 TYPE 2 DIABETES MELLITUS WITH STAGE 3A CHRONIC KIDNEY DISEASE, WITH LONG-TERM CURRENT USE OF INSULIN (HCC): ICD-10-CM

## 2025-07-30 DIAGNOSIS — Z79.4 TYPE 2 DIABETES MELLITUS WITH STAGE 3A CHRONIC KIDNEY DISEASE, WITH LONG-TERM CURRENT USE OF INSULIN (HCC): ICD-10-CM

## 2025-07-30 DIAGNOSIS — E11.22 TYPE 2 DIABETES MELLITUS WITH STAGE 3A CHRONIC KIDNEY DISEASE, WITH LONG-TERM CURRENT USE OF INSULIN (HCC): ICD-10-CM

## 2025-07-30 RX ORDER — INSULIN ASPART 100 [IU]/ML
INJECTION, SUSPENSION SUBCUTANEOUS
Qty: 60 ML | Refills: 0 | Status: SHIPPED | OUTPATIENT
Start: 2025-07-30

## 2025-07-30 NOTE — TELEPHONE ENCOUNTER
PCP: Nicky Aldrich MD     Last appt:  6/11/2025      Future Appointments   Date Time Provider Department Center   10/13/2025 11:10 AM Nicky Aldrich MD [facility] DEP          Requested Prescriptions     Pending Prescriptions Disp Refills    insulin aspart prot & aspart (NOVOLOG MIX 70/30 FLEXPEN) injection pen [Pharmacy Med Name: NOVOLOG MIX 70/30 PREFILLED FLEXPEN (70-30) 100 UNIT/ML Subcutaneous Suspension Pen-injector] 60 mL 3     Sig: INJECT 35 UNITS INTO THE SKIN 2 TIMES DAILY BEFORE BREAKFAST AND SUPPER

## 2025-08-04 DIAGNOSIS — N18.2 BENIGN HYPERTENSION WITH CHRONIC KIDNEY DISEASE, STAGE II: ICD-10-CM

## 2025-08-04 DIAGNOSIS — I12.9 BENIGN HYPERTENSION WITH CHRONIC KIDNEY DISEASE, STAGE II: ICD-10-CM

## 2025-08-04 RX ORDER — LISINOPRIL 5 MG/1
5 TABLET ORAL DAILY
Qty: 90 TABLET | Refills: 0 | Status: SHIPPED | OUTPATIENT
Start: 2025-08-04

## 2025-08-30 ENCOUNTER — OFFICE VISIT (OUTPATIENT)
Age: 70
End: 2025-08-30

## 2025-08-30 VITALS
HEART RATE: 77 BPM | SYSTOLIC BLOOD PRESSURE: 125 MMHG | OXYGEN SATURATION: 97 % | TEMPERATURE: 98.2 F | RESPIRATION RATE: 16 BRPM | DIASTOLIC BLOOD PRESSURE: 75 MMHG

## 2025-08-30 DIAGNOSIS — S82.432A CLOSED DISPLACED OBLIQUE FRACTURE OF SHAFT OF LEFT FIBULA, INITIAL ENCOUNTER: Primary | ICD-10-CM

## 2025-08-30 DIAGNOSIS — S99.912A INJURY OF LEFT ANKLE, INITIAL ENCOUNTER: ICD-10-CM

## (undated) DEVICE — NC TREK CORONARY DILATATION CATHETER 4.0 MM X 8 MM / RAPID-EXCHANGE: Brand: NC TREK

## (undated) DEVICE — SYR LR LCK 1ML GRAD NSAF 30ML --

## (undated) DEVICE — SINGLE-USE BIOPSY FORCEPS: Brand: RADIAL JAW 4

## (undated) DEVICE — PRESSURE GUIDEWIRE: Brand: COMET

## (undated) DEVICE — STRAP,POSITIONING,KNEE/BODY,FOAM,4X60": Brand: MEDLINE

## (undated) DEVICE — Device

## (undated) DEVICE — CUFF BLD PRSS AD CLTH SGL TB W/ BAYNT CONN ROUNDED CORNER

## (undated) DEVICE — SPLINT WR POS F/ARTERIAL ACC -- BX/10

## (undated) DEVICE — RADIFOCUS OPTITORQUE ANGIOGRAPHIC CATHETER: Brand: OPTITORQUE

## (undated) DEVICE — TREK CORONARY DILATATION CATHETER 2.50 MM X 12 MM / RAPID-EXCHANGE: Brand: TREK

## (undated) DEVICE — GOWN,SIRUS,NONRNF,SETINSLV,2XL,18/CS: Brand: MEDLINE

## (undated) DEVICE — APPLICATOR BNDG 1MM ADH PREMIERPRO EXOFIN

## (undated) DEVICE — SOLUTION IV 500ML 0.9% SOD CHL FLX CONT

## (undated) DEVICE — KENDALL SCD EXPRESS SLEEVES, KNEE LENGTH, MEDIUM: Brand: KENDALL SCD

## (undated) DEVICE — (D)PREP SKN CHLRAPRP APPL 26ML -- CONVERT TO ITEM 371833

## (undated) DEVICE — CATHETER IV 20GA L1.16IN OD1.0414-1.1176MM ID0.762-0.8382MM

## (undated) DEVICE — REM POLYHESIVE ADULT PATIENT RETURN ELECTRODE: Brand: VALLEYLAB

## (undated) DEVICE — CATH GUID .056IN 6FR 150CM -- GUIDELINER V3

## (undated) DEVICE — CATHETER GUID 6FR L100CM DIA0.071IN NYL SHFT RBU4.0 W/O

## (undated) DEVICE — TREK CORONARY DILATATION CATHETER 3.0 MM X 12 MM / RAPID-EXCHANGE: Brand: TREK

## (undated) DEVICE — SOLUTION IRRIGATION NACL 0.9% 1000 ML FLX CONTAINER

## (undated) DEVICE — CATHETER ANGIO PIG 145 DEG 6 FRX110 CM 7.5 CM PERFORMA

## (undated) DEVICE — HANDLE LT SNAP ON ULT DURABLE LENS FOR TRUMPF ALC DISPOSABLE

## (undated) DEVICE — SURGICAL PROCEDURE KIT GEN LAPAROSCOPY LF

## (undated) DEVICE — SET EXT W/2 NEEDLELESS Y-SITES 4-WAY STOPCOCK

## (undated) DEVICE — TUBING PRSS MON L6IN PVC M FEM CONN

## (undated) DEVICE — DRAPE XR C ARM 41X74IN LF --

## (undated) DEVICE — SYRINGE ANGIO 10 CC BRL STD PRNT POLYCARB LT BLU MEDALLION

## (undated) DEVICE — SUTURE VCRL SZ 4-0 L27IN ABSRB UD L19MM PS-2 3/8 CIR PRIM J426H

## (undated) DEVICE — STERILE POLYISOPRENE POWDER-FREE SURGICAL GLOVES: Brand: PROTEXIS

## (undated) DEVICE — KIT ACCS INTRO 4FR L10CM NDL 21GA L7CM GWIRE L40CM

## (undated) DEVICE — 40 MHZ CORONARY IMAGING CATHETER: Brand: OPTICROSS

## (undated) DEVICE — CATHETER URET 4FR L70CM POLYUR OLV FLX TIP KINK RESIST W/

## (undated) DEVICE — TR BAND RADIAL ARTERY COMPRESSION DEVICE: Brand: TR BAND

## (undated) DEVICE — SPECIMEN RETRIEVAL POUCH: Brand: ENDO CATCH GOLD

## (undated) DEVICE — CATH GUID COR EB30 6FR 100CM -- LAUNCHER

## (undated) DEVICE — INFECTION CONTROL KIT SYS

## (undated) DEVICE — TOWEL,OR,DSP,ST,BLUE,STD,2/PK,40PK/CS: Brand: MEDLINE

## (undated) DEVICE — Z DISCONTINUED NO SUB IDED SET EXTN W/ 4 W STPCOCK M SPIN LOK 36IN

## (undated) DEVICE — 1200 GUARD II KIT W/5MM TUBE W/O VAC TUBE: Brand: GUARDIAN

## (undated) DEVICE — CUSTOM KT PTCA INFL DEV K05 00053H

## (undated) DEVICE — PACK PROCEDURE SURG HRT CATH

## (undated) DEVICE — TRAP EVAC NO 5500 DISPOS-A-TRAP

## (undated) DEVICE — CONTAINER SPEC 20 ML LID NEUT BUFF FORMALIN 10 % POLYPR STS

## (undated) DEVICE — BLADE ASSEMB CLP HAIR FINE --

## (undated) DEVICE — IV START KIT: Brand: MEDLINE

## (undated) DEVICE — ENDOSCOPIC KIT COMPLIANCE ENDOKIT

## (undated) DEVICE — GUIDEWIRE VASC L260CM 0.035IN J TIP L3MM PTFE FIX COR NAMIC

## (undated) DEVICE — SYR 10ML LUER LOK 1/5ML GRAD --

## (undated) DEVICE — BLUNT TROCAR WITH THREADED ANCHOR: Brand: VERSAONE

## (undated) DEVICE — SUTURE SZ 0 27IN 5/8 CIR UR-6  TAPER PT VIOLET ABSRB VICRYL J603H

## (undated) DEVICE — SYSTEM REPROC CBL 3 LD DISPOSABLE

## (undated) DEVICE — APPLIER CLP M/L SHFT DIA5MM 15 LIG LIGAMAX 5

## (undated) DEVICE — HI-TORQUE VERSACORE FLOPPY GUIDE WIRE SYSTEM 145 CM: Brand: HI-TORQUE VERSACORE

## (undated) DEVICE — BITEBLOCK 54FR W/ DENT RIM BLOX

## (undated) DEVICE — TUBING O2 STAR LUMEN 7 FT UNIV CONN CRUSH KINK RESIST CLR LF

## (undated) DEVICE — NEEDLE HYPO 25GA L1.5IN BVL ORIENTED ECLIPSE

## (undated) DEVICE — SNARE ENDOSCP AD L240CM LOOP W10MM SHTH DIA2.4MM RND INSUL

## (undated) DEVICE — GLIDESHEATH SLENDER ACCESS KIT: Brand: GLIDESHEATH SLENDER

## (undated) DEVICE — RUNTHROUGH NS EXTRA FLOPPY PTCA GUIDEWIRE: Brand: RUNTHROUGH

## (undated) DEVICE — ANGIOGRAPHY KIT CUST [K0910930B] [MERIT MEDICAL SYSTEMS INC]

## (undated) DEVICE — 3M™ TEGADERM™ TRANSPARENT FILM DRESSING FRAME STYLE, 1626W, 4 IN X 4-3/4 IN (10 CM X 12 CM), 50/CT 4CT/CASE: Brand: 3M™ TEGADERM™